# Patient Record
Sex: FEMALE | Race: OTHER | HISPANIC OR LATINO | URBAN - METROPOLITAN AREA
[De-identification: names, ages, dates, MRNs, and addresses within clinical notes are randomized per-mention and may not be internally consistent; named-entity substitution may affect disease eponyms.]

---

## 2023-09-26 ENCOUNTER — EMERGENCY (EMERGENCY)
Facility: HOSPITAL | Age: 53
LOS: 1 days | Discharge: HOME | End: 2023-09-26
Attending: EMERGENCY MEDICINE
Payer: MEDICAID

## 2023-09-26 VITALS
HEIGHT: 65 IN | OXYGEN SATURATION: 98 % | WEIGHT: 159.39 LBS | RESPIRATION RATE: 20 BRPM | DIASTOLIC BLOOD PRESSURE: 89 MMHG | HEART RATE: 83 BPM | TEMPERATURE: 98 F | SYSTOLIC BLOOD PRESSURE: 159 MMHG

## 2023-09-26 PROCEDURE — 99285 EMERGENCY DEPT VISIT HI MDM: CPT

## 2023-09-26 NOTE — ED ADULT TRIAGE NOTE - CHIEF COMPLAINT QUOTE
generalized abdominal pain with nausea x 2-3 days, bp 200/112 at 9pm, hx of htn, dialysis via Rt chest port

## 2023-09-27 VITALS
RESPIRATION RATE: 18 BRPM | OXYGEN SATURATION: 96 % | HEART RATE: 75 BPM | DIASTOLIC BLOOD PRESSURE: 88 MMHG | SYSTOLIC BLOOD PRESSURE: 165 MMHG | TEMPERATURE: 98 F

## 2023-09-27 LAB
ALBUMIN SERPL ELPH-MCNC: 3.5 G/DL — SIGNIFICANT CHANGE UP (ref 3.5–5)
ALP SERPL-CCNC: 90 U/L — SIGNIFICANT CHANGE UP (ref 40–120)
ALT FLD-CCNC: 25 U/L DA — SIGNIFICANT CHANGE UP (ref 10–60)
ANION GAP SERPL CALC-SCNC: 9 MMOL/L — SIGNIFICANT CHANGE UP (ref 5–17)
APPEARANCE UR: ABNORMAL
AST SERPL-CCNC: 16 U/L — SIGNIFICANT CHANGE UP (ref 10–40)
BACTERIA # UR AUTO: ABNORMAL /HPF
BASOPHILS # BLD AUTO: 0.01 K/UL — SIGNIFICANT CHANGE UP (ref 0–0.2)
BASOPHILS NFR BLD AUTO: 0.2 % — SIGNIFICANT CHANGE UP (ref 0–2)
BILIRUB SERPL-MCNC: 0.7 MG/DL — SIGNIFICANT CHANGE UP (ref 0.2–1.2)
BILIRUB UR-MCNC: NEGATIVE — SIGNIFICANT CHANGE UP
BUN SERPL-MCNC: 23 MG/DL — HIGH (ref 7–18)
CALCIUM SERPL-MCNC: 8.4 MG/DL — SIGNIFICANT CHANGE UP (ref 8.4–10.5)
CHLORIDE SERPL-SCNC: 101 MMOL/L — SIGNIFICANT CHANGE UP (ref 96–108)
CO2 SERPL-SCNC: 30 MMOL/L — SIGNIFICANT CHANGE UP (ref 22–31)
COLOR SPEC: YELLOW — SIGNIFICANT CHANGE UP
CREAT SERPL-MCNC: 6.56 MG/DL — HIGH (ref 0.5–1.3)
DIFF PNL FLD: ABNORMAL
EGFR: 7 ML/MIN/1.73M2 — LOW
EOSINOPHIL # BLD AUTO: 0.19 K/UL — SIGNIFICANT CHANGE UP (ref 0–0.5)
EOSINOPHIL NFR BLD AUTO: 4.1 % — SIGNIFICANT CHANGE UP (ref 0–6)
EPI CELLS # UR: PRESENT
GLUCOSE SERPL-MCNC: 109 MG/DL — HIGH (ref 70–99)
GLUCOSE UR QL: 100 MG/DL
HCT VFR BLD CALC: 29.4 % — LOW (ref 34.5–45)
HGB BLD-MCNC: 9.3 G/DL — LOW (ref 11.5–15.5)
HYALINE CASTS # UR AUTO: PRESENT
IMM GRANULOCYTES NFR BLD AUTO: 0.4 % — SIGNIFICANT CHANGE UP (ref 0–0.9)
KETONES UR-MCNC: NEGATIVE MG/DL — SIGNIFICANT CHANGE UP
LACTATE SERPL-SCNC: 0.7 MMOL/L — SIGNIFICANT CHANGE UP (ref 0.7–2)
LEUKOCYTE ESTERASE UR-ACNC: ABNORMAL
LIDOCAIN IGE QN: 42 U/L — SIGNIFICANT CHANGE UP (ref 13–75)
LYMPHOCYTES # BLD AUTO: 0.81 K/UL — LOW (ref 1–3.3)
LYMPHOCYTES # BLD AUTO: 17.5 % — SIGNIFICANT CHANGE UP (ref 13–44)
MCHC RBC-ENTMCNC: 30.2 PG — SIGNIFICANT CHANGE UP (ref 27–34)
MCHC RBC-ENTMCNC: 31.6 GM/DL — LOW (ref 32–36)
MCV RBC AUTO: 95.5 FL — SIGNIFICANT CHANGE UP (ref 80–100)
MONOCYTES # BLD AUTO: 0.45 K/UL — SIGNIFICANT CHANGE UP (ref 0–0.9)
MONOCYTES NFR BLD AUTO: 9.7 % — SIGNIFICANT CHANGE UP (ref 2–14)
NEUTROPHILS # BLD AUTO: 3.14 K/UL — SIGNIFICANT CHANGE UP (ref 1.8–7.4)
NEUTROPHILS NFR BLD AUTO: 68.1 % — SIGNIFICANT CHANGE UP (ref 43–77)
NITRITE UR-MCNC: NEGATIVE — SIGNIFICANT CHANGE UP
NRBC # BLD: 0 /100 WBCS — SIGNIFICANT CHANGE UP (ref 0–0)
PH UR: 8 — SIGNIFICANT CHANGE UP (ref 5–8)
PLATELET # BLD AUTO: 113 K/UL — LOW (ref 150–400)
POTASSIUM SERPL-MCNC: 3.6 MMOL/L — SIGNIFICANT CHANGE UP (ref 3.5–5.3)
POTASSIUM SERPL-SCNC: 3.6 MMOL/L — SIGNIFICANT CHANGE UP (ref 3.5–5.3)
PROT SERPL-MCNC: 7.1 G/DL — SIGNIFICANT CHANGE UP (ref 6–8.3)
PROT UR-MCNC: 300 MG/DL
RBC # BLD: 3.08 M/UL — LOW (ref 3.8–5.2)
RBC # FLD: 16.4 % — HIGH (ref 10.3–14.5)
RBC CASTS # UR COMP ASSIST: 4 /HPF — SIGNIFICANT CHANGE UP (ref 0–4)
SODIUM SERPL-SCNC: 140 MMOL/L — SIGNIFICANT CHANGE UP (ref 135–145)
SP GR SPEC: 1.01 — SIGNIFICANT CHANGE UP (ref 1–1.03)
TROPONIN I, HIGH SENSITIVITY RESULT: 11 NG/L — SIGNIFICANT CHANGE UP
TROPONIN I, HIGH SENSITIVITY RESULT: 14.3 NG/L — SIGNIFICANT CHANGE UP
UROBILINOGEN FLD QL: 0.2 MG/DL — SIGNIFICANT CHANGE UP (ref 0.2–1)
WBC # BLD: 4.62 K/UL — SIGNIFICANT CHANGE UP (ref 3.8–10.5)
WBC # FLD AUTO: 4.62 K/UL — SIGNIFICANT CHANGE UP (ref 3.8–10.5)
WBC UR QL: 3 /HPF — SIGNIFICANT CHANGE UP (ref 0–5)

## 2023-09-27 PROCEDURE — 83605 ASSAY OF LACTIC ACID: CPT

## 2023-09-27 PROCEDURE — 76705 ECHO EXAM OF ABDOMEN: CPT | Mod: 26

## 2023-09-27 PROCEDURE — 84484 ASSAY OF TROPONIN QUANT: CPT

## 2023-09-27 PROCEDURE — 74176 CT ABD & PELVIS W/O CONTRAST: CPT | Mod: 26,MA

## 2023-09-27 PROCEDURE — 99285 EMERGENCY DEPT VISIT HI MDM: CPT | Mod: 25

## 2023-09-27 PROCEDURE — 74176 CT ABD & PELVIS W/O CONTRAST: CPT | Mod: MA

## 2023-09-27 PROCEDURE — 85025 COMPLETE CBC W/AUTO DIFF WBC: CPT

## 2023-09-27 PROCEDURE — 36415 COLL VENOUS BLD VENIPUNCTURE: CPT

## 2023-09-27 PROCEDURE — 81001 URINALYSIS AUTO W/SCOPE: CPT

## 2023-09-27 PROCEDURE — 87086 URINE CULTURE/COLONY COUNT: CPT

## 2023-09-27 PROCEDURE — 71046 X-RAY EXAM CHEST 2 VIEWS: CPT

## 2023-09-27 PROCEDURE — 93005 ELECTROCARDIOGRAM TRACING: CPT

## 2023-09-27 PROCEDURE — 71046 X-RAY EXAM CHEST 2 VIEWS: CPT | Mod: 26

## 2023-09-27 PROCEDURE — 80053 COMPREHEN METABOLIC PANEL: CPT

## 2023-09-27 PROCEDURE — 83690 ASSAY OF LIPASE: CPT

## 2023-09-27 PROCEDURE — 76705 ECHO EXAM OF ABDOMEN: CPT

## 2023-09-27 RX ORDER — ONDANSETRON 8 MG/1
4 TABLET, FILM COATED ORAL ONCE
Refills: 0 | Status: DISCONTINUED | OUTPATIENT
Start: 2023-09-27 | End: 2023-09-30

## 2023-09-27 RX ORDER — ACETAMINOPHEN 500 MG
650 TABLET ORAL ONCE
Refills: 0 | Status: COMPLETED | OUTPATIENT
Start: 2023-09-27 | End: 2023-09-27

## 2023-09-27 RX ADMIN — Medication 650 MILLIGRAM(S): at 04:36

## 2023-09-27 RX ADMIN — Medication 650 MILLIGRAM(S): at 04:06

## 2023-09-27 NOTE — ED PROVIDER NOTE - OBJECTIVE STATEMENT
53-year-old female with a past medical history of HTN, ESRD on HD Tuesday Thursday Saturday last session of dialysis today. Completed 3-hour session removed 1.8 L presenting to ED for generalized abdominal pain and nausea.  First noticed abd pain, constipation, distention. Endorsed feeling chest discomfort and took her blood pressure noted to be hypertensive to 200s over 110s then noted difficulty breathing. Takes nifedipine 60mg x BID, and labetalol 200mg x 3 daily, compliant with these meds. Makes urine. +passing gas, last normal BM today. Denies vomiting, diarrhea, fever, chills, weakness, urinary symptoms.

## 2023-09-27 NOTE — ED PROVIDER NOTE - PHYSICAL EXAMINATION
Gen: NAD, AOx3, able to make needs known, non-toxic  Head: NCAT  HEENT: EOMI, oral mucosa moist, normal conjunctiva  Lung: CTAB, no respiratory distress, no wheezes/rhonchi/rales B/L, speaking in full sentences  CV: +rt chest wall port, RRR, no murmurs, nonpitting edema to b/l lower extremities   Abd: non distended, soft, nontender, no guarding, no CVA tenderness  MSK: no visible deformities  Neuro: Appears non focal  Skin: Warm, well perfused, no rash  Psych: normal affect

## 2023-09-27 NOTE — ED PROVIDER NOTE - PROGRESS NOTE DETAILS
NAD, well appearing. Signed off care to Dr. CARINE Sommer who will f/u repeat troponin and ultrasound and reassess, anticipate discharge if unremarkable. sono unremarkable. Patient asking for food. home with GI followup

## 2023-09-27 NOTE — ED PROVIDER NOTE - ATTENDING APP SHARED VISIT CONTRIBUTION OF CARE
[id number]. 53-year-old female with history of ESRD on HD Tuesday/Thursday/Saturday (last completed today Tuesday), HTN on nifedipine and labetalol, , presents primarily with epigastric abdominal pain up and out of her stomach x4 days. Described mostly as a gassy feeling and feeling of nausea and constipation, though had a normal bowel movement today. States the pain somewhat worsens when she takes deep breath, though contrary to NP history denies actual chest pain or shortness of breath. Denies fever, leg swelling, recent long distance travel, urinary symptoms, and all other symptoms. Reports normal stress test 1 month ago. Denies family history of CAD. On exam, afebrile, hemodynamically stable, saturating well on room air, NAD, well appearing, sitting comfortably in bed, no WOB/tachypnea, speaking full sentences, head NCAT, EOMI grossly, anicteric, MMM, no JVD, RRR, nml S1/S2, no m/r/g, lungs CTAB, no w/r/r, abd soft, mild right upper quadrant TTP, ND, nml BS, no rebound or guarding, negative [place]'s, AAO, CN's 3-12 grossly intact, BAHENA spontaneously, no leg cyanosis or edema, skin warm, well perfused, no rashes or hives. Noted nonspecific ECG abnormalities, though no prior with which to compare and patient with no actual chest pain or shortness of breath, and nml stress test 1 month ago, with low suspicion for ACS as cause of symptoms. Abdomen nonperitoneal and CT unremarkable. Some possibility of cholecystitis on CT noncontrast, and pending right upper quadrant abdominal ultrasound. Patient with no more pain and no vomiting at this time. NAD, well appearing. Signed off care to Dr. CARINE Sommer who will f/u repeat troponin and ultrasound and reassess, anticipate discharge if unremarkable.

## 2023-09-27 NOTE — ED ADULT NURSE NOTE - NSFALLUNIVINTERV_ED_ALL_ED
Bed/Stretcher in lowest position, wheels locked, appropriate side rails in place/Call bell, personal items and telephone in reach/Instruct patient to call for assistance before getting out of bed/chair/stretcher/Non-slip footwear applied when patient is off stretcher/[place] to call system/Physically safe environment - no spills, clutter or unnecessary equipment/Purposeful proactive rounding/Room/bathroom lighting operational, light cord in reach

## 2023-09-27 NOTE — ED PROVIDER NOTE - PATIENT PORTAL LINK FT
You can access the FollowMyHealth Patient Portal offered by [facility] by registering at the following website: [email or web address]. By joining [facility]’s FollowMyHealth portal, you will also be able to view your health information using other applications (apps) compatible with our system.

## 2023-09-27 NOTE — ED PROVIDER NOTE - NS ED ROS FT
GENERAL: No fever or chills  EYES: No change in vision  HEENT: No trouble swallowing or speaking  CARDIAC: +chest discomfort  PULMONARY: +SOB  GI: +ABD PAIN, +NAUSEA or no vomiting, no diarrhea or constipation  : No changes in urination  SKIN: No rashes  NEURO: No headache, no numbness  MSK: No joint pain  Otherwise as HPI or negative.

## 2023-09-27 NOTE — ED PROVIDER NOTE - NS ED ATTENDING STATEMENT MOD
This was a shared visit with the RAMONA. I reviewed and verified the documentation and independently performed the documented:

## 2023-09-27 NOTE — ED PROVIDER NOTE - CLINICAL SUMMARY MEDICAL DECISION MAKING FREE TEXT BOX
53-year-old female with a past medical history of HTN, ESRD on HD Tuesday Thursday Saturday last session of dialysis today. Completed 3-hour session removed 1.8 L presenting to ED for generalized abdominal pain and nausea.  First noticed abd pain, constipation, distention. Endorsed feeling chest discomfort and took her blood pressure noted to be hypertensive to 200s over 110s then noted difficulty breathing. Takes nifedipine 60mg x BID, and labetalol 200mg x 3 daily, compliant with these meds. Makes urine. +passing gas, last normal BM today. Denies vomiting, diarrhea, fever, chills, weakness, urinary symptoms. PE benign, no focal abd tenderness, will obtain labs r/o infection, electrolyte abnormality, UTI. Less likely ACS but will obtain trop/ekg. Do not suspect dissection pt well appearing with equal distal pulses. Wells low risk do not suspect PE. No cough, lungs CTAB do not suspect PNA. Will obtain labs, urine, CXR, EKG, analgesia, antiemetics, reassess, may require further imaging.

## 2023-09-27 NOTE — ED PROVIDER NOTE - NSFOLLOWUPINSTRUCTIONS_ED_ALL_ED_FT
Dolor abdominal en los adultos  Abdominal Pain, Adult  El dolor en el abdomen (dolor abdominal) puede tener muchas causas. A menudo, el dolor abdominal no es grave y mejora sin tratamiento o con tratamiento en la casa. Sin embargo, a veces el dolor abdominal es grave.    El médico le hará preguntas sobre jim antecedentes médicos y le hará un examen físico para tratar de determinar la causa del dolor abdominal.    Siga estas instrucciones en bardales casa:    Medicamentos    Use los medicamentos de venta jessie y los recetados solamente kim se lo haya indicado el médico.  No tome un laxante a menos que se lo haya indicado el médico.  Instrucciones generales    Controle bardales afección para detectar cualquier cambio.  Judy suficiente líquido kim para mantener la orina de color amarillo pálido.  Concurra a todas las visitas de seguimiento kim se lo haya indicado el médico. [place] es importante.  Comuníquese con un médico si:  El dolor abdominal cambia o empeora.  No tiene apetito o baja de peso sin proponérselo.  Está estreñido o tiene diarrea kathy más de 2 o 3 martha.  Tiene dolor cuando orina o defeca.  El dolor abdominal lo despierta de noche.  El dolor empeora con las comidas, después de comer o con determinados alimentos.  Tiene vómitos y no puede retener nada de lo que ingiere.  Tiene fiebre.  Observa cale en la orina.  Solicite ayuda inmediatamente si:  El dolor no desaparece tan pronto kim el médico le dijo que era esperable.  No puede dejar de vomitar.  El dolor se siente solo en zonas del abdomen, kim el lado derecho o la parte inferior izquierda del abdomen. Si se localiza en la alondra derecha, posiblemente podría tratarse de apendicitis.  Las heces son sanguinolentas o de color natalia, o de aspecto alquitranado.  Tiene dolor intenso, cólicos o distensión abdominal.  Tiene signos de deshidratación, kim los siguientes:  Orina de color oscuro, muy escasa o falta de orina.  Labios agrietados.  Sequedad de boca.  Ojos hundidos.  Somnolencia.  Debilidad.  Tiene dificultad para respirar o dolor en el pecho.  Resumen  A menudo, el dolor abdominal no es grave y mejora sin tratamiento o con tratamiento en la casa. Sin embargo, a veces el dolor abdominal es grave.  Controle bardales afección para detectar cualquier cambio.  Use los medicamentos de venta jessie y los recetados solamente kim se lo haya indicado el médico.  Comuníquese con un médico si el dolor abdominal cambia o empeora.  Busque ayuda de inmediato si tiene dolor intenso, cólicos o distensión abdominal.  Esta información no tiene kim fin reemplazar el consejo del médico. Asegúrese de hacerle al médico cualquier pregunta que tenga.

## 2023-09-28 LAB
CULTURE RESULTS: SIGNIFICANT CHANGE UP
SPECIMEN SOURCE: SIGNIFICANT CHANGE UP

## 2023-11-01 ENCOUNTER — INPATIENT (INPATIENT)
Facility: HOSPITAL | Age: 53
LOS: 6 days | Discharge: ANOTHER HOSPITAL | DRG: 305 | End: 2023-11-08
Attending: INTERNAL MEDICINE | Admitting: INTERNAL MEDICINE
Payer: MEDICAID

## 2023-11-01 VITALS
HEART RATE: 98 BPM | WEIGHT: 161.38 LBS | TEMPERATURE: 99 F | SYSTOLIC BLOOD PRESSURE: 168 MMHG | RESPIRATION RATE: 24 BRPM | HEIGHT: 65 IN | OXYGEN SATURATION: 92 % | DIASTOLIC BLOOD PRESSURE: 83 MMHG

## 2023-11-01 DIAGNOSIS — J81.0 ACUTE PULMONARY EDEMA: ICD-10-CM

## 2023-11-01 LAB
ALBUMIN SERPL ELPH-MCNC: 3.3 G/DL — LOW (ref 3.5–5)
ALBUMIN SERPL ELPH-MCNC: 3.3 G/DL — LOW (ref 3.5–5)
ALP SERPL-CCNC: 83 U/L — SIGNIFICANT CHANGE UP (ref 40–120)
ALP SERPL-CCNC: 83 U/L — SIGNIFICANT CHANGE UP (ref 40–120)
ALT FLD-CCNC: 30 U/L DA — SIGNIFICANT CHANGE UP (ref 10–60)
ALT FLD-CCNC: 30 U/L DA — SIGNIFICANT CHANGE UP (ref 10–60)
ANION GAP SERPL CALC-SCNC: 7 MMOL/L — SIGNIFICANT CHANGE UP (ref 5–17)
ANION GAP SERPL CALC-SCNC: 7 MMOL/L — SIGNIFICANT CHANGE UP (ref 5–17)
APPEARANCE UR: CLEAR — SIGNIFICANT CHANGE UP
APPEARANCE UR: CLEAR — SIGNIFICANT CHANGE UP
AST SERPL-CCNC: 18 U/L — SIGNIFICANT CHANGE UP (ref 10–40)
AST SERPL-CCNC: 18 U/L — SIGNIFICANT CHANGE UP (ref 10–40)
BACTERIA # UR AUTO: ABNORMAL /HPF
BACTERIA # UR AUTO: ABNORMAL /HPF
BASOPHILS # BLD AUTO: 0.02 K/UL — SIGNIFICANT CHANGE UP (ref 0–0.2)
BASOPHILS # BLD AUTO: 0.02 K/UL — SIGNIFICANT CHANGE UP (ref 0–0.2)
BASOPHILS NFR BLD AUTO: 0.4 % — SIGNIFICANT CHANGE UP (ref 0–2)
BASOPHILS NFR BLD AUTO: 0.4 % — SIGNIFICANT CHANGE UP (ref 0–2)
BILIRUB SERPL-MCNC: 0.9 MG/DL — SIGNIFICANT CHANGE UP (ref 0.2–1.2)
BILIRUB SERPL-MCNC: 0.9 MG/DL — SIGNIFICANT CHANGE UP (ref 0.2–1.2)
BILIRUB UR-MCNC: NEGATIVE — SIGNIFICANT CHANGE UP
BILIRUB UR-MCNC: NEGATIVE — SIGNIFICANT CHANGE UP
BUN SERPL-MCNC: 41 MG/DL — HIGH (ref 7–18)
BUN SERPL-MCNC: 41 MG/DL — HIGH (ref 7–18)
CALCIUM SERPL-MCNC: 8.8 MG/DL — SIGNIFICANT CHANGE UP (ref 8.4–10.5)
CALCIUM SERPL-MCNC: 8.8 MG/DL — SIGNIFICANT CHANGE UP (ref 8.4–10.5)
CHLORIDE SERPL-SCNC: 103 MMOL/L — SIGNIFICANT CHANGE UP (ref 96–108)
CHLORIDE SERPL-SCNC: 103 MMOL/L — SIGNIFICANT CHANGE UP (ref 96–108)
CO2 SERPL-SCNC: 28 MMOL/L — SIGNIFICANT CHANGE UP (ref 22–31)
CO2 SERPL-SCNC: 28 MMOL/L — SIGNIFICANT CHANGE UP (ref 22–31)
COLOR SPEC: YELLOW — SIGNIFICANT CHANGE UP
COLOR SPEC: YELLOW — SIGNIFICANT CHANGE UP
COMMENT - URINE: SIGNIFICANT CHANGE UP
COMMENT - URINE: SIGNIFICANT CHANGE UP
CREAT SERPL-MCNC: 8.21 MG/DL — HIGH (ref 0.5–1.3)
CREAT SERPL-MCNC: 8.21 MG/DL — HIGH (ref 0.5–1.3)
DIFF PNL FLD: ABNORMAL
DIFF PNL FLD: ABNORMAL
EGFR: 5 ML/MIN/1.73M2 — LOW
EGFR: 5 ML/MIN/1.73M2 — LOW
EOSINOPHIL # BLD AUTO: 0.25 K/UL — SIGNIFICANT CHANGE UP (ref 0–0.5)
EOSINOPHIL # BLD AUTO: 0.25 K/UL — SIGNIFICANT CHANGE UP (ref 0–0.5)
EOSINOPHIL NFR BLD AUTO: 5.4 % — SIGNIFICANT CHANGE UP (ref 0–6)
EOSINOPHIL NFR BLD AUTO: 5.4 % — SIGNIFICANT CHANGE UP (ref 0–6)
EPI CELLS # UR: PRESENT
EPI CELLS # UR: PRESENT
GLUCOSE SERPL-MCNC: 112 MG/DL — HIGH (ref 70–99)
GLUCOSE SERPL-MCNC: 112 MG/DL — HIGH (ref 70–99)
GLUCOSE UR QL: 100 MG/DL
GLUCOSE UR QL: 100 MG/DL
GRAN CASTS # UR COMP ASSIST: PRESENT
GRAN CASTS # UR COMP ASSIST: PRESENT
HCG SERPL-ACNC: 2 MIU/ML — SIGNIFICANT CHANGE UP
HCG SERPL-ACNC: 2 MIU/ML — SIGNIFICANT CHANGE UP
HCG UR QL: NEGATIVE — SIGNIFICANT CHANGE UP
HCG UR QL: NEGATIVE — SIGNIFICANT CHANGE UP
HCT VFR BLD CALC: 34.7 % — SIGNIFICANT CHANGE UP (ref 34.5–45)
HCT VFR BLD CALC: 34.7 % — SIGNIFICANT CHANGE UP (ref 34.5–45)
HGB BLD-MCNC: 10.7 G/DL — LOW (ref 11.5–15.5)
HGB BLD-MCNC: 10.7 G/DL — LOW (ref 11.5–15.5)
IMM GRANULOCYTES NFR BLD AUTO: 1.3 % — HIGH (ref 0–0.9)
IMM GRANULOCYTES NFR BLD AUTO: 1.3 % — HIGH (ref 0–0.9)
KETONES UR-MCNC: NEGATIVE MG/DL — SIGNIFICANT CHANGE UP
KETONES UR-MCNC: NEGATIVE MG/DL — SIGNIFICANT CHANGE UP
LEUKOCYTE ESTERASE UR-ACNC: ABNORMAL
LEUKOCYTE ESTERASE UR-ACNC: ABNORMAL
LIDOCAIN IGE QN: 35 U/L — SIGNIFICANT CHANGE UP (ref 13–75)
LIDOCAIN IGE QN: 35 U/L — SIGNIFICANT CHANGE UP (ref 13–75)
LYMPHOCYTES # BLD AUTO: 0.76 K/UL — LOW (ref 1–3.3)
LYMPHOCYTES # BLD AUTO: 0.76 K/UL — LOW (ref 1–3.3)
LYMPHOCYTES # BLD AUTO: 16.3 % — SIGNIFICANT CHANGE UP (ref 13–44)
LYMPHOCYTES # BLD AUTO: 16.3 % — SIGNIFICANT CHANGE UP (ref 13–44)
MAGNESIUM SERPL-MCNC: 2.2 MG/DL — SIGNIFICANT CHANGE UP (ref 1.6–2.6)
MAGNESIUM SERPL-MCNC: 2.2 MG/DL — SIGNIFICANT CHANGE UP (ref 1.6–2.6)
MCHC RBC-ENTMCNC: 28.8 PG — SIGNIFICANT CHANGE UP (ref 27–34)
MCHC RBC-ENTMCNC: 28.8 PG — SIGNIFICANT CHANGE UP (ref 27–34)
MCHC RBC-ENTMCNC: 30.8 GM/DL — LOW (ref 32–36)
MCHC RBC-ENTMCNC: 30.8 GM/DL — LOW (ref 32–36)
MCV RBC AUTO: 93.5 FL — SIGNIFICANT CHANGE UP (ref 80–100)
MCV RBC AUTO: 93.5 FL — SIGNIFICANT CHANGE UP (ref 80–100)
MONOCYTES # BLD AUTO: 0.42 K/UL — SIGNIFICANT CHANGE UP (ref 0–0.9)
MONOCYTES # BLD AUTO: 0.42 K/UL — SIGNIFICANT CHANGE UP (ref 0–0.9)
MONOCYTES NFR BLD AUTO: 9 % — SIGNIFICANT CHANGE UP (ref 2–14)
MONOCYTES NFR BLD AUTO: 9 % — SIGNIFICANT CHANGE UP (ref 2–14)
NEUTROPHILS # BLD AUTO: 3.15 K/UL — SIGNIFICANT CHANGE UP (ref 1.8–7.4)
NEUTROPHILS # BLD AUTO: 3.15 K/UL — SIGNIFICANT CHANGE UP (ref 1.8–7.4)
NEUTROPHILS NFR BLD AUTO: 67.6 % — SIGNIFICANT CHANGE UP (ref 43–77)
NEUTROPHILS NFR BLD AUTO: 67.6 % — SIGNIFICANT CHANGE UP (ref 43–77)
NITRITE UR-MCNC: NEGATIVE — SIGNIFICANT CHANGE UP
NITRITE UR-MCNC: NEGATIVE — SIGNIFICANT CHANGE UP
NRBC # BLD: 0 /100 WBCS — SIGNIFICANT CHANGE UP (ref 0–0)
NRBC # BLD: 0 /100 WBCS — SIGNIFICANT CHANGE UP (ref 0–0)
NT-PROBNP SERPL-SCNC: HIGH PG/ML (ref 0–125)
NT-PROBNP SERPL-SCNC: HIGH PG/ML (ref 0–125)
PH UR: 8 — SIGNIFICANT CHANGE UP (ref 5–8)
PH UR: 8 — SIGNIFICANT CHANGE UP (ref 5–8)
PLATELET # BLD AUTO: 123 K/UL — LOW (ref 150–400)
PLATELET # BLD AUTO: 123 K/UL — LOW (ref 150–400)
POTASSIUM SERPL-MCNC: 4.4 MMOL/L — SIGNIFICANT CHANGE UP (ref 3.5–5.3)
POTASSIUM SERPL-MCNC: 4.4 MMOL/L — SIGNIFICANT CHANGE UP (ref 3.5–5.3)
POTASSIUM SERPL-SCNC: 4.4 MMOL/L — SIGNIFICANT CHANGE UP (ref 3.5–5.3)
POTASSIUM SERPL-SCNC: 4.4 MMOL/L — SIGNIFICANT CHANGE UP (ref 3.5–5.3)
PROT SERPL-MCNC: 6.8 G/DL — SIGNIFICANT CHANGE UP (ref 6–8.3)
PROT SERPL-MCNC: 6.8 G/DL — SIGNIFICANT CHANGE UP (ref 6–8.3)
PROT UR-MCNC: 300 MG/DL
PROT UR-MCNC: 300 MG/DL
RBC # BLD: 3.71 M/UL — LOW (ref 3.8–5.2)
RBC # BLD: 3.71 M/UL — LOW (ref 3.8–5.2)
RBC # FLD: 15.9 % — HIGH (ref 10.3–14.5)
RBC # FLD: 15.9 % — HIGH (ref 10.3–14.5)
RBC CASTS # UR COMP ASSIST: 12 /HPF — HIGH (ref 0–4)
RBC CASTS # UR COMP ASSIST: 12 /HPF — HIGH (ref 0–4)
SODIUM SERPL-SCNC: 138 MMOL/L — SIGNIFICANT CHANGE UP (ref 135–145)
SODIUM SERPL-SCNC: 138 MMOL/L — SIGNIFICANT CHANGE UP (ref 135–145)
SP GR SPEC: 1.01 — SIGNIFICANT CHANGE UP (ref 1–1.03)
SP GR SPEC: 1.01 — SIGNIFICANT CHANGE UP (ref 1–1.03)
TROPONIN I, HIGH SENSITIVITY RESULT: 24.2 NG/L — SIGNIFICANT CHANGE UP
TROPONIN I, HIGH SENSITIVITY RESULT: 24.2 NG/L — SIGNIFICANT CHANGE UP
UROBILINOGEN FLD QL: 0.2 MG/DL — SIGNIFICANT CHANGE UP (ref 0.2–1)
UROBILINOGEN FLD QL: 0.2 MG/DL — SIGNIFICANT CHANGE UP (ref 0.2–1)
WBC # BLD: 4.66 K/UL — SIGNIFICANT CHANGE UP (ref 3.8–10.5)
WBC # BLD: 4.66 K/UL — SIGNIFICANT CHANGE UP (ref 3.8–10.5)
WBC # FLD AUTO: 4.66 K/UL — SIGNIFICANT CHANGE UP (ref 3.8–10.5)
WBC # FLD AUTO: 4.66 K/UL — SIGNIFICANT CHANGE UP (ref 3.8–10.5)
WBC UR QL: 8 /HPF — HIGH (ref 0–5)
WBC UR QL: 8 /HPF — HIGH (ref 0–5)

## 2023-11-01 PROCEDURE — 71045 X-RAY EXAM CHEST 1 VIEW: CPT | Mod: 26

## 2023-11-01 PROCEDURE — 99285 EMERGENCY DEPT VISIT HI MDM: CPT

## 2023-11-01 PROCEDURE — 71250 CT THORAX DX C-: CPT | Mod: 26,MA

## 2023-11-01 RX ORDER — FUROSEMIDE 40 MG
80 TABLET ORAL
Refills: 0 | Status: DISCONTINUED | OUTPATIENT
Start: 2023-11-02 | End: 2023-11-02

## 2023-11-01 RX ORDER — FUROSEMIDE 40 MG
80 TABLET ORAL ONCE
Refills: 0 | Status: COMPLETED | OUTPATIENT
Start: 2023-11-01 | End: 2023-11-01

## 2023-11-01 RX ORDER — MORPHINE SULFATE 50 MG/1
4 CAPSULE, EXTENDED RELEASE ORAL ONCE
Refills: 0 | Status: DISCONTINUED | OUTPATIENT
Start: 2023-11-01 | End: 2023-11-01

## 2023-11-01 RX ADMIN — Medication 80 MILLIGRAM(S): at 21:51

## 2023-11-01 NOTE — ED ADULT NURSE NOTE - OBJECTIVE STATEMENT
pt is here for difficulty breathing.  pt stated that difficulty breathing x 5 days, dry cough x10 days, dialysis port to right chest wall T/TH/SAT, mild chest pain with cough, denied fever or chills,

## 2023-11-01 NOTE — ED ADULT NURSE NOTE - NSFALLUNIVINTERV_ED_ALL_ED
Bed/Stretcher in lowest position, wheels locked, appropriate side rails in place/Call bell, personal items and telephone in reach/Instruct patient to call for assistance before getting out of bed/chair/stretcher/Non-slip footwear applied when patient is off stretcher/[place] to call system/Physically safe environment - no spills, clutter or unnecessary equipment/Purposeful proactive rounding/Room/bathroom lighting operational, light cord in reach

## 2023-11-01 NOTE — ED PROVIDER NOTE - ALLERGIC/IMMUNOLOGIC NEGATIVE STATEMENT, MLM
No no dermatitis, no environmental allergies, no food allergies, no immunosuppressive disorder, and no pruritus.

## 2023-11-01 NOTE — ED ADULT NURSE NOTE - TEMPLATE LIST FOR HEAD TO TOE ASSESSMENT
Called number on chart to contact Ysabel Tiwari, pt son. Lady that answered phone said that he was not there. Asked this person to please have Caleb Fierro call me about his mother. She states, \"I will if I see him. \"  Explained that it was important that I speak with him regarding us hearing back from [facility] about transfer. Respiratory

## 2023-11-01 NOTE — ED PROVIDER NOTE - OBJECTIVE STATEMENT
Patient is a 54 y/o female with a pertinent PMHx of HTN, ESRD, dialysis presents to the ED c/o SOB and nonproductive cough for the past two weeks. Patient also reports that her blood pressure is out of control despite taking metoprolol and metformin. Patient had intermittent left sided chest pain. Patient denies fever, headache, vomiting, diarrhea, and all other acute complaints.

## 2023-11-01 NOTE — ED PROVIDER NOTE - CLINICAL SUMMARY MEDICAL DECISION MAKING FREE TEXT BOX
Patient is a 52 y/o female diarrhea patient here for SOB. Concern for fluid overload, pneumonia, or ACS. Will perform chest x-ray, labs, EKG, and reassess.

## 2023-11-02 DIAGNOSIS — N18.6 END STAGE RENAL DISEASE: ICD-10-CM

## 2023-11-02 DIAGNOSIS — R06.00 DYSPNEA, UNSPECIFIED: ICD-10-CM

## 2023-11-02 DIAGNOSIS — Z29.9 ENCOUNTER FOR PROPHYLACTIC MEASURES, UNSPECIFIED: ICD-10-CM

## 2023-11-02 DIAGNOSIS — J81.0 ACUTE PULMONARY EDEMA: ICD-10-CM

## 2023-11-02 DIAGNOSIS — N39.0 URINARY TRACT INFECTION, SITE NOT SPECIFIED: ICD-10-CM

## 2023-11-02 DIAGNOSIS — I30.9 ACUTE PERICARDITIS, UNSPECIFIED: ICD-10-CM

## 2023-11-02 DIAGNOSIS — I10 ESSENTIAL (PRIMARY) HYPERTENSION: ICD-10-CM

## 2023-11-02 LAB
ANION GAP SERPL CALC-SCNC: 9 MMOL/L — SIGNIFICANT CHANGE UP (ref 5–17)
ANION GAP SERPL CALC-SCNC: 9 MMOL/L — SIGNIFICANT CHANGE UP (ref 5–17)
BASOPHILS # BLD AUTO: 0.02 K/UL — SIGNIFICANT CHANGE UP (ref 0–0.2)
BASOPHILS # BLD AUTO: 0.02 K/UL — SIGNIFICANT CHANGE UP (ref 0–0.2)
BASOPHILS NFR BLD AUTO: 0.5 % — SIGNIFICANT CHANGE UP (ref 0–2)
BASOPHILS NFR BLD AUTO: 0.5 % — SIGNIFICANT CHANGE UP (ref 0–2)
BUN SERPL-MCNC: 48 MG/DL — HIGH (ref 7–18)
BUN SERPL-MCNC: 48 MG/DL — HIGH (ref 7–18)
CALCIUM SERPL-MCNC: 8.3 MG/DL — LOW (ref 8.4–10.5)
CALCIUM SERPL-MCNC: 8.3 MG/DL — LOW (ref 8.4–10.5)
CHLORIDE SERPL-SCNC: 104 MMOL/L — SIGNIFICANT CHANGE UP (ref 96–108)
CHLORIDE SERPL-SCNC: 104 MMOL/L — SIGNIFICANT CHANGE UP (ref 96–108)
CO2 SERPL-SCNC: 28 MMOL/L — SIGNIFICANT CHANGE UP (ref 22–31)
CO2 SERPL-SCNC: 28 MMOL/L — SIGNIFICANT CHANGE UP (ref 22–31)
CREAT SERPL-MCNC: 9.62 MG/DL — HIGH (ref 0.5–1.3)
CREAT SERPL-MCNC: 9.62 MG/DL — HIGH (ref 0.5–1.3)
EGFR: 4 ML/MIN/1.73M2 — LOW
EGFR: 4 ML/MIN/1.73M2 — LOW
EOSINOPHIL # BLD AUTO: 0.18 K/UL — SIGNIFICANT CHANGE UP (ref 0–0.5)
EOSINOPHIL # BLD AUTO: 0.18 K/UL — SIGNIFICANT CHANGE UP (ref 0–0.5)
EOSINOPHIL NFR BLD AUTO: 4.1 % — SIGNIFICANT CHANGE UP (ref 0–6)
EOSINOPHIL NFR BLD AUTO: 4.1 % — SIGNIFICANT CHANGE UP (ref 0–6)
GLUCOSE SERPL-MCNC: 93 MG/DL — SIGNIFICANT CHANGE UP (ref 70–99)
GLUCOSE SERPL-MCNC: 93 MG/DL — SIGNIFICANT CHANGE UP (ref 70–99)
HBV SURFACE AG SER-ACNC: SIGNIFICANT CHANGE UP
HBV SURFACE AG SER-ACNC: SIGNIFICANT CHANGE UP
HCT VFR BLD CALC: 31 % — LOW (ref 34.5–45)
HCT VFR BLD CALC: 31 % — LOW (ref 34.5–45)
HGB BLD-MCNC: 9.7 G/DL — LOW (ref 11.5–15.5)
HGB BLD-MCNC: 9.7 G/DL — LOW (ref 11.5–15.5)
IMM GRANULOCYTES NFR BLD AUTO: 1.1 % — HIGH (ref 0–0.9)
IMM GRANULOCYTES NFR BLD AUTO: 1.1 % — HIGH (ref 0–0.9)
LYMPHOCYTES # BLD AUTO: 1.02 K/UL — SIGNIFICANT CHANGE UP (ref 1–3.3)
LYMPHOCYTES # BLD AUTO: 1.02 K/UL — SIGNIFICANT CHANGE UP (ref 1–3.3)
LYMPHOCYTES # BLD AUTO: 23.2 % — SIGNIFICANT CHANGE UP (ref 13–44)
LYMPHOCYTES # BLD AUTO: 23.2 % — SIGNIFICANT CHANGE UP (ref 13–44)
MAGNESIUM SERPL-MCNC: 2.4 MG/DL — SIGNIFICANT CHANGE UP (ref 1.6–2.6)
MAGNESIUM SERPL-MCNC: 2.4 MG/DL — SIGNIFICANT CHANGE UP (ref 1.6–2.6)
MCHC RBC-ENTMCNC: 28.9 PG — SIGNIFICANT CHANGE UP (ref 27–34)
MCHC RBC-ENTMCNC: 28.9 PG — SIGNIFICANT CHANGE UP (ref 27–34)
MCHC RBC-ENTMCNC: 31.3 GM/DL — LOW (ref 32–36)
MCHC RBC-ENTMCNC: 31.3 GM/DL — LOW (ref 32–36)
MCV RBC AUTO: 92.3 FL — SIGNIFICANT CHANGE UP (ref 80–100)
MCV RBC AUTO: 92.3 FL — SIGNIFICANT CHANGE UP (ref 80–100)
MONOCYTES # BLD AUTO: 0.45 K/UL — SIGNIFICANT CHANGE UP (ref 0–0.9)
MONOCYTES # BLD AUTO: 0.45 K/UL — SIGNIFICANT CHANGE UP (ref 0–0.9)
MONOCYTES NFR BLD AUTO: 10.3 % — SIGNIFICANT CHANGE UP (ref 2–14)
MONOCYTES NFR BLD AUTO: 10.3 % — SIGNIFICANT CHANGE UP (ref 2–14)
NEUTROPHILS # BLD AUTO: 2.67 K/UL — SIGNIFICANT CHANGE UP (ref 1.8–7.4)
NEUTROPHILS # BLD AUTO: 2.67 K/UL — SIGNIFICANT CHANGE UP (ref 1.8–7.4)
NEUTROPHILS NFR BLD AUTO: 60.8 % — SIGNIFICANT CHANGE UP (ref 43–77)
NEUTROPHILS NFR BLD AUTO: 60.8 % — SIGNIFICANT CHANGE UP (ref 43–77)
NRBC # BLD: 0 /100 WBCS — SIGNIFICANT CHANGE UP (ref 0–0)
NRBC # BLD: 0 /100 WBCS — SIGNIFICANT CHANGE UP (ref 0–0)
PHOSPHATE SERPL-MCNC: 4.8 MG/DL — HIGH (ref 2.5–4.5)
PHOSPHATE SERPL-MCNC: 4.8 MG/DL — HIGH (ref 2.5–4.5)
PLATELET # BLD AUTO: 115 K/UL — LOW (ref 150–400)
PLATELET # BLD AUTO: 115 K/UL — LOW (ref 150–400)
POTASSIUM SERPL-MCNC: 3.8 MMOL/L — SIGNIFICANT CHANGE UP (ref 3.5–5.3)
POTASSIUM SERPL-MCNC: 3.8 MMOL/L — SIGNIFICANT CHANGE UP (ref 3.5–5.3)
POTASSIUM SERPL-SCNC: 3.8 MMOL/L — SIGNIFICANT CHANGE UP (ref 3.5–5.3)
POTASSIUM SERPL-SCNC: 3.8 MMOL/L — SIGNIFICANT CHANGE UP (ref 3.5–5.3)
RBC # BLD: 3.36 M/UL — LOW (ref 3.8–5.2)
RBC # BLD: 3.36 M/UL — LOW (ref 3.8–5.2)
RBC # FLD: 15.9 % — HIGH (ref 10.3–14.5)
RBC # FLD: 15.9 % — HIGH (ref 10.3–14.5)
SODIUM SERPL-SCNC: 141 MMOL/L — SIGNIFICANT CHANGE UP (ref 135–145)
SODIUM SERPL-SCNC: 141 MMOL/L — SIGNIFICANT CHANGE UP (ref 135–145)
TROPONIN I, HIGH SENSITIVITY RESULT: 21.2 NG/L — SIGNIFICANT CHANGE UP
TROPONIN I, HIGH SENSITIVITY RESULT: 21.2 NG/L — SIGNIFICANT CHANGE UP
WBC # BLD: 4.39 K/UL — SIGNIFICANT CHANGE UP (ref 3.8–10.5)
WBC # BLD: 4.39 K/UL — SIGNIFICANT CHANGE UP (ref 3.8–10.5)
WBC # FLD AUTO: 4.39 K/UL — SIGNIFICANT CHANGE UP (ref 3.8–10.5)
WBC # FLD AUTO: 4.39 K/UL — SIGNIFICANT CHANGE UP (ref 3.8–10.5)

## 2023-11-02 PROCEDURE — 93010 ELECTROCARDIOGRAM REPORT: CPT

## 2023-11-02 RX ORDER — ONDANSETRON 8 MG/1
4 TABLET, FILM COATED ORAL EVERY 8 HOURS
Refills: 0 | Status: DISCONTINUED | OUTPATIENT
Start: 2023-11-02 | End: 2023-11-08

## 2023-11-02 RX ORDER — LABETALOL HCL 100 MG
300 TABLET ORAL EVERY 8 HOURS
Refills: 0 | Status: DISCONTINUED | OUTPATIENT
Start: 2023-11-02 | End: 2023-11-08

## 2023-11-02 RX ORDER — NIFEDIPINE 30 MG
60 TABLET, EXTENDED RELEASE 24 HR ORAL
Refills: 0 | Status: DISCONTINUED | OUTPATIENT
Start: 2023-11-02 | End: 2023-11-08

## 2023-11-02 RX ORDER — LABETALOL HCL 100 MG
100 TABLET ORAL THREE TIMES A DAY
Refills: 0 | Status: DISCONTINUED | OUTPATIENT
Start: 2023-11-02 | End: 2023-11-02

## 2023-11-02 RX ORDER — INFLUENZA VIRUS VACCINE 15; 15; 15; 15 UG/.5ML; UG/.5ML; UG/.5ML; UG/.5ML
0.5 SUSPENSION INTRAMUSCULAR ONCE
Refills: 0 | Status: DISCONTINUED | OUTPATIENT
Start: 2023-11-02 | End: 2023-11-08

## 2023-11-02 RX ORDER — FUROSEMIDE 40 MG
80 TABLET ORAL
Refills: 0 | Status: DISCONTINUED | OUTPATIENT
Start: 2023-11-02 | End: 2023-11-08

## 2023-11-02 RX ORDER — HEPARIN SODIUM 5000 [USP'U]/ML
5000 INJECTION INTRAVENOUS; SUBCUTANEOUS EVERY 8 HOURS
Refills: 0 | Status: DISCONTINUED | OUTPATIENT
Start: 2023-11-02 | End: 2023-11-08

## 2023-11-02 RX ORDER — ACETAMINOPHEN 500 MG
650 TABLET ORAL EVERY 6 HOURS
Refills: 0 | Status: DISCONTINUED | OUTPATIENT
Start: 2023-11-02 | End: 2023-11-08

## 2023-11-02 RX ORDER — HYDRALAZINE HCL 50 MG
25 TABLET ORAL EVERY 8 HOURS
Refills: 0 | Status: DISCONTINUED | OUTPATIENT
Start: 2023-11-02 | End: 2023-11-08

## 2023-11-02 RX ADMIN — Medication 100 MILLIGRAM(S): at 03:50

## 2023-11-02 RX ADMIN — Medication 300 MILLIGRAM(S): at 15:01

## 2023-11-02 RX ADMIN — HEPARIN SODIUM 5000 UNIT(S): 5000 INJECTION INTRAVENOUS; SUBCUTANEOUS at 22:44

## 2023-11-02 RX ADMIN — Medication 25 MILLIGRAM(S): at 15:01

## 2023-11-02 RX ADMIN — Medication 60 MILLIGRAM(S): at 05:32

## 2023-11-02 RX ADMIN — HEPARIN SODIUM 5000 UNIT(S): 5000 INJECTION INTRAVENOUS; SUBCUTANEOUS at 05:32

## 2023-11-02 RX ADMIN — Medication 100 MILLIGRAM(S): at 15:01

## 2023-11-02 RX ADMIN — Medication 80 MILLIGRAM(S): at 05:32

## 2023-11-02 RX ADMIN — Medication 80 MILLIGRAM(S): at 15:01

## 2023-11-02 RX ADMIN — Medication 100 MILLIGRAM(S): at 22:45

## 2023-11-02 RX ADMIN — HEPARIN SODIUM 5000 UNIT(S): 5000 INJECTION INTRAVENOUS; SUBCUTANEOUS at 15:01

## 2023-11-02 RX ADMIN — Medication 25 MILLIGRAM(S): at 12:45

## 2023-11-02 RX ADMIN — Medication 300 MILLIGRAM(S): at 22:45

## 2023-11-02 RX ADMIN — Medication 60 MILLIGRAM(S): at 19:14

## 2023-11-02 RX ADMIN — Medication 25 MILLIGRAM(S): at 22:44

## 2023-11-02 NOTE — PROGRESS NOTE ADULT - ASSESSMENT
This is a 52 y/o female with pmhx of HTN, ESRD on HD presenting to the ED for 2 week shortness of breath and dry cough. Patient states she developed shortness of breath at rest associated with dry cough that is severe, comes in bouts causing chest tightness. She took some over the counter cough medication that did not help. She endorses dysuria for the past 3 days. She denies any fevers, sore throat, chest pian, palpitations, abdominal pain, N/V/D. No sick contacts or recent travel. She notes increase in her lower extremity. Admitted for dyspnea 2/2 pulmonary edema   In ED  vitals: BP: 184/93, HR: 97 on RA  s/p Lasix 80mg, morphine 4mg,   Chest CT: b/l ground glass and pulm edema   Cr: 8.21, BNP: 30k  Ua(+)

## 2023-11-02 NOTE — PROGRESS NOTE ADULT - PROBLEM SELECTOR PLAN 1
p/w dyspnea for 2 weeks, LE edema  dyspnea 2/2 fluid overload  Chest CT: b/l ground glass 2/2  pulm edema   BNP: 30 k. trop nl   s/p 80 mg IV lasix  c/w lasix 80 mg IV BID as per Dr. Calle  f/u echo   strict intake and out put, daily weights

## 2023-11-02 NOTE — H&P ADULT - PROBLEM SELECTOR PLAN 2
ESRD on HD at [place] follows up with DR hernandez   planned for HD [facility]   c/w lasix 80 mg IV BID same as above

## 2023-11-02 NOTE — PROGRESS NOTE ADULT - PROBLEM SELECTOR PLAN 4
ESRD on HD at [place] follows up with DR hernandez   planned for HD [facility]   c/w lasix 80 mg IV BID

## 2023-11-02 NOTE — H&P ADULT - ASSESSMENT
This is a 54 y/o female with pmhx of HTN, ESRD on HD presenting to the ED for 2 week shortness of breath and dry cough. Patient states she developed shortness of breath at rest associated with dry cough that is severe, comes in bouts causing chest tightness. She took some over the counter cough medication that did not help. She endorses dysuria for the past 3 days. She denies any fevers, sore throat, chest pian, palpitations, abdominal pain, N/V/D. No sick contacts or recent travel. She notes increase in her lower extremity. Admitted for dyspnea   In ED  vitals: BP: 184/93, HR: 97 on RA  s/p Lasix 80mg, morphine 4mg,   Chest CT: b/l ground glass and pulm edema   Cr: 8.21, BNP: 30k  Ua(+) This is a 52 y/o female with pmhx of HTN, ESRD on HD presenting to the ED for 2 week shortness of breath and dry cough. Patient states she developed shortness of breath at rest associated with dry cough that is severe, comes in bouts causing chest tightness. She took some over the counter cough medication that did not help. She endorses dysuria for the past 3 days. She denies any fevers, sore throat, chest pian, palpitations, abdominal pain, N/V/D. No sick contacts or recent travel. She notes increase in her lower extremity. Admitted for dyspnea 2/2 pulmonary edema   In ED  vitals: BP: 184/93, HR: 97 on RA  s/p Lasix 80mg, morphine 4mg,   Chest CT: b/l ground glass and pulm edema   Cr: 8.21, BNP: 30k  Ua(+)

## 2023-11-02 NOTE — H&P ADULT - NSCORESITESY/N_GEN_A_CORE_RD
Yes pt BIBA prenotification of a stroke code. [place] [employer] [place] lot and developed RUE/RLE weakness and full dysarthria. No trauma noted. Pt lethargic at this time

## 2023-11-02 NOTE — CONSULT NOTE ADULT - SUBJECTIVE AND OBJECTIVE BOX
Date of Service  11-02-23 @ 11:41    CHIEF COMPLAINT:Patient is a 53y old  Female who presents with a chief complaint of Dyspnea .      HPI:  This is a 54 y/o female with pmhx of HTN, ESRD on HD ID  [id number] presenting to the ED for 2 week shortness of breath and dry cough. Patient states she developed shortness of breath at rest associated with dry cough that is severe, comes in bouts causing chest tightness. She took some over the counter cough medication that did not help. She endorses dysuria for the past 3 days. She denies any fevers, sore throat, chest pian, palpitations, abdominal pain, N/V/D. No sick contacts or recent travel. She notes increase in her lower extremity.     In ED  vitals: BP: 184/93, HR: 97 on RA  s/p Lasix 80mg, morphine 4mg,   Chest CT: b/l ground glass and pulm edema   Cr: 8.21, BNP: 30k  Ua(+) (02 Nov 2023 00:10)      PAST MEDICAL & SURGICAL HISTORY:  ESRD    HTN    S/P PC        MEDICATIONS  (STANDING):  furosemide   Injectable 80 milliGRAM(s) IV Push two times a day  heparin   Injectable 5000 Unit(s) SubCutaneous every 8 hours  influenza   Vaccine 0.5 milliLiter(s) IntraMuscular once  labetalol 300 milliGRAM(s) Oral every 8 hours  NIFEdipine XL 60 milliGRAM(s) Oral two times a day    MEDICATIONS  (PRN):  acetaminophen     Tablet .. 650 milliGRAM(s) Oral every 6 hours PRN Temp greater or equal to 38C (100.4F), Mild Pain (1 - 3)  ondansetron Injectable 4 milliGRAM(s) IV Push every 8 hours PRN Nausea and/or Vomiting      FAMILY HISTORY:No hx of CAD      SOCIAL HISTORY:    [x ] Non-smoker    [x ] Alcohol-denies    Allergies    No Known Allergies    Intolerances    	    REVIEW OF SYSTEMS:  CONSTITUTIONAL: No fever, weight loss, or fatigue  EYES: No eye pain, visual disturbances, or discharge  ENT:  No difficulty hearing, tinnitus, vertigo; No sinus or throat pain  NECK: No pain or stiffness  RESPIRATORY: No cough, wheezing, chills or hemoptysis; + Shortness of Breath  CARDIOVASCULAR: No chest pain, palpitations, passing out, dizziness, or leg swelling  GASTROINTESTINAL: No abdominal or epigastric pain. No nausea, vomiting, or hematemesis; No diarrhea or constipation. No melena or hematochezia.  GENITOURINARY: No dysuria, frequency, hematuria, or incontinence  NEUROLOGICAL: No headaches, memory loss, loss of strength, numbness, or tremors  SKIN: No itching, burning, rashes, or lesions   LYMPH Nodes: No enlarged glands  ENDOCRINE: No heat or cold intolerance; No hair loss  MUSCULOSKELETAL: No joint pain or swelling; No muscle, back, or extremity pain  PSYCHIATRIC: No depression, anxiety, mood swings, or difficulty sleeping  HEME/LYMPH: No easy bruising, or bleeding gums  ALLERGY AND IMMUNOLOGIC: No hives or eczema	        PHYSICAL EXAM:  T(C): 36.7 (11-02-23 @ 05:15), Max: 37 (11-01-23 @ 16:56)  HR: 86 (11-02-23 @ 05:15) (85 - 98)  BP: 171/89 (11-02-23 @ 05:15) (156/83 - 184/93)  RR: 17 (11-02-23 @ 05:15) (17 - 24)  SpO2: 96% (11-02-23 @ 05:15) (92% - 99%)  Wt(kg): --  I&O's Summary      Appearance: Normal	  HEENT:   Normal oral mucosa, PERRL, EOMI	  Lymphatic: No lymphadenopathy  Cardiovascular: Normal S1 S2, No JVD, No murmurs, No edema  Respiratory: B/L ronchi	  Psychiatry: A & O x 3, Mood & affect appropriate  Gastrointestinal:  Soft, Non-tender, + BS	  Skin: No rashes, No ecchymoses, No cyanosis	  Neurologic: Non-focal  Extremities: Normal range of motion, No clubbing, cyanosis or edema  Vascular: Peripheral pulses palpable 2+ bilaterally    	    ECG:    Normal sinus rhythm  T wave abnormality, consider lateral ischemia      	  	  LABS:	 	    Troponin I, High Sensitivity Result: 21.2 ng/L (11-02-23 @ 03:48)  Troponin I, High Sensitivity Result: 24.2 ng/L (11-01-23 @ 18:20)                          9.7    4.39  )-----------( 115      ( 02 Nov 2023 05:09 )             31.0     11-02    141  |  104  |  48<H>  ----------------------------<  93  3.8   |  28  |  9.62<H>    Ca    8.3<L>      02 Nov 2023 05:09  Phos  4.8     11-02  Mg     2.4     11-02    TPro  6.8  /  Alb  3.3<L>  /  TBili  0.9  /  DBili  x   /  AST  18  /  ALT  30  /  AlkPhos  83  11-01      ACC: [id number] EXAM:  CT CHEST   ORDERED BY: AUDREY GARCIA     PROCEDURE DATE:  11/01/2023          INTERPRETATION:  CT CHEST WITHOUT CONTRAST    INDICATION: Shortness of breath..    TECHNIQUE: Unenhanced helical images were obtained of the chest.Coronal   and sagittal images were reconstructed. Maximum intensity projection   images were generated.        COMPARISON: Graft chest 11/1/2023.    FINDINGS:    Tubes/Lines: Catheter via right-sided approach in the right atrium    Lungs, airways andpleura: Bilateral pleural effusions, passive   atelectasis, septal thickening and groundglass opacities.    The airways are unremarkable. No pneumothorax.    Mediastinum: The heart is enlarged. In particular, the atria are   enlarged. Small pericardial effusion. Coronary artery calcifications. The   aorta is normal in caliber. Aortic calcifications.    Upper Abdomen: The upper abdomen is normal.    Bones And Soft Tissues: The bones are unremarkable.  The soft tissues are   unremarkable.      IMPRESSION:    1.  Bilateral septal thickening and groundglass opacities can be   secondary to pulmonary edema.  2.  Bilateral pleural effusions and lower lobe passive atelectasis.  3.  Small pericardial effusion.    --- End of Report ---            JEWEL FINK MD; Attending Radiologist  This document has been electronically signed. Nov 1 2023  7:23PM    < end of copied text >

## 2023-11-02 NOTE — PATIENT PROFILE ADULT - FALL HARM RISK - HARM RISK INTERVENTIONS

## 2023-11-02 NOTE — CONSULT NOTE ADULT - ASSESSMENT
Paged Device RN at [phone]    Patient: Rohit Garvey  MRN:[id number]  Procedure: EP Ablation VT  Device: ICD    Patient is in 3c, bay 19, for pre-procedure of the above surgery. Patient has ICD. Please advise.    Marshal Paz [phone]    Response from Device RN at [phone] from Chelita RN:    Will see him in EP lab for pre and post   1 ESRD on HD (T/TH/SAT)  -HD today and pt can't tolerate more than 2L per HD and HD orders were written and discussed with dialysis nurse.   -Hemodialysis Renal Diet and Fluid restriction to 1L/day  -Adjust meds to eGFR and avoid IV Gadolinium contrast,NSAIDs, and phosphate enema.  -Monitor Electrolytes daily.  2. HTN:   -bp is elevated.   -continue bp meds  -titrate bp meds to keep sbp >110 and < 130  3. Anemia of ESRD:  -hold Epogen until bp is controlled.   -F/u CBC daily  -transfuse if HB < 7.0.  4. Mineral Bone Disease:  -continue phoslo tid  -monitor phos  5. Sob due to fluid overload:  -add lasix 80mg iv bid   -ECHO pending  -UF during HD.     Discussed with patient via  in detail regarding the renal plan and care.   1 ESRD on HD (T/TH/SAT)  -HD today and pt can't tolerate more than 2L per HD and HD orders were written and discussed with dialysis nurse.   -Hemodialysis Renal Diet and Fluid restriction to 1L/day  -Adjust meds to eGFR and avoid IV Gadolinium contrast,NSAIDs, and phosphate enema.  -Monitor Electrolytes daily.  2. HTN:   -bp is elevated.   -increase labetolol 300mg tid; not sure if nifedipine is causing swelling of the legs.   -titrate bp meds to keep sbp >110 and < 130  3. Anemia of ESRD:  -hold Epogen until bp is controlled.   -F/u CBC daily  -transfuse if HB < 7.0.  4. Mineral Bone Disease:  -continue phoslo tid  -monitor phos  5. Sob due to fluid overload:  -add lasix 80mg iv bid   -ECHO pending  -UF during HD.   -Consult Pulmon (Dr. Goodwin) and Cardio (Dr. Hernandez)    Discussed with patient via  in detail regarding the renal plan and care.

## 2023-11-02 NOTE — CONSULT NOTE ADULT - SUBJECTIVE AND OBJECTIVE BOX
Time of visit:    CHIEF COMPLAINT: Patient is a 53y old  Female who presents with a chief complaint of Dyspnea (02 Nov 2023 09:37)      HPI:  This is a 52 y/o female with pmhx of HTN, ESRD on HD ID  [id number] presenting to the ED for 2 week shortness of breath and dry cough. Patient states she developed shortness of breath at rest associated with dry cough that is severe, comes in bouts causing chest tightness. She took some over the counter cough medication that did not help. She endorses dysuria for the past 3 days. She denies any fevers, sore throat, chest pian, palpitations, abdominal pain, N/V/D. No sick contacts or recent travel. She notes increase in her lower extremity.     In ED  vitals: BP: 184/93, HR: 97 on RA  s/p Lasix 80mg, morphine 4mg,   Chest CT: b/l ground glass and pulm edema   Cr: 8.21, BNP: 30k  Ua(+) (02 Nov 2023 00:10)   Patient seen and examined.     PAST MEDICAL & SURGICAL HISTORY:  No pertinent past medical history      No significant past surgical history          Allergies    No Known Allergies    Intolerances        MEDICATIONS  (STANDING):  furosemide   Injectable 80 milliGRAM(s) IV Push two times a day  heparin   Injectable 5000 Unit(s) SubCutaneous every 8 hours  influenza   Vaccine 0.5 milliLiter(s) IntraMuscular once  labetalol 300 milliGRAM(s) Oral every 8 hours  NIFEdipine XL 60 milliGRAM(s) Oral two times a day      MEDICATIONS  (PRN):  acetaminophen     Tablet .. 650 milliGRAM(s) Oral every 6 hours PRN Temp greater or equal to 38C (100.4F), Mild Pain (1 - 3)  ondansetron Injectable 4 milliGRAM(s) IV Push every 8 hours PRN Nausea and/or Vomiting   Medications up to date at time of exam.    Medications up to date at time of exam.    FAMILY HISTORY:      SOCIAL HISTORY  Smoking History: [   ] smoking/smoke exposure, [   ] former smoker  Living Condition: [   ] apartment, [   ] private house  Work History:   Travel History: denies recent travel  Illicit Substance Use: denies  Alcohol Use: denies    REVIEW OF SYSTEMS:    CONSTITUTIONAL:  denies fevers, chills, sweats, weight loss    HEENT:  denies diplopia or blurred vision, sore throat or runny nose.    CARDIOVASCULAR:  denies pressure, squeezing, tightness, or heaviness about the chest; no palpitations.    RESPIRATORY:  denies SOB, cough, MUIR, wheezing.    GASTROINTESTINAL:  denies abdominal pain, nausea, vomiting or diarrhea.    GENITOURINARY: denies dysuria, frequency or urgency.    NEUROLOGIC:  denies numbness, tingling, seizures or weakness.    PSYCHIATRIC:  denies disorder of thought or mood.    MSK: denies swelling, redness      PHYSICAL EXAMINATION:    GENERAL: The patient is a well-developed, well-nourished, in no apparent distress.     Vital Signs Last 24 Hrs  T(C): 36.7 (02 Nov 2023 05:15), Max: 37 (01 Nov 2023 16:56)  T(F): 98 (02 Nov 2023 05:15), Max: 98.6 (01 Nov 2023 16:56)  HR: 86 (02 Nov 2023 05:15) (85 - 98)  BP: 171/89 (02 Nov 2023 05:15) (156/83 - 184/93)  BP(mean): 107 (01 Nov 2023 23:42) (107 - 107)  RR: 17 (02 Nov 2023 05:15) (17 - 24)  SpO2: 96% (02 Nov 2023 05:15) (92% - 99%)    Parameters below as of 02 Nov 2023 05:15  Patient On (Oxygen Delivery Method): nasal cannula  O2 Flow (L/min): 2     (if applicable)    Chest Tube (if applicable)    HEENT: Head is normocephalic and atraumatic. Extraocular muscles are intact. Mucous membranes are moist.     NECK: Supple, no palpable adenopathy.    LUNGS: Clear to auscultation, no wheezing, rales, or rhonchi.    HEART: Regular rate and rhythm without murmur.    ABDOMEN: Soft, nontender, and nondistended.  No hepatosplenomegaly is noted.    RENAL: No difficulty voiding, no pelvic pain    EXTREMITIES: Without any cyanosis, clubbing, rash, lesions or edema.    NEUROLOGIC: Awake, alert, oriented, grossly intact    SKIN: Warm, dry, good turgor.      LABS:                        9.7    4.39  )-----------( 115      ( 02 Nov 2023 05:09 )             31.0     11-02    141  |  104  |  48<H>  ----------------------------<  93  3.8   |  28  |  9.62<H>    Ca    8.3<L>      02 Nov 2023 05:09  Phos  4.8     11-02  Mg     2.4     11-02    TPro  6.8  /  Alb  3.3<L>  /  TBili  0.9  /  DBili  x   /  AST  18  /  ALT  30  /  AlkPhos  83  11-01      Urinalysis Basic - ( 02 Nov 2023 05:09 )    Color: x / Appearance: x / SG: x / pH: x  Gluc: 93 mg/dL / Ketone: x  / Bili: x / Urobili: x   Blood: x / Protein: x / Nitrite: x   Leuk Esterase: x / RBC: x / WBC x   Sq Epi: x / Non Sq Epi: x / Bacteria: x    MICROBIOLOGY: (if applicable)    RADIOLOGY & ADDITIONAL STUDIES:  EKG:   CXR:  < from: CT Chest No Cont (11.01.23 @ 18:59) >    ACC: [id number] EXAM:  CT CHEST   ORDERED BY: AUDREY GARCIA     PROCEDURE DATE:  11/01/2023          INTERPRETATION:  CT CHEST WITHOUT CONTRAST    INDICATION: Shortness of breath..    TECHNIQUE: Unenhanced helical images were obtained of the chest.Coronal   and sagittal images were reconstructed. Maximum intensity projection   images were generated.        COMPARISON: Graft chest 11/1/2023.    FINDINGS:    Tubes/Lines: Catheter via right-sided approach in the right atrium    Lungs, airways andpleura: Bilateral pleural effusions, passive   atelectasis, septal thickening and groundglass opacities.    The airways are unremarkable. No pneumothorax.    Mediastinum: The heart is enlarged. In particular, the atria are   enlarged. Small pericardial effusion. Coronary artery calcifications. The   aorta is normal in caliber. Aortic calcifications.    Upper Abdomen: The upper abdomen is normal.    Bones And Soft Tissues: The bones are unremarkable.  The soft tissues are   unremarkable.      IMPRESSION:    1.  Bilateral septal thickening and groundglass opacities can be   secondary to pulmonary edema.  2.  Bilateral pleural effusions and lower lobe passive atelectasis.  3.  Small pericardial effusion.    --- End of Report ---            JEWEL FINK MD; Attending Radiologist  This document has been electronically signed. Nov 1 2023  7:23PM    < end of copied text >  ECHO:    IMPRESSION: 53y Female PAST MEDICAL & SURGICAL HISTORY:  No pertinent past medical history      No significant past surgical history       p/w                      Time of visit:    CHIEF COMPLAINT: Patient is a 53y old  Female who presents with a chief complaint of Dyspnea (02 Nov 2023 09:37)      HPI:  This is a 52 y/o female with pmhx of HTN, ESRD on HD ID  [id number] presenting to the ED for 2 week shortness of breath and dry cough. Patient states she developed shortness of breath at rest associated with dry cough that is severe, comes in bouts causing chest tightness. She took some over the counter cough medication that did not help. She endorses dysuria for the past 3 days. She denies any fevers, sore throat, chest pian, palpitations, abdominal pain, N/V/D. No sick contacts or recent travel. She notes increase in her lower extremity.     In ED  vitals: BP: 184/93, HR: 97 on RA  s/p Lasix 80mg, morphine 4mg,   Chest CT: b/l ground glass and pulm edema   Cr: 8.21, BNP: 30k  Ua(+) (02 Nov 2023 00:10)   Patient seen and examined.     PAST MEDICAL & SURGICAL HISTORY:  No pertinent past medical history      No significant past surgical history    Allergies    No Known Allergies    Intolerances    MEDICATIONS  (STANDING):  furosemide   Injectable 80 milliGRAM(s) IV Push two times a day  heparin   Injectable 5000 Unit(s) SubCutaneous every 8 hours  influenza   Vaccine 0.5 milliLiter(s) IntraMuscular once  labetalol 300 milliGRAM(s) Oral every 8 hours  NIFEdipine XL 60 milliGRAM(s) Oral two times a day      MEDICATIONS  (PRN):  acetaminophen     Tablet .. 650 milliGRAM(s) Oral every 6 hours PRN Temp greater or equal to 38C (100.4F), Mild Pain (1 - 3)  ondansetron Injectable 4 milliGRAM(s) IV Push every 8 hours PRN Nausea and/or Vomiting   Medications up to date at time of exam.    Medications up to date at time of exam.    FAMILY HISTORY:      SOCIAL HISTORY  Smoking History: [ x  ] none  smoking/smoke exposure, [   ] former smoker  Living Condition: [   ] apartment, [   ] private house  Work History:    Travel History: denies recent travel  Illicit Substance Use: denies  Alcohol Use: denies    REVIEW OF SYSTEMS:    CONSTITUTIONAL:  denies fevers, chills, sweats, weight loss    HEENT:  denies diplopia or blurred vision, sore throat or runny nose.    CARDIOVASCULAR:  denies pressure, squeezing, tightness, or heaviness about the chest; no palpitations.    RESPIRATORY:  denies SOB, cough, MUIR, wheezing.    GASTROINTESTINAL:  denies abdominal pain, nausea, vomiting or diarrhea.    GENITOURINARY: denies dysuria, frequency or urgency.    NEUROLOGIC:  denies numbness, tingling, seizures or weakness.    PSYCHIATRIC:  denies disorder of thought or mood.    MSK: denies swelling, redness      PHYSICAL EXAMINATION:    GENERAL: The patient is a well-developed, well-nourished, in no apparent distress.     Vital Signs Last 24 Hrs  T(C): 36.7 (02 Nov 2023 05:15), Max: 37 (01 Nov 2023 16:56)  T(F): 98 (02 Nov 2023 05:15), Max: 98.6 (01 Nov 2023 16:56)  HR: 86 (02 Nov 2023 05:15) (85 - 98)  BP: 171/89 (02 Nov 2023 05:15) (156/83 - 184/93)  BP(mean): 107 (01 Nov 2023 23:42) (107 - 107)  RR: 17 (02 Nov 2023 05:15) (17 - 24)  SpO2: 96% (02 Nov 2023 05:15) (92% - 99%)    Parameters below as of 02 Nov 2023 05:15  Patient On (Oxygen Delivery Method): nasal cannula  O2 Flow (L/min): 2     (if applicable)    Chest Tube (if applicable)    HEENT: Head is normocephalic and atraumatic. Extraocular muscles are intact. Mucous membranes are moist.     NECK: Supple, no palpable adenopathy.    LUNGS: Clear to auscultation, no wheezing, rales, or rhonchi. Right SC dialysis cath     HEART: Regular rate and rhythm without murmur.    ABDOMEN: Soft, nontender, and nondistended.  No hepatosplenomegaly is noted.    RENAL: No difficulty voiding, no pelvic pain    EXTREMITIES: Without any cyanosis, clubbing, rash, lesions or edema.    NEUROLOGIC: Awake, alert, oriented, grossly intact    SKIN: Warm, dry, good turgor.      LABS:                        9.7    4.39  )-----------( 115      ( 02 Nov 2023 05:09 )             31.0     11-02    141  |  104  |  48<H>  ----------------------------<  93  3.8   |  28  |  9.62<H>    Ca    8.3<L>      02 Nov 2023 05:09  Phos  4.8     11-02  Mg     2.4     11-02    TPro  6.8  /  Alb  3.3<L>  /  TBili  0.9  /  DBili  x   /  AST  18  /  ALT  30  /  AlkPhos  83  11-01      Urinalysis Basic - ( 02 Nov 2023 05:09 )    Color: x / Appearance: x / SG: x / pH: x  Gluc: 93 mg/dL / Ketone: x  / Bili: x / Urobili: x   Blood: x / Protein: x / Nitrite: x   Leuk Esterase: x / RBC: x / WBC x   Sq Epi: x / Non Sq Epi: x / Bacteria: x    MICROBIOLOGY: (if applicable)    RADIOLOGY & ADDITIONAL STUDIES:  EKG:   CXR:  < from: CT Chest No Cont (11.01.23 @ 18:59) >    ACC: [id number] EXAM:  CT CHEST   ORDERED BY: AUDREY GARCIA     PROCEDURE DATE:  11/01/2023          INTERPRETATION:  CT CHEST WITHOUT CONTRAST    INDICATION: Shortness of breath..    TECHNIQUE: Unenhanced helical images were obtained of the chest.Coronal   and sagittal images were reconstructed. Maximum intensity projection   images were generated.        COMPARISON: Graft chest 11/1/2023.    FINDINGS:    Tubes/Lines: Catheter via right-sided approach in the right atrium    Lungs, airways andpleura: Bilateral pleural effusions, passive   atelectasis, septal thickening and groundglass opacities.    The airways are unremarkable. No pneumothorax.    Mediastinum: The heart is enlarged. In particular, the atria are   enlarged. Small pericardial effusion. Coronary artery calcifications. The   aorta is normal in caliber. Aortic calcifications.    Upper Abdomen: The upper abdomen is normal.    Bones And Soft Tissues: The bones are unremarkable.  The soft tissues are   unremarkable.      IMPRESSION:    1.  Bilateral septal thickening and groundglass opacities can be   secondary to pulmonary edema.  2.  Bilateral pleural effusions and lower lobe passive atelectasis.  3.  Small pericardial effusion.    --- End of Report ---            JEWEL FINK MD; Attending Radiologist  This document has been electronically signed. Nov 1 2023  7:23PM    < end of copied text >  ECHO:    IMPRESSION: 53y Female PAST MEDICAL & SURGICAL HISTORY:  No pertinent past medical history      No significant past surgical history       p/w         IMP: This is a 53 yr old  woman  with  HTN, ESRD on HD presenting to the ED for 2 week shortness of breath and dry cough. Patient states she developed shortness of breath at rest associated with dry cough that is severe, comes in bouts causing chest tightness.  She missed HD .  Admitted for acute hypoxic resp failure due to pul edema from missed HD . CT show small pericardial effusion probable due to uremia .  Serial cardiac enzymes neg .       ASSESSMENT     - Acute Hypoxic resp Failure   - Pulmonary edema   - Total body fluid over load   - Pericardia edema   - HTN   - ESRD on HD       Plan     - Continue O2 supp as needed to maintain sat >90%  - Tele monitoring   - 2 DECHO eval for tamponade   - Neph for dialysis   - Repeat CXR after 2-3 session of Dialysis   - Check TSH   - RVP   - DVT GI prophy   - Vascular for AVF creation     spoke with  Juan  at bedside   D/C Dr Singh

## 2023-11-02 NOTE — CONSULT NOTE ADULT - ASSESSMENT
52 y/o female with pmhx of HTN, ESRD on HD ID  [id number] presenting to the ED for 2 week shortness of breath and dry cough,pulmonary edema,uncontrolled HTN.  1.Echocardiogram.  2.ESRD-HD as per renal.  3.HTN-add hydralazine 25 mg q8,cont rest of bp medication.  4.GI and DVT prophylaxis.  5.Check TSH,A1c.

## 2023-11-02 NOTE — H&P ADULT - NSHPPHYSICALEXAM_GEN_ALL_CORE
GENERAL: NAD   HEAD:  Atraumatic, Normocephalic  EYES:  conjunctiva and sclera clear  NECK: Supple, No JVD, Normal thyroid  CHEST/LUNG: decreased breath sounds in b/l LL; No rales, rhonchi, wheezing, or rubs  HEART: Regular rate and rhythm; No murmurs, rubs, or gallops  ABDOMEN: Soft, Nontender, Nondistended; Bowel sounds present  NERVOUS SYSTEM:  Alert & Oriented X3,    EXTREMITIES:  2+ Peripheral Pulses, No clubbing, cyanosis, or +1(+) edema  SKIN: warm dry

## 2023-11-02 NOTE — H&P ADULT - PROBLEM SELECTOR PLAN 1
p/w dyspnea for 2 weeks, LE edema  dyspnea 2/2 fluid overload  Chest CT: b/l ground glass and pulm edema   BNP: 30 k. trop nl   s/p 80 mg IV lasix  c/w lasix 80 mg IV BID as per Dr. Calle  f/u echo   strict intake and out put, daily weights p/w dyspnea for 2 weeks, LE edema  dyspnea 2/2 fluid overload  Chest CT: b/l ground glass 2/2  pulm edema   BNP: 30 k. trop nl   s/p 80 mg IV lasix  c/w lasix 80 mg IV BID as per Dr. Calle  f/u echo   strict intake and out put, daily weights

## 2023-11-02 NOTE — H&P ADULT - HISTORY OF PRESENT ILLNESS
This is a 52 y/o female with pmhx of HTN, ESRD on HD presenting to the ED for 2 week shortness of breath and dry cough. Patient states she developed shortness of breath at rest associated with dry cough that is severe, comes in bouts causing chest tightness. She took some over the counter cough medication that did not help. She endorses dysuria for the past 3 days. She denies any fevers, sore throat, chest pian, palpitations, abdominal pain, N/V/D. No sick contacts or recent travel. She notes increase in her lower extremity.     In ED  vitals: BP: 184/93, HR: 97 on RA  s/p Lasix 80mg, morphine 4mg,   Chest CT: b/l ground glass and pulm edema   Cr: 8.21, BNP: 30k  Ua(+) This is a 52 y/o female with pmhx of HTN, ESRD on HD ID  [id number] presenting to the ED for 2 week shortness of breath and dry cough. Patient states she developed shortness of breath at rest associated with dry cough that is severe, comes in bouts causing chest tightness. She took some over the counter cough medication that did not help. She endorses dysuria for the past 3 days. She denies any fevers, sore throat, chest pian, palpitations, abdominal pain, N/V/D. No sick contacts or recent travel. She notes increase in her lower extremity.     In ED  vitals: BP: 184/93, HR: 97 on RA  s/p Lasix 80mg, morphine 4mg,   Chest CT: b/l ground glass and pulm edema   Cr: 8.21, BNP: 30k  Ua(+)

## 2023-11-02 NOTE — CONSULT NOTE ADULT - SUBJECTIVE AND OBJECTIVE BOX
NEPHROLOGY MEDICAL CARE, [facility] - Dr. Marco Calle/ Dr. Nicholas Mckeon/ Dr. Ry Zuniga/ Dr. Roxi Reese    Date of Service: 11-02-23    Patient was seen and examined at bedside.    Consultation requested by:  Phan Singh    Reason for Consult: End Stage Renal Disease management    HPI:  This is a 52 y/o female with pmhx of HTN, ESRD on HD (T/Th/Sat at [facility], Nephro: Dr. Calle) presenting to the ED for 2 week shortness of breath and dry cough. Patient states she developed shortness of breath at rest associated with dry cough that is severe, comes in bouts causing chest tightness. She took some over the counter cough medication that did not help. She endorses dysuria for the past 3 days. She denies any fevers, sore throat, chest pian, palpitations, abdominal pain, N/V/D. No sick contacts or recent travel. She notes increase in her lower extremity. Last HD was on Tuesday and patient is compliant with meds. Bp is not well controlled even with different medications. patient had stress test done few months back which was normal.     In ED  vitals: BP: 184/93, HR: 97 on RA  s/p Lasix 80mg, morphine 4mg,   Chest CT: b/l ground glass and pulm edema   Cr: 8.21, BNP: 30k  Ua(+) (02 Nov 2023 00:10)      PMH:   ESRD on HD  HTN        PSH:   s/p right permacath         FAMILY HISTORY:  n/c    Social History:  non-smoker/ non-alcoholic     Home Meds:  Home Medications:  labetalol 100 mg oral tablet: 1 tab(s) orally 3 times a day (02 Nov 2023 00:15)  NIFEdipine 60 mg oral tablet, extended release: 1 tab(s) orally 2 times a day (02 Nov 2023 00:15)      Allergies:  Allergies    No Known Allergies    Intolerances        REVIEW OF SYSTEMS:  CONSTITUTIONAL: No fever; No weight loss; No fatigue  EYES: No eye pain; No visual disturbances; No discharge  ENMT:  No difficulty hearing; No tinnitus;  No vertigo; No sinus; No throat pain  NECK: No pain; No stiffness  BREASTS: No pain; No masses; No nipple discharge  RESPIRATORY: No cough; No wheezing; No chills; No hemoptysis; shortness of breath  CARDIOVASCULAR: No chest pain; No palpitations; No dizziness; No leg swelling  GASTROINTESTINAL: No abdominal pain; No epigastric pain; No nausea; No vomiting; No hematemesis; No diarrhea; No constipation. No melena   GENITOURINARY: No dysuria No frequency; No hematuria; No incontinence  NEUROLOGICAL: No headaches; No memory loss; No loss of strength; No numbness; No tremors  SKIN: No itching; No burning; No rashes  ENDOCRINE: No heat or cold intolerance; No hair loss  MUSCULOSKELETAL: No joint pain or swelling; No muscle, back, or extremity pain  PSYCHIATRIC: No depression; No anxiety; No mood swings; No difficulty sleeping  HEME/LYMPH: No easy bruising; No bleeding gums  ALLERY AND IMMUNOLOGIC: No hives or eczema    Vital Signs Last 24 Hrs  T(C): 36.7 (02 Nov 2023 05:15), Max: 37 (01 Nov 2023 16:56)  T(F): 98 (02 Nov 2023 05:15), Max: 98.6 (01 Nov 2023 16:56)  HR: 86 (02 Nov 2023 05:15) (85 - 98)  BP: 171/89 (02 Nov 2023 05:15) (156/83 - 184/93)  BP(mean): 107 (01 Nov 2023 23:42) (107 - 107)  RR: 17 (02 Nov 2023 05:15) (17 - 24)  SpO2: 96% (02 Nov 2023 05:15) (92% - 99%)    Parameters below as of 02 Nov 2023 05:15  Patient On (Oxygen Delivery Method): nasal cannula  O2 Flow (L/min): 2        PHYSICAL EXAM:  General: No acute respiratory distress.  Eyes: conjunctiva and sclera clear  ENMT: Atraumatic, Normocephalic, supple, No JVD present. Moist mucous membranes  Respiratory:   Bilaterally rales and no rhonchi, wheezing  Cardiovascular: S1S2+; no m/r/g  Gastrointestinal: Soft, Non-tender, Nondistended; Bowel sounds present, no hepatosplenomegaly.   Neuro:  Awake, Alert & Oriented X3, No focal deficits present.   Ext:  2+ Peripheral Pulses and 2+ pedal edema, No Cyanosis  Skin: No rashes  Back: No CVA tenderness.  Dialysis Access::  Rt chest  PERMACATH    LABS:                        9.7    4.39  )-----------( 115      ( 02 Nov 2023 05:09 )             31.0     11-02    141  |  104  |  48<H>  ----------------------------<  93  3.8   |  28  |  9.62<H>    Ca    8.3<L>      02 Nov 2023 05:09  Phos  4.8     11-02  Mg     2.4     11-02    TPro  6.8  /  Alb  3.3<L>  /  TBili  0.9  /  DBili  x   /  AST  18  /  ALT  30  /  AlkPhos  83  11-01      Urinalysis Basic - ( 02 Nov 2023 05:09 )    Color: x / Appearance: x / SG: x / pH: x  Gluc: 93 mg/dL / Ketone: x  / Bili: x / Urobili: x   Blood: x / Protein: x / Nitrite: x   Leuk Esterase: x / RBC: x / WBC x   Sq Epi: x / Non Sq Epi: x / Bacteria: x      Magnesium: 2.4 mg/dL (11-02 @ 05:09)  Phosphorus: 4.8 mg/dL *H* (11-02 @ 05:09)  Magnesium: 2.2 mg/dL (11-01 @ 18:20)    Urine studies      Medications:  acetaminophen     Tablet .. 650 milliGRAM(s) Oral every 6 hours PRN  furosemide   Injectable 80 milliGRAM(s) IV Push two times a day  heparin   Injectable 5000 Unit(s) SubCutaneous every 8 hours  influenza   Vaccine 0.5 milliLiter(s) IntraMuscular once  labetalol 100 milliGRAM(s) Oral three times a day  NIFEdipine XL 60 milliGRAM(s) Oral two times a day  ondansetron Injectable 4 milliGRAM(s) IV Push every 8 hours PRN

## 2023-11-02 NOTE — H&P ADULT - ATTENDING COMMENTS
Pt appearing with sob and radiographic support for pulmonary edema, possibly consistent with fluid overload in HD dependent pt with ESRD. Possibility of uremic pericarditis is also present. Scheduled for HD in a.m. Pulm and cardio consult to follow. Pt appearing with sob and radiographic support for pulmonary edema, possibly consistent with fluid overload in HD dependent pt with ESRD vs CHF (secondary to cardiac tamponade post pericardial effusion). Possibility of uremic pericarditis is also present. Scheduled for HD in a.m. Pulm and cardio consult to follow.

## 2023-11-02 NOTE — PROGRESS NOTE ADULT - SUBJECTIVE AND OBJECTIVE BOX
COLLIN WHITMAN  MR# [id number]  53yFemale        Patient is a 53y old  Female who presents with a chief complaint of Dyspnea (02 Nov 2023 09:37)      INTERVAL HPI/OVERNIGHT EVENTS:  Patient seen and examined at bedside. No notations of chest pain, palpitation, SOB, orthopnea, nausea, vomiting or abdominal pain.    ALLERGIES  No Known Allergies      MEDICATIONS  acetaminophen     Tablet .. 650 milliGRAM(s) Oral every 6 hours PRN Temp greater or equal to 38C (100.4F), Mild Pain (1 - 3)  furosemide   Injectable 80 milliGRAM(s) IV Push two times a day  heparin   Injectable 5000 Unit(s) SubCutaneous every 8 hours  influenza   Vaccine 0.5 milliLiter(s) IntraMuscular once  labetalol 300 milliGRAM(s) Oral every 8 hours  NIFEdipine XL 60 milliGRAM(s) Oral two times a day  ondansetron Injectable 4 milliGRAM(s) IV Push every 8 hours PRN Nausea and/or Vomiting              REVIEW OF SYSTEMS:  CONSTITUTIONAL: No fever, weight loss, or fatigue  EYES: No eye pain, visual disturbances, or discharge  ENT:  No difficulty hearing, tinnitus, vertigo; No sinus or throat pain  NECK: No pain or stiffness  RESPIRATORY: No cough, wheezing, chills or hemoptysis; No Shortness of Breath  CARDIOVASCULAR: No chest pain, palpitations, passing out, dizziness, or leg swelling  GASTROINTESTINAL: No abdominal or epigastric pain. No nausea, vomiting, or hematemesis; No diarrhea or constipation. No melena or hematochezia.  GENITOURINARY: No dysuria, frequency, hematuria, or incontinence  NEUROLOGICAL: No headaches, memory loss, loss of strength, numbness, or tremors  SKIN: No itching, burning, rashes, or lesions   LYMPH Nodes: No enlarged glands  ENDOCRINE: No heat or cold intolerance; No hair loss  MUSCULOSKELETAL: No joint pain or swelling; No muscle, back, or extremity pain  PSYCHIATRIC: No depression, anxiety, mood swings, or difficulty sleeping  HEME/LYMPH: No easy bruising, or bleeding gums  ALLERGY AND IMMUNOLOGIC: No hives or eczema	    [ ] All others negative	  [ ] Unable to obtain      T(C): 36.7 (11-02-23 @ 05:15), Max: 37 (11-01-23 @ 16:56)  T(F): 98 (11-02-23 @ 05:15), Max: 98.6 (11-01-23 @ 16:56)  HR: 86 (11-02-23 @ 05:15) (85 - 98)  BP: 171/89 (11-02-23 @ 05:15) (156/83 - 184/93)  RR: 17 (11-02-23 @ 05:15) (17 - 24)  SpO2: 96% (11-02-23 @ 05:15) (92% - 99%)  Wt(kg): --  Height (cm): 165.1 (11-01 @ 16:56)  Weight (kg): 73.2 (11-01 @ 16:56)  BMI (kg/m2): 26.9 (11-01 @ 16:56)  BSA (m2): 1.81 (11-01 @ 16:56)  I&O's Summary        PHYSICAL EXAM:  A X O x  HEAD:  Atraumatic, Normocephalic  EYES: EOMI, PERRLA, conjunctiva and sclera clear  NECK: Supple, No JVD, Normal thyroid  Resp: CTAB, No crackles, wheezing,   CVS: Regular rate and rhythm; No discernable murmurs, rubs, or gallops  ABD: Soft, Nontender, Nondistended; Bowel sounds present  EXTREMITIES:  2+ Peripheral Pulses, No edema  LYMPH: No dicernable lymphadenopathy noted  GENERAL: NAD, well-groomed, well-developed      LABS:                        9.7    4.39  )-----------( 115      ( 02 Nov 2023 05:09 )             31.0     11-02    141  |  104  |  48<H>  ----------------------------<  93  3.8   |  28  |  9.62<H>    Ca    8.3<L>      02 Nov 2023 05:09  Phos  4.8     11-02  Mg     2.4     11-02    TPro  6.8  /  Alb  3.3<L>  /  TBili  0.9  /  DBili  x   /  AST  18  /  ALT  30  /  AlkPhos  83  11-01      Urinalysis Basic - ( 02 Nov 2023 05:09 )    Color: x / Appearance: x / SG: x / pH: x  Gluc: 93 mg/dL / Ketone: x  / Bili: x / Urobili: x   Blood: x / Protein: x / Nitrite: x   Leuk Esterase: x / RBC: x / WBC x   Sq Epi: x / Non Sq Epi: x / Bacteria: x      CAPILLARY BLOOD GLUCOSE          Troponins:  ProBNP:  Lipid Profile:   HgA1c:  TSH:           RADIOLOGY & ADDITIONAL TESTS:    Imaging Personally Reviewed:  [ ] YES  [ ] NO      Consultant(s) Notes Reviewed:  [x ] YES  [ ] NO    Care Discussed with Consultants/Other Providers [ x] YES  [ ] NO          PAST MEDICAL & SURGICAL HISTORY:  No pertinent past medical history      No significant past surgical history            Acute pulmonary edema    Handoff    MEWS Score    No pertinent past medical history    No pertinent past medical history    Acute pulmonary edema    Dyspnea    ESRD on hemodialysis    Acute UTI    HTN (hypertension)    Prophylactic measure    Acute pericardial effusion    Acute pulmonary edema    No significant past surgical history    No significant past surgical history    W/DIFF BREATHING    34    ESRD on dialysis    SysAdmin_VisitLink

## 2023-11-02 NOTE — H&P ADULT - PROBLEM SELECTOR PLAN 5
on labetalol 100mg TID and nifedipien 60mg BID   c/w home med p/w dysuria  Ua(+)   start rocephin x 3 days

## 2023-11-02 NOTE — H&P ADULT - PROBLEM SELECTOR PLAN 4
p/w dysuria  Ua(+)   start rocephin x 3 days ESRD on HD at [place] follows up with DR hernandez   planned for HD [facility]   c/w lasix 80 mg IV BID

## 2023-11-02 NOTE — PATIENT PROFILE ADULT - FUNCTIONAL ASSESSMENT - DAILY ACTIVITY SECTION LABEL
[FreeTextEntry1] : Ms. 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 She has been following with Dr. Florentino with MRA and the most recent MRA shows the aneurysm seems to have increased in size from 3.0 mm to 4.6 mm. We discussed the risks of having an aneurysm and the likelihood of one causing a SAH. Plan for cerebral angiogram to better evaluate the aneurysm, and based on size we will possibly treat. The procedure, risks and benefits discussed with patient. All of her questions and concerns were addressed.  .

## 2023-11-03 LAB
ALBUMIN SERPL ELPH-MCNC: 2.9 G/DL — LOW (ref 3.5–5)
ALBUMIN SERPL ELPH-MCNC: 2.9 G/DL — LOW (ref 3.5–5)
ALP SERPL-CCNC: 79 U/L — SIGNIFICANT CHANGE UP (ref 40–120)
ALP SERPL-CCNC: 79 U/L — SIGNIFICANT CHANGE UP (ref 40–120)
ALT FLD-CCNC: 23 U/L DA — SIGNIFICANT CHANGE UP (ref 10–60)
ALT FLD-CCNC: 23 U/L DA — SIGNIFICANT CHANGE UP (ref 10–60)
ANION GAP SERPL CALC-SCNC: 7 MMOL/L — SIGNIFICANT CHANGE UP (ref 5–17)
ANION GAP SERPL CALC-SCNC: 7 MMOL/L — SIGNIFICANT CHANGE UP (ref 5–17)
AST SERPL-CCNC: 11 U/L — SIGNIFICANT CHANGE UP (ref 10–40)
AST SERPL-CCNC: 11 U/L — SIGNIFICANT CHANGE UP (ref 10–40)
BILIRUB SERPL-MCNC: 0.8 MG/DL — SIGNIFICANT CHANGE UP (ref 0.2–1.2)
BILIRUB SERPL-MCNC: 0.8 MG/DL — SIGNIFICANT CHANGE UP (ref 0.2–1.2)
BUN SERPL-MCNC: 31 MG/DL — HIGH (ref 7–18)
BUN SERPL-MCNC: 31 MG/DL — HIGH (ref 7–18)
CALCIUM SERPL-MCNC: 8.4 MG/DL — SIGNIFICANT CHANGE UP (ref 8.4–10.5)
CALCIUM SERPL-MCNC: 8.4 MG/DL — SIGNIFICANT CHANGE UP (ref 8.4–10.5)
CHLORIDE SERPL-SCNC: 100 MMOL/L — SIGNIFICANT CHANGE UP (ref 96–108)
CHLORIDE SERPL-SCNC: 100 MMOL/L — SIGNIFICANT CHANGE UP (ref 96–108)
CO2 SERPL-SCNC: 32 MMOL/L — HIGH (ref 22–31)
CO2 SERPL-SCNC: 32 MMOL/L — HIGH (ref 22–31)
CREAT SERPL-MCNC: 7.3 MG/DL — HIGH (ref 0.5–1.3)
CREAT SERPL-MCNC: 7.3 MG/DL — HIGH (ref 0.5–1.3)
EGFR: 6 ML/MIN/1.73M2 — LOW
EGFR: 6 ML/MIN/1.73M2 — LOW
GLUCOSE SERPL-MCNC: 105 MG/DL — HIGH (ref 70–99)
GLUCOSE SERPL-MCNC: 105 MG/DL — HIGH (ref 70–99)
HCT VFR BLD CALC: 33.5 % — LOW (ref 34.5–45)
HCT VFR BLD CALC: 33.5 % — LOW (ref 34.5–45)
HGB BLD-MCNC: 10.3 G/DL — LOW (ref 11.5–15.5)
HGB BLD-MCNC: 10.3 G/DL — LOW (ref 11.5–15.5)
MAGNESIUM SERPL-MCNC: 2.3 MG/DL — SIGNIFICANT CHANGE UP (ref 1.6–2.6)
MAGNESIUM SERPL-MCNC: 2.3 MG/DL — SIGNIFICANT CHANGE UP (ref 1.6–2.6)
MCHC RBC-ENTMCNC: 29.1 PG — SIGNIFICANT CHANGE UP (ref 27–34)
MCHC RBC-ENTMCNC: 29.1 PG — SIGNIFICANT CHANGE UP (ref 27–34)
MCHC RBC-ENTMCNC: 30.7 GM/DL — LOW (ref 32–36)
MCHC RBC-ENTMCNC: 30.7 GM/DL — LOW (ref 32–36)
MCV RBC AUTO: 94.6 FL — SIGNIFICANT CHANGE UP (ref 80–100)
MCV RBC AUTO: 94.6 FL — SIGNIFICANT CHANGE UP (ref 80–100)
NRBC # BLD: 0 /100 WBCS — SIGNIFICANT CHANGE UP (ref 0–0)
NRBC # BLD: 0 /100 WBCS — SIGNIFICANT CHANGE UP (ref 0–0)
PHOSPHATE SERPL-MCNC: 3.5 MG/DL — SIGNIFICANT CHANGE UP (ref 2.5–4.5)
PHOSPHATE SERPL-MCNC: 3.5 MG/DL — SIGNIFICANT CHANGE UP (ref 2.5–4.5)
PLATELET # BLD AUTO: 126 K/UL — LOW (ref 150–400)
PLATELET # BLD AUTO: 126 K/UL — LOW (ref 150–400)
POTASSIUM SERPL-MCNC: 3.9 MMOL/L — SIGNIFICANT CHANGE UP (ref 3.5–5.3)
POTASSIUM SERPL-MCNC: 3.9 MMOL/L — SIGNIFICANT CHANGE UP (ref 3.5–5.3)
POTASSIUM SERPL-SCNC: 3.9 MMOL/L — SIGNIFICANT CHANGE UP (ref 3.5–5.3)
POTASSIUM SERPL-SCNC: 3.9 MMOL/L — SIGNIFICANT CHANGE UP (ref 3.5–5.3)
PROT SERPL-MCNC: 6.3 G/DL — SIGNIFICANT CHANGE UP (ref 6–8.3)
PROT SERPL-MCNC: 6.3 G/DL — SIGNIFICANT CHANGE UP (ref 6–8.3)
RAPID RVP RESULT: SIGNIFICANT CHANGE UP
RAPID RVP RESULT: SIGNIFICANT CHANGE UP
RBC # BLD: 3.54 M/UL — LOW (ref 3.8–5.2)
RBC # BLD: 3.54 M/UL — LOW (ref 3.8–5.2)
RBC # FLD: 15.8 % — HIGH (ref 10.3–14.5)
RBC # FLD: 15.8 % — HIGH (ref 10.3–14.5)
SARS-COV-2 RNA SPEC QL NAA+PROBE: SIGNIFICANT CHANGE UP
SARS-COV-2 RNA SPEC QL NAA+PROBE: SIGNIFICANT CHANGE UP
SODIUM SERPL-SCNC: 139 MMOL/L — SIGNIFICANT CHANGE UP (ref 135–145)
SODIUM SERPL-SCNC: 139 MMOL/L — SIGNIFICANT CHANGE UP (ref 135–145)
TSH SERPL-MCNC: 0.42 UU/ML — SIGNIFICANT CHANGE UP (ref 0.34–4.82)
TSH SERPL-MCNC: 0.42 UU/ML — SIGNIFICANT CHANGE UP (ref 0.34–4.82)
WBC # BLD: 4.63 K/UL — SIGNIFICANT CHANGE UP (ref 3.8–10.5)
WBC # BLD: 4.63 K/UL — SIGNIFICANT CHANGE UP (ref 3.8–10.5)
WBC # FLD AUTO: 4.63 K/UL — SIGNIFICANT CHANGE UP (ref 3.8–10.5)
WBC # FLD AUTO: 4.63 K/UL — SIGNIFICANT CHANGE UP (ref 3.8–10.5)

## 2023-11-03 RX ADMIN — Medication 100 MILLIGRAM(S): at 06:09

## 2023-11-03 RX ADMIN — Medication 60 MILLIGRAM(S): at 17:12

## 2023-11-03 RX ADMIN — Medication 300 MILLIGRAM(S): at 17:12

## 2023-11-03 RX ADMIN — HEPARIN SODIUM 5000 UNIT(S): 5000 INJECTION INTRAVENOUS; SUBCUTANEOUS at 06:08

## 2023-11-03 RX ADMIN — Medication 25 MILLIGRAM(S): at 22:21

## 2023-11-03 RX ADMIN — Medication 25 MILLIGRAM(S): at 06:08

## 2023-11-03 RX ADMIN — Medication 300 MILLIGRAM(S): at 22:21

## 2023-11-03 RX ADMIN — Medication 100 MILLIGRAM(S): at 14:25

## 2023-11-03 RX ADMIN — Medication 25 MILLIGRAM(S): at 14:25

## 2023-11-03 RX ADMIN — Medication 300 MILLIGRAM(S): at 06:08

## 2023-11-03 RX ADMIN — Medication 60 MILLIGRAM(S): at 06:08

## 2023-11-03 RX ADMIN — Medication 100 MILLIGRAM(S): at 22:20

## 2023-11-03 RX ADMIN — HEPARIN SODIUM 5000 UNIT(S): 5000 INJECTION INTRAVENOUS; SUBCUTANEOUS at 22:21

## 2023-11-03 RX ADMIN — Medication 80 MILLIGRAM(S): at 06:07

## 2023-11-03 RX ADMIN — Medication 80 MILLIGRAM(S): at 14:25

## 2023-11-03 RX ADMIN — HEPARIN SODIUM 5000 UNIT(S): 5000 INJECTION INTRAVENOUS; SUBCUTANEOUS at 14:25

## 2023-11-03 NOTE — PROGRESS NOTE ADULT - ASSESSMENT
52 y/o female with pmhx of HTN, ESRD on HD ID  [id number] presenting to the ED for 2 week shortness of breath and dry cough,pulmonary edema,uncontrolled HTN.  1.Echocardiogram.  2.ESRD-HD as per renal.  3.HTN-cont bp medication.  4.GI and DVT prophylaxis.

## 2023-11-03 NOTE — PROGRESS NOTE ADULT - PROBLEM SELECTOR PLAN 4
ESRD on HD at [place] follows up with DR hernandez   planned for HD [facility]   c/w lasix 80 mg IV BID

## 2023-11-03 NOTE — PROGRESS NOTE ADULT - SUBJECTIVE AND OBJECTIVE BOX
NEPHROLOGY MEDICAL CARE, [facility] - Dr. Marco Calle/ Dr. Nicholas Mckeon/ Dr. Ry Zuniga/ Dr. Roxi Reese    Date of Service: 11-03-23    Patient was seen and examined at bedside.    CC: patient is okay and breathing is better.     Vital Signs Last 24 Hrs  T(C): 36.7 (03 Nov 2023 12:49), Max: 36.8 (02 Nov 2023 18:50)  T(F): 98 (03 Nov 2023 12:49), Max: 98.3 (02 Nov 2023 20:09)  HR: 86 (03 Nov 2023 12:49) (72 - 86)  BP: 133/78 (03 Nov 2023 12:49) (133/78 - 164/98)  BP(mean): --  RR: 16 (03 Nov 2023 12:49) (16 - 18)  SpO2: 95% (03 Nov 2023 12:49) (95% - 100%)    Parameters below as of 03 Nov 2023 12:49  Patient On (Oxygen Delivery Method): room air        11-02 @ 07:01 - 11-03 @ 07:00  --------------------------------------------------------  IN: 600 mL / OUT: 2600 mL / NET: -2000 mL    11-03 @ 07:01  -  11-03 @ 13:54  --------------------------------------------------------  IN: 500 mL / OUT: 2500 mL / NET: -2000 mL        PHYSICAL EXAM:  General: No acute respiratory distress.  Eyes: conjunctiva and sclera clear  ENMT: Atraumatic, Normocephalic,  Respiratory:   Bilaterally poor air entry and no rhonchi, wheezing  Cardiovascular: S1S2+; no m/r/g  Gastrointestinal: Soft, Non-tender, Nondistended; Bowel sounds present   Neuro:  Awake, Alert & Oriented X3  Ext:  2+ pedal edema, No Cyanosis  Skin: No rashes  Dialysis Access::  Rt chest  PERMACATH      MEDICATIONS:  MEDICATIONS  (STANDING):  benzonatate 100 milliGRAM(s) Oral every 8 hours  furosemide   Injectable 80 milliGRAM(s) IV Push two times a day  heparin   Injectable 5000 Unit(s) SubCutaneous every 8 hours  hydrALAZINE 25 milliGRAM(s) Oral every 8 hours  influenza   Vaccine 0.5 milliLiter(s) IntraMuscular once  labetalol 300 milliGRAM(s) Oral every 8 hours  NIFEdipine XL 60 milliGRAM(s) Oral two times a day    MEDICATIONS  (PRN):  acetaminophen     Tablet .. 650 milliGRAM(s) Oral every 6 hours PRN Temp greater or equal to 38C (100.4F), Mild Pain (1 - 3)  ondansetron Injectable 4 milliGRAM(s) IV Push every 8 hours PRN Nausea and/or Vomiting          LABS:                        10.3   4.63  )-----------( 126      ( 03 Nov 2023 08:12 )             33.5     11-03    139  |  100  |  31<H>  ----------------------------<  105<H>  3.9   |  32<H>  |  7.30<H>    Ca    8.4      03 Nov 2023 08:12  Phos  3.5     11-03  Mg     2.3     11-03    TPro  6.3  /  Alb  2.9<L>  /  TBili  0.8  /  DBili  x   /  AST  11  /  ALT  23  /  AlkPhos  79  11-03      Urinalysis Basic - ( 03 Nov 2023 08:12 )    Color: x / Appearance: x / SG: x / pH: x  Gluc: 105 mg/dL / Ketone: x  / Bili: x / Urobili: x   Blood: x / Protein: x / Nitrite: x   Leuk Esterase: x / RBC: x / WBC x   Sq Epi: x / Non Sq Epi: x / Bacteria: x      Phosphorus: 3.5 mg/dL (11-03 @ 08:12)  Magnesium: 2.3 mg/dL (11-03 @ 08:12)    Urine studies    PTH and Vit D:

## 2023-11-03 NOTE — PROGRESS NOTE ADULT - SUBJECTIVE AND OBJECTIVE BOX
Time of Visit:  Patient seen and examined. pat is lying in bed , relative at bedside     MEDICATIONS  (STANDING):  benzonatate 100 milliGRAM(s) Oral every 8 hours  furosemide   Injectable 80 milliGRAM(s) IV Push two times a day  heparin   Injectable 5000 Unit(s) SubCutaneous every 8 hours  hydrALAZINE 25 milliGRAM(s) Oral every 8 hours  influenza   Vaccine 0.5 milliLiter(s) IntraMuscular once  labetalol 300 milliGRAM(s) Oral every 8 hours  NIFEdipine XL 60 milliGRAM(s) Oral two times a day      MEDICATIONS  (PRN):  acetaminophen     Tablet .. 650 milliGRAM(s) Oral every 6 hours PRN Temp greater or equal to 38C (100.4F), Mild Pain (1 - 3)  ondansetron Injectable 4 milliGRAM(s) IV Push every 8 hours PRN Nausea and/or Vomiting       Medications up to date at time of exam.      PHYSICAL EXAMINATION:  Patient has no new complaints.  GENERAL: The patient is a well-developed, well-nourished, in no apparent distress.     Vital Signs Last 24 Hrs  T(C): 36.7 (03 Nov 2023 12:49), Max: 36.8 (02 Nov 2023 18:50)  T(F): 98 (03 Nov 2023 12:49), Max: 98.3 (02 Nov 2023 20:09)  HR: 95 (03 Nov 2023 17:03) (72 - 95)  BP: 152/84 (03 Nov 2023 17:03) (129/77 - 164/98)  BP(mean): 106 (03 Nov 2023 17:03) (94 - 106)  RR: 16 (03 Nov 2023 17:03) (16 - 18)  SpO2: 94% (03 Nov 2023 17:03) (94% - 100%)    Parameters below as of 03 Nov 2023 17:03  Patient On (Oxygen Delivery Method): nasal cannula, 2  O2 Flow (L/min): 2     (if applicable)    Chest Tube (if applicable)    HEENT: Head is normocephalic and atraumatic. Extraocular muscles are intact. Mucous membranes are moist.     NECK: Supple, no palpable adenopathy.    LUNGS: Clear to auscultation, no wheezing, rales, or rhonchi.    HEART: Regular rate and rhythm without murmur.    ABDOMEN: Soft, nontender, and nondistended.  No hepatosplenomegaly is noted.    : No painful voiding, no pelvic pain    EXTREMITIES: Without any cyanosis, clubbing, rash, lesions or edema.    NEUROLOGIC: Awake, alert, oriented, grossly intact    SKIN: Warm, dry, good turgor.      LABS:                        10.3   4.63  )-----------( 126      ( 03 Nov 2023 08:12 )             33.5     11-03    139  |  100  |  31<H>  ----------------------------<  105<H>  3.9   |  32<H>  |  7.30<H>    Ca    8.4      03 Nov 2023 08:12  Phos  3.5     11-03  Mg     2.3     11-03    TPro  6.3  /  Alb  2.9<L>  /  TBili  0.8  /  DBili  x   /  AST  11  /  ALT  23  /  AlkPhos  79  11-03      Urinalysis Basic - ( 03 Nov 2023 08:12 )    Color: x / Appearance: x / SG: x / pH: x  Gluc: 105 mg/dL / Ketone: x  / Bili: x / Urobili: x   Blood: x / Protein: x / Nitrite: x   Leuk Esterase: x / RBC: x / WBC x   Sq Epi: x / Non Sq Epi: x / Bacteria: x      MICROBIOLOGY: (if applicable)    RADIOLOGY & ADDITIONAL STUDIES:  EKG:   CXR:  ECHO:< from: Transthoracic Echocardiogram (11.02.23 @ 07:08) >    Patient name: COLLIN WHITMAN  YOB: 1970   Age: 53 (F)   MR#: [id number]  Study Date: 11/2/2023  Location: [address] [place], [place] [address]Sonographer: Abdirashid Tucker RUST  Study quality: Technically good  Referring Physician:  POLLY PIÑA MD  Blood Pressure: 171/89 mmHg  Height: 165 cm  Weight: 73 kg  BSA: 1.8 m2  ------------------------------------------------------------------------    PROCEDURE: Transthoracic echocardiogram with 2-D, M-Mode  and complete spectral and color flow Doppler.  INDICATION: Chest pain, unspecified (R07.9)  HISTORY:  ------------------------------------------------------------------------  DIMENSIONS:  Dimensions:     Normal Values:  LA:     4.7 cm    2.0 - 4.0 cm  Ao:     3.3 cm    2.0 - 3.8 cm  SEPTUM: 1.0 cm    0.6 - 1.2 cm  PWT:    1.0 cm    0.6 - 1.1 cm  LVIDd:  5.6 cm    3.0 - 5.6 cm  LVIDs:  4.0 cm    1.8 - 4.0 cm      Derived Variables:  LVMI: 122 g/m2  RWT: 0.35  Ejection Fraction Visual Estimate: 50-55 %  Ejection Fraction [place]: 52 %    ------------------------------------------------------------------------  OBSERVATIONS:  Mitral Valve: Normal mitral valve. Moderate to severe  mitral regurgitation.  Aortic Root: Aortic Root: 3.3 cm.    Aortic Valve: Normal trileaflet aortic valve. Mild aortic  regurgitation.  Left Atrium: Moderately dilated left atrium.  LA volume  index = 46 cc/m2.  Left Ventricle: Normal Left Ventricular Systolic Function,  (EF = 55 to 60%) Normal left ventricular internal  dimensions and wall thicknesses. Grade IVdiastolic  dysfunction (severe with fixed restrictive pattern).  Right Heart: Normal right atrium. Linear echodensity  consistent with a catheter is visualized in the right  atrium. Normal right ventricular size and systolic function  (TAPSE 2.6 cm).There is mild tricuspid regurgitation.  There is mild pulmonic regurgitation.  Pericardium/PleuraModerate pericardial effusion,greatest  diameter 1.5cm. Bilateral pleural effusions.  Hemodynamic: RA Pressure is 8 mm Hg. RV systolic pressure  is moderately increased at  46 mm Hg.  ------------------------------------------------------------------------  CONCLUSIONS:  1. Normal mitral valve. Moderate to severe mitral  regurgitation.  2. Normal trileaflet aortic valve. Mild aortic  regurgitation.  3. Aortic Root: 3.3 cm.  4. Moderately dilated left atrium.  LA volume index = 46  cc/m2.  5. Normal left ventricular internal dimensions and wall  thicknesses.  6. Normal Left Ventricular Systolic Function,  (EF = 55 to  60%)  7. Grade IV diastolic dysfunction (severe with fixed  restrictive pattern).  8. Normal right atrium. Linear echodensity consistent with  a catheter is visualized in the right atrium.  9. Normal right ventricular size and systolic function  (TAPSE 2.6 cm).  10. RA Pressure is8 mm Hg.  11. RV systolic pressure is moderately increased at  46 mm  Hg.  12. There is mild tricuspid regurgitation.  13. There is mild pulmonic regurgitation.  14. Moderate pericardial effusion,greatest diameter 1.5cm.  15. Bilateral pleural effusions.    ------------------------------------------------------------------------  Confirmed on  11/3/2023 - 15:05:56 by Emilie Hernandez MD  ------------------------------------------------------------------------    < end of copied text >      IMPRESSION: 53y Female PAST MEDICAL & SURGICAL HISTORY:  No pertinent past medical history      No significant past surgical history       p/w         IMP: This is a 53 yr old  woman  with  HTN, ESRD on HD presenting to the ED for 2 week shortness of breath and dry cough. Patient states she developed shortness of breath at rest associated with dry cough that is severe, comes in bouts causing chest tightness.  She missed HD .  Admitted for acute hypoxic resp failure due to pul edema from missed HD . CT show small pericardial effusion probable due to uremia .  Serial cardiac enzymes neg .       ASSESSMENT     - Acute Hypoxic resp Failure   - Pulmonary edema   - Total body fluid over load   - Pericardia edema   - HTN   - ESRD on HD       Plan     - Continue O2 supp as needed to maintain sat >90%  - Tele monitoring   - 2 DECHO reported noted   - Serial  2 DECHO in 2-3 months to eval pericardial effusion   - Thoracic eval   - Neph for dialysis   - Repeat CXR after 2-3 session of Dialysis   - TSH ok   - RVP  neg  - DVT GI prophy   - Vascular for AVF creation     D/C Dr Singh

## 2023-11-03 NOTE — PROGRESS NOTE ADULT - ASSESSMENT
1 ESRD on HD (T/TH/SAT)  -s/p HD yesterday with UF 2.0kg; UF today with UF 2.0kg plan for HD tomorrow.    -Hemodialysis Renal Diet and Fluid restriction to 1L/day  -Adjust meds to eGFR and avoid IV Gadolinium contrast,NSAIDs, and phosphate enema.  -Monitor Electrolytes daily.  2. HTN:   -bp is improved.  -continue labetolol 300mg tid; on nifedipine 60mg daily; hydralazine 25mg tid.    -titrate bp meds to keep sbp >110 and < 130  3. Anemia of ESRD:  -hold Epogen until bp is controlled.   -F/u CBC daily  -transfuse if HB < 7.0.  4. Mineral Bone Disease:  -continue phoslo tid  -monitor phos  5. Sob due to fluid overload:  -continue lasix 80mg iv bid   -ECHO pending  -UF during HD.   -Pulmon (Dr. Goodwin) and Cardio (Dr. Hernandez) noted.    Discussed with patient via  in detail regarding the renal plan and care.

## 2023-11-03 NOTE — PROGRESS NOTE ADULT - SUBJECTIVE AND OBJECTIVE BOX
COLLIN WHITMAN  MR# [id number]  53yFemale        Patient is a 53y old  Female who presents with a chief complaint of Dyspnea (03 Nov 2023 13:23)      INTERVAL HPI/OVERNIGHT EVENTS:  Patient seen and examined at bedside. No notations of chest pain, palpitation, SOB, orthopnea, nausea, vomiting or abdominal pain.    ALLERGIES  No Known Allergies      MEDICATIONS  acetaminophen     Tablet .. 650 milliGRAM(s) Oral every 6 hours PRN Temp greater or equal to 38C (100.4F), Mild Pain (1 - 3)  benzonatate 100 milliGRAM(s) Oral every 8 hours  furosemide   Injectable 80 milliGRAM(s) IV Push two times a day  heparin   Injectable 5000 Unit(s) SubCutaneous every 8 hours  hydrALAZINE 25 milliGRAM(s) Oral every 8 hours  influenza   Vaccine 0.5 milliLiter(s) IntraMuscular once  labetalol 300 milliGRAM(s) Oral every 8 hours  NIFEdipine XL 60 milliGRAM(s) Oral two times a day  ondansetron Injectable 4 milliGRAM(s) IV Push every 8 hours PRN Nausea and/or Vomiting              REVIEW OF SYSTEMS:  CONSTITUTIONAL: No fever, weight loss, or fatigue  EYES: No eye pain, visual disturbances, or discharge  ENT:  No difficulty hearing, tinnitus, vertigo; No sinus or throat pain  NECK: No pain or stiffness  RESPIRATORY: No cough, wheezing, chills or hemoptysis; No Shortness of Breath  CARDIOVASCULAR: No chest pain, palpitations, passing out, dizziness, or leg swelling  GASTROINTESTINAL: No abdominal or epigastric pain. No nausea, vomiting, or hematemesis; No diarrhea or constipation. No melena or hematochezia.  GENITOURINARY: No dysuria, frequency, hematuria, or incontinence  NEUROLOGICAL: No headaches, memory loss, loss of strength, numbness, or tremors  SKIN: No itching, burning, rashes, or lesions   LYMPH Nodes: No enlarged glands  ENDOCRINE: No heat or cold intolerance; No hair loss  MUSCULOSKELETAL: No joint pain or swelling; No muscle, back, or extremity pain  PSYCHIATRIC: No depression, anxiety, mood swings, or difficulty sleeping  HEME/LYMPH: No easy bruising, or bleeding gums  ALLERGY AND IMMUNOLOGIC: No hives or eczema	    [ ] All others negative	  [ ] Unable to obtain      T(C): 36.7 (11-03-23 @ 12:49), Max: 36.8 (11-02-23 @ 18:50)  T(F): 98 (11-03-23 @ 12:49), Max: 98.3 (11-02-23 @ 20:09)  HR: 86 (11-03-23 @ 12:49) (72 - 86)  BP: 133/78 (11-03-23 @ 12:49) (133/78 - 164/98)  RR: 16 (11-03-23 @ 12:49) (16 - 18)  SpO2: 95% (11-03-23 @ 12:49) (95% - 100%)  Wt(kg): --    I&O's Summary    02 Nov 2023 07:01  -  03 Nov 2023 07:00  --------------------------------------------------------  IN: 600 mL / OUT: 2600 mL / NET: -2000 mL    03 Nov 2023 07:01  -  03 Nov 2023 13:41  --------------------------------------------------------  IN: 500 mL / OUT: 2500 mL / NET: -2000 mL          PHYSICAL EXAM:  A X O x  HEAD:  Atraumatic, Normocephalic  EYES: EOMI, PERRLA, conjunctiva and sclera clear  NECK: Supple, No JVD, Normal thyroid  Resp: CTAB, No crackles, wheezing,   CVS: Regular rate and rhythm; No discernable murmurs, rubs, or gallops  ABD: Soft, Nontender, Nondistended; Bowel sounds present  EXTREMITIES:  2+ Peripheral Pulses, No edema  LYMPH: No dicernable lymphadenopathy noted  GENERAL: NAD, well-groomed, well-developed      LABS:                        10.3   4.63  )-----------( 126      ( 03 Nov 2023 08:12 )             33.5     11-03    139  |  100  |  31<H>  ----------------------------<  105<H>  3.9   |  32<H>  |  7.30<H>    Ca    8.4      03 Nov 2023 08:12  Phos  3.5     11-03  Mg     2.3     11-03    TPro  6.3  /  Alb  2.9<L>  /  TBili  0.8  /  DBili  x   /  AST  11  /  ALT  23  /  AlkPhos  79  11-03      Urinalysis Basic - ( 03 Nov 2023 08:12 )    Color: x / Appearance: x / SG: x / pH: x  Gluc: 105 mg/dL / Ketone: x  / Bili: x / Urobili: x   Blood: x / Protein: x / Nitrite: x   Leuk Esterase: x / RBC: x / WBC x   Sq Epi: x / Non Sq Epi: x / Bacteria: x      CAPILLARY BLOOD GLUCOSE          Troponins:  ProBNP:  Lipid Profile:   HgA1c:  TSH:           RADIOLOGY & ADDITIONAL TESTS:    Imaging Personally Reviewed:  [ ] YES  [ ] NO      Consultant(s) Notes Reviewed:  [x ] YES  [ ] NO    Care Discussed with Consultants/Other Providers [ x] YES  [ ] NO          PAST MEDICAL & SURGICAL HISTORY:  No pertinent past medical history      No significant past surgical history            Acute pulmonary edema    Handoff    MEWS Score    No pertinent past medical history    No pertinent past medical history    Acute pulmonary edema    Dyspnea    ESRD on hemodialysis    Acute UTI    HTN (hypertension)    Prophylactic measure    Acute pericardial effusion    Acute pulmonary edema    No significant past surgical history    No significant past surgical history    W/DIFF BREATHING    34    ESRD on dialysis    SysAdmin_VisitLink

## 2023-11-03 NOTE — PROGRESS NOTE ADULT - SUBJECTIVE AND OBJECTIVE BOX
Date of Service 11-03-23 @ 13:23    CHIEF COMPLAINT:Patient is a 53y old  Female who presents with a chief complaint of Dyspnea .Pt appears comfortable.    	  REVIEW OF SYSTEMS:  CONSTITUTIONAL: No fever, weight loss, or fatigue  EYES: No eye pain, visual disturbances, or discharge  ENT:  No difficulty hearing, tinnitus, vertigo; No sinus or throat pain  NECK: No pain or stiffness  RESPIRATORY: No cough, wheezing, chills or hemoptysis; No Shortness of Breath  CARDIOVASCULAR: No chest pain, palpitations, passing out, dizziness, or leg swelling  GASTROINTESTINAL: No abdominal or epigastric pain. No nausea, vomiting, or hematemesis; No diarrhea or constipation. No melena or hematochezia.  GENITOURINARY: No dysuria, frequency, hematuria, or incontinence  NEUROLOGICAL: No headaches, memory loss, loss of strength, numbness, or tremors  SKIN: No itching, burning, rashes, or lesions   LYMPH Nodes: No enlarged glands  ENDOCRINE: No heat or cold intolerance; No hair loss  MUSCULOSKELETAL: No joint pain or swelling; No muscle, back, or extremity pain  PSYCHIATRIC: No depression, anxiety, mood swings, or difficulty sleeping  HEME/LYMPH: No easy bruising, or bleeding gums  ALLERGY AND IMMUNOLOGIC: No hives or eczema	      PHYSICAL EXAM:  T(C): 36.7 (11-03-23 @ 12:49), Max: 36.8 (11-02-23 @ 18:50)  HR: 86 (11-03-23 @ 12:49) (72 - 86)  BP: 133/78 (11-03-23 @ 12:49) (133/78 - 164/98)  RR: 16 (11-03-23 @ 12:49) (16 - 18)  SpO2: 95% (11-03-23 @ 12:49) (95% - 100%)  Wt(kg): --  I&O's Summary    02 Nov 2023 07:01  -  03 Nov 2023 07:00  --------------------------------------------------------  IN: 600 mL / OUT: 2600 mL / NET: -2000 mL    03 Nov 2023 07:01  -  03 Nov 2023 13:23  --------------------------------------------------------  IN: 500 mL / OUT: 2500 mL / NET: -2000 mL        Appearance: Normal	  HEENT:   Normal oral mucosa, PERRL, EOMI	  Lymphatic: No lymphadenopathy  Cardiovascular: Normal S1 S2, No JVD, No murmurs, No edema  Respiratory: Lungs clear to auscultation	  Psychiatry: A & O x 3, Mood & affect appropriate  Gastrointestinal:  Soft, Non-tender, + BS	  Skin: No rashes, No ecchymoses, No cyanosis	  Neurologic: Non-focal  Extremities: Normal range of motion, No clubbing, cyanosis or edema  Vascular: Peripheral pulses palpable 2+ bilaterally    MEDICATIONS  (STANDING):  benzonatate 100 milliGRAM(s) Oral every 8 hours  furosemide   Injectable 80 milliGRAM(s) IV Push two times a day  heparin   Injectable 5000 Unit(s) SubCutaneous every 8 hours  hydrALAZINE 25 milliGRAM(s) Oral every 8 hours  influenza   Vaccine 0.5 milliLiter(s) IntraMuscular once  labetalol 300 milliGRAM(s) Oral every 8 hours  NIFEdipine XL 60 milliGRAM(s) Oral two times a day       	  	  LABS:	 	      Troponin I, High Sensitivity Result: 21.2 ng/L (11-02 @ 03:48)  Troponin I, High Sensitivity Result: 24.2 ng/L (11-01 @ 18:20)                            10.3   4.63  )-----------( 126      ( 03 Nov 2023 08:12 )             33.5     11-03    139  |  100  |  31<H>  ----------------------------<  105<H>  3.9   |  32<H>  |  7.30<H>    Ca    8.4      03 Nov 2023 08:12  Phos  3.5     11-03  Mg     2.3     11-03    TPro  6.3  /  Alb  2.9<L>  /  TBili  0.8  /  DBili  x   /  AST  11  /  ALT  23  /  AlkPhos  79  11-03    proBNP:   Lipid Profile:   HgA1c:   TSH: Thyroid Stimulating Hormone, Serum: 0.42 uU/mL (11-03 @ 08:12)

## 2023-11-04 LAB
A1C WITH ESTIMATED AVERAGE GLUCOSE RESULT: 4.4 % — SIGNIFICANT CHANGE UP (ref 4–5.6)
A1C WITH ESTIMATED AVERAGE GLUCOSE RESULT: 4.4 % — SIGNIFICANT CHANGE UP (ref 4–5.6)
ANION GAP SERPL CALC-SCNC: 9 MMOL/L — SIGNIFICANT CHANGE UP (ref 5–17)
ANION GAP SERPL CALC-SCNC: 9 MMOL/L — SIGNIFICANT CHANGE UP (ref 5–17)
BUN SERPL-MCNC: 50 MG/DL — HIGH (ref 7–18)
BUN SERPL-MCNC: 50 MG/DL — HIGH (ref 7–18)
CALCIUM SERPL-MCNC: 8.7 MG/DL — SIGNIFICANT CHANGE UP (ref 8.4–10.5)
CALCIUM SERPL-MCNC: 8.7 MG/DL — SIGNIFICANT CHANGE UP (ref 8.4–10.5)
CHLORIDE SERPL-SCNC: 103 MMOL/L — SIGNIFICANT CHANGE UP (ref 96–108)
CHLORIDE SERPL-SCNC: 103 MMOL/L — SIGNIFICANT CHANGE UP (ref 96–108)
CO2 SERPL-SCNC: 28 MMOL/L — SIGNIFICANT CHANGE UP (ref 22–31)
CO2 SERPL-SCNC: 28 MMOL/L — SIGNIFICANT CHANGE UP (ref 22–31)
CREAT SERPL-MCNC: 9.71 MG/DL — HIGH (ref 0.5–1.3)
CREAT SERPL-MCNC: 9.71 MG/DL — HIGH (ref 0.5–1.3)
EGFR: 4 ML/MIN/1.73M2 — LOW
EGFR: 4 ML/MIN/1.73M2 — LOW
ESTIMATED AVERAGE GLUCOSE: 80 MG/DL — SIGNIFICANT CHANGE UP (ref 68–114)
ESTIMATED AVERAGE GLUCOSE: 80 MG/DL — SIGNIFICANT CHANGE UP (ref 68–114)
GLUCOSE SERPL-MCNC: 90 MG/DL — SIGNIFICANT CHANGE UP (ref 70–99)
GLUCOSE SERPL-MCNC: 90 MG/DL — SIGNIFICANT CHANGE UP (ref 70–99)
HCT VFR BLD CALC: 35 % — SIGNIFICANT CHANGE UP (ref 34.5–45)
HCT VFR BLD CALC: 35 % — SIGNIFICANT CHANGE UP (ref 34.5–45)
HGB BLD-MCNC: 10.5 G/DL — LOW (ref 11.5–15.5)
HGB BLD-MCNC: 10.5 G/DL — LOW (ref 11.5–15.5)
MAGNESIUM SERPL-MCNC: 2.4 MG/DL — SIGNIFICANT CHANGE UP (ref 1.6–2.6)
MAGNESIUM SERPL-MCNC: 2.4 MG/DL — SIGNIFICANT CHANGE UP (ref 1.6–2.6)
MCHC RBC-ENTMCNC: 28.4 PG — SIGNIFICANT CHANGE UP (ref 27–34)
MCHC RBC-ENTMCNC: 28.4 PG — SIGNIFICANT CHANGE UP (ref 27–34)
MCHC RBC-ENTMCNC: 30 GM/DL — LOW (ref 32–36)
MCHC RBC-ENTMCNC: 30 GM/DL — LOW (ref 32–36)
MCV RBC AUTO: 94.6 FL — SIGNIFICANT CHANGE UP (ref 80–100)
MCV RBC AUTO: 94.6 FL — SIGNIFICANT CHANGE UP (ref 80–100)
NRBC # BLD: 0 /100 WBCS — SIGNIFICANT CHANGE UP (ref 0–0)
NRBC # BLD: 0 /100 WBCS — SIGNIFICANT CHANGE UP (ref 0–0)
PLATELET # BLD AUTO: 146 K/UL — LOW (ref 150–400)
PLATELET # BLD AUTO: 146 K/UL — LOW (ref 150–400)
POTASSIUM SERPL-MCNC: 4.4 MMOL/L — SIGNIFICANT CHANGE UP (ref 3.5–5.3)
POTASSIUM SERPL-MCNC: 4.4 MMOL/L — SIGNIFICANT CHANGE UP (ref 3.5–5.3)
POTASSIUM SERPL-SCNC: 4.4 MMOL/L — SIGNIFICANT CHANGE UP (ref 3.5–5.3)
POTASSIUM SERPL-SCNC: 4.4 MMOL/L — SIGNIFICANT CHANGE UP (ref 3.5–5.3)
RBC # BLD: 3.7 M/UL — LOW (ref 3.8–5.2)
RBC # BLD: 3.7 M/UL — LOW (ref 3.8–5.2)
RBC # FLD: 16.2 % — HIGH (ref 10.3–14.5)
RBC # FLD: 16.2 % — HIGH (ref 10.3–14.5)
SODIUM SERPL-SCNC: 140 MMOL/L — SIGNIFICANT CHANGE UP (ref 135–145)
SODIUM SERPL-SCNC: 140 MMOL/L — SIGNIFICANT CHANGE UP (ref 135–145)
WBC # BLD: 4.75 K/UL — SIGNIFICANT CHANGE UP (ref 3.8–10.5)
WBC # BLD: 4.75 K/UL — SIGNIFICANT CHANGE UP (ref 3.8–10.5)
WBC # FLD AUTO: 4.75 K/UL — SIGNIFICANT CHANGE UP (ref 3.8–10.5)
WBC # FLD AUTO: 4.75 K/UL — SIGNIFICANT CHANGE UP (ref 3.8–10.5)

## 2023-11-04 PROCEDURE — 99222 1ST HOSP IP/OBS MODERATE 55: CPT

## 2023-11-04 RX ORDER — ALTEPLASE 100 MG
2 KIT INTRAVENOUS ONCE
Refills: 0 | Status: COMPLETED | OUTPATIENT
Start: 2023-11-04 | End: 2023-11-04

## 2023-11-04 RX ADMIN — Medication 60 MILLIGRAM(S): at 19:59

## 2023-11-04 RX ADMIN — Medication 80 MILLIGRAM(S): at 06:53

## 2023-11-04 RX ADMIN — Medication 300 MILLIGRAM(S): at 21:17

## 2023-11-04 RX ADMIN — Medication 25 MILLIGRAM(S): at 06:54

## 2023-11-04 RX ADMIN — Medication 60 MILLIGRAM(S): at 06:55

## 2023-11-04 RX ADMIN — Medication 25 MILLIGRAM(S): at 14:23

## 2023-11-04 RX ADMIN — Medication 300 MILLIGRAM(S): at 06:55

## 2023-11-04 RX ADMIN — HEPARIN SODIUM 5000 UNIT(S): 5000 INJECTION INTRAVENOUS; SUBCUTANEOUS at 21:17

## 2023-11-04 RX ADMIN — HEPARIN SODIUM 5000 UNIT(S): 5000 INJECTION INTRAVENOUS; SUBCUTANEOUS at 06:54

## 2023-11-04 RX ADMIN — ALTEPLASE 2 MILLIGRAM(S): KIT at 16:26

## 2023-11-04 RX ADMIN — Medication 100 MILLIGRAM(S): at 06:53

## 2023-11-04 RX ADMIN — Medication 80 MILLIGRAM(S): at 14:26

## 2023-11-04 RX ADMIN — Medication 100 MILLIGRAM(S): at 21:16

## 2023-11-04 RX ADMIN — Medication 25 MILLIGRAM(S): at 21:16

## 2023-11-04 RX ADMIN — Medication 100 MILLIGRAM(S): at 14:23

## 2023-11-04 RX ADMIN — Medication 300 MILLIGRAM(S): at 14:24

## 2023-11-04 NOTE — PROGRESS NOTE ADULT - SUBJECTIVE AND OBJECTIVE BOX
STEPHANPEDRITOS COLLIN  53y  Patient is a 53y old  Female who presents with a chief complaint of Dyspnea (2023 18:16)    HPI:  This is a 52 y/o female with pmhx of HTN, ESRD on HD ID  [id number] presenting to the ED for 2 week shortness of breath and dry cough. Patient states she developed shortness of breath at rest associated with dry cough that is severe, comes in bouts causing chest tightness. She took some over the counter cough medication that did not help. She endorses dysuria for the past 3 days. She denies any fevers, sore throat, chest pian, palpitations, abdominal pain, N/V/D. No sick contacts or recent travel. She notes increase in her lower extremity.     In ED  vitals: BP: 184/93, HR: 97 on RA  s/p Lasix 80mg, morphine 4mg,   Chest CT: b/l ground glass and pulm edema   Cr: 8.21, BNP: 30k  Ua(+) (2023 00:10)    HEALTH ISSUES - PROBLEM Dx:  Dyspnea    ESRD on hemodialysis    Acute UTI    HTN (hypertension)    Prophylactic measure    Acute pericardial effusion    Acute pulmonary edema          MEDICATIONS  (STANDING):  benzonatate 100 milliGRAM(s) Oral every 8 hours  furosemide   Injectable 80 milliGRAM(s) IV Push two times a day  heparin   Injectable 5000 Unit(s) SubCutaneous every 8 hours  hydrALAZINE 25 milliGRAM(s) Oral every 8 hours  influenza   Vaccine 0.5 milliLiter(s) IntraMuscular once  labetalol 300 milliGRAM(s) Oral every 8 hours  NIFEdipine XL 60 milliGRAM(s) Oral two times a day    MEDICATIONS  (PRN):  acetaminophen     Tablet .. 650 milliGRAM(s) Oral every 6 hours PRN Temp greater or equal to 38C (100.4F), Mild Pain (1 - 3)  ondansetron Injectable 4 milliGRAM(s) IV Push every 8 hours PRN Nausea and/or Vomiting    Vital Signs Last 24 Hrs  T(C): 36.9 (2023 20:44), Max: 36.9 (2023 20:44)  T(F): 98.5 (2023 20:44), Max: 98.5 (2023 20:44)  HR: 83 (2023 20:44) (78 - 88)  BP: 133/57 (2023 20:44) (133/57 - 161/90)  BP(mean): 82 (2023 20:44) (82 - 82)  RR: 19 (2023 20:44) (16 - 19)  SpO2: 93% (2023 21:01) (93% - 100%)    Parameters below as of 2023 21:01  Patient On (Oxygen Delivery Method): room air      Daily     Daily Weight in k.1 (2023 19:15)    PHYSICAL EXAM:  Constitutional:   appears comfortable and not distressed. Not diaphoretic.    Neck:  The thyroid is normal. Trachea is midline.     Breasts: Normal examination.    Respiratory: The lungs are clear to auscultation. No dullness and expansion is normal.    Cardiovascular: S1 and S2 are normal. No mummurs, rubs or gallops are present.    Gastrointestinal: The abdomen is soft. No tenderness is present. No masses are present. Bowel sounds are normal.    Genitourinary: The bladder is not distended. No CVA tenderness is present.    Extremities: No edema is noted. No deformities are present.    Neurological: Cognition is normal. Tone, power and sensation are normal. Gait is steady.    Skin: No leasions are seen  or palpated.    Lymph Nodes: No lymphadenopathy is present.    Psychiatric: Mood is appropriate. No hallucinations or flight of ideas are noted.                              10.5   4.75  )-----------( 146      ( 2023 06:10 )             35.0     11-04    140  |  103  |  50<H>  ----------------------------<  90  4.4   |  28  |  9.71<H>    Ca    8.7      2023 06:10  Phos  3.5     11-03  Mg     2.4     11-04    TPro  6.3  /  Alb  2.9<L>  /  TBili  0.8  /  DBili  x   /  AST  11  /  ALT  23  /  AlkPhos  79  11-03    Urinalysis Basic - ( 2023 06:10 )    Color: x / Appearance: x / SG: x / pH: x  Gluc: 90 mg/dL / Ketone: x  / Bili: x / Urobili: x   Blood: x / Protein: x / Nitrite: x   Leuk Esterase: x / RBC: x / WBC x   Sq Epi: x / Non Sq Epi: x / Bacteria: x

## 2023-11-04 NOTE — CONSULT NOTE ADULT - SUBJECTIVE AND OBJECTIVE BOX
HPI:  This is a 52 y/o female with pmhx of HTN, ESRD on HD ID  [id number] presenting to the ED for 2 week shortness of breath and dry cough. Patient states she developed shortness of breath at rest associated with dry cough that is severe, comes in bouts causing chest tightness. She took some over the counter cough medication that did not help. She endorses dysuria for the past 3 days. She denies any fevers, sore throat, chest pian, palpitations, abdominal pain, N/V/D. No sick contacts or recent travel. She notes increase in her lower extremity.     THORACIC SURGERY CONSULT  52 y/o female with past medical history of HTN, ESRD (on HD through permacath) presented to the ED with SOB and dry cough x 2 weeks. Pt admitted to medicine for concern of fluid overload / CHF. Thoracic surgery consulted for surgical evaluation of pericardial effusion / pleural effusions. Pt seen and examined at bedside. Family member at bedside provided translation. Pt admits to coming into hospital for shortness of breath and cough. Pt experienced difficulty breathing with chest pain upon admission. Pt admits to improvement in breathing since admission. Pt feels comfortable on 2L. Able to ambulate without difficulty. Denies chest pain, back pain, pleuritic pain. Pt is most concerned about cough/mild pain in throat. Denies significant medical/pulmonary history including COPD, asthma, lung malignancy, pneumonia, etc. Denies history of thoracic surgical interventions. Pt is nonsmoker.     In ED  vitals: BP: 184/93, HR: 97 on RA  s/p Lasix 80mg, morphine 4mg,   Chest CT: b/l ground glass and pulm edema   Cr: 8.21, BNP: 30k  Ua(+) (02 Nov 2023 00:10)      PAST MEDICAL & SURGICAL HISTORY:  No pertinent past medical history      No significant past surgical history          MEDICATIONS  (STANDING):  benzonatate 100 milliGRAM(s) Oral every 8 hours  furosemide   Injectable 80 milliGRAM(s) IV Push two times a day  heparin   Injectable 5000 Unit(s) SubCutaneous every 8 hours  hydrALAZINE 25 milliGRAM(s) Oral every 8 hours  influenza   Vaccine 0.5 milliLiter(s) IntraMuscular once  labetalol 300 milliGRAM(s) Oral every 8 hours  NIFEdipine XL 60 milliGRAM(s) Oral two times a day    MEDICATIONS  (PRN):  acetaminophen     Tablet .. 650 milliGRAM(s) Oral every 6 hours PRN Temp greater or equal to 38C (100.4F), Mild Pain (1 - 3)  ondansetron Injectable 4 milliGRAM(s) IV Push every 8 hours PRN Nausea and/or Vomiting      Allergies    No Known Allergies    Intolerances        Vital Signs Last 24 Hrs  T(C): 36.6 (04 Nov 2023 05:17), Max: 37 (03 Nov 2023 20:34)  T(F): 97.9 (04 Nov 2023 05:17), Max: 98.6 (03 Nov 2023 20:34)  HR: 84 (04 Nov 2023 05:17) (81 - 95)  BP: 146/77 (04 Nov 2023 05:17) (129/77 - 152/84)  BP(mean): 106 (03 Nov 2023 17:03) (94 - 106)  RR: 18 (04 Nov 2023 05:17) (16 - 18)  SpO2: 94% (04 Nov 2023 05:17) (94% - 99%)    Parameters below as of 04 Nov 2023 05:17  Patient On (Oxygen Delivery Method): room air        Physical:  Gen: A&Ox3. NAD.  Resp: Unlabored breathing. Equal chest rise bilaterally. Satting well on 2L NC.   Chest: Nontender to palpation. No crepitus palpated.         I&O's Detail    03 Nov 2023 07:01  -  04 Nov 2023 07:00  --------------------------------------------------------  IN:    Other (mL): 500 mL  Total IN: 500 mL    OUT:    Other (mL): 2500 mL  Total OUT: 2500 mL    Total NET: -2000 mL          LABS:                        10.5   4.75  )-----------( 146      ( 04 Nov 2023 06:10 )             35.0              11-04    140  |  103  |  50<H>  ----------------------------<  90  4.4   |  28  |  9.71<H>    Ca    8.7      04 Nov 2023 06:10  Phos  3.5     11-03  Mg     2.4     11-04    TPro  6.3  /  Alb  2.9<L>  /  TBili  0.8  /  DBili  x   /  AST  11  /  ALT  23  /  AlkPhos  79  11-03              Urinalysis Basic - ( 04 Nov 2023 06:10 )    Color: x / Appearance: x / SG: x / pH: x  Gluc: 90 mg/dL / Ketone: x  / Bili: x / Urobili: x   Blood: x / Protein: x / Nitrite: x   Leuk Esterase: x / RBC: x / WBC x   Sq Epi: x / Non Sq Epi: x / Bacteria: x        RADIOLOGY & ADDITIONAL STUDIES:    53y.o. Female with THORACIC SURGERY CONSULT  52 y/o female with past medical history of HTN, ESRD (on HD through permacath) presented to the ED with SOB and dry cough x 2 weeks. Pt admitted to medicine for concern of fluid overload / CHF. Thoracic surgery consulted for surgical evaluation of pericardial effusion / pleural effusions. Pt seen and examined at bedside. Family member at bedside provided translation. Pt admits to coming into hospital for shortness of breath and cough. Pt experienced difficulty breathing with chest pain upon admission. Pt admits to improvement in breathing since admission. Pt feels comfortable on 2L. Able to ambulate without difficulty. Denies chest pain, back pain, pleuritic pain. Pt is most concerned about cough/mild pain in throat. Denies significant medical/pulmonary history including COPD, asthma, lung malignancy, pneumonia, etc. Denies history of thoracic surgical interventions. Pt is nonsmoker.     In ED  vitals: BP: 184/93, HR: 97 on RA  s/p Lasix 80mg, morphine 4mg,   Chest CT: b/l ground glass and pulm edema   Cr: 8.21, BNP: 30k  Ua(+) (02 Nov 2023 00:10)      PAST MEDICAL & SURGICAL HISTORY:  No pertinent past medical history      No significant past surgical history          MEDICATIONS  (STANDING):  benzonatate 100 milliGRAM(s) Oral every 8 hours  furosemide   Injectable 80 milliGRAM(s) IV Push two times a day  heparin   Injectable 5000 Unit(s) SubCutaneous every 8 hours  hydrALAZINE 25 milliGRAM(s) Oral every 8 hours  influenza   Vaccine 0.5 milliLiter(s) IntraMuscular once  labetalol 300 milliGRAM(s) Oral every 8 hours  NIFEdipine XL 60 milliGRAM(s) Oral two times a day    MEDICATIONS  (PRN):  acetaminophen     Tablet .. 650 milliGRAM(s) Oral every 6 hours PRN Temp greater or equal to 38C (100.4F), Mild Pain (1 - 3)  ondansetron Injectable 4 milliGRAM(s) IV Push every 8 hours PRN Nausea and/or Vomiting      Allergies    No Known Allergies    Intolerances        Vital Signs Last 24 Hrs  T(C): 36.6 (04 Nov 2023 05:17), Max: 37 (03 Nov 2023 20:34)  T(F): 97.9 (04 Nov 2023 05:17), Max: 98.6 (03 Nov 2023 20:34)  HR: 84 (04 Nov 2023 05:17) (81 - 95)  BP: 146/77 (04 Nov 2023 05:17) (129/77 - 152/84)  BP(mean): 106 (03 Nov 2023 17:03) (94 - 106)  RR: 18 (04 Nov 2023 05:17) (16 - 18)  SpO2: 94% (04 Nov 2023 05:17) (94% - 99%)    Parameters below as of 04 Nov 2023 05:17  Patient On (Oxygen Delivery Method): room air        Physical:  Gen: A&Ox3. NAD.  Resp: Unlabored breathing. Equal chest rise bilaterally. Satting well on 2L NC.   Cardio: Normal heart sounds. Normal RR.  Chest: R permacath in place, dressing clean, dry, intact. Nontender to palpation. No crepitus palpated. Upper lung fields clear to auscultation. Lower lung fields muffled.  Extremities: LE 1+ pitting edema b/l. Distal pulses intact.       I&O's Detail    03 Nov 2023 07:01  -  04 Nov 2023 07:00  --------------------------------------------------------  IN:    Other (mL): 500 mL  Total IN: 500 mL    OUT:    Other (mL): 2500 mL  Total OUT: 2500 mL    Total NET: -2000 mL          LABS:                        10.5   4.75  )-----------( 146      ( 04 Nov 2023 06:10 )             35.0              11-04    140  |  103  |  50<H>  ----------------------------<  90  4.4   |  28  |  9.71<H>    Ca    8.7      04 Nov 2023 06:10  Phos  3.5     11-03  Mg     2.4     11-04    TPro  6.3  /  Alb  2.9<L>  /  TBili  0.8  /  DBili  x   /  AST  11  /  ALT  23  /  AlkPhos  79  11-03              Urinalysis Basic - ( 04 Nov 2023 06:10 )    Color: x / Appearance: x / SG: x / pH: x  Gluc: 90 mg/dL / Ketone: x  / Bili: x / Urobili: x   Blood: x / Protein: x / Nitrite: x   Leuk Esterase: x / RBC: x / WBC x   Sq Epi: x / Non Sq Epi: x / Bacteria: x        RADIOLOGY & ADDITIONAL STUDIES:    < from: CT Chest No Cont (11.01.23 @ 18:59) >  FINDINGS:    Tubes/Lines: Catheter via right-sided approach in the right atrium    Lungs, airways andpleura: Bilateral pleural effusions, passive   atelectasis, septal thickening and groundglass opacities.    The airways are unremarkable. No pneumothorax.    Mediastinum: The heart is enlarged. In particular, the atria are   enlarged. Small pericardial effusion. Coronary artery calcifications. The   aorta is normal in caliber. Aortic calcifications.    Upper Abdomen: The upper abdomen is normal.    Bones And Soft Tissues: The bones are unremarkable.  The soft tissues are   unremarkable.      IMPRESSION:    1.  Bilateral septal thickening and groundglass opacities can be   secondary to pulmonary edema.  2.  Bilateral pleural effusions and lower lobe passive atelectasis.  3.  Small pericardial effusion.    --- End of Report ---        < from: Transthoracic Echocardiogram (11.02.23 @ 07:08) >  CONCLUSIONS:  1. Normal mitral valve. Moderate to severe mitral  regurgitation.  2. Normal trileaflet aortic valve. Mild aortic  regurgitation.  3. Aortic Root: 3.3 cm.  4. Moderately dilated left atrium.  LA volume index = 46  cc/m2.  5. Normal left ventricular internal dimensions and wall  thicknesses.  6. Normal Left Ventricular Systolic Function,  (EF = 55 to  60%)  7. Grade IV diastolic dysfunction (severe with fixed  restrictive pattern).  8. Normal right atrium. Linear echodensity consistent with  a catheter is visualized in the right atrium.  9. Normal right ventricular size and systolic function  (TAPSE 2.6 cm).  10. RA Pressure is8 mm Hg.  11. RV systolic pressure is moderately increased at  46 mm  Hg.  12. There is mild tricuspid regurgitation.  13. There is mild pulmonic regurgitation.  14. Moderate pericardial effusion,greatest diameter 1.5cm.  15. Bilateral pleural effusions.    < end of copied text >

## 2023-11-04 NOTE — CONSULT NOTE ADULT - NS ATTEND AMEND GEN_ALL_CORE FT
patient HD dependent with bilateral effusion and pericardial effusion - clinically in no tamponade physiology. sitting comfortably in bed in no acute distress.   would  continue to keep fluid negative on HD if BP allows. no acute indication for surgical drainage of fluid. medical optimization.

## 2023-11-04 NOTE — DISCHARGE NOTE PROVIDER - NSDCCPCAREPLAN_GEN_ALL_CORE_FT
PRINCIPAL DISCHARGE DIAGNOSIS  Diagnosis: Acute pulmonary edema  Assessment and Plan of Treatment: You presented with worsening  shortness of breath for 2 weeks, bilateral leg  edema  due to  fluid overload  Chest CT findings suggestive of   pulmonary  edema   You were treated with lasix (diuretic) , and dialysis.   Your Echo shows bilateral  pleural effusions and moderate pericardial effusion (fluid around the heart).   Continue medication as prescribed by your doctor.   Continue hemodialysis as per your nephrologist.   Follow-up for  ischemia eval and repeat echo in 1 month to follow-up  pericardial effusion-due to renal failure..  Follow-up with your primary care physician within 1 week. Call for appointment.  Follow-up with Thoracic Surgeon Dr. Knight within 1 week. Call for appointment.        SECONDARY DISCHARGE DIAGNOSES  Diagnosis: ESRD on dialysis  Assessment and Plan of Treatment:

## 2023-11-04 NOTE — DISCHARGE NOTE PROVIDER - NSDCMRMEDTOKEN_GEN_ALL_CORE_FT
labetalol 100 mg oral tablet: 1 tab(s) orally 3 times a day  NIFEdipine 60 mg oral tablet, extended release: 1 tab(s) orally 2 times a day

## 2023-11-04 NOTE — PROGRESS NOTE ADULT - PROBLEM SELECTOR PLAN 4
ESRD on HD at [place] follows up with DR hernandez   planned for HD [facility]   c/w lasix 80 mg IV BID

## 2023-11-04 NOTE — DISCHARGE NOTE PROVIDER - HOSPITAL COURSE
This is a 52 y/o female with pmhx of HTN, ESRD on HD presenting to the ED for 2 week shortness of breath and dry cough. Patient states she developed shortness of breath at rest associated with dry cough that is severe, comes in bouts causing chest tightness. She took some over the counter cough medication that did not help. She endorses dysuria for the past 3 days. She denies any fevers, sore throat, chest pian, palpitations, abdominal pain, N/V/D. No sick contacts or recent travel. She notes increase in her lower extremity. Admitted for dyspnea 2/2 pulmonary edema . Chest CT: b/l ground glass and pulm edema . Cr: 8.21, BNP: 30k.  Troponin WNL. . s/p 80 mg IV lasix. Continue with lasix 80 mg IV BID as per Nephrology Dr. Calle. HD per Nephro.  CT chest finding acute pericardial effusion. Card Dr Kamila Hernandez consulted. Echo finding Pericardium/PleuraModerate pericardial effusion,greatest  diameter 1.5cm. Bilateral pleural effusions.  Will need ischemia eval and repeat echo 1 mo f/u pericardial effusion-due to renal failure..    Thoracic Surgery consulted. No acute thoracic intervention indicated. Continue aggressive hemodialysis per Nephrologist. Follow up with Dr. Knight as an outpatient once discharged.     ----------------incomplete    Pt is medically optimized for discharge. Continue medications as instructed. Follow-up with PCP.   This is brief summary of patient's hospitalization. Refer to medical record for complete details of hospital course. This is a 54 y/o female with pmhx of HTN, ESRD on HD presenting to the ED for 2 week shortness of breath and dry cough. Patient states she developed shortness of breath at rest associated with dry cough that is severe, comes in bouts causing chest tightness. She took some over the counter cough medication that did not help. She endorses dysuria for the past 3 days. She denies any fevers, sore throat, chest pian, palpitations, abdominal pain, N/V/D. No sick contacts or recent travel. She notes increase in her lower extremity. Admitted for dyspnea 2/2 pulmonary edema . Chest CT: b/l ground glass and pulm edema . Cr: 8.21, BNP: 30k.  Troponin WNL.  S/p 80 mg IV lasix. Continue with lasix 80 mg IV BID as per Nephrology Dr. Calle. HD per Nephro.  CT chest finding noted for acute pericardial effusion. Cardiologist Dr. Hernandez consulted. Echo done with finding Pericardium/PleuraModerate pericardial effusion, greatest diameter 1.5cm. Bilateral pleural effusions.  Will need ischemia eval and repeat echo in 1 mo f/u. Pericardial effusion is due to renal failure.    Thoracic Surgery consulted. No acute thoracic intervention indicated. Continue aggressive hemodialysis as per Nephrologist. Follow up with Dr. Knight as an outpatient once discharged.     ----------------incomplete oa of 11/06    Pt is medically optimized for discharge. Continue medications as instructed. Follow-up with PCP.   This is brief summary of patient's hospitalization. Refer to medical record for complete details of hospital course. This is a 54 y/o female with pmhx of HTN, ESRD on HD presenting to the ED for 2 week shortness of breath and dry cough. Patient states she developed shortness of breath at rest associated with dry cough that is severe, which comes in bouts causing chest tightness. She took some over the counter cough medication that did not help. She endorses dysuria for the past 3 days. She denies any fevers, sore throat, chest pain, palpitations, abdominal pain, N/V/D. No sick contacts or recent travel. She notes increase swelling in her lower extremities.  Admitted for dyspnea 2/2 pulmonary edema. Chest CT with b/l ground glass and pulm edema . Cr: 8.21, BNP: 30k.  Troponin WNL.  S/p 80 mg IV lasix in ED & was continued with Lasix 80 mg IV BID as per Nephrology Dr. Calle. HD per Nephro.  CT chest finding noted for acute pericardial effusion. Cardiologist Dr. Hernandez consulted. Echo done with finding Pericardium/PleuraModerate pericardial effusion, greatest diameter 1.5cm. Bilateral pleural effusions.  Will need ischemia eval and repeat echo in 1 mo f/u. Pericardial effusion is due to renal failure.  Thoracic Surgery consulted. No acute thoracic intervention indicated. Continue aggressive hemodialysis as per Nephrologist. Follow up with Dr. Knight as an outpatient once discharged.       ----------------incomplete as of 11/06 ..................    Pt is medically optimized for discharge. Continue medications as instructed. Follow-up with PCP.   This is brief summary of patient's hospitalization. Refer to medical record for complete details of hospital course.

## 2023-11-04 NOTE — CONSULT NOTE ADULT - ASSESSMENT
52 y/o female c/o SOB with b/l pleural effusions and moderate pericardial effusion   VSS, afebrile, satting well on 2L NC    Pt is hemodynamically stable. Appears comfortable. Pt admits to improvement in respiratory effort.     Plan   - no acute thoracic intervention indicated  - aggressive hemodialysis as her nephrology / primary team to mitigate fluid overload  - follow cardiology recommendations  - follow pulm recommendations   - follow up with Dr. Knight as an outpatient once d/c   - pt to return if respiratory status declines, difficulty breathing, severe chest pain, syncope, etc.  - discussed and agreed with Dr. Knight

## 2023-11-04 NOTE — PROGRESS NOTE ADULT - SUBJECTIVE AND OBJECTIVE BOX
COLLIN WHITMAN  MR# [id number]  53yFemale        Patient is a 53y old  Female who presents with a chief complaint of Dyspnea (04 Nov 2023 17:50)      INTERVAL HPI/OVERNIGHT EVENTS:  Patient seen and examined at bedside. No notations of chest pain, palpitation, SOB, orthopnea, nausea, vomiting or abdominal pain.    ALLERGIES  No Known Allergies      MEDICATIONS  acetaminophen     Tablet .. 650 milliGRAM(s) Oral every 6 hours PRN Temp greater or equal to 38C (100.4F), Mild Pain (1 - 3)  benzonatate 100 milliGRAM(s) Oral every 8 hours  furosemide   Injectable 80 milliGRAM(s) IV Push two times a day  heparin   Injectable 5000 Unit(s) SubCutaneous every 8 hours  hydrALAZINE 25 milliGRAM(s) Oral every 8 hours  influenza   Vaccine 0.5 milliLiter(s) IntraMuscular once  labetalol 300 milliGRAM(s) Oral every 8 hours  NIFEdipine XL 60 milliGRAM(s) Oral two times a day  ondansetron Injectable 4 milliGRAM(s) IV Push every 8 hours PRN Nausea and/or Vomiting              REVIEW OF SYSTEMS:  CONSTITUTIONAL: No fever, weight loss, or fatigue  EYES: No eye pain, visual disturbances, or discharge  ENT:  No difficulty hearing, tinnitus, vertigo; No sinus or throat pain  NECK: No pain or stiffness  RESPIRATORY: No cough, wheezing, chills or hemoptysis; No Shortness of Breath  CARDIOVASCULAR: No chest pain, palpitations, passing out, dizziness, or leg swelling  GASTROINTESTINAL: No abdominal or epigastric pain. No nausea, vomiting, or hematemesis; No diarrhea or constipation. No melena or hematochezia.  GENITOURINARY: No dysuria, frequency, hematuria, or incontinence  NEUROLOGICAL: No headaches, memory loss, loss of strength, numbness, or tremors  SKIN: No itching, burning, rashes, or lesions   LYMPH Nodes: No enlarged glands  ENDOCRINE: No heat or cold intolerance; No hair loss  MUSCULOSKELETAL: No joint pain or swelling; No muscle, back, or extremity pain  PSYCHIATRIC: No depression, anxiety, mood swings, or difficulty sleeping  HEME/LYMPH: No easy bruising, or bleeding gums  ALLERGY AND IMMUNOLOGIC: No hives or eczema	    [ ] All others negative	  [ ] Unable to obtain      T(C): 36.9 (11-04-23 @ 20:44), Max: 36.9 (11-04-23 @ 20:44)  T(F): 98.5 (11-04-23 @ 20:44), Max: 98.5 (11-04-23 @ 20:44)  HR: 83 (11-04-23 @ 20:44) (78 - 88)  BP: 133/57 (11-04-23 @ 20:44) (133/57 - 161/90)  RR: 19 (11-04-23 @ 20:44) (16 - 19)  SpO2: 95% (11-04-23 @ 20:44) (94% - 100%)  Wt(kg): --    I&O's Summary    03 Nov 2023 07:01  -  04 Nov 2023 07:00  --------------------------------------------------------  IN: 500 mL / OUT: 2500 mL / NET: -2000 mL    04 Nov 2023 08:01  -  04 Nov 2023 21:16  --------------------------------------------------------  IN: 800 mL / OUT: 2800 mL / NET: -2000 mL          PHYSICAL EXAM:  A X O x  HEAD:  Atraumatic, Normocephalic  EYES: EOMI, PERRLA, conjunctiva and sclera clear  NECK: Supple, No JVD, Normal thyroid  Resp: CTAB, No crackles, wheezing,   CVS: Regular rate and rhythm; No discernable murmurs, rubs, or gallops  ABD: Soft, Nontender, Nondistended; Bowel sounds present  EXTREMITIES:  2+ Peripheral Pulses, No edema  LYMPH: No dicernable lymphadenopathy noted  GENERAL: NAD, well-groomed, well-developed      LABS:                        10.5   4.75  )-----------( 146      ( 04 Nov 2023 06:10 )             35.0     11-04    140  |  103  |  50<H>  ----------------------------<  90  4.4   |  28  |  9.71<H>    Ca    8.7      04 Nov 2023 06:10  Phos  3.5     11-03  Mg     2.4     11-04    TPro  6.3  /  Alb  2.9<L>  /  TBili  0.8  /  DBili  x   /  AST  11  /  ALT  23  /  AlkPhos  79  11-03      Urinalysis Basic - ( 04 Nov 2023 06:10 )    Color: x / Appearance: x / SG: x / pH: x  Gluc: 90 mg/dL / Ketone: x  / Bili: x / Urobili: x   Blood: x / Protein: x / Nitrite: x   Leuk Esterase: x / RBC: x / WBC x   Sq Epi: x / Non Sq Epi: x / Bacteria: x      CAPILLARY BLOOD GLUCOSE          Troponins:  ProBNP:  Lipid Profile:   HgA1c:  TSH:           RADIOLOGY & ADDITIONAL TESTS:    Imaging Personally Reviewed:  [ ] YES  [ ] NO      Consultant(s) Notes Reviewed:  [x ] YES  [ ] NO    Care Discussed with Consultants/Other Providers [ x] YES  [ ] NO          PAST MEDICAL & SURGICAL HISTORY:  No pertinent past medical history      No significant past surgical history            Acute pulmonary edema    Handoff    MEWS Score    No pertinent past medical history    No pertinent past medical history    Acute pulmonary edema    Dyspnea    ESRD on hemodialysis    Acute UTI    HTN (hypertension)    Prophylactic measure    Acute pericardial effusion    Acute pulmonary edema    No significant past surgical history    No significant past surgical history    W/DIFF BREATHING    34    ESRD on dialysis    SysAdmin_VisitLink

## 2023-11-04 NOTE — PROGRESS NOTE ADULT - SUBJECTIVE AND OBJECTIVE BOX
Time of Visit:  Patient seen and examined.     MEDICATIONS  (STANDING):  benzonatate 100 milliGRAM(s) Oral every 8 hours  furosemide   Injectable 80 milliGRAM(s) IV Push two times a day  heparin   Injectable 5000 Unit(s) SubCutaneous every 8 hours  hydrALAZINE 25 milliGRAM(s) Oral every 8 hours  influenza   Vaccine 0.5 milliLiter(s) IntraMuscular once  labetalol 300 milliGRAM(s) Oral every 8 hours  NIFEdipine XL 60 milliGRAM(s) Oral two times a day      MEDICATIONS  (PRN):  acetaminophen     Tablet .. 650 milliGRAM(s) Oral every 6 hours PRN Temp greater or equal to 38C (100.4F), Mild Pain (1 - 3)  ondansetron Injectable 4 milliGRAM(s) IV Push every 8 hours PRN Nausea and/or Vomiting       Medications up to date at time of exam.      PHYSICAL EXAMINATION:  Patient has no new complaints.  GENERAL: The patient is a well-developed, well-nourished, in no apparent distress.     Vital Signs Last 24 Hrs  T(C): 36.7 (04 Nov 2023 15:11), Max: 37 (03 Nov 2023 20:34)  T(F): 98.1 (04 Nov 2023 15:11), Max: 98.6 (03 Nov 2023 20:34)  HR: 78 (04 Nov 2023 15:11) (78 - 88)  BP: 142/82 (04 Nov 2023 15:11) (139/78 - 161/90)  BP(mean): --  RR: 17 (04 Nov 2023 15:11) (16 - 18)  SpO2: 100% (04 Nov 2023 15:11) (94% - 100%)    Parameters below as of 04 Nov 2023 15:11  Patient On (Oxygen Delivery Method): nasal cannula  O2 Flow (L/min): 2     (if applicable)    Chest Tube (if applicable)    HEENT: Head is normocephalic and atraumatic. Extraocular muscles are intact. Mucous membranes are moist.     NECK: Supple, no palpable adenopathy.    LUNGS: Clear to auscultation, no wheezing, rales, or rhonchi.    HEART: Regular rate and rhythm without murmur.    ABDOMEN: Soft, nontender, and nondistended.  No hepatosplenomegaly is noted.    : No painful voiding, no pelvic pain    EXTREMITIES: Without any cyanosis, clubbing, rash, lesions or edema.    NEUROLOGIC: Awake, alert, oriented, grossly intact    SKIN: Warm, dry, good turgor.      LABS:                        10.5   4.75  )-----------( 146      ( 04 Nov 2023 06:10 )             35.0     11-04    140  |  103  |  50<H>  ----------------------------<  90  4.4   |  28  |  9.71<H>    Ca    8.7      04 Nov 2023 06:10  Phos  3.5     11-03  Mg     2.4     11-04    TPro  6.3  /  Alb  2.9<L>  /  TBili  0.8  /  DBili  x   /  AST  11  /  ALT  23  /  AlkPhos  79  11-03      Urinalysis Basic - ( 04 Nov 2023 06:10 )    Color: x / Appearance: x / SG: x / pH: x  Gluc: 90 mg/dL / Ketone: x  / Bili: x / Urobili: x   Blood: x / Protein: x / Nitrite: x   Leuk Esterase: x / RBC: x / WBC x   Sq Epi: x / Non Sq Epi: x / Bacteria: x    MICROBIOLOGY: (if applicable)    RADIOLOGY & ADDITIONAL STUDIES:  EKG:   CXR:  ECHO:    IMPRESSION: 53y Female PAST MEDICAL & SURGICAL HISTORY:  No pertinent past medical history      No significant past surgical history       p/w           IMP: This is a 53 yr old  woman  with  HTN, ESRD on HD presenting to the ED for 2 week shortness of breath and dry cough. Patient states she developed shortness of breath at rest associated with dry cough that is severe, comes in bouts causing chest tightness.  She missed HD .  Admitted for acute hypoxic resp failure due to pul edema from missed HD . CT show small pericardial effusion probable due to uremia .  Serial cardiac enzymes neg .       ASSESSMENT     - Acute Hypoxic resp Failure   - Pulmonary edema   - Total body fluid over load   - Pericardia edema   - HTN   - ESRD on HD       Plan     - D/C supp o2   - Tele monitoring   - 2 DECHO reported noted   - Serial  2 DECHO in 2-3 months to eval pericardial effusion   - Thoracic eval noted   - Neph for dialysis   - Repeat CXR after 2-3 session of Dialysis   - TSH ok   - RVP  neg  - DVT GI prophy   - CXR in AM     spoke with daughter Germaine at bedside

## 2023-11-04 NOTE — PROGRESS NOTE ADULT - ASSESSMENT
54 y/o female with pmhx of HTN, ESRD on HD ID  [id number] presenting to the ED for 2 week shortness of breath and dry cough,pulmonary edema,uncontrolled HTN.  1.Echocardiogram as above.Will need ischemia eval and repeat echo 1 mo f/u pericardial effusion-due cookie renal failure..  2.ESRD-HD as per renal.  3.HTN-cont  bp medication.  4.GI and DVT prophylaxis.

## 2023-11-04 NOTE — PROGRESS NOTE ADULT - ASSESSMENT
This is a 54 y/o female with pmhx of HTN, ESRD on HD presenting to the ED for 2 week shortness of breath and dry cough. Patient states she developed shortness of breath at rest associated with dry cough that is severe, comes in bouts causing chest tightness. She took some over the counter cough medication that did not help. She endorses dysuria for the past 3 days. She denies any fevers, sore throat, chest pian, palpitations, abdominal pain, N/V/D. No sick contacts or recent travel. She notes increase in her lower extremity. Admitted for dyspnea 2/2 pulmonary edema   In ED  vitals: BP: 184/93, HR: 97 on RA  s/p Lasix 80mg, morphine 4mg,   Chest CT: b/l ground glass and pulm edema   Cr: 8.21, BNP: 30k  Ua(+)

## 2023-11-04 NOTE — DISCHARGE NOTE PROVIDER - CARE PROVIDERS DIRECT ADDRESSES
,DirectAddress_Unknown,[email or web address]

## 2023-11-04 NOTE — PROGRESS NOTE ADULT - SUBJECTIVE AND OBJECTIVE BOX
Date of Service 11-04-23 @ 12:48    CHIEF COMPLAINT:Patient is a 53y old  Female who presents with a chief complaint of Dyspnea.Pt appears comfortable.    	  REVIEW OF SYSTEMS:  CONSTITUTIONAL: No fever, weight loss, or fatigue  EYES: No eye pain, visual disturbances, or discharge  ENT:  No difficulty hearing, tinnitus, vertigo; No sinus or throat pain  NECK: No pain or stiffness  RESPIRATORY: No cough, wheezing, chills or hemoptysis; No Shortness of Breath  CARDIOVASCULAR: No chest pain, palpitations, passing out, dizziness, or leg swelling  GASTROINTESTINAL: No abdominal or epigastric pain. No nausea, vomiting, or hematemesis; No diarrhea or constipation. No melena or hematochezia.  GENITOURINARY: No dysuria, frequency, hematuria, or incontinence  NEUROLOGICAL: No headaches, memory loss, loss of strength, numbness, or tremors  SKIN: No itching, burning, rashes, or lesions   LYMPH Nodes: No enlarged glands  ENDOCRINE: No heat or cold intolerance; No hair loss  MUSCULOSKELETAL: No joint pain or swelling; No muscle, back, or extremity pain  PSYCHIATRIC: No depression, anxiety, mood swings, or difficulty sleeping  HEME/LYMPH: No easy bruising, or bleeding gums  ALLERGY AND IMMUNOLOGIC: No hives or eczema	    PHYSICAL EXAM:  T(C): 36.6 (11-04-23 @ 05:17), Max: 37 (11-03-23 @ 20:34)  HR: 84 (11-04-23 @ 05:17) (81 - 95)  BP: 146/77 (11-04-23 @ 05:17) (129/77 - 152/84)  RR: 18 (11-04-23 @ 05:17) (16 - 18)  SpO2: 94% (11-04-23 @ 05:17) (94% - 99%)  Wt(kg): --  I&O's Summary    03 Nov 2023 07:01  -  04 Nov 2023 07:00  --------------------------------------------------------  IN: 500 mL / OUT: 2500 mL / NET: -2000 mL        Appearance: Normal	  HEENT:   Normal oral mucosa, PERRL, EOMI	  Lymphatic: No lymphadenopathy  Cardiovascular: Normal S1 S2, No JVD, No murmurs, No edema  Respiratory: Lungs clear to auscultation	  Psychiatry: A & O x 3, Mood & affect appropriate  Gastrointestinal:  Soft, Non-tender, + BS	  Skin: No rashes, No ecchymoses, No cyanosis	  Neurologic: Non-focal  Extremities: Normal range of motion, No clubbing, cyanosis or edema  Vascular: Peripheral pulses palpable 2+ bilaterally    MEDICATIONS  (STANDING):  benzonatate 100 milliGRAM(s) Oral every 8 hours  furosemide   Injectable 80 milliGRAM(s) IV Push two times a day  heparin   Injectable 5000 Unit(s) SubCutaneous every 8 hours  hydrALAZINE 25 milliGRAM(s) Oral every 8 hours  influenza   Vaccine 0.5 milliLiter(s) IntraMuscular once  labetalol 300 milliGRAM(s) Oral every 8 hours  NIFEdipine XL 60 milliGRAM(s) Oral two times a day    	  	  LABS:	 	    Troponin I, High Sensitivity Result: 21.2 ng/L (11-02 @ 03:48)  Troponin I, High Sensitivity Result: 24.2 ng/L (11-01 @ 18:20)                            10.5   4.75  )-----------( 146      ( 04 Nov 2023 06:10 )             35.0     11-04    140  |  103  |  50<H>  ----------------------------<  90  4.4   |  28  |  9.71<H>    Ca    8.7      04 Nov 2023 06:10  Phos  3.5     11-03  Mg     2.4     11-04    TPro  6.3  /  Alb  2.9<L>  /  TBili  0.8  /  DBili  x   /  AST  11  /  ALT  23  /  AlkPhos  79  11-03      TSH: Thyroid Stimulating Hormone, Serum: 0.42 uU/mL (11-03 @ 08:12)      	  < from: Transthoracic Echocardiogram (11.02.23 @ 07:08) >  OBSERVATIONS:  Mitral Valve: Normal mitral valve. Moderate to severe  mitral regurgitation.  Aortic Root: Aortic Root: 3.3 cm.    Aortic Valve: Normal trileaflet aortic valve. Mild aortic  regurgitation.  Left Atrium: Moderately dilated left atrium.  LA volume  index = 46 cc/m2.  Left Ventricle: Normal Left Ventricular Systolic Function,  (EF = 55 to 60%) Normal left ventricular internal  dimensions and wall thicknesses. Grade IVdiastolic  dysfunction (severe with fixed restrictive pattern).  Right Heart: Normal right atrium. Linear echodensity  consistent with a catheter is visualized in the right  atrium. Normal right ventricular size and systolic function  (TAPSE 2.6 cm).There is mild tricuspid regurgitation.  There is mild pulmonic regurgitation.  Pericardium/PleuraModerate pericardial effusion,greatest  diameter 1.5cm. Bilateral pleural effusions.  Hemodynamic: RA Pressure is 8 mm Hg. RV systolic pressure  is moderately increased at  46 mm Hg.    < end of copied text >

## 2023-11-04 NOTE — DISCHARGE NOTE PROVIDER - CARE PROVIDER_API CALL
Dr. Dong Hamilton, PCP  Phone: [phone]  Fax: (   )    -  Established Patient  Follow Up Time: 1 week    Sarahy Knight  Thoracic Surgery  [address] [place] [address], [address] [place] [address]  Phone: [phone]  Fax: [phone]  Follow Up Time: 1 week

## 2023-11-04 NOTE — DISCHARGE NOTE PROVIDER - PROVIDER TOKENS
FREE:[LAST:[Dr. Dong Hamilton],FIRST:[PCP],PHONE:[phone]],FAX:[(   )    -],FOLLOWUP:[1 week],ESTABLISHEDPATIENT:[T]],PROVIDER:[TOKEN:[[id number]],FOLLOWUP:[1 week]]

## 2023-11-05 RX ORDER — FLUTICASONE PROPIONATE 50 MCG
1 SPRAY, SUSPENSION NASAL
Refills: 0 | Status: DISCONTINUED | OUTPATIENT
Start: 2023-11-05 | End: 2023-11-08

## 2023-11-05 RX ADMIN — Medication 60 MILLIGRAM(S): at 05:53

## 2023-11-05 RX ADMIN — Medication 100 MILLIGRAM(S): at 13:16

## 2023-11-05 RX ADMIN — Medication 25 MILLIGRAM(S): at 05:53

## 2023-11-05 RX ADMIN — Medication 100 MILLIGRAM(S): at 21:05

## 2023-11-05 RX ADMIN — Medication 100 MILLIGRAM(S): at 05:53

## 2023-11-05 RX ADMIN — Medication 100 MILLIGRAM(S): at 21:10

## 2023-11-05 RX ADMIN — Medication 60 MILLIGRAM(S): at 17:14

## 2023-11-05 RX ADMIN — HEPARIN SODIUM 5000 UNIT(S): 5000 INJECTION INTRAVENOUS; SUBCUTANEOUS at 13:17

## 2023-11-05 RX ADMIN — HEPARIN SODIUM 5000 UNIT(S): 5000 INJECTION INTRAVENOUS; SUBCUTANEOUS at 05:55

## 2023-11-05 RX ADMIN — Medication 80 MILLIGRAM(S): at 05:55

## 2023-11-05 RX ADMIN — Medication 25 MILLIGRAM(S): at 21:06

## 2023-11-05 RX ADMIN — Medication 300 MILLIGRAM(S): at 13:16

## 2023-11-05 RX ADMIN — Medication 300 MILLIGRAM(S): at 05:54

## 2023-11-05 RX ADMIN — Medication 300 MILLIGRAM(S): at 21:06

## 2023-11-05 RX ADMIN — HEPARIN SODIUM 5000 UNIT(S): 5000 INJECTION INTRAVENOUS; SUBCUTANEOUS at 21:07

## 2023-11-05 RX ADMIN — Medication 100 MILLIGRAM(S): at 15:39

## 2023-11-05 RX ADMIN — Medication 1 SPRAY(S): at 17:13

## 2023-11-05 RX ADMIN — Medication 80 MILLIGRAM(S): at 13:16

## 2023-11-05 RX ADMIN — Medication 25 MILLIGRAM(S): at 13:16

## 2023-11-05 NOTE — PROGRESS NOTE ADULT - SUBJECTIVE AND OBJECTIVE BOX
COLLIN WHITMAN  MR# [id number]  53yFemale        Patient is a 53y old  Female who presents with a chief complaint of Dyspnea (05 Nov 2023 16:00)      INTERVAL HPI/OVERNIGHT EVENTS:  Patient seen and examined at bedside. No notations of chest pain, palpitation, SOB, orthopnea, nausea, vomiting or abdominal pain.    ALLERGIES  No Known Allergies      MEDICATIONS  acetaminophen     Tablet .. 650 milliGRAM(s) Oral every 6 hours PRN Temp greater or equal to 38C (100.4F), Mild Pain (1 - 3)  benzonatate 100 milliGRAM(s) Oral every 8 hours  fluticasone propionate 50 MICROgram(s)/spray Nasal Spray 1 Spray(s) Both Nostrils two times a day  furosemide   Injectable 80 milliGRAM(s) IV Push two times a day  guaiFENesin Oral Liquid (Sugar-Free) 100 milliGRAM(s) Oral every 6 hours PRN Cough  heparin   Injectable 5000 Unit(s) SubCutaneous every 8 hours  hydrALAZINE 25 milliGRAM(s) Oral every 8 hours  influenza   Vaccine 0.5 milliLiter(s) IntraMuscular once  labetalol 300 milliGRAM(s) Oral every 8 hours  NIFEdipine XL 60 milliGRAM(s) Oral two times a day  ondansetron Injectable 4 milliGRAM(s) IV Push every 8 hours PRN Nausea and/or Vomiting              REVIEW OF SYSTEMS:  CONSTITUTIONAL: No fever, weight loss, or fatigue  EYES: No eye pain, visual disturbances, or discharge  ENT:  No difficulty hearing, tinnitus, vertigo; No sinus or throat pain  NECK: No pain or stiffness  RESPIRATORY: No cough, wheezing, chills or hemoptysis; No Shortness of Breath  CARDIOVASCULAR: No chest pain, palpitations, passing out, dizziness, or leg swelling  GASTROINTESTINAL: No abdominal or epigastric pain. No nausea, vomiting, or hematemesis; No diarrhea or constipation. No melena or hematochezia.  GENITOURINARY: No dysuria, frequency, hematuria, or incontinence  NEUROLOGICAL: No headaches, memory loss, loss of strength, numbness, or tremors  SKIN: No itching, burning, rashes, or lesions   LYMPH Nodes: No enlarged glands  ENDOCRINE: No heat or cold intolerance; No hair loss  MUSCULOSKELETAL: No joint pain or swelling; No muscle, back, or extremity pain  PSYCHIATRIC: No depression, anxiety, mood swings, or difficulty sleeping  HEME/LYMPH: No easy bruising, or bleeding gums  ALLERGY AND IMMUNOLOGIC: No hives or eczema	    [ ] All others negative	  [ ] Unable to obtain      T(C): 36.8 (11-05-23 @ 14:03), Max: 36.9 (11-04-23 @ 20:44)  T(F): 98.3 (11-05-23 @ 14:03), Max: 98.5 (11-04-23 @ 20:44)  HR: 86 (11-05-23 @ 14:03) (79 - 86)  BP: 141/77 (11-05-23 @ 14:03) (133/57 - 161/88)  RR: 16 (11-05-23 @ 14:03) (16 - 19)  SpO2: 97% (11-05-23 @ 14:03) (93% - 100%)  Wt(kg): --    I&O's Summary    04 Nov 2023 08:01  -  05 Nov 2023 07:00  --------------------------------------------------------  IN: 800 mL / OUT: 2800 mL / NET: -2000 mL          PHYSICAL EXAM:  A X O x  HEAD:  Atraumatic, Normocephalic  EYES: EOMI, PERRLA, conjunctiva and sclera clear  NECK: Supple, No JVD, Normal thyroid  Resp: CTAB, No crackles, wheezing,   CVS: Regular rate and rhythm; No discernable murmurs, rubs, or gallops  ABD: Soft, Nontender, Nondistended; Bowel sounds present  EXTREMITIES:  2+ Peripheral Pulses, No edema  LYMPH: No dicernable lymphadenopathy noted  GENERAL: NAD, well-groomed, well-developed      LABS:                        10.5   4.75  )-----------( 146      ( 04 Nov 2023 06:10 )             35.0     11-04    140  |  103  |  50<H>  ----------------------------<  90  4.4   |  28  |  9.71<H>    Ca    8.7      04 Nov 2023 06:10  Mg     2.4     11-04        Urinalysis Basic - ( 04 Nov 2023 06:10 )    Color: x / Appearance: x / SG: x / pH: x  Gluc: 90 mg/dL / Ketone: x  / Bili: x / Urobili: x   Blood: x / Protein: x / Nitrite: x   Leuk Esterase: x / RBC: x / WBC x   Sq Epi: x / Non Sq Epi: x / Bacteria: x      CAPILLARY BLOOD GLUCOSE          Troponins:  ProBNP:  Lipid Profile:   HgA1c:  TSH:           RADIOLOGY & ADDITIONAL TESTS:    Imaging Personally Reviewed:  [ ] YES  [ ] NO      Consultant(s) Notes Reviewed:  [x ] YES  [ ] NO    Care Discussed with Consultants/Other Providers [ x] YES  [ ] NO          PAST MEDICAL & SURGICAL HISTORY:  No pertinent past medical history      No significant past surgical history            Acute pulmonary edema    Handoff    MEWS Score    No pertinent past medical history    No pertinent past medical history    Acute pulmonary edema    Dyspnea    ESRD on hemodialysis    Acute UTI    HTN (hypertension)    Prophylactic measure    Acute pericardial effusion    Acute pulmonary edema    No significant past surgical history    No significant past surgical history    W/DIFF BREATHING    35    ESRD on dialysis    SysAdmin_VisitLink

## 2023-11-05 NOTE — PROGRESS NOTE ADULT - SUBJECTIVE AND OBJECTIVE BOX
Elmo Cantu MD  Nephrology      Covering for COLLIN Esposito  53y  Patient is a 53y old  Female who presents with a chief complaint of Dyspnea (2023 11:40)    HPI:  ESRD on HD admitted for volume overload.  HD yesterday and Friday.  She feels much improved.    HEALTH ISSUES - PROBLEM Dx:  Dyspnea    ESRD on hemodialysis    Acute UTI    HTN (hypertension)    Prophylactic measure    Acute pericardial effusion    Acute pulmonary edema          MEDICATIONS  (STANDING):  benzonatate 100 milliGRAM(s) Oral every 8 hours  fluticasone propionate 50 MICROgram(s)/spray Nasal Spray 1 Spray(s) Both Nostrils two times a day  furosemide   Injectable 80 milliGRAM(s) IV Push two times a day  heparin   Injectable 5000 Unit(s) SubCutaneous every 8 hours  hydrALAZINE 25 milliGRAM(s) Oral every 8 hours  influenza   Vaccine 0.5 milliLiter(s) IntraMuscular once  labetalol 300 milliGRAM(s) Oral every 8 hours  NIFEdipine XL 60 milliGRAM(s) Oral two times a day    MEDICATIONS  (PRN):  acetaminophen     Tablet .. 650 milliGRAM(s) Oral every 6 hours PRN Temp greater or equal to 38C (100.4F), Mild Pain (1 - 3)  guaiFENesin Oral Liquid (Sugar-Free) 100 milliGRAM(s) Oral every 6 hours PRN Cough  ondansetron Injectable 4 milliGRAM(s) IV Push every 8 hours PRN Nausea and/or Vomiting    Vital Signs Last 24 Hrs  T(C): 36.8 (2023 14:03), Max: 36.9 (2023 20:44)  T(F): 98.3 (2023 14:03), Max: 98.5 (2023 20:44)  HR: 86 (2023 14:03) (79 - 86)  BP: 141/77 (2023 14:03) (133/57 - 161/88)  BP(mean): 82 (2023 20:44) (82 - 82)  RR: 16 (2023 14:03) (16 - 19)  SpO2: 97% (2023 14:03) (93% - 100%)    Parameters below as of 2023 14:03  Patient On (Oxygen Delivery Method): room air      Daily     Daily Weight in k.9 (2023 05:38)    PHYSICAL EXAM:  Constitutional:  She appears comfortable and not distressed. Not diaphoretic.    Neck:  The thyroid is normal. Trachea is midline.     Breasts: Normal examination.    Respiratory: The lungs are clear to auscultation. No dullness and expansion is normal.    Cardiovascular: S1 and S2 are normal. No mummurs, rubs or gallops are present.    Gastrointestinal: The abdomen is soft. No tenderness is present. No masses are present. Bowel sounds are normal.    Genitourinary: The bladder is not distended. No CVA tenderness is present.    Extremities: No edema is noted. No deformities are present.    Neurological: Cognition is normal. Tone, power and sensation are normal. Gait is steady.    Skin: No leasions are seen  or palpated.    Lymph Nodes: No lymphadenopathy is present.    Psychiatric: Mood is appropriate. No hallucinations or flight of ideas are noted.                              10.5   4.75  )-----------( 146      ( 2023 06:10 )             35.0     11-04    140  |  103  |  50<H>  ----------------------------<  90  4.4   |  28  |  9.71<H>    Ca    8.7      2023 06:10  Mg     2.4     11-04        < from: Transthoracic Echocardiogram (23 @ 07:08) >  1. Normal mitral valve. Moderate to severe mitral  regurgitation.  2. Normal trileaflet aortic valve. Mild aortic  regurgitation.  3. Aortic Root: 3.3 cm.  4. Moderately dilated left atrium.  LA volume index = 46  cc/m2.  5. Normal left ventricular internal dimensions and wall  thicknesses.  6. Normal Left Ventricular Systolic Function,  (EF = 55 to  60%)  7. Grade IV diastolic dysfunction (severe with fixed  restrictive pattern).  8. Normal right atrium. Linear echodensity consistent with  a catheter is visualized in the right atrium.  9. Normal right ventricular size and systolic function  (TAPSE 2.6 cm).  10. RA Pressure is8 mm Hg.  11. RV systolic pressure is moderately increased at  46 mm  Hg.  12. There is mild tricuspid regurgitation.  13. There is mild pulmonic regurgitation.  14. Moderate pericardial effusion,greatest diameter 1.5cm.  15. Bilateral pleural effusions.    < end of copied text >

## 2023-11-05 NOTE — PROGRESS NOTE ADULT - SUBJECTIVE AND OBJECTIVE BOX
S: No difficulty breathing. C/o cough with sputum that is difficult to excrete.     O:  Vital Signs Last 24 Hrs  T(C): 36.4 (05 Nov 2023 05:38), Max: 36.9 (04 Nov 2023 20:44)  T(F): 97.6 (05 Nov 2023 05:38), Max: 98.5 (04 Nov 2023 20:44)  HR: 83 (05 Nov 2023 05:38) (78 - 88)  BP: 158/83 (05 Nov 2023 05:38) (133/57 - 161/90)  BP(mean): 82 (04 Nov 2023 20:44) (82 - 82)  RR: 17 (05 Nov 2023 05:38) (16 - 19)  SpO2: 97% (05 Nov 2023 05:38) (93% - 100%)    Parameters below as of 05 Nov 2023 05:38  Patient On (Oxygen Delivery Method): nasal cannula  O2 Flow (L/min): 2    Exam:  General: sitting upright, NAD  Resp: nonlabored, no accessory  muscle use    < from: Transthoracic Echocardiogram (11.02.23 @ 07:08) >  CONCLUSIONS:  1. Normal mitral valve. Moderate to severe mitral  regurgitation.  2. Normal trileaflet aortic valve. Mild aortic  regurgitation.  3. Aortic Root: 3.3 cm.  4. Moderately dilated left atrium.  LA volume index = 46  cc/m2.  5. Normal left ventricular internal dimensions and wall  thicknesses.  6. Normal Left Ventricular Systolic Function,  (EF = 55 to  60%)  7. Grade IV diastolic dysfunction (severe with fixed  restrictive pattern).  8. Normal right atrium. Linear echodensity consistent with  a catheter is visualized in the right atrium.  9. Normal right ventricular size and systolic function  (TAPSE 2.6 cm).  10. RA Pressure is8 mm Hg.  11. RV systolic pressure is moderately increased at  46 mm  Hg.  12. There is mild tricuspid regurgitation.  13. There is mild pulmonic regurgitation.  14. Moderate pericardial effusion, greatest diameter 1.5cm.  15. Bilateral pleural effusions.    < end of copied text >

## 2023-11-05 NOTE — PROGRESS NOTE ADULT - ASSESSMENT
1 ESRD on HD (T/TH/SAT)  -s/p HD on 10/3 and 10/4.  -Hemodialysis Renal Diet and Fluid restriction to 1L/day  -Adjust meds to eGFR and avoid IV Gadolinium contrast,NSAIDs, and phosphate enema.  -Monitor Electrolytes daily.    2. HTN:   -bp is improved.  -continue labetolol 300mg tid; on nifedipine 60mg daily; hydralazine 25mg tid.    -titrate bp meds to keep sbp >110 and < 130.    3. Anemia of ESRD:  -hold Epogen until bp is controlled.   -F/u CBC daily  -transfuse if HB < 7.0.    4. Mineral Bone Disease:  -continue phoslo tid  -monitor phos.    5. Sob due to fluid overload:  -continue lasix 80mg iv bid   -ECHO pending  -UF during HD.   -Pulmon (Dr. Goodwin) and Cardio (Dr. Hernandez) noted.    Discussed with patient via her son as  in detail regarding the renal plan and care.

## 2023-11-05 NOTE — PROGRESS NOTE ADULT - ASSESSMENT
54 y/o female with pmhx of HTN, ESRD on HD ID  [id number] presenting to the ED for 2 week shortness of breath and dry cough,pulmonary edema,uncontrolled HTN.  1.Echocardiogram as above.Will need ischemia eval and repeat echo 1 mo f/u pericardial effusion-due cookie renal failure..  2.ESRD-HD as per renal.  3.HTN-cont  bp medication.  4.GI and DVT prophylaxis.

## 2023-11-05 NOTE — PROGRESS NOTE ADULT - ASSESSMENT
54 y/o female c/o SOB with b/l pleural effusions and moderate pericardial effusion   VSS, afebrile, saturation wnl on 2L NC  Pt is hemodynamically stable. No difficulty breathing    - expectorant for productive cough  - no acute thoracic intervention indicated at this time  - aggressive hemodialysis as her nephrology  - follow cardiology recommendations  - follow pulm recommendations   - follow up with Dr. Knight as an outpatient upon discharge  - pt to return to hospital if respiratory status declines, difficulty breathing, severe chest pain, syncope, etc.  - discussed with Dr. Knight    52 y/o female c/o SOB with b/l pleural effusions and moderate pericardial effusion   VSS, afebrile, saturation wnl on 2L NC  Pt is hemodynamically stable. No difficulty breathing    - expectorant for productive cough  - no acute thoracic intervention indicated at this time  - aggressive hemodialysis as her nephrology  - follow cardiology recommendations  - follow pulm recommendations   - follow up with Dr. Knight as an outpatient upon discharge  - pt to return to hospital if respiratory status declines, difficulty breathing, severe chest pain, syncope, etc.  - discussed with Dr. Knight

## 2023-11-05 NOTE — PROGRESS NOTE ADULT - PROBLEM SELECTOR PLAN 4
ESRD on HD at [place] follows up with DR hernandez   planned for HD [facility]   c/w lasix 80 mg IV BID

## 2023-11-05 NOTE — PROGRESS NOTE ADULT - SUBJECTIVE AND OBJECTIVE BOX
Date of Service 11-05-23 @ 11:40    CHIEF COMPLAINT:Patient is a 53y old  Female who presents with a chief complaint of Dyspnea .Pt appears comfortable.    	  REVIEW OF SYSTEMS:  CONSTITUTIONAL: No fever, weight loss, or fatigue  EYES: No eye pain, visual disturbances, or discharge  ENT:  No difficulty hearing, tinnitus, vertigo; No sinus or throat pain  NECK: No pain or stiffness  RESPIRATORY: No cough, wheezing, chills or hemoptysis; No Shortness of Breath  CARDIOVASCULAR: No chest pain, palpitations, passing out, dizziness, or leg swelling  GASTROINTESTINAL: No abdominal or epigastric pain. No nausea, vomiting, or hematemesis; No diarrhea or constipation. No melena or hematochezia.  GENITOURINARY: No dysuria, frequency, hematuria, or incontinence  NEUROLOGICAL: No headaches, memory loss, loss of strength, numbness, or tremors  SKIN: No itching, burning, rashes, or lesions   LYMPH Nodes: No enlarged glands  ENDOCRINE: No heat or cold intolerance; No hair loss  MUSCULOSKELETAL: No joint pain or swelling; No muscle, back, or extremity pain  PSYCHIATRIC: No depression, anxiety, mood swings, or difficulty sleeping  HEME/LYMPH: No easy bruising, or bleeding gums  ALLERGY AND IMMUNOLOGIC: No hives or eczema	      PHYSICAL EXAM:  T(C): 36.4 (11-05-23 @ 05:38), Max: 36.9 (11-04-23 @ 20:44)  HR: 83 (11-05-23 @ 05:38) (78 - 88)  BP: 158/83 (11-05-23 @ 05:38) (133/57 - 161/90)  RR: 17 (11-05-23 @ 05:38) (16 - 19)  SpO2: 97% (11-05-23 @ 05:38) (93% - 100%)  Wt(kg): --  I&O's Summary    04 Nov 2023 08:01  -  05 Nov 2023 07:00  --------------------------------------------------------  IN: 800 mL / OUT: 2800 mL / NET: -2000 mL        Appearance: Normal	  HEENT:   Normal oral mucosa, PERRL, EOMI	  Lymphatic: No lymphadenopathy  Cardiovascular: Normal S1 S2, No JVD, No murmurs, No edema  Respiratory: Lungs clear to auscultation	  Psychiatry: A & O x 3, Mood & affect appropriate  Gastrointestinal:  Soft, Non-tender, + BS	  Skin: No rashes, No ecchymoses, No cyanosis	  Neurologic: Non-focal  Extremities: Normal range of motion, No clubbing, cyanosis or edema  Vascular: Peripheral pulses palpable 2+ bilaterally    MEDICATIONS  (STANDING):  benzonatate 100 milliGRAM(s) Oral every 8 hours  furosemide   Injectable 80 milliGRAM(s) IV Push two times a day  heparin   Injectable 5000 Unit(s) SubCutaneous every 8 hours  hydrALAZINE 25 milliGRAM(s) Oral every 8 hours  influenza   Vaccine 0.5 milliLiter(s) IntraMuscular once  labetalol 300 milliGRAM(s) Oral every 8 hours  NIFEdipine XL 60 milliGRAM(s) Oral two times a day      	  	  LABS:	 	                         10.5   4.75  )-----------( 146      ( 04 Nov 2023 06:10 )             35.0     11-04    140  |  103  |  50<H>  ----------------------------<  90  4.4   |  28  |  9.71<H>    Ca    8.7      04 Nov 2023 06:10  Mg     2.4     11-04      proBNP:   Lipid Profile:   HgA1c:   TSH: Thyroid Stimulating Hormone, Serum: 0.42 uU/mL (11-03 @ 08:12)

## 2023-11-05 NOTE — PROGRESS NOTE ADULT - SUBJECTIVE AND OBJECTIVE BOX
Time of Visit:  Patient seen and examined.     MEDICATIONS  (STANDING):  benzonatate 100 milliGRAM(s) Oral every 8 hours  fluticasone propionate 50 MICROgram(s)/spray Nasal Spray 1 Spray(s) Both Nostrils two times a day  furosemide   Injectable 80 milliGRAM(s) IV Push two times a day  heparin   Injectable 5000 Unit(s) SubCutaneous every 8 hours  hydrALAZINE 25 milliGRAM(s) Oral every 8 hours  influenza   Vaccine 0.5 milliLiter(s) IntraMuscular once  labetalol 300 milliGRAM(s) Oral every 8 hours  NIFEdipine XL 60 milliGRAM(s) Oral two times a day      MEDICATIONS  (PRN):  acetaminophen     Tablet .. 650 milliGRAM(s) Oral every 6 hours PRN Temp greater or equal to 38C (100.4F), Mild Pain (1 - 3)  guaiFENesin Oral Liquid (Sugar-Free) 100 milliGRAM(s) Oral every 6 hours PRN Cough  ondansetron Injectable 4 milliGRAM(s) IV Push every 8 hours PRN Nausea and/or Vomiting       Medications up to date at time of exam.      PHYSICAL EXAMINATION:  Patient has no new complaints.  GENERAL: The patient is a well-developed, well-nourished, in no apparent distress.     Vital Signs Last 24 Hrs  T(C): 36.8 (05 Nov 2023 14:03), Max: 36.9 (04 Nov 2023 20:44)  T(F): 98.3 (05 Nov 2023 14:03), Max: 98.5 (04 Nov 2023 20:44)  HR: 85 (05 Nov 2023 17:11) (79 - 86)  BP: 138/70 (05 Nov 2023 17:11) (133/57 - 161/88)  BP(mean): 82 (04 Nov 2023 20:44) (82 - 82)  RR: 16 (05 Nov 2023 14:03) (16 - 19)  SpO2: 97% (05 Nov 2023 14:03) (93% - 100%)    Parameters below as of 05 Nov 2023 14:03  Patient On (Oxygen Delivery Method): room air       (if applicable)    Chest Tube (if applicable)    HEENT: Head is normocephalic and atraumatic. Extraocular muscles are intact. Mucous membranes are moist.     NECK: Supple, no palpable adenopathy.    LUNGS: Clear to auscultation, no wheezing, rales, or rhonchi.    HEART: Regular rate and rhythm without murmur.    ABDOMEN: Soft, nontender, and nondistended.  No hepatosplenomegaly is noted.    : No painful voiding, no pelvic pain    EXTREMITIES: Without any cyanosis, clubbing, rash, lesions or edema.    NEUROLOGIC: Awake, alert, oriented, grossly intact    SKIN: Warm, dry, good turgor.      LABS:                        10.5   4.75  )-----------( 146      ( 04 Nov 2023 06:10 )             35.0     11-04    140  |  103  |  50<H>  ----------------------------<  90  4.4   |  28  |  9.71<H>    Ca    8.7      04 Nov 2023 06:10  Mg     2.4     11-04        Urinalysis Basic - ( 04 Nov 2023 06:10 )    Color: x / Appearance: x / SG: x / pH: x  Gluc: 90 mg/dL / Ketone: x  / Bili: x / Urobili: x   Blood: x / Protein: x / Nitrite: x   Leuk Esterase: x / RBC: x / WBC x   Sq Epi: x / Non Sq Epi: x / Bacteria: x      MICROBIOLOGY: (if applicable)    RADIOLOGY & ADDITIONAL STUDIES:  EKG:   CXR:  ECHO:    IMPRESSION: 53y Female PAST MEDICAL & SURGICAL HISTORY:  No pertinent past medical history      No significant past surgical history       p/w             IMP: This is a 53 yr old  woman  with  HTN, ESRD on HD presenting to the ED for 2 week shortness of breath and dry cough. Patient states she developed shortness of breath at rest associated with dry cough that is severe, comes in bouts causing chest tightness.  She missed HD .  Admitted for acute hypoxic resp failure due to pul edema from missed HD . CT show small pericardial effusion probable due to uremia .  Serial cardiac enzymes neg .       ASSESSMENT     - Acute Hypoxic resp Failure   - Pulmonary edema   - Total body fluid over load   - Pericardia edema   - HTN   - ESRD on HD       Plan     - D/C supp o2   - Tele monitoring   - 2 DECHO reported noted   - Serial  2 DECHO in 2-3 months to eval pericardial effusion   - Thoracic eval noted   - Neph for dialysis   - Repeat CXR after 2-3 session of Dialysis   - TSH ok   - RVP  neg  - DVT GI prophy   - CXR in AM     spoke with kevin Gill at  bedside

## 2023-11-06 DIAGNOSIS — R82.71 BACTERIURIA: ICD-10-CM

## 2023-11-06 DIAGNOSIS — I50.33 ACUTE ON CHRONIC DIASTOLIC (CONGESTIVE) HEART FAILURE: ICD-10-CM

## 2023-11-06 LAB
ANION GAP SERPL CALC-SCNC: 11 MMOL/L — SIGNIFICANT CHANGE UP (ref 5–17)
ANION GAP SERPL CALC-SCNC: 11 MMOL/L — SIGNIFICANT CHANGE UP (ref 5–17)
BUN SERPL-MCNC: 50 MG/DL — HIGH (ref 7–18)
BUN SERPL-MCNC: 50 MG/DL — HIGH (ref 7–18)
CALCIUM SERPL-MCNC: 9.5 MG/DL — SIGNIFICANT CHANGE UP (ref 8.4–10.5)
CALCIUM SERPL-MCNC: 9.5 MG/DL — SIGNIFICANT CHANGE UP (ref 8.4–10.5)
CHLORIDE SERPL-SCNC: 100 MMOL/L — SIGNIFICANT CHANGE UP (ref 96–108)
CHLORIDE SERPL-SCNC: 100 MMOL/L — SIGNIFICANT CHANGE UP (ref 96–108)
CO2 SERPL-SCNC: 27 MMOL/L — SIGNIFICANT CHANGE UP (ref 22–31)
CO2 SERPL-SCNC: 27 MMOL/L — SIGNIFICANT CHANGE UP (ref 22–31)
CREAT SERPL-MCNC: 9.9 MG/DL — HIGH (ref 0.5–1.3)
CREAT SERPL-MCNC: 9.9 MG/DL — HIGH (ref 0.5–1.3)
EGFR: 4 ML/MIN/1.73M2 — LOW
EGFR: 4 ML/MIN/1.73M2 — LOW
GLUCOSE SERPL-MCNC: 103 MG/DL — HIGH (ref 70–99)
GLUCOSE SERPL-MCNC: 103 MG/DL — HIGH (ref 70–99)
HCT VFR BLD CALC: 36.9 % — SIGNIFICANT CHANGE UP (ref 34.5–45)
HCT VFR BLD CALC: 36.9 % — SIGNIFICANT CHANGE UP (ref 34.5–45)
HGB BLD-MCNC: 11.2 G/DL — LOW (ref 11.5–15.5)
HGB BLD-MCNC: 11.2 G/DL — LOW (ref 11.5–15.5)
MAGNESIUM SERPL-MCNC: 2.3 MG/DL — SIGNIFICANT CHANGE UP (ref 1.6–2.6)
MAGNESIUM SERPL-MCNC: 2.3 MG/DL — SIGNIFICANT CHANGE UP (ref 1.6–2.6)
MCHC RBC-ENTMCNC: 29.2 PG — SIGNIFICANT CHANGE UP (ref 27–34)
MCHC RBC-ENTMCNC: 29.2 PG — SIGNIFICANT CHANGE UP (ref 27–34)
MCHC RBC-ENTMCNC: 30.4 GM/DL — LOW (ref 32–36)
MCHC RBC-ENTMCNC: 30.4 GM/DL — LOW (ref 32–36)
MCV RBC AUTO: 96.3 FL — SIGNIFICANT CHANGE UP (ref 80–100)
MCV RBC AUTO: 96.3 FL — SIGNIFICANT CHANGE UP (ref 80–100)
MRSA PCR RESULT.: SIGNIFICANT CHANGE UP
MRSA PCR RESULT.: SIGNIFICANT CHANGE UP
NRBC # BLD: 0 /100 WBCS — SIGNIFICANT CHANGE UP (ref 0–0)
NRBC # BLD: 0 /100 WBCS — SIGNIFICANT CHANGE UP (ref 0–0)
PHOSPHATE SERPL-MCNC: 4.9 MG/DL — HIGH (ref 2.5–4.5)
PHOSPHATE SERPL-MCNC: 4.9 MG/DL — HIGH (ref 2.5–4.5)
PLATELET # BLD AUTO: 160 K/UL — SIGNIFICANT CHANGE UP (ref 150–400)
PLATELET # BLD AUTO: 160 K/UL — SIGNIFICANT CHANGE UP (ref 150–400)
POTASSIUM SERPL-MCNC: 3.6 MMOL/L — SIGNIFICANT CHANGE UP (ref 3.5–5.3)
POTASSIUM SERPL-MCNC: 3.6 MMOL/L — SIGNIFICANT CHANGE UP (ref 3.5–5.3)
POTASSIUM SERPL-SCNC: 3.6 MMOL/L — SIGNIFICANT CHANGE UP (ref 3.5–5.3)
POTASSIUM SERPL-SCNC: 3.6 MMOL/L — SIGNIFICANT CHANGE UP (ref 3.5–5.3)
RBC # BLD: 3.83 M/UL — SIGNIFICANT CHANGE UP (ref 3.8–5.2)
RBC # BLD: 3.83 M/UL — SIGNIFICANT CHANGE UP (ref 3.8–5.2)
RBC # FLD: 17.2 % — HIGH (ref 10.3–14.5)
RBC # FLD: 17.2 % — HIGH (ref 10.3–14.5)
S AUREUS DNA NOSE QL NAA+PROBE: SIGNIFICANT CHANGE UP
S AUREUS DNA NOSE QL NAA+PROBE: SIGNIFICANT CHANGE UP
SODIUM SERPL-SCNC: 138 MMOL/L — SIGNIFICANT CHANGE UP (ref 135–145)
SODIUM SERPL-SCNC: 138 MMOL/L — SIGNIFICANT CHANGE UP (ref 135–145)
WBC # BLD: 5.68 K/UL — SIGNIFICANT CHANGE UP (ref 3.8–10.5)
WBC # BLD: 5.68 K/UL — SIGNIFICANT CHANGE UP (ref 3.8–10.5)
WBC # FLD AUTO: 5.68 K/UL — SIGNIFICANT CHANGE UP (ref 3.8–10.5)
WBC # FLD AUTO: 5.68 K/UL — SIGNIFICANT CHANGE UP (ref 3.8–10.5)

## 2023-11-06 PROCEDURE — 71045 X-RAY EXAM CHEST 1 VIEW: CPT | Mod: 26

## 2023-11-06 RX ORDER — CHLORHEXIDINE GLUCONATE 213 G/1000ML
1 SOLUTION TOPICAL
Refills: 0 | Status: DISCONTINUED | OUTPATIENT
Start: 2023-11-06 | End: 2023-11-08

## 2023-11-06 RX ADMIN — Medication 1 SPRAY(S): at 17:42

## 2023-11-06 RX ADMIN — HEPARIN SODIUM 5000 UNIT(S): 5000 INJECTION INTRAVENOUS; SUBCUTANEOUS at 05:38

## 2023-11-06 RX ADMIN — Medication 60 MILLIGRAM(S): at 05:38

## 2023-11-06 RX ADMIN — HEPARIN SODIUM 5000 UNIT(S): 5000 INJECTION INTRAVENOUS; SUBCUTANEOUS at 13:07

## 2023-11-06 RX ADMIN — Medication 300 MILLIGRAM(S): at 05:39

## 2023-11-06 RX ADMIN — Medication 100 MILLIGRAM(S): at 13:14

## 2023-11-06 RX ADMIN — Medication 80 MILLIGRAM(S): at 05:38

## 2023-11-06 RX ADMIN — Medication 25 MILLIGRAM(S): at 21:53

## 2023-11-06 RX ADMIN — Medication 100 MILLIGRAM(S): at 05:38

## 2023-11-06 RX ADMIN — Medication 25 MILLIGRAM(S): at 13:14

## 2023-11-06 RX ADMIN — HEPARIN SODIUM 5000 UNIT(S): 5000 INJECTION INTRAVENOUS; SUBCUTANEOUS at 21:52

## 2023-11-06 RX ADMIN — Medication 60 MILLIGRAM(S): at 17:42

## 2023-11-06 RX ADMIN — Medication 300 MILLIGRAM(S): at 21:53

## 2023-11-06 RX ADMIN — Medication 300 MILLIGRAM(S): at 13:14

## 2023-11-06 RX ADMIN — Medication 80 MILLIGRAM(S): at 13:06

## 2023-11-06 RX ADMIN — Medication 25 MILLIGRAM(S): at 05:38

## 2023-11-06 RX ADMIN — Medication 100 MILLIGRAM(S): at 21:52

## 2023-11-06 RX ADMIN — Medication 1 SPRAY(S): at 05:39

## 2023-11-06 NOTE — PROGRESS NOTE ADULT - ASSESSMENT
52 y/o female with pmhx of HTN, ESRD on HD presenting to the ED for 2 week shortness of breath and dry cough. Patient states she developed shortness of breath at rest associated with dry cough that is severe, comes in bouts causing chest tightness. She took some over the counter cough medication that did not help. She endorses dysuria for the past 3 days. She denies any fevers, sore throat, chest pian, palpitations, abdominal pain, N/V/D. No sick contacts or recent travel. She notes increase in her lower extremity. Admitted for dyspnea 2/2 pulmonary edema.     Chest CT: b/l ground glass opacity, and bilateral pleural effusions, also small pericardial effusion.   Cardiology Dr. Hernandez following.   Cr: 8.21, BNP: 30k  Ua(+)    Awaiting repeat CXR to re-eval pleural effusions. Pulm. Dr. Goodwin following.   Pt is also pending Cardiac Cath on 11/8/23, will [facility].    52 y/o female with pmhx of HTN, ESRD on HD T/Th/Sat, presenting to the ED for 2 week shortness of breath and dry cough. Admitted for dyspnea 2/2 pulmonary edema. Chest CT: b/l ground glass opacity, and bilateral pleural effusions, also small pericardial effusion. Echo noted for normal EF, grade 4 diastolic dysfunction, and now moderate pericardial effusion.   Cardiology Dr. Hernandez following. Cardiothoracic Surgery Dr. Knight following, no surgical intervention.     Awaiting repeat CXR to re-eval pleural effusions. Pulm. Dr. Goodwin following.   Pt is also pending Cardiac Cath on 11/8/23, will need transfer to [facility].

## 2023-11-06 NOTE — ACUTE INTERFACILITY TRANSFER NOTE - HOSPITAL COURSE
54 y/o female with PMH of HTN, ESRD on HD presenting to the ED for 2 weeks with the c/o dyspnea and dry cough. Patient stated she developed shortness of breath at rest associated with dry cough that was severe, and causing chest tightness. she took some over the counter cough medication that did not help. She endorsed dysuria for the past 3 days. Patient denied any fevers, sore throat, chest pain, palpitation, abdominal pain. N/V/D. No sick contact or recent travel. She noted increases in her lower extremities. Admitted for dyspnea 2/2 pulmonary edema. Chest CT: zenaida. glass opacity, and bilateral pleural effusions, and small pericardial effusion. Echo noted for normal EF, grade 4 diastolic dysfunction, and now moderate pericardiac effusion. ESRD on HD -T/Th/Sat, . Cardiology Dr. Hernandez following. Cardiothoracic surgery Dr. Knight following, No surgical intervention. Pulmonary Dr. Goodwin following. Awaiting repeat CXR to re-eval. pleural effusions. Patient scheduled for a coronary angiogram on Wednesday (11/8/23) at [facility], as deemed by cardio. Will have HD session prior to that 11/7/23.

## 2023-11-06 NOTE — PROGRESS NOTE ADULT - PROBLEM SELECTOR PLAN 1
p/w dyspnea for 2 weeks, LE edema  dyspnea 2/2 fluid overload  Chest CT: b/l ground glass 2/2  pulm edema   BNP: 30 k. trop nl   s/p 80 mg IV lasix  c/w lasix 80 mg IV BID as per Dr. Calle  f/u echo   strict intake and out put, daily weights p/w dyspnea for 2 weeks, LE edema  dyspnea 2/2 fluid overload  Chest CT: b/l ground glass 2/2  pulm edema   BNP: 30 k. trop nl   echo noted for normal EF and grade 4 DD  s/p 80 mg IV lasix  c/w lasix 80 mg IV BID as per Dr. Calle  strict intake and out put, daily weights  pending cardiac cath  Cardiology Dr. Hernandez following

## 2023-11-06 NOTE — PROGRESS NOTE ADULT - ASSESSMENT
53F c/o SOB with b/l pleural effusions and moderate pericardial effusion   VSS, afebrile, saturation wnl on RA  Pt is hemodynamically stable. No difficulty breathing    - no acute thoracic intervention indicated at this time  - aggressive hemodialysis as her nephrology  - follow cardiology recommendations  - follow pulm recommendations   - follow up with Dr. Knight as an outpatient upon discharge  - pt to return to hospital if respiratory status declines, difficulty breathing, severe chest pain, syncope, etc.  - contents of this note signed out to PA covering #[phone]   53F c/o SOB with b/l pleural effusions and moderate pericardial effusion   VSS, afebrile, saturation wnl on RA  Pt is hemodynamically stable. No difficulty breathing    - no acute thoracic intervention indicated at this time  - aggressive hemodialysis as her nephrology  - follow cardiology recommendations  - follow pulm recommendations   - follow up with Dr. Knight as an outpatient upon discharge  - pt to return to hospital if respiratory status declines, difficulty breathing, severe chest pain, syncope, etc.  - contents of this note signed out to PA covering #[phone]   53F c/o SOB with b/l pleural effusions and moderate pericardial effusion   VSS, afebrile, saturation wnl on RA  Pt is hemodynamically stable. No difficulty breathing    - no acute thoracic intervention indicated at this time  - aggressive hemodialysis as her nephrology  - follow cardiology recommendations  - follow pulm recommendations   - follow up with Dr. Knight as an outpatient upon discharge  - pt to return to hospital if respiratory status declines, difficulty breathing, severe chest pain, syncope, etc.  - contents of this note signed out to PA covering #[phone]

## 2023-11-06 NOTE — PROGRESS NOTE ADULT - ASSESSMENT
1 ESRD on HD (T/TH/SAT)  -Plan for HD tomorrow with UF 2.0kg    -Hemodialysis Renal Diet and Fluid restriction to 1L/day  -Adjust meds to eGFR and avoid IV Gadolinium contrast,NSAIDs, and phosphate enema.  -Monitor Electrolytes daily.  2. HTN:   -bp is elevated.  -continue labetolol 300mg tid; was increased nifedipine 60mg bid; hydralazine 25mg tid.    -titrate bp meds to keep sbp >110 and < 130  3. Anemia of ESRD:  -start epoen if hb <10    -F/u CBC daily  -transfuse if HB < 7.0.  4. Mineral Bone Disease:  -continue phoslo tid  -monitor phos  5. Sob due to fluid overload:  -continue lasix 80mg iv bid   -ECHO shows percardial effussion with mod MR.   -UF during HD.   -Pulmon (Dr. Goodwin) and Cardio (Dr. Hernandez) noted.  -Cardio will plan for cardiac cath on Wednesday at [facility].    Discussed with patient via  in detail regarding the renal plan and care.

## 2023-11-06 NOTE — PROGRESS NOTE ADULT - SUBJECTIVE AND OBJECTIVE BOX
COLLIN WHITMAN  MR# [id number]  53yFemale        Patient is a 53y old  Female who presents with a chief complaint of Dyspnea (06 Nov 2023 11:28)      INTERVAL HPI/OVERNIGHT EVENTS:  Patient seen and examined at bedside. No notations of chest pain, palpitation, SOB, orthopnea, nausea, vomiting or abdominal pain.    ALLERGIES  No Known Allergies      MEDICATIONS  acetaminophen     Tablet .. 650 milliGRAM(s) Oral every 6 hours PRN Temp greater or equal to 38C (100.4F), Mild Pain (1 - 3)  benzonatate 100 milliGRAM(s) Oral every 8 hours  chlorhexidine 2% Cloths 1 Application(s) Topical <User Schedule>  fluticasone propionate 50 MICROgram(s)/spray Nasal Spray 1 Spray(s) Both Nostrils two times a day  furosemide   Injectable 80 milliGRAM(s) IV Push two times a day  guaiFENesin Oral Liquid (Sugar-Free) 100 milliGRAM(s) Oral every 6 hours PRN Cough  heparin   Injectable 5000 Unit(s) SubCutaneous every 8 hours  hydrALAZINE 25 milliGRAM(s) Oral every 8 hours  influenza   Vaccine 0.5 milliLiter(s) IntraMuscular once  labetalol 300 milliGRAM(s) Oral every 8 hours  NIFEdipine XL 60 milliGRAM(s) Oral two times a day  ondansetron Injectable 4 milliGRAM(s) IV Push every 8 hours PRN Nausea and/or Vomiting              REVIEW OF SYSTEMS:  CONSTITUTIONAL: No fever, weight loss, or fatigue  EYES: No eye pain, visual disturbances, or discharge  ENT:  No difficulty hearing, tinnitus, vertigo; No sinus or throat pain  NECK: No pain or stiffness  RESPIRATORY: No cough, wheezing, chills or hemoptysis; No Shortness of Breath  CARDIOVASCULAR: No chest pain, palpitations, passing out, dizziness, or leg swelling  GASTROINTESTINAL: No abdominal or epigastric pain. No nausea, vomiting, or hematemesis; No diarrhea or constipation. No melena or hematochezia.  GENITOURINARY: No dysuria, frequency, hematuria, or incontinence  NEUROLOGICAL: No headaches, memory loss, loss of strength, numbness, or tremors  SKIN: No itching, burning, rashes, or lesions   LYMPH Nodes: No enlarged glands  ENDOCRINE: No heat or cold intolerance; No hair loss  MUSCULOSKELETAL: No joint pain or swelling; No muscle, back, or extremity pain  PSYCHIATRIC: No depression, anxiety, mood swings, or difficulty sleeping  HEME/LYMPH: No easy bruising, or bleeding gums  ALLERGY AND IMMUNOLOGIC: No hives or eczema	    [ ] All others negative	  [ ] Unable to obtain      T(C): 36.7 (11-06-23 @ 05:15), Max: 36.9 (11-05-23 @ 20:19)  T(F): 98.1 (11-06-23 @ 05:15), Max: 98.4 (11-05-23 @ 20:19)  HR: 79 (11-06-23 @ 05:15) (79 - 86)  BP: 144/77 (11-06-23 @ 05:15) (138/70 - 154/83)  RR: 17 (11-06-23 @ 05:15) (16 - 18)  SpO2: 94% (11-06-23 @ 05:15) (94% - 97%)  Wt(kg): --    I&O's Summary        PHYSICAL EXAM:  A X O x  HEAD:  Atraumatic, Normocephalic  EYES: EOMI, PERRLA, conjunctiva and sclera clear  NECK: Supple, No JVD, Normal thyroid  Resp: CTAB, No crackles, wheezing,   CVS: Regular rate and rhythm; No discernable murmurs, rubs, or gallops  ABD: Soft, Nontender, Nondistended; Bowel sounds present  EXTREMITIES:  2+ Peripheral Pulses, No edema  LYMPH: No dicernable lymphadenopathy noted  GENERAL: NAD, well-groomed, well-developed      LABS:                        11.2   5.68  )-----------( 160      ( 06 Nov 2023 06:15 )             36.9     11-06    138  |  100  |  50<H>  ----------------------------<  103<H>  3.6   |  27  |  9.90<H>    Ca    9.5      06 Nov 2023 06:15  Phos  4.9     11-06  Mg     2.3     11-06        Urinalysis Basic - ( 06 Nov 2023 06:15 )    Color: x / Appearance: x / SG: x / pH: x  Gluc: 103 mg/dL / Ketone: x  / Bili: x / Urobili: x   Blood: x / Protein: x / Nitrite: x   Leuk Esterase: x / RBC: x / WBC x   Sq Epi: x / Non Sq Epi: x / Bacteria: x      CAPILLARY BLOOD GLUCOSE          Troponins:  ProBNP:  Lipid Profile:   HgA1c:  TSH:           RADIOLOGY & ADDITIONAL TESTS:    Imaging Personally Reviewed:  [ ] YES  [ ] NO      Consultant(s) Notes Reviewed:  [x ] YES  [ ] NO    Care Discussed with Consultants/Other Providers [ x] YES  [ ] NO          PAST MEDICAL & SURGICAL HISTORY:  No pertinent past medical history      No significant past surgical history            Acute pulmonary edema    Handoff    MEWS Score    No pertinent past medical history    No pertinent past medical history    Acute pulmonary edema    Dyspnea    ESRD on hemodialysis    Acute UTI    HTN (hypertension)    Prophylactic measure    Acute pericardial effusion    Acute pulmonary edema    No significant past surgical history    No significant past surgical history    W/DIFF BREATHING    35    ESRD on dialysis    [email or web address]

## 2023-11-06 NOTE — PROGRESS NOTE ADULT - SUBJECTIVE AND OBJECTIVE BOX
Patient is a 53y old  Female who presents with a chief complaint of Dyspnea (06 Nov 2023 12:47)    OVERNIGHT EVENTS: no acute changes.    Pt is aox3, sitting up in bed, comfortable appearing, eating well, ambulating independently.     REVIEW OF SYSTEMS:  # [id number]  CONSTITUTIONAL: No fever, chills  ENMT:  No difficulty hearing, no change in vision  NECK: No pain or stiffness  RESPIRATORY: No cough, SOB  CARDIOVASCULAR: No chest pain, palpitations  GASTROINTESTINAL: No abdominal pain. No nausea, vomiting, or diarrhea  GENITOURINARY: No dysuria  NEUROLOGICAL: No HA  SKIN: No itching, burning, rashes, or lesions   LYMPH NODES: No enlarged glands  ENDOCRINE: No heat or cold intolerance; No hair loss  MUSCULOSKELETAL: No joint pain or swelling; No muscle, back, or extremity pain  PSYCHIATRIC: No depression, anxiety  HEME/LYMPH: No easy bruising, or bleeding gums    T(C): 36.9 (11-06-23 @ 14:15), Max: 36.9 (11-05-23 @ 20:19)  HR: 81 (11-06-23 @ 14:15) (79 - 85)  BP: 158/82 (11-06-23 @ 14:15) (138/70 - 158/82)  RR: 17 (11-06-23 @ 14:15) (17 - 18)  SpO2: 98% (11-06-23 @ 14:15) (94% - 98%)  Wt(kg): --Vital Signs Last 24 Hrs  T(C): 36.9 (06 Nov 2023 14:15), Max: 36.9 (05 Nov 2023 20:19)  T(F): 98.5 (06 Nov 2023 14:15), Max: 98.5 (06 Nov 2023 14:15)  HR: 81 (06 Nov 2023 14:15) (79 - 85)  BP: 158/82 (06 Nov 2023 14:15) (138/70 - 158/82)  BP(mean): --  RR: 17 (06 Nov 2023 14:15) (17 - 18)  SpO2: 98% (06 Nov 2023 14:15) (94% - 98%)    Parameters below as of 06 Nov 2023 14:15  Patient On (Oxygen Delivery Method): room air        MEDICATIONS  (STANDING):  benzonatate 100 milliGRAM(s) Oral every 8 hours  chlorhexidine 2% Cloths 1 Application(s) Topical <User Schedule>  fluticasone propionate 50 MICROgram(s)/spray Nasal Spray 1 Spray(s) Both Nostrils two times a day  furosemide   Injectable 80 milliGRAM(s) IV Push two times a day  heparin   Injectable 5000 Unit(s) SubCutaneous every 8 hours  hydrALAZINE 25 milliGRAM(s) Oral every 8 hours  influenza   Vaccine 0.5 milliLiter(s) IntraMuscular once  labetalol 300 milliGRAM(s) Oral every 8 hours  NIFEdipine XL 60 milliGRAM(s) Oral two times a day    MEDICATIONS  (PRN):  acetaminophen     Tablet .. 650 milliGRAM(s) Oral every 6 hours PRN Temp greater or equal to 38C (100.4F), Mild Pain (1 - 3)  guaiFENesin Oral Liquid (Sugar-Free) 100 milliGRAM(s) Oral every 6 hours PRN Cough  ondansetron Injectable 4 milliGRAM(s) IV Push every 8 hours PRN Nausea and/or Vomiting      PHYSICAL EXAM:  GENERAL: NAD  EYES: clear conjunctiva  ENMT: Moist mucous membranes  NECK: Supple, No JVD, Normal thyroid  CHEST/LUNG: Clear to auscultation bilaterally; No rales, rhonchi, wheezing, or rubs  HEART: S1, S2, Regular rate and rhythm  ABDOMEN: Soft, Nontender, Nondistended; Bowel sounds present  NEURO: Alert & Oriented X3  EXTREMITIES: No LE edema, no calf tenderness  LYMPH: No lymphadenopathy noted  SKIN: No rashes or lesions    Consultant(s) Notes Reviewed:  [x ] YES  [ ] NO  Care Discussed with Consultants/Other Providers [ x] YES  [ ] NO    LABS:                        11.2   5.68  )-----------( 160      ( 06 Nov 2023 06:15 )             36.9     11-06    138  |  100  |  50<H>  ----------------------------<  103<H>  3.6   |  27  |  9.90<H>    Ca    9.5      06 Nov 2023 06:15  Phos  4.9     11-06  Mg     2.3     11-06        CAPILLARY BLOOD GLUCOSE            Urinalysis Basic - ( 06 Nov 2023 06:15 )    Color: x / Appearance: x / SG: x / pH: x  Gluc: 103 mg/dL / Ketone: x  / Bili: x / Urobili: x   Blood: x / Protein: x / Nitrite: x   Leuk Esterase: x / RBC: x / WBC x   Sq Epi: x / Non Sq Epi: x / Bacteria: x        RADIOLOGY & ADDITIONAL TESTS:  < from: CT Chest No Cont (11.01.23 @ 18:59) >    ACC: [id number] EXAM:  CT CHEST   ORDERED BY: AUDREY GARCIA     PROCEDURE DATE:  11/01/2023          INTERPRETATION:  CT CHEST WITHOUT CONTRAST    INDICATION: Shortness of breath..    TECHNIQUE: Unenhanced helical images were obtained of the chest.Coronal   and sagittal images were reconstructed. Maximum intensity projection   images were generated.        COMPARISON: Graft chest 11/1/2023.    FINDINGS:    Tubes/Lines: Catheter via right-sided approach in the right atrium    Lungs, airways andpleura: Bilateral pleural effusions, passive   atelectasis, septal thickening and groundglass opacities.    The airways are unremarkable. No pneumothorax.    Mediastinum: The heart is enlarged. In particular, the atria are   enlarged. Small pericardial effusion. Coronary artery calcifications. The   aorta is normal in caliber. Aortic calcifications.    Upper Abdomen: The upper abdomen is normal.    Bones And Soft Tissues: The bones are unremarkable.  The soft tissues are   unremarkable.      IMPRESSION:    1.  Bilateral septal thickening and groundglass opacities can be   secondary to pulmonary edema.  2.  Bilateral pleural effusions and lower lobe passive atelectasis.  3.  Small pericardial effusion.    --- End of Report ---            JEWEL FINK MD; Attending Radiologist  This document has been electronically signed. Nov 1 2023  7:23PM    < end of copied text >    Imaging Personally Reviewed:  [ ] YES  [ ] NO   Patient is a 53y old  Female who presents with a chief complaint of Dyspnea (06 Nov 2023 12:47)    OVERNIGHT EVENTS: no acute changes.    Pt is aox3, sitting up in bed, comfortable appearing, eating well, ambulating independently.     REVIEW OF SYSTEMS:  # [id number]  CONSTITUTIONAL: No fever, chills  ENMT:  No difficulty hearing, no change in vision  NECK: No pain or stiffness  RESPIRATORY: No cough, SOB  CARDIOVASCULAR: No chest pain, palpitations  GASTROINTESTINAL: No abdominal pain. No nausea, vomiting, or diarrhea  GENITOURINARY: No dysuria  NEUROLOGICAL: No HA  SKIN: No itching, burning, rashes, or lesions   LYMPH NODES: No enlarged glands  ENDOCRINE: No heat or cold intolerance; No hair loss  MUSCULOSKELETAL: No joint pain or swelling; No muscle, back, or extremity pain  PSYCHIATRIC: No depression, anxiety  HEME/LYMPH: No easy bruising, or bleeding gums    T(C): 36.9 (11-06-23 @ 14:15), Max: 36.9 (11-05-23 @ 20:19)  HR: 81 (11-06-23 @ 14:15) (79 - 85)  BP: 158/82 (11-06-23 @ 14:15) (138/70 - 158/82)  RR: 17 (11-06-23 @ 14:15) (17 - 18)  SpO2: 98% (11-06-23 @ 14:15) (94% - 98%)  Wt(kg): --Vital Signs Last 24 Hrs  T(C): 36.9 (06 Nov 2023 14:15), Max: 36.9 (05 Nov 2023 20:19)  T(F): 98.5 (06 Nov 2023 14:15), Max: 98.5 (06 Nov 2023 14:15)  HR: 81 (06 Nov 2023 14:15) (79 - 85)  BP: 158/82 (06 Nov 2023 14:15) (138/70 - 158/82)  BP(mean): --  RR: 17 (06 Nov 2023 14:15) (17 - 18)  SpO2: 98% (06 Nov 2023 14:15) (94% - 98%)    Parameters below as of 06 Nov 2023 14:15  Patient On (Oxygen Delivery Method): room air        MEDICATIONS  (STANDING):  benzonatate 100 milliGRAM(s) Oral every 8 hours  chlorhexidine 2% Cloths 1 Application(s) Topical <User Schedule>  fluticasone propionate 50 MICROgram(s)/spray Nasal Spray 1 Spray(s) Both Nostrils two times a day  furosemide   Injectable 80 milliGRAM(s) IV Push two times a day  heparin   Injectable 5000 Unit(s) SubCutaneous every 8 hours  hydrALAZINE 25 milliGRAM(s) Oral every 8 hours  influenza   Vaccine 0.5 milliLiter(s) IntraMuscular once  labetalol 300 milliGRAM(s) Oral every 8 hours  NIFEdipine XL 60 milliGRAM(s) Oral two times a day    MEDICATIONS  (PRN):  acetaminophen     Tablet .. 650 milliGRAM(s) Oral every 6 hours PRN Temp greater or equal to 38C (100.4F), Mild Pain (1 - 3)  guaiFENesin Oral Liquid (Sugar-Free) 100 milliGRAM(s) Oral every 6 hours PRN Cough  ondansetron Injectable 4 milliGRAM(s) IV Push every 8 hours PRN Nausea and/or Vomiting      PHYSICAL EXAM:  GENERAL: NAD  EYES: clear conjunctiva  ENMT: Moist mucous membranes  NECK: Supple, No JVD, Normal thyroid  CHEST/LUNG: +right chest permacath. Clear to auscultation bilaterally; No rales, rhonchi, wheezing, or rubs  HEART: S1, S2, Regular rate and rhythm  ABDOMEN: Soft, Nontender, Nondistended; Bowel sounds present  NEURO: Alert & Oriented X3  EXTREMITIES: No LE edema, no calf tenderness  LYMPH: No lymphadenopathy noted  SKIN: No rashes or lesions    Consultant(s) Notes Reviewed:  [x ] YES  [ ] NO  Care Discussed with Consultants/Other Providers [ x] YES  [ ] NO    LABS:                        11.2   5.68  )-----------( 160      ( 06 Nov 2023 06:15 )             36.9     11-06    138  |  100  |  50<H>  ----------------------------<  103<H>  3.6   |  27  |  9.90<H>    Ca    9.5      06 Nov 2023 06:15  Phos  4.9     11-06  Mg     2.3     11-06        CAPILLARY BLOOD GLUCOSE            Urinalysis Basic - ( 06 Nov 2023 06:15 )    Color: x / Appearance: x / SG: x / pH: x  Gluc: 103 mg/dL / Ketone: x  / Bili: x / Urobili: x   Blood: x / Protein: x / Nitrite: x   Leuk Esterase: x / RBC: x / WBC x   Sq Epi: x / Non Sq Epi: x / Bacteria: x        RADIOLOGY & ADDITIONAL TESTS:  < from: CT Chest No Cont (11.01.23 @ 18:59) >    ACC: [id number] EXAM:  CT CHEST   ORDERED BY: AUDREY GARCIA     PROCEDURE DATE:  11/01/2023          INTERPRETATION:  CT CHEST WITHOUT CONTRAST    INDICATION: Shortness of breath..    TECHNIQUE: Unenhanced helical images were obtained of the chest.Coronal   and sagittal images were reconstructed. Maximum intensity projection   images were generated.        COMPARISON: Graft chest 11/1/2023.    FINDINGS:    Tubes/Lines: Catheter via right-sided approach in the right atrium    Lungs, airways andpleura: Bilateral pleural effusions, passive   atelectasis, septal thickening and groundglass opacities.    The airways are unremarkable. No pneumothorax.    Mediastinum: The heart is enlarged. In particular, the atria are   enlarged. Small pericardial effusion. Coronary artery calcifications. The   aorta is normal in caliber. Aortic calcifications.    Upper Abdomen: The upper abdomen is normal.    Bones And Soft Tissues: The bones are unremarkable.  The soft tissues are   unremarkable.      IMPRESSION:    1.  Bilateral septal thickening and groundglass opacities can be   secondary to pulmonary edema.  2.  Bilateral pleural effusions and lower lobe passive atelectasis.  3.  Small pericardial effusion.    --- End of Report ---            JEWEL FINK MD; Attending Radiologist  This document has been electronically signed. Nov 1 2023  7:23PM    < end of copied text >    Imaging Personally Reviewed:  [ ] YES  [ ] NO

## 2023-11-06 NOTE — PROGRESS NOTE ADULT - SUBJECTIVE AND OBJECTIVE BOX
Date of Service 11-06-23 @ 11:31    CHIEF COMPLAINT:Patient is a 53y old  Female who presents with a chief complaint of Dyspnea.Pt appears comfortable.    	  REVIEW OF SYSTEMS:  CONSTITUTIONAL: No fever, weight loss, or fatigue  EYES: No eye pain, visual disturbances, or discharge  ENT:  No difficulty hearing, tinnitus, vertigo; No sinus or throat pain  NECK: No pain or stiffness  RESPIRATORY: No cough, wheezing, chills or hemoptysis; No Shortness of Breath  CARDIOVASCULAR: No chest pain, palpitations, passing out, dizziness, or leg swelling  GASTROINTESTINAL: No abdominal or epigastric pain. No nausea, vomiting, or hematemesis; No diarrhea or constipation. No melena or hematochezia.  GENITOURINARY: No dysuria, frequency, hematuria, or incontinence  NEUROLOGICAL: No headaches, memory loss, loss of strength, numbness, or tremors  SKIN: No itching, burning, rashes, or lesions   LYMPH Nodes: No enlarged glands  ENDOCRINE: No heat or cold intolerance; No hair loss  MUSCULOSKELETAL: No joint pain or swelling; No muscle, back, or extremity pain  PSYCHIATRIC: No depression, anxiety, mood swings, or difficulty sleeping  HEME/LYMPH: No easy bruising, or bleeding gums  ALLERGY AND IMMUNOLOGIC: No hives or eczema	        PHYSICAL EXAM:  T(C): 36.7 (11-06-23 @ 05:15), Max: 36.9 (11-05-23 @ 20:19)  HR: 79 (11-06-23 @ 05:15) (79 - 86)  BP: 144/77 (11-06-23 @ 05:15) (138/70 - 154/83)  RR: 17 (11-06-23 @ 05:15) (16 - 18)  SpO2: 94% (11-06-23 @ 05:15) (94% - 97%)  Wt(kg): --  I&O's Summary      Appearance: Normal	  HEENT:   Normal oral mucosa, PERRL, EOMI	  Lymphatic: No lymphadenopathy  Cardiovascular: Normal S1 S2, No JVD, No murmurs, No edema  Respiratory: Lungs clear to auscultation	  Psychiatry: A & O x 3, Mood & affect appropriate  Gastrointestinal:  Soft, Non-tender, + BS	  Skin: No rashes, No ecchymoses, No cyanosis	  Neurologic: Non-focal  Extremities: Normal range of motion, No clubbing, cyanosis or edema  Vascular: Peripheral pulses palpable 2+ bilaterally    MEDICATIONS  (STANDING):  benzonatate 100 milliGRAM(s) Oral every 8 hours  chlorhexidine 2% Cloths 1 Application(s) Topical <User Schedule>  fluticasone propionate 50 MICROgram(s)/spray Nasal Spray 1 Spray(s) Both Nostrils two times a day  furosemide   Injectable 80 milliGRAM(s) IV Push two times a day  heparin   Injectable 5000 Unit(s) SubCutaneous every 8 hours  hydrALAZINE 25 milliGRAM(s) Oral every 8 hours  influenza   Vaccine 0.5 milliLiter(s) IntraMuscular once  labetalol 300 milliGRAM(s) Oral every 8 hours  NIFEdipine XL 60 milliGRAM(s) Oral two times a day        LABS:	 	                        11.2   5.68  )-----------( 160      ( 06 Nov 2023 06:15 )             36.9     11-06    138  |  100  |  50<H>  ----------------------------<  103<H>  3.6   |  27  |  9.90<H>    Ca    9.5      06 Nov 2023 06:15  Phos  4.9     11-06  Mg     2.3     11-06        TSH: Thyroid Stimulating Hormone, Serum: 0.42 uU/mL (11-03 @ 08:12)      	    < from: Transthoracic Echocardiogram (11.02.23 @ 07:08) >  OBSERVATIONS:  Mitral Valve: Normal mitral valve. Moderate to severe  mitral regurgitation.  Aortic Root: Aortic Root: 3.3 cm.    Aortic Valve: Normal trileaflet aortic valve. Mild aortic  regurgitation.  Left Atrium: Moderately dilated left atrium.  LA volume  index = 46 cc/m2.  Left Ventricle: Normal Left Ventricular Systolic Function,  (EF = 55 to 60%) Normal left ventricular internal  dimensions and wall thicknesses. Grade IVdiastolic  dysfunction (severe with fixed restrictive pattern).  Right Heart: Normal right atrium. Linear echodensity  consistent with a catheter is visualized in the right  atrium. Normal right ventricular size and systolic function  (TAPSE 2.6 cm).There is mild tricuspid regurgitation.  There is mild pulmonic regurgitation.  Pericardium/PleuraModerate pericardial effusion,greatest  diameter 1.5cm. Bilateral pleural effusions.  Hemodynamic: RA Pressure is 8 mm Hg. RV systolic pressure  is moderately increased at  46 mm Hg.

## 2023-11-06 NOTE — PROGRESS NOTE ADULT - PROBLEM SELECTOR PLAN 5
on labetalol 100mg TID and nifedipine 60mg BID   c/w home med c/w home med labetalol 100mg TID and nifedipine 60mg BID   c/w hydralazine 25 mg tid, lasix 80 mg IV BID  BP goal <140/90

## 2023-11-06 NOTE — PROGRESS NOTE ADULT - PROBLEM SELECTOR PLAN 4
ESRD on HD at [facility] follows up with DR hernandez   planned for HD [facility]   c/w lasix 80 mg IV BID ESRD on HD T/Th/Sat at [place] follows up with Dr Alva HASSAN bathing daily  f/u MRSA/MSSA pcr  monitor BMP and phos serum  Hemodialysis Renal Diet and Fluid restriction to 1L/day  Adjust meds to eGFR and avoid IV Gadolinium contrast, NSAIDs, and phosphate enema

## 2023-11-06 NOTE — PROGRESS NOTE ADULT - PROBLEM SELECTOR PLAN 2
same as above Ct chest on 11/1 noted for Bilateral pleural effusions and lower lobe passive atelectasis  on lasix 80 mg bid IV  f/u repeat CXR  Pulm. Dr. Goodwin following

## 2023-11-06 NOTE — PROGRESS NOTE ADULT - SUBJECTIVE AND OBJECTIVE BOX
S: No complaints. Ambulating without breathing issues. No chest pain.     O:  Vital Signs Last 24 Hrs  T(C): 36.7 (06 Nov 2023 05:15), Max: 36.9 (05 Nov 2023 20:19)  T(F): 98.1 (06 Nov 2023 05:15), Max: 98.4 (05 Nov 2023 20:19)  HR: 79 (06 Nov 2023 05:15) (79 - 86)  BP: 144/77 (06 Nov 2023 05:15) (138/70 - 154/83)  BP(mean): --  RR: 17 (06 Nov 2023 05:15) (16 - 18)  SpO2: 94% (06 Nov 2023 05:15) (94% - 97%)    Parameters below as of 06 Nov 2023 05:15  Patient On (Oxygen Delivery Method): room air    Exam:  General: awake, alert, sitting upright in bed  Respiratory: nonlabored; saturating well on RA      LABS                      11.2   5.68  )-----------( 160      ( 06 Nov 2023 06:15 )             36.9     11-06    138  |  100  |  50<H>  ----------------------------<  103<H>  3.6   |  27  |  9.90<H>    Ca    9.5      06 Nov 2023 06:15  Phos  4.9     11-06  Mg     2.3     11-06

## 2023-11-06 NOTE — PROGRESS NOTE ADULT - SUBJECTIVE AND OBJECTIVE BOX
NEPHROLOGY MEDICAL CARE, [facility] - Dr. Marco Calle/ Dr. Nicholas Mckeon/ Dr. Ry Zuniga/ Dr. Roxi Reese    Date of Service: 11-06-23    Patient was seen and examined at bedside.    CC: patient feeling better   at bedside  used     Vital Signs Last 24 Hrs  T(C): 36.9 (06 Nov 2023 14:15), Max: 36.9 (05 Nov 2023 20:19)  T(F): 98.5 (06 Nov 2023 14:15), Max: 98.5 (06 Nov 2023 14:15)  HR: 81 (06 Nov 2023 14:15) (79 - 85)  BP: 158/82 (06 Nov 2023 14:15) (138/70 - 158/82)  BP(mean): --  RR: 17 (06 Nov 2023 14:15) (17 - 18)  SpO2: 98% (06 Nov 2023 14:15) (94% - 98%)    Parameters below as of 06 Nov 2023 14:15  Patient On (Oxygen Delivery Method): room air          PHYSICAL EXAM:  General: No acute respiratory distress.  Eyes: conjunctiva and sclera clear  ENMT: Atraumatic, Normocephalic,  Respiratory:   Bilaterally poor air entry and no rhonchi, wheezing  Cardiovascular: S1S2+; no m/r/g  Gastrointestinal: Soft, Non-tender, Nondistended; Bowel sounds present   Neuro:  Awake, Alert & Oriented X3  Ext:  2+ pedal edema, No Cyanosis  Skin: No rashes  Dialysis Access::  Rt chest  PERMACATH    MEDICATIONS:  MEDICATIONS  (STANDING):  benzonatate 100 milliGRAM(s) Oral every 8 hours  chlorhexidine 2% Cloths 1 Application(s) Topical <User Schedule>  fluticasone propionate 50 MICROgram(s)/spray Nasal Spray 1 Spray(s) Both Nostrils two times a day  furosemide   Injectable 80 milliGRAM(s) IV Push two times a day  heparin   Injectable 5000 Unit(s) SubCutaneous every 8 hours  hydrALAZINE 25 milliGRAM(s) Oral every 8 hours  influenza   Vaccine 0.5 milliLiter(s) IntraMuscular once  labetalol 300 milliGRAM(s) Oral every 8 hours  NIFEdipine XL 60 milliGRAM(s) Oral two times a day    MEDICATIONS  (PRN):  acetaminophen     Tablet .. 650 milliGRAM(s) Oral every 6 hours PRN Temp greater or equal to 38C (100.4F), Mild Pain (1 - 3)  guaiFENesin Oral Liquid (Sugar-Free) 100 milliGRAM(s) Oral every 6 hours PRN Cough  ondansetron Injectable 4 milliGRAM(s) IV Push every 8 hours PRN Nausea and/or Vomiting          LABS:                        11.2   5.68  )-----------( 160      ( 06 Nov 2023 06:15 )             36.9     11-06    138  |  100  |  50<H>  ----------------------------<  103<H>  3.6   |  27  |  9.90<H>    Ca    9.5      06 Nov 2023 06:15  Phos  4.9     11-06  Mg     2.3     11-06        Urinalysis Basic - ( 06 Nov 2023 06:15 )    Color: x / Appearance: x / SG: x / pH: x  Gluc: 103 mg/dL / Ketone: x  / Bili: x / Urobili: x   Blood: x / Protein: x / Nitrite: x   Leuk Esterase: x / RBC: x / WBC x   Sq Epi: x / Non Sq Epi: x / Bacteria: x      Magnesium: 2.3 mg/dL (11-06 @ 06:15)  Phosphorus: 4.9 mg/dL (11-06 @ 06:15)    Urine studies    PTH and Vit D:

## 2023-11-06 NOTE — PROGRESS NOTE ADULT - SUBJECTIVE AND OBJECTIVE BOX
Time of Visit:  Patient seen and examined.     MEDICATIONS  (STANDING):  benzonatate 100 milliGRAM(s) Oral every 8 hours  chlorhexidine 2% Cloths 1 Application(s) Topical <User Schedule>  fluticasone propionate 50 MICROgram(s)/spray Nasal Spray 1 Spray(s) Both Nostrils two times a day  furosemide   Injectable 80 milliGRAM(s) IV Push two times a day  heparin   Injectable 5000 Unit(s) SubCutaneous every 8 hours  hydrALAZINE 25 milliGRAM(s) Oral every 8 hours  influenza   Vaccine 0.5 milliLiter(s) IntraMuscular once  labetalol 300 milliGRAM(s) Oral every 8 hours  NIFEdipine XL 60 milliGRAM(s) Oral two times a day      MEDICATIONS  (PRN):  acetaminophen     Tablet .. 650 milliGRAM(s) Oral every 6 hours PRN Temp greater or equal to 38C (100.4F), Mild Pain (1 - 3)  guaiFENesin Oral Liquid (Sugar-Free) 100 milliGRAM(s) Oral every 6 hours PRN Cough  ondansetron Injectable 4 milliGRAM(s) IV Push every 8 hours PRN Nausea and/or Vomiting       Medications up to date at time of exam.    ROS; No fever, chills, cough, nasal congestion on exam.   PHYSICAL EXAMINATION:    Vital Signs Last 24 Hrs  T(C): 36.9 (06 Nov 2023 14:15), Max: 36.9 (05 Nov 2023 20:19)  T(F): 98.5 (06 Nov 2023 14:15), Max: 98.5 (06 Nov 2023 14:15)  HR: 81 (06 Nov 2023 14:15) (79 - 85)  BP: 158/82 (06 Nov 2023 14:15) (138/70 - 158/82)  BP(mean): --  RR: 17 (06 Nov 2023 14:15) (17 - 18)  SpO2: 98% (06 Nov 2023 14:15) (94% - 98%)    Parameters below as of 06 Nov 2023 14:15  Patient On (Oxygen Delivery Method): room air       (if applicable)    General : Alert and oriented. No acute distress .     HEENT: Head is normocephalic and atraumatic. No nasal tenderness. Extraocular muscles are intact. Mucous membranes are moist.     NECK: Supple, no palpable adenopathy.    LUNGS: Clear to auscultation bilaterally with no wheezing, rales, or rhonchi. No use of accessory muscle .     HEART: S1 S2  Regular rate and no click / rub.     ABDOMEN: Soft, nontender, and nondistended.  Active bowel sounds.     EXTREMITIES: Without any cyanosis, clubbing, rash, lesions .     NEUROLOGIC: Awake, alert, oriented.     SKIN: Warm and moist . Non diaphoretic.       LABS:                        11.2   5.68  )-----------( 160      ( 06 Nov 2023 06:15 )             36.9     11-06    138  |  100  |  50<H>  ----------------------------<  103<H>  3.6   |  27  |  9.90<H>    Ca    9.5      06 Nov 2023 06:15  Phos  4.9     11-06  Mg     2.3     11-06        Urinalysis Basic - ( 06 Nov 2023 06:15 )    Color: x / Appearance: x / SG: x / pH: x  Gluc: 103 mg/dL / Ketone: x  / Bili: x / Urobili: x   Blood: x / Protein: x / Nitrite: x   Leuk Esterase: x / RBC: x / WBC x   Sq Epi: x / Non Sq Epi: x / Bacteria: x    MICROBIOLOGY: (if applicable)    RADIOLOGY & ADDITIONAL STUDIES:  EKG:   CT Chest < from: CT Chest No Cont (11.01.23 @ 18:59) >    ACC: [id number] EXAM:  CT CHEST   ORDERED BY: AUDREY GARCIA     PROCEDURE DATE:  11/01/2023          INTERPRETATION:  CT CHEST WITHOUT CONTRAST    INDICATION: Shortness of breath..    TECHNIQUE: Unenhanced helical images were obtained of the chest.Coronal   and sagittal images were reconstructed. Maximum intensity projection   images were generated.        COMPARISON: Graft chest 11/1/2023.    FINDINGS:    Tubes/Lines: Catheter via right-sided approach in the right atrium    Lungs, airways andpleura: Bilateral pleural effusions, passive   atelectasis, septal thickening and groundglass opacities.    The airways are unremarkable. No pneumothorax.    Mediastinum: The heart is enlarged. In particular, the atria are   enlarged. Small pericardial effusion. Coronary artery calcifications. The   aorta is normal in caliber. Aortic calcifications.    Upper Abdomen: The upper abdomen is normal.    Bones And Soft Tissues: The bones are unremarkable.  The soft tissues are   unremarkable.      IMPRESSION:    1.  Bilateral septal thickening and groundglass opacities can be   secondary to pulmonary edema.  2.  Bilateral pleural effusions and lower lobe passive atelectasis.  3.  Small pericardial effusion.    --- End of Report ---      CXR : < from: Xray Chest 1 View- PORTABLE-Urgent (Xray Chest 1 View- PORTABLE-Urgent .) (11.01.23 @ 17:39) >    ACC: [id number] EXAM:  XR CHEST PORTABLE URGENT 1V   ORDERED BY: AUDREY GARCIA     PROCEDURE DATE:  11/01/2023          INTERPRETATION:  CLINICAL HISTORY: sob.    COMPARISON: Prior chest radiograph dated 9/27/2023. Same day CT chest   11/1/2023    TECHNIQUE: Portable frontal chest radiograph. Adequate positioning.    FINDINGS:    Support devices: Right-sided catheter with tip terminating in right   atrium.    Cardiac/mediastinum/hilum: Mild enlargement of the cardiac silhouette, CT   demonstrated a pericardial effusion.    Lung parenchyma/Pleura: Bilateral pleural effusion with atelectasis,   right greater than left. Increased bronchovascular markings consistent   with pulmonary vascular congestion.    Skeleton/soft tissues: No acute findings.      IMPRESSION:    Bilateral pleural effusion with atelectasis, right greater than left.    Mild pulmonary vascular congestion.    Mild enlargement of the cardiac silhouette, CT demonstrated a pericardial   effusion    --- End of Report ---          CAITLYN ACEVEDO DO; Resident Radiologist  This document has been electronically signed.  DAR ALBA DO; Attending Radiologist  This document has been electronically signed. Nov 2 2023  9:48AM    < end of copied text >          IMPRESSION: 53y Female PAST MEDICAL & SURGICAL HISTORY:  No pertinent past medical history      No significant past surgical history       Impression; This is a 52 Y/O Female with ESRD on HD presented to ED for 2 weeks of shortness of breath and dry cough. Admitted for Acute hypoxic respiratory failure due to Pulmonary Edema from missed HD .  CT Chest with B/L Pleural Effusions with Small Pericardial Effusion. Her hypoxia improved and saturating good room air.    Suggestion:  O2 saturation 96% room air. So far saturating good room air.   Reinforced the importance of compliance to HD and 1 L Fluid restriction .   Thoracic eval noted. No need to drain effusion at this time.  Nephrology follow up for ESRD on HD.   Fluticasone nasal spray Twice daily.   DVT / GI prophylactic. On Heparin 5,000 Units SQ Q 8 Hours.

## 2023-11-06 NOTE — ACUTE INTERFACILITY TRANSFER NOTE - PLAN OF CARE
p/w dyspnea for 2 weeks, LE edema, dyspnea 2/2 fluid overload. CT chest results on 11/1 noted for Bilateral pleural effusions and lower lobe passive atelectasis. Echo shows bilat. pleural effusions and moderate pericardial effusion. On CT scan, f/u resolution post HD. Follow-up ischemia eval., s/p Lasix 80 mg bid IV. c/w Lasix 80 mg IV bid as per Dr. Calle. f/u echo, repeat CXR. Strict intake and output. Pulmonary - Dr. Goodwin following. Cardiology consult- Dr. Hernandez following. ESRD on HD (T/Th/Sat) at [place] follows up with Dr. Calle. Schedule for HD 11/7/23 in am. ESRD vs CHF (secondary to cardiac tamponage post pericardial effusion). 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 c/w Lasix 80 mg Iv bid. Daily weight. BNP: 30K., trop. nl., CHG bathing daily. Monitor BMP and phos. serum, Renal diet and fluid restriction to 1L/day. Adjust meds to eGFR and avoid IV gadolinium contrast, NSAIDs, and phosphate enema.

## 2023-11-06 NOTE — PROGRESS NOTE ADULT - PROBLEM SELECTOR PLAN 3
on CT scan   f/u resolution post HD   cardiology consult Dr. Salguero echo noted for morderate pericardial effusion 2/2 ESRD  Cardiothoracic surgery following, no surgical intervention   f/u Cardiology Dr. Hernandez for repeat echo in 1 month

## 2023-11-06 NOTE — PROGRESS NOTE ADULT - PROBLEM SELECTOR PLAN 4
ESRD on HD at [place] follows up with DR hernandez   planned for HD [facility]   c/w lasix 80 mg IV BID

## 2023-11-06 NOTE — PROGRESS NOTE ADULT - ASSESSMENT
52 y/o female with pmhx of HTN, ESRD on HD ID  [id number] presenting to the ED for 2 week shortness of breath and dry cough,pulmonary edema,uncontrolled HTN.  1.Echocardiogram as above.Will need ischemia eval and repeat echo 1 mo f/u pericardial effusion-due to renal failure..  2.ESRD-HD as per renal.  3.HTN-cont  bp medication.  4.R and Lt heart cath wednesday.  5.GI and DVT prophylaxis.

## 2023-11-07 LAB
ANION GAP SERPL CALC-SCNC: 9 MMOL/L — SIGNIFICANT CHANGE UP (ref 5–17)
ANION GAP SERPL CALC-SCNC: 9 MMOL/L — SIGNIFICANT CHANGE UP (ref 5–17)
BUN SERPL-MCNC: 75 MG/DL — HIGH (ref 7–18)
BUN SERPL-MCNC: 75 MG/DL — HIGH (ref 7–18)
CALCIUM SERPL-MCNC: 8.3 MG/DL — LOW (ref 8.4–10.5)
CALCIUM SERPL-MCNC: 8.3 MG/DL — LOW (ref 8.4–10.5)
CHLORIDE SERPL-SCNC: 103 MMOL/L — SIGNIFICANT CHANGE UP (ref 96–108)
CHLORIDE SERPL-SCNC: 103 MMOL/L — SIGNIFICANT CHANGE UP (ref 96–108)
CO2 SERPL-SCNC: 27 MMOL/L — SIGNIFICANT CHANGE UP (ref 22–31)
CO2 SERPL-SCNC: 27 MMOL/L — SIGNIFICANT CHANGE UP (ref 22–31)
CREAT SERPL-MCNC: 12.2 MG/DL — HIGH (ref 0.5–1.3)
CREAT SERPL-MCNC: 12.2 MG/DL — HIGH (ref 0.5–1.3)
EGFR: 3 ML/MIN/1.73M2 — LOW
EGFR: 3 ML/MIN/1.73M2 — LOW
GLUCOSE SERPL-MCNC: 105 MG/DL — HIGH (ref 70–99)
GLUCOSE SERPL-MCNC: 105 MG/DL — HIGH (ref 70–99)
HCT VFR BLD CALC: 32.2 % — LOW (ref 34.5–45)
HCT VFR BLD CALC: 32.2 % — LOW (ref 34.5–45)
HGB BLD-MCNC: 9.7 G/DL — LOW (ref 11.5–15.5)
HGB BLD-MCNC: 9.7 G/DL — LOW (ref 11.5–15.5)
MAGNESIUM SERPL-MCNC: 2.3 MG/DL — SIGNIFICANT CHANGE UP (ref 1.6–2.6)
MAGNESIUM SERPL-MCNC: 2.3 MG/DL — SIGNIFICANT CHANGE UP (ref 1.6–2.6)
MCHC RBC-ENTMCNC: 28.9 PG — SIGNIFICANT CHANGE UP (ref 27–34)
MCHC RBC-ENTMCNC: 28.9 PG — SIGNIFICANT CHANGE UP (ref 27–34)
MCHC RBC-ENTMCNC: 30.1 GM/DL — LOW (ref 32–36)
MCHC RBC-ENTMCNC: 30.1 GM/DL — LOW (ref 32–36)
MCV RBC AUTO: 95.8 FL — SIGNIFICANT CHANGE UP (ref 80–100)
MCV RBC AUTO: 95.8 FL — SIGNIFICANT CHANGE UP (ref 80–100)
NRBC # BLD: 0 /100 WBCS — SIGNIFICANT CHANGE UP (ref 0–0)
NRBC # BLD: 0 /100 WBCS — SIGNIFICANT CHANGE UP (ref 0–0)
PHOSPHATE SERPL-MCNC: 6.6 MG/DL — HIGH (ref 2.5–4.5)
PHOSPHATE SERPL-MCNC: 6.6 MG/DL — HIGH (ref 2.5–4.5)
PLATELET # BLD AUTO: 141 K/UL — LOW (ref 150–400)
PLATELET # BLD AUTO: 141 K/UL — LOW (ref 150–400)
POTASSIUM SERPL-MCNC: 5.1 MMOL/L — SIGNIFICANT CHANGE UP (ref 3.5–5.3)
POTASSIUM SERPL-MCNC: 5.1 MMOL/L — SIGNIFICANT CHANGE UP (ref 3.5–5.3)
POTASSIUM SERPL-SCNC: 5.1 MMOL/L — SIGNIFICANT CHANGE UP (ref 3.5–5.3)
POTASSIUM SERPL-SCNC: 5.1 MMOL/L — SIGNIFICANT CHANGE UP (ref 3.5–5.3)
RBC # BLD: 3.36 M/UL — LOW (ref 3.8–5.2)
RBC # BLD: 3.36 M/UL — LOW (ref 3.8–5.2)
RBC # FLD: 17.7 % — HIGH (ref 10.3–14.5)
RBC # FLD: 17.7 % — HIGH (ref 10.3–14.5)
SODIUM SERPL-SCNC: 139 MMOL/L — SIGNIFICANT CHANGE UP (ref 135–145)
SODIUM SERPL-SCNC: 139 MMOL/L — SIGNIFICANT CHANGE UP (ref 135–145)
WBC # BLD: 4.89 K/UL — SIGNIFICANT CHANGE UP (ref 3.8–10.5)
WBC # BLD: 4.89 K/UL — SIGNIFICANT CHANGE UP (ref 3.8–10.5)
WBC # FLD AUTO: 4.89 K/UL — SIGNIFICANT CHANGE UP (ref 3.8–10.5)
WBC # FLD AUTO: 4.89 K/UL — SIGNIFICANT CHANGE UP (ref 3.8–10.5)

## 2023-11-07 PROCEDURE — 99231 SBSQ HOSP IP/OBS SF/LOW 25: CPT

## 2023-11-07 RX ADMIN — Medication 60 MILLIGRAM(S): at 05:36

## 2023-11-07 RX ADMIN — Medication 100 MILLIGRAM(S): at 05:35

## 2023-11-07 RX ADMIN — Medication 300 MILLIGRAM(S): at 21:19

## 2023-11-07 RX ADMIN — Medication 1 SPRAY(S): at 05:35

## 2023-11-07 RX ADMIN — Medication 60 MILLIGRAM(S): at 17:17

## 2023-11-07 RX ADMIN — Medication 650 MILLIGRAM(S): at 19:40

## 2023-11-07 RX ADMIN — Medication 300 MILLIGRAM(S): at 05:36

## 2023-11-07 RX ADMIN — HEPARIN SODIUM 5000 UNIT(S): 5000 INJECTION INTRAVENOUS; SUBCUTANEOUS at 05:37

## 2023-11-07 RX ADMIN — Medication 100 MILLIGRAM(S): at 14:04

## 2023-11-07 RX ADMIN — Medication 300 MILLIGRAM(S): at 14:05

## 2023-11-07 RX ADMIN — HEPARIN SODIUM 5000 UNIT(S): 5000 INJECTION INTRAVENOUS; SUBCUTANEOUS at 14:04

## 2023-11-07 RX ADMIN — Medication 25 MILLIGRAM(S): at 21:19

## 2023-11-07 RX ADMIN — Medication 80 MILLIGRAM(S): at 14:04

## 2023-11-07 RX ADMIN — Medication 1 SPRAY(S): at 17:17

## 2023-11-07 RX ADMIN — Medication 100 MILLIGRAM(S): at 21:19

## 2023-11-07 RX ADMIN — Medication 650 MILLIGRAM(S): at 20:30

## 2023-11-07 RX ADMIN — Medication 25 MILLIGRAM(S): at 05:36

## 2023-11-07 RX ADMIN — HEPARIN SODIUM 5000 UNIT(S): 5000 INJECTION INTRAVENOUS; SUBCUTANEOUS at 21:21

## 2023-11-07 RX ADMIN — CHLORHEXIDINE GLUCONATE 1 APPLICATION(S): 213 SOLUTION TOPICAL at 05:37

## 2023-11-07 RX ADMIN — Medication 80 MILLIGRAM(S): at 05:35

## 2023-11-07 RX ADMIN — Medication 25 MILLIGRAM(S): at 14:05

## 2023-11-07 NOTE — PROGRESS NOTE ADULT - PROBLEM SELECTOR PLAN 2
Ct chest on 11/1 noted for Bilateral pleural effusions and lower lobe passive atelectasis  on lasix 80 mg bid IV  f/u repeat CXR  Pulm. Dr. Goodwin following

## 2023-11-07 NOTE — PROGRESS NOTE ADULT - ASSESSMENT
54 y/o female with pmhx of HTN, ESRD on HD T/Th/Sat, presenting to the ED for 2 week shortness of breath and dry cough. Admitted for dyspnea 2/2 pulmonary edema. Chest CT: b/l ground glass opacity, and bilateral pleural effusions, also small pericardial effusion. Echo noted for normal EF, grade 4 diastolic dysfunction, and now moderate pericardial effusion.   Cardiology Dr. Hernandez following. Cardiothoracic Surgery Dr. Knight following, no surgical intervention.     Awaiting repeat CXR to re-eval pleural effusions. Pulm. Dr. Goodwin following.   Pt is also pending Cardiac Cath on 11/8/23, will need transfer to [facility].

## 2023-11-07 NOTE — PROGRESS NOTE ADULT - SUBJECTIVE AND OBJECTIVE BOX
INTERVAL HPI/OVERNIGHT EVENTS:  seen and examined   Pt resting comfortably. Reporting cough  Denies SOB/chest pain   planned for dialysis today and transfer tomorrow     MEDICATIONS  (STANDING):  benzonatate 100 milliGRAM(s) Oral every 8 hours  chlorhexidine 2% Cloths 1 Application(s) Topical <User Schedule>  fluticasone propionate 50 MICROgram(s)/spray Nasal Spray 1 Spray(s) Both Nostrils two times a day  furosemide   Injectable 80 milliGRAM(s) IV Push two times a day  heparin   Injectable 5000 Unit(s) SubCutaneous every 8 hours  hydrALAZINE 25 milliGRAM(s) Oral every 8 hours  influenza   Vaccine 0.5 milliLiter(s) IntraMuscular once  labetalol 300 milliGRAM(s) Oral every 8 hours  NIFEdipine XL 60 milliGRAM(s) Oral two times a day    MEDICATIONS  (PRN):  acetaminophen     Tablet .. 650 milliGRAM(s) Oral every 6 hours PRN Temp greater or equal to 38C (100.4F), Mild Pain (1 - 3)  guaiFENesin Oral Liquid (Sugar-Free) 100 milliGRAM(s) Oral every 6 hours PRN Cough  ondansetron Injectable 4 milliGRAM(s) IV Push every 8 hours PRN Nausea and/or Vomiting      Vital Signs Last 24 Hrs  T(C): 36.7 (07 Nov 2023 05:16), Max: 36.9 (06 Nov 2023 14:15)  T(F): 98.1 (07 Nov 2023 05:16), Max: 98.5 (06 Nov 2023 14:15)  HR: 80 (07 Nov 2023 05:16) (80 - 92)  BP: 143/78 (07 Nov 2023 05:16) (143/78 - 158/83)  BP(mean): --  RR: 18 (07 Nov 2023 05:16) (17 - 18)  SpO2: 95% (07 Nov 2023 05:16) (95% - 99%)    Parameters below as of 07 Nov 2023 05:16  Patient On (Oxygen Delivery Method): room air        Physical:  General: A&Ox3. NAD.  Respirations: Unlabored on room air  chest: equal chest rise b/l     I&O's Detail      LABS:                        11.2   5.68  )-----------( 160      ( 06 Nov 2023 06:15 )             36.9             11-06    138  |  100  |  50<H>  ----------------------------<  103<H>  3.6   |  27  |  9.90<H>    Ca    9.5      06 Nov 2023 06:15  Phos  4.9     11-06  Mg     2.3     11-06

## 2023-11-07 NOTE — PROGRESS NOTE ADULT - PROBLEM SELECTOR PLAN 4
ESRD on HD T/Th/Sat at [place] follows up with Dr Alva HASSAN bathing daily  f/u MRSA/MSSA pcr  monitor BMP and phos serum  Hemodialysis Renal Diet and Fluid restriction to 1L/day  Adjust meds to eGFR and avoid IV Gadolinium contrast, NSAIDs, and phosphate enema

## 2023-11-07 NOTE — PROGRESS NOTE ADULT - SUBJECTIVE AND OBJECTIVE BOX
COLLIN WHITMAN  MR# [id number]  53yFemale        Patient is a 53y old  Female who presents with a chief complaint of Dyspnea (07 Nov 2023 11:52)      INTERVAL HPI/OVERNIGHT EVENTS:  Patient seen and examined at bedside. No notations of chest pain, palpitation, SOB, orthopnea, nausea, vomiting or abdominal pain.    ALLERGIES  No Known Allergies      MEDICATIONS  acetaminophen     Tablet .. 650 milliGRAM(s) Oral every 6 hours PRN Temp greater or equal to 38C (100.4F), Mild Pain (1 - 3)  benzonatate 100 milliGRAM(s) Oral every 8 hours  chlorhexidine 2% Cloths 1 Application(s) Topical <User Schedule>  fluticasone propionate 50 MICROgram(s)/spray Nasal Spray 1 Spray(s) Both Nostrils two times a day  furosemide   Injectable 80 milliGRAM(s) IV Push two times a day  guaiFENesin Oral Liquid (Sugar-Free) 100 milliGRAM(s) Oral every 6 hours PRN Cough  heparin   Injectable 5000 Unit(s) SubCutaneous every 8 hours  hydrALAZINE 25 milliGRAM(s) Oral every 8 hours  influenza   Vaccine 0.5 milliLiter(s) IntraMuscular once  labetalol 300 milliGRAM(s) Oral every 8 hours  NIFEdipine XL 60 milliGRAM(s) Oral two times a day  ondansetron Injectable 4 milliGRAM(s) IV Push every 8 hours PRN Nausea and/or Vomiting              REVIEW OF SYSTEMS:  CONSTITUTIONAL: No fever, weight loss, or fatigue  EYES: No eye pain, visual disturbances, or discharge  ENT:  No difficulty hearing, tinnitus, vertigo; No sinus or throat pain  NECK: No pain or stiffness  RESPIRATORY: No cough, wheezing, chills or hemoptysis; No Shortness of Breath  CARDIOVASCULAR: No chest pain, palpitations, passing out, dizziness, or leg swelling  GASTROINTESTINAL: No abdominal or epigastric pain. No nausea, vomiting, or hematemesis; No diarrhea or constipation. No melena or hematochezia.  GENITOURINARY: No dysuria, frequency, hematuria, or incontinence  NEUROLOGICAL: No headaches, memory loss, loss of strength, numbness, or tremors  SKIN: No itching, burning, rashes, or lesions   LYMPH Nodes: No enlarged glands  ENDOCRINE: No heat or cold intolerance; No hair loss  MUSCULOSKELETAL: No joint pain or swelling; No muscle, back, or extremity pain  PSYCHIATRIC: No depression, anxiety, mood swings, or difficulty sleeping  HEME/LYMPH: No easy bruising, or bleeding gums  ALLERGY AND IMMUNOLOGIC: No hives or eczema	    [ ] All others negative	  [ ] Unable to obtain      T(C): 36.7 (11-07-23 @ 05:16), Max: 36.9 (11-06-23 @ 14:15)  T(F): 98.1 (11-07-23 @ 05:16), Max: 98.5 (11-06-23 @ 14:15)  HR: 80 (11-07-23 @ 05:16) (80 - 92)  BP: 143/78 (11-07-23 @ 05:16) (143/78 - 158/83)  RR: 18 (11-07-23 @ 05:16) (17 - 18)  SpO2: 95% (11-07-23 @ 05:16) (95% - 99%)  Wt(kg): --    I&O's Summary        PHYSICAL EXAM:  A X O x  HEAD:  Atraumatic, Normocephalic  EYES: EOMI, PERRLA, conjunctiva and sclera clear  NECK: Supple, No JVD, Normal thyroid  Resp: CTAB, No crackles, wheezing,   CVS: Regular rate and rhythm; No discernable murmurs, rubs, or gallops  ABD: Soft, Nontender, Nondistended; Bowel sounds present  EXTREMITIES:  2+ Peripheral Pulses, No edema  LYMPH: No dicernable lymphadenopathy noted  GENERAL: NAD, well-groomed, well-developed      LABS:                        11.2   5.68  )-----------( 160      ( 06 Nov 2023 06:15 )             36.9     11-06    138  |  100  |  50<H>  ----------------------------<  103<H>  3.6   |  27  |  9.90<H>    Ca    9.5      06 Nov 2023 06:15  Phos  4.9     11-06  Mg     2.3     11-06        Urinalysis Basic - ( 06 Nov 2023 06:15 )    Color: x / Appearance: x / SG: x / pH: x  Gluc: 103 mg/dL / Ketone: x  / Bili: x / Urobili: x   Blood: x / Protein: x / Nitrite: x   Leuk Esterase: x / RBC: x / WBC x   Sq Epi: x / Non Sq Epi: x / Bacteria: x      CAPILLARY BLOOD GLUCOSE          Troponins:  ProBNP:  Lipid Profile:   HgA1c:  TSH:           RADIOLOGY & ADDITIONAL TESTS:    Imaging Personally Reviewed:  [ ] YES  [ ] NO      Consultant(s) Notes Reviewed:  [x ] YES  [ ] NO    Care Discussed with Consultants/Other Providers [ x] YES  [ ] NO          PAST MEDICAL & SURGICAL HISTORY:  No pertinent past medical history      No significant past surgical history            Acute pulmonary edema    Handoff    MEWS Score    No pertinent past medical history    No pertinent past medical history    Acute pulmonary edema    Dyspnea    ESRD on hemodialysis    Acute UTI    HTN (hypertension)    Prophylactic measure    Acute pericardial effusion    Acute pulmonary edema    Asymptomatic bacteriuria    Acute on chronic diastolic congestive heart failure    No significant past surgical history    No significant past surgical history    W/DIFF BREATHING    35    ESRD on dialysis    [email or web address]

## 2023-11-07 NOTE — PROGRESS NOTE ADULT - ASSESSMENT
54 y/o female with pmhx of HTN, ESRD on HD ID  [id number] presenting to the ED for 2 week shortness of breath and dry cough,pulmonary edema,uncontrolled HTN.  1.Echocardiogram as above.Will need ischemia eval and repeat echo 1 mo f/u pericardial effusion-due to renal failure..  2.ESRD-HD as per renal.  3.HTN-cont  bp medication.  4.R and Lt heart cath in am.  5.GI and DVT prophylaxis.

## 2023-11-07 NOTE — PROGRESS NOTE ADULT - ASSESSMENT
52 y/o female with pmhx of HTN, ESRD on HD T/Th/Sat, presenting to the ED for 2 week shortness of breath and dry cough. Admitted for dyspnea 2/2 pulmonary edema. Chest CT: b/l ground glass opacity, and bilateral pleural effusions, also small pericardial effusion. Echo noted for normal EF, grade 4 diastolic dysfunction, and now moderate pericardial effusion.   Cardiology Dr. Hernandez following. Cardiothoracic Surgery Dr. Knight following, no surgical intervention.     Awaiting repeat CXR to re-eval pleural effusions. Pulm. Dr. Goodwin following.   Pt is also pending Cardiac Cath on 11/8/23, will need transfer to [facility].

## 2023-11-07 NOTE — PROGRESS NOTE ADULT - PROBLEM SELECTOR PLAN 3
echo noted for morderate pericardial effusion 2/2 ESRD  Cardiothoracic surgery following, no surgical intervention   f/u Cardiology Dr. Hernandez for repeat echo in 1 month

## 2023-11-07 NOTE — PROGRESS NOTE ADULT - SUBJECTIVE AND OBJECTIVE BOX
NEPHROLOGY MEDICAL CARE, [facility] - Dr. Marco Calle/ Dr. Nicholas Mckeon/ Dr. Ry Zuniga/ Dr. Roxi Reese    Date of Service: 11-07-23    Patient was seen and examined at bedside.    CC: patient is okay and NAD    Vital Signs Last 24 Hrs  T(C): 36.7 (07 Nov 2023 05:16), Max: 36.9 (06 Nov 2023 14:15)  T(F): 98.1 (07 Nov 2023 05:16), Max: 98.5 (06 Nov 2023 14:15)  HR: 80 (07 Nov 2023 05:16) (80 - 92)  BP: 143/78 (07 Nov 2023 05:16) (143/78 - 158/83)  BP(mean): --  RR: 18 (07 Nov 2023 05:16) (17 - 18)  SpO2: 95% (07 Nov 2023 05:16) (95% - 99%)    Parameters below as of 07 Nov 2023 05:16  Patient On (Oxygen Delivery Method): room air          PHYSICAL EXAM:  General: No acute respiratory distress.  Eyes: conjunctiva and sclera clear  ENMT: Atraumatic, Normocephalic,  Respiratory:   Bilaterally poor air entry and no rhonchi, wheezing  Cardiovascular: S1S2+; no m/r/g  Gastrointestinal: Soft, Non-tender, Nondistended; Bowel sounds present   Neuro:  Awake, Alert & Oriented X3  Ext:  1+ pedal edema, No Cyanosis  Skin: No rashes  Dialysis Access::  Rt chest  PERMACATH    MEDICATIONS:  MEDICATIONS  (STANDING):  benzonatate 100 milliGRAM(s) Oral every 8 hours  chlorhexidine 2% Cloths 1 Application(s) Topical <User Schedule>  fluticasone propionate 50 MICROgram(s)/spray Nasal Spray 1 Spray(s) Both Nostrils two times a day  furosemide   Injectable 80 milliGRAM(s) IV Push two times a day  heparin   Injectable 5000 Unit(s) SubCutaneous every 8 hours  hydrALAZINE 25 milliGRAM(s) Oral every 8 hours  influenza   Vaccine 0.5 milliLiter(s) IntraMuscular once  labetalol 300 milliGRAM(s) Oral every 8 hours  NIFEdipine XL 60 milliGRAM(s) Oral two times a day    MEDICATIONS  (PRN):  acetaminophen     Tablet .. 650 milliGRAM(s) Oral every 6 hours PRN Temp greater or equal to 38C (100.4F), Mild Pain (1 - 3)  guaiFENesin Oral Liquid (Sugar-Free) 100 milliGRAM(s) Oral every 6 hours PRN Cough  ondansetron Injectable 4 milliGRAM(s) IV Push every 8 hours PRN Nausea and/or Vomiting          LABS:                        11.2   5.68  )-----------( 160      ( 06 Nov 2023 06:15 )             36.9     11-06    138  |  100  |  50<H>  ----------------------------<  103<H>  3.6   |  27  |  9.90<H>    Ca    9.5      06 Nov 2023 06:15  Phos  4.9     11-06  Mg     2.3     11-06        Urinalysis Basic - ( 06 Nov 2023 06:15 )    Color: x / Appearance: x / SG: x / pH: x  Gluc: 103 mg/dL / Ketone: x  / Bili: x / Urobili: x   Blood: x / Protein: x / Nitrite: x   Leuk Esterase: x / RBC: x / WBC x   Sq Epi: x / Non Sq Epi: x / Bacteria: x        Urine studies    PTH and Vit D:

## 2023-11-07 NOTE — PROGRESS NOTE ADULT - PROBLEM SELECTOR PLAN 5
c/w home med labetalol 100mg TID and nifedipine 60mg BID   c/w hydralazine 25 mg tid, lasix 80 mg IV BID  BP goal <140/90

## 2023-11-07 NOTE — PROGRESS NOTE ADULT - ASSESSMENT
53F c/o SOB with b/l pleural effusions and moderate pericardial effusion   VSS, afebrile, saturation wnl on RA  Pt is hemodynamically stable. No difficulty breathing    -aggressive hemodialysis per nephrology  -planned for transfer tomorrow 11/8 for coronary angiogram at [facility]   -cardio, pulm f/u   -contents of this note signed out to PA covering #[phone]  -no acute thoracic surgical intervention at this time    53F c/o SOB with b/l pleural effusions and moderate pericardial effusion   VSS, afebrile, saturation wnl on RA  Pt is hemodynamically stable. No difficulty breathing    -aggressive hemodialysis per nephrology  -planned for transfer tomorrow 11/8 for coronary angiogram at [facility]   -cardio, pulm f/u   -contents of this note signed out to PA covering #[phone]  -no acute thoracic surgical intervention at this time    53F c/o SOB with b/l pleural effusions and moderate pericardial effusion   VSS, afebrile, saturation wnl on RA  Pt is hemodynamically stable. No difficulty breathing    -aggressive hemodialysis per nephrology  -planned for transfer tomorrow 11/8 for coronary angiogram at [facility]   -cardio, pulm f/u   -contents of this note signed out to PA covering #[phone]  -no acute thoracic surgical intervention at this time

## 2023-11-07 NOTE — PROGRESS NOTE ADULT - PROBLEM SELECTOR PLAN 4
ESRD on HD T/Th/Sat at [place] follows up with Dr Alva HASSAN bathing daily  f/u MRSA/MSSA pcr  monitor BMP and phos serum  Hemodialysis Renal Diet and Fluid restriction to 1L/day  Adjust meds to eGFR and avoid IV Gadolinium contrast, NSAIDs, and phosphate enema

## 2023-11-07 NOTE — PROGRESS NOTE ADULT - SUBJECTIVE AND OBJECTIVE BOX
Date of Service 11-07-23 @ 11:40    CHIEF COMPLAINT:Patient is a 53y old  Female who presents with a chief complaint of Dyspnea.Pt appears comfortable.    	  REVIEW OF SYSTEMS:  CONSTITUTIONAL: No fever, weight loss, or fatigue  EYES: No eye pain, visual disturbances, or discharge  ENT:  No difficulty hearing, tinnitus, vertigo; No sinus or throat pain  NECK: No pain or stiffness  RESPIRATORY: No cough, wheezing, chills or hemoptysis; No Shortness of Breath  CARDIOVASCULAR: No chest pain, palpitations, passing out, dizziness, or leg swelling  GASTROINTESTINAL: No abdominal or epigastric pain. No nausea, vomiting, or hematemesis; No diarrhea or constipation. No melena or hematochezia.  GENITOURINARY: No dysuria, frequency, hematuria, or incontinence  NEUROLOGICAL: No headaches, memory loss, loss of strength, numbness, or tremors  SKIN: No itching, burning, rashes, or lesions   LYMPH Nodes: No enlarged glands  ENDOCRINE: No heat or cold intolerance; No hair loss  MUSCULOSKELETAL: No joint pain or swelling; No muscle, back, or extremity pain  PSYCHIATRIC: No depression, anxiety, mood swings, or difficulty sleeping  HEME/LYMPH: No easy bruising, or bleeding gums  ALLERGY AND IMMUNOLOGIC: No hives or eczema	      PHYSICAL EXAM:  T(C): 36.7 (11-07-23 @ 05:16), Max: 36.9 (11-06-23 @ 14:15)  HR: 80 (11-07-23 @ 05:16) (80 - 92)  BP: 143/78 (11-07-23 @ 05:16) (143/78 - 158/83)  RR: 18 (11-07-23 @ 05:16) (17 - 18)  SpO2: 95% (11-07-23 @ 05:16) (95% - 99%)  Wt(kg): --  I&O's Summary      Appearance: Normal	  HEENT:   Normal oral mucosa, PERRL, EOMI	  Lymphatic: No lymphadenopathy  Cardiovascular: Normal S1 S2, No JVD, No murmurs, No edema  Respiratory: Lungs clear to auscultation	  Psychiatry: A & O x 3, Mood & affect appropriate  Gastrointestinal:  Soft, Non-tender, + BS	  Skin: No rashes, No ecchymoses, No cyanosis	  Neurologic: Non-focal  Extremities: Normal range of motion, No clubbing, cyanosis or edema  Vascular: Peripheral pulses palpable 2+ bilaterally    MEDICATIONS  (STANDING):  benzonatate 100 milliGRAM(s) Oral every 8 hours  chlorhexidine 2% Cloths 1 Application(s) Topical <User Schedule>  fluticasone propionate 50 MICROgram(s)/spray Nasal Spray 1 Spray(s) Both Nostrils two times a day  furosemide   Injectable 80 milliGRAM(s) IV Push two times a day  heparin   Injectable 5000 Unit(s) SubCutaneous every 8 hours  hydrALAZINE 25 milliGRAM(s) Oral every 8 hours  influenza   Vaccine 0.5 milliLiter(s) IntraMuscular once  labetalol 300 milliGRAM(s) Oral every 8 hours  NIFEdipine XL 60 milliGRAM(s) Oral two times a day        	  LABS:	 	                            11.2   5.68  )-----------( 160      ( 06 Nov 2023 06:15 )             36.9     11-06    138  |  100  |  50<H>  ----------------------------<  103<H>  3.6   |  27  |  9.90<H>    Ca    9.5      06 Nov 2023 06:15  Phos  4.9     11-06  Mg     2.3     11-06        TSH: Thyroid Stimulating Hormone, Serum: 0.42 uU/mL (11-03 @ 08:12)      	         Date of Service 11-07-23 @ 11:40    CHIEF COMPLAINT:Patient is a 53y old  Female who presents with a chief complaint of Dyspnea.Pt appears comfortable.    	  REVIEW OF SYSTEMS:  CONSTITUTIONAL: No fever, weight loss, or fatigue  EYES: No eye pain, visual disturbances, or discharge  ENT:  No difficulty hearing, tinnitus, vertigo; No sinus or throat pain  NECK: No pain or stiffness  RESPIRATORY: No cough, wheezing, chills or hemoptysis; No Shortness of Breath  CARDIOVASCULAR: No chest pain, palpitations, passing out, dizziness, or leg swelling  GASTROINTESTINAL: No abdominal or epigastric pain. No nausea, vomiting, or hematemesis; No diarrhea or constipation. No melena or hematochezia.  GENITOURINARY: No dysuria, frequency, hematuria, or incontinence  NEUROLOGICAL: No headaches, memory loss, loss of strength, numbness, or tremors  SKIN: No itching, burning, rashes, or lesions   LYMPH Nodes: No enlarged glands  ENDOCRINE: No heat or cold intolerance; No hair loss  MUSCULOSKELETAL: No joint pain or swelling; No muscle, back, or extremity pain  PSYCHIATRIC: No depression, anxiety, mood swings, or difficulty sleeping  HEME/LYMPH: No easy bruising, or bleeding gums  ALLERGY AND IMMUNOLOGIC: No hives or eczema	      PHYSICAL EXAM:  T(C): 36.7 (11-07-23 @ 05:16), Max: 36.9 (11-06-23 @ 14:15)  HR: 80 (11-07-23 @ 05:16) (80 - 92)  BP: 143/78 (11-07-23 @ 05:16) (143/78 - 158/83)  RR: 18 (11-07-23 @ 05:16) (17 - 18)  SpO2: 95% (11-07-23 @ 05:16) (95% - 99%)  Wt(kg): --  I&O's Summary      Appearance: Normal	  HEENT:   Normal oral mucosa, PERRL, EOMI	  Lymphatic: No lymphadenopathy  Cardiovascular: Normal S1 S2, No JVD, No murmurs, No edema  Respiratory: Lungs clear to auscultation	  Psychiatry: A & O x 3, Mood & affect appropriate  Gastrointestinal:  Soft, Non-tender, + BS	  Skin: No rashes, No ecchymoses, No cyanosis	  Neurologic: Non-focal  Extremities: Normal range of motion, No clubbing, cyanosis or edema  Vascular: Peripheral pulses palpable 2+ bilaterally    MEDICATIONS  (STANDING):  benzonatate 100 milliGRAM(s) Oral every 8 hours  chlorhexidine 2% Cloths 1 Application(s) Topical <User Schedule>  fluticasone propionate 50 MICROgram(s)/spray Nasal Spray 1 Spray(s) Both Nostrils two times a day  furosemide   Injectable 80 milliGRAM(s) IV Push two times a day  heparin   Injectable 5000 Unit(s) SubCutaneous every 8 hours  hydrALAZINE 25 milliGRAM(s) Oral every 8 hours  influenza   Vaccine 0.5 milliLiter(s) IntraMuscular once  labetalol 300 milliGRAM(s) Oral every 8 hours  NIFEdipine XL 60 milliGRAM(s) Oral two times a day        	  LABS:	 	                            11.2   5.68  )-----------( 160      ( 06 Nov 2023 06:15 )             36.9     11-06    138  |  100  |  50<H>  ----------------------------<  103<H>  3.6   |  27  |  9.90<H>    Ca    9.5      06 Nov 2023 06:15  Phos  4.9     11-06  Mg     2.3     11-06        TSH: Thyroid Stimulating Hormone, Serum: 0.42 uU/mL (11-03 @ 08:12)      	      Transthoracic Echocardiogram (11.02.23 @ 07:08)   OBSERVATIONS:  Mitral Valve: Normal mitral valve. Moderate to severe  mitral regurgitation.  Aortic Root: Aortic Root: 3.3 cm.  Aortic Valve: Normal trileaflet aortic valve. Mild aortic  regurgitation.  Left Atrium: Moderately dilated left atrium.  LA volume  index = 46 cc/m2.  Left Ventricle: Normal Left Ventricular Systolic Function,  (EF = 55 to 60%) Normal left ventricular internal  dimensions and wall thicknesses. Grade IVdiastolic  dysfunction (severe with fixed restrictive pattern).  Right Heart: Normal right atrium. Linear echodensity  consistent with a catheter is visualized in the right  atrium. Normal right ventricular size and systolic function  (TAPSE 2.6 cm).There is mild tricuspid regurgitation.  There is mild pulmonic regurgitation.  Pericardium/PleuraModerate pericardial effusion,greatest  diameter 1.5cm. Bilateral pleural effusions.  Hemodynamic: RA Pressure is 8 mm Hg. RV systolic pressure  is moderately increased at  46 mm Hg.

## 2023-11-07 NOTE — PROGRESS NOTE ADULT - SUBJECTIVE AND OBJECTIVE BOX
NP Note discussed with  primary attending    Patient is a 53y old  Female who presents with a chief complaint of Dyspnea (07 Nov 2023 12:48)      INTERVAL HPI/OVERNIGHT EVENTS: no new complaints    MEDICATIONS  (STANDING):  benzonatate 100 milliGRAM(s) Oral every 8 hours  chlorhexidine 2% Cloths 1 Application(s) Topical <User Schedule>  fluticasone propionate 50 MICROgram(s)/spray Nasal Spray 1 Spray(s) Both Nostrils two times a day  furosemide   Injectable 80 milliGRAM(s) IV Push two times a day  heparin   Injectable 5000 Unit(s) SubCutaneous every 8 hours  hydrALAZINE 25 milliGRAM(s) Oral every 8 hours  influenza   Vaccine 0.5 milliLiter(s) IntraMuscular once  labetalol 300 milliGRAM(s) Oral every 8 hours  NIFEdipine XL 60 milliGRAM(s) Oral two times a day    MEDICATIONS  (PRN):  acetaminophen     Tablet .. 650 milliGRAM(s) Oral every 6 hours PRN Temp greater or equal to 38C (100.4F), Mild Pain (1 - 3)  guaiFENesin Oral Liquid (Sugar-Free) 100 milliGRAM(s) Oral every 6 hours PRN Cough  ondansetron Injectable 4 milliGRAM(s) IV Push every 8 hours PRN Nausea and/or Vomiting      __________________________________________________  REVIEW OF SYSTEMS:    CONSTITUTIONAL: No fever,   EYES: no acute visual disturbances  NECK: No pain or stiffness  RESPIRATORY: No cough; No shortness of breath  CARDIOVASCULAR: No chest pain, no palpitations  GASTROINTESTINAL: No pain. No nausea or vomiting; No diarrhea   NEUROLOGICAL: No headache or numbness, no tremors  MUSCULOSKELETAL: No joint pain, no muscle pain  GENITOURINARY: no dysuria, no frequency, no hesitancy        Vital Signs Last 24 Hrs  T(C): 36.5 (07 Nov 2023 13:54), Max: 36.9 (06 Nov 2023 20:15)  T(F): 97.7 (07 Nov 2023 13:54), Max: 98.5 (06 Nov 2023 20:15)  HR: 83 (07 Nov 2023 14:01) (75 - 92)  BP: 147/75 (07 Nov 2023 14:01) (141/74 - 158/83)  BP(mean): 99 (07 Nov 2023 14:01) (99 - 99)  RR: 17 (07 Nov 2023 14:01) (16 - 18)  SpO2: 96% (07 Nov 2023 14:01) (95% - 99%)    Parameters below as of 07 Nov 2023 14:01  Patient On (Oxygen Delivery Method): room air        ________________________________________________  PHYSICAL EXAM:  GENERAL: NAD  HEENT: Normocephalic;  conjunctivae and sclerae clear; moist mucous membranes;   NECK : supple  CHEST/LUNG: Clear to ausculitation bilaterally with good air entry   HEART: S1 S2  regular; no murmurs, gallops or rubs  ABDOMEN: Soft, Nontender, Nondistended; Bowel sounds present  EXTREMITIES: no cyanosis; no edema; no calf tenderness  SKIN: warm and dry; no rash  NERVOUS SYSTEM:  Awake and alert; Oriented  to place, person and time ; no new deficits    _________________________________________________  LABS:                        11.2   5.68  )-----------( 160      ( 06 Nov 2023 06:15 )             36.9     11-06    138  |  100  |  50<H>  ----------------------------<  103<H>  3.6   |  27  |  9.90<H>    Ca    9.5      06 Nov 2023 06:15  Phos  4.9     11-06  Mg     2.3     11-06        Urinalysis Basic - ( 06 Nov 2023 06:15 )    Color: x / Appearance: x / SG: x / pH: x  Gluc: 103 mg/dL / Ketone: x  / Bili: x / Urobili: x   Blood: x / Protein: x / Nitrite: x   Leuk Esterase: x / RBC: x / WBC x   Sq Epi: x / Non Sq Epi: x / Bacteria: x      CAPILLARY BLOOD GLUCOSE            RADIOLOGY & ADDITIONAL TESTS:    Imaging Personally Reviewed:  YES/NO    Consultant(s) Notes Reviewed:   YES/ No    Care Discussed with Consultants :     Plan of care was discussed with patient and /or primary care giver; all questions and concerns were addressed and care was aligned with patient's wishes.

## 2023-11-07 NOTE — PROGRESS NOTE ADULT - ASSESSMENT
1 ESRD on HD (T/TH/SAT)  -Plan for HD today with UF 2.0kg    -Hemodialysis Renal Diet and Fluid restriction to 1L/day  -Adjust meds to eGFR and avoid IV Gadolinium contrast,NSAIDs, and phosphate enema.  -Monitor Electrolytes daily.  2. HTN:   -bp is impoving  -continue labetolol 300mg tid; continue nifedipine 60mg bid; hydralazine 25mg tid.    -titrate bp meds to keep sbp >110 and < 130  3. Anemia of ESRD:  -start epoen if hb <10    -F/u CBC daily  -transfuse if HB < 7.0.  4. Mineral Bone Disease:  -continue phoslo tid  -monitor phos  5. Sob due to fluid overload:  -continue lasix 80mg iv bid   -ECHO shows percardial effussion with mod MR.   -UF during HD.   -Pulmon (Dr. Goodwin) and Cardio (Dr. Hernandez) noted.  -Cardio will plan for cardiac cath on Wednesday at [facility].

## 2023-11-07 NOTE — PROGRESS NOTE ADULT - PROBLEM SELECTOR PLAN 1
p/w dyspnea for 2 weeks, LE edema  dyspnea 2/2 fluid overload  Chest CT: b/l ground glass 2/2  pulm edema   BNP: 30 k. trop nl   echo noted for normal EF and grade 4 DD  s/p 80 mg IV lasix  c/w lasix 80 mg IV BID as per Dr. Calle  strict intake and out put, daily weights  pending cardiac cath  Cardiology Dr. Hernandez following

## 2023-11-08 ENCOUNTER — INPATIENT (INPATIENT)
Facility: HOSPITAL | Age: 53
LOS: 2 days | Discharge: HOME | End: 2023-11-11
Attending: HOSPITALIST | Admitting: HOSPITALIST
Payer: MEDICAID

## 2023-11-08 VITALS
HEART RATE: 72 BPM | RESPIRATION RATE: 18 BRPM | DIASTOLIC BLOOD PRESSURE: 100 MMHG | TEMPERATURE: 98 F | SYSTOLIC BLOOD PRESSURE: 172 MMHG

## 2023-11-08 VITALS
TEMPERATURE: 98 F | HEART RATE: 77 BPM | DIASTOLIC BLOOD PRESSURE: 87 MMHG | OXYGEN SATURATION: 97 % | SYSTOLIC BLOOD PRESSURE: 167 MMHG | RESPIRATION RATE: 16 BRPM

## 2023-11-08 DIAGNOSIS — Z98.51 TUBAL LIGATION STATUS: Chronic | ICD-10-CM

## 2023-11-08 DIAGNOSIS — I50.9 HEART FAILURE, UNSPECIFIED: ICD-10-CM

## 2023-11-08 DIAGNOSIS — Z98.891 HISTORY OF UTERINE SCAR FROM PREVIOUS SURGERY: Chronic | ICD-10-CM

## 2023-11-08 LAB
ANION GAP SERPL CALC-SCNC: 9 MMOL/L — SIGNIFICANT CHANGE UP (ref 5–17)
ANION GAP SERPL CALC-SCNC: 9 MMOL/L — SIGNIFICANT CHANGE UP (ref 5–17)
BUN SERPL-MCNC: 34 MG/DL — HIGH (ref 7–18)
BUN SERPL-MCNC: 34 MG/DL — HIGH (ref 7–18)
CALCIUM SERPL-MCNC: 9 MG/DL — SIGNIFICANT CHANGE UP (ref 8.4–10.5)
CALCIUM SERPL-MCNC: 9 MG/DL — SIGNIFICANT CHANGE UP (ref 8.4–10.5)
CHLORIDE SERPL-SCNC: 100 MMOL/L — SIGNIFICANT CHANGE UP (ref 96–108)
CHLORIDE SERPL-SCNC: 100 MMOL/L — SIGNIFICANT CHANGE UP (ref 96–108)
CO2 SERPL-SCNC: 29 MMOL/L — SIGNIFICANT CHANGE UP (ref 22–31)
CO2 SERPL-SCNC: 29 MMOL/L — SIGNIFICANT CHANGE UP (ref 22–31)
CREAT SERPL-MCNC: 7.44 MG/DL — HIGH (ref 0.5–1.3)
CREAT SERPL-MCNC: 7.44 MG/DL — HIGH (ref 0.5–1.3)
EGFR: 6 ML/MIN/1.73M2 — LOW
EGFR: 6 ML/MIN/1.73M2 — LOW
GLUCOSE SERPL-MCNC: 98 MG/DL — SIGNIFICANT CHANGE UP (ref 70–99)
GLUCOSE SERPL-MCNC: 98 MG/DL — SIGNIFICANT CHANGE UP (ref 70–99)
HCT VFR BLD CALC: 36.8 % — SIGNIFICANT CHANGE UP (ref 34.5–45)
HCT VFR BLD CALC: 36.8 % — SIGNIFICANT CHANGE UP (ref 34.5–45)
HGB BLD-MCNC: 11.3 G/DL — LOW (ref 11.5–15.5)
HGB BLD-MCNC: 11.3 G/DL — LOW (ref 11.5–15.5)
MCHC RBC-ENTMCNC: 29.1 PG — SIGNIFICANT CHANGE UP (ref 27–34)
MCHC RBC-ENTMCNC: 29.1 PG — SIGNIFICANT CHANGE UP (ref 27–34)
MCHC RBC-ENTMCNC: 30.7 GM/DL — LOW (ref 32–36)
MCHC RBC-ENTMCNC: 30.7 GM/DL — LOW (ref 32–36)
MCV RBC AUTO: 94.8 FL — SIGNIFICANT CHANGE UP (ref 80–100)
MCV RBC AUTO: 94.8 FL — SIGNIFICANT CHANGE UP (ref 80–100)
NRBC # BLD: 0 /100 WBCS — SIGNIFICANT CHANGE UP (ref 0–0)
NRBC # BLD: 0 /100 WBCS — SIGNIFICANT CHANGE UP (ref 0–0)
PLATELET # BLD AUTO: 144 K/UL — LOW (ref 150–400)
PLATELET # BLD AUTO: 144 K/UL — LOW (ref 150–400)
POTASSIUM SERPL-MCNC: 4.3 MMOL/L — SIGNIFICANT CHANGE UP (ref 3.5–5.3)
POTASSIUM SERPL-MCNC: 4.3 MMOL/L — SIGNIFICANT CHANGE UP (ref 3.5–5.3)
POTASSIUM SERPL-SCNC: 4.3 MMOL/L — SIGNIFICANT CHANGE UP (ref 3.5–5.3)
POTASSIUM SERPL-SCNC: 4.3 MMOL/L — SIGNIFICANT CHANGE UP (ref 3.5–5.3)
RBC # BLD: 3.88 M/UL — SIGNIFICANT CHANGE UP (ref 3.8–5.2)
RBC # BLD: 3.88 M/UL — SIGNIFICANT CHANGE UP (ref 3.8–5.2)
RBC # FLD: 17.6 % — HIGH (ref 10.3–14.5)
RBC # FLD: 17.6 % — HIGH (ref 10.3–14.5)
SODIUM SERPL-SCNC: 138 MMOL/L — SIGNIFICANT CHANGE UP (ref 135–145)
SODIUM SERPL-SCNC: 138 MMOL/L — SIGNIFICANT CHANGE UP (ref 135–145)
WBC # BLD: 4.88 K/UL — SIGNIFICANT CHANGE UP (ref 3.8–10.5)
WBC # BLD: 4.88 K/UL — SIGNIFICANT CHANGE UP (ref 3.8–10.5)
WBC # FLD AUTO: 4.88 K/UL — SIGNIFICANT CHANGE UP (ref 3.8–10.5)
WBC # FLD AUTO: 4.88 K/UL — SIGNIFICANT CHANGE UP (ref 3.8–10.5)

## 2023-11-08 PROCEDURE — 87641 MR-STAPH DNA AMP PROBE: CPT

## 2023-11-08 PROCEDURE — 93010 ELECTROCARDIOGRAM REPORT: CPT

## 2023-11-08 PROCEDURE — 87340 HEPATITIS B SURFACE AG IA: CPT

## 2023-11-08 PROCEDURE — 71250 CT THORAX DX C-: CPT | Mod: MA

## 2023-11-08 PROCEDURE — 99231 SBSQ HOSP IP/OBS SF/LOW 25: CPT

## 2023-11-08 PROCEDURE — 83690 ASSAY OF LIPASE: CPT

## 2023-11-08 PROCEDURE — 0225U NFCT DS DNA&RNA 21 SARSCOV2: CPT

## 2023-11-08 PROCEDURE — 99285 EMERGENCY DEPT VISIT HI MDM: CPT | Mod: 25

## 2023-11-08 PROCEDURE — 96374 THER/PROPH/DIAG INJ IV PUSH: CPT

## 2023-11-08 PROCEDURE — 71045 X-RAY EXAM CHEST 1 VIEW: CPT

## 2023-11-08 PROCEDURE — 93005 ELECTROCARDIOGRAM TRACING: CPT

## 2023-11-08 PROCEDURE — 80053 COMPREHEN METABOLIC PANEL: CPT

## 2023-11-08 PROCEDURE — 83735 ASSAY OF MAGNESIUM: CPT

## 2023-11-08 PROCEDURE — 36415 COLL VENOUS BLD VENIPUNCTURE: CPT

## 2023-11-08 PROCEDURE — 81025 URINE PREGNANCY TEST: CPT

## 2023-11-08 PROCEDURE — 94640 AIRWAY INHALATION TREATMENT: CPT

## 2023-11-08 PROCEDURE — 84100 ASSAY OF PHOSPHORUS: CPT

## 2023-11-08 PROCEDURE — 93306 TTE W/DOPPLER COMPLETE: CPT

## 2023-11-08 PROCEDURE — 99261: CPT

## 2023-11-08 PROCEDURE — 84443 ASSAY THYROID STIM HORMONE: CPT

## 2023-11-08 PROCEDURE — 81001 URINALYSIS AUTO W/SCOPE: CPT

## 2023-11-08 PROCEDURE — 85025 COMPLETE CBC W/AUTO DIFF WBC: CPT

## 2023-11-08 PROCEDURE — 84484 ASSAY OF TROPONIN QUANT: CPT

## 2023-11-08 PROCEDURE — 85027 COMPLETE CBC AUTOMATED: CPT

## 2023-11-08 PROCEDURE — 84702 CHORIONIC GONADOTROPIN TEST: CPT

## 2023-11-08 PROCEDURE — 80048 BASIC METABOLIC PNL TOTAL CA: CPT

## 2023-11-08 PROCEDURE — 87640 STAPH A DNA AMP PROBE: CPT

## 2023-11-08 PROCEDURE — 83036 HEMOGLOBIN GLYCOSYLATED A1C: CPT

## 2023-11-08 PROCEDURE — 93460 R&L HRT ART/VENTRICLE ANGIO: CPT | Mod: 26

## 2023-11-08 PROCEDURE — 83880 ASSAY OF NATRIURETIC PEPTIDE: CPT

## 2023-11-08 RX ORDER — FUROSEMIDE 40 MG
80 TABLET ORAL
Refills: 0 | Status: DISCONTINUED | OUTPATIENT
Start: 2023-11-08 | End: 2023-11-09

## 2023-11-08 RX ORDER — LABETALOL HCL 100 MG
300 TABLET ORAL THREE TIMES A DAY
Refills: 0 | Status: DISCONTINUED | OUTPATIENT
Start: 2023-11-08 | End: 2023-11-11

## 2023-11-08 RX ORDER — HEPARIN SODIUM 5000 [USP'U]/ML
5000 INJECTION INTRAVENOUS; SUBCUTANEOUS EVERY 8 HOURS
Refills: 0 | Status: DISCONTINUED | OUTPATIENT
Start: 2023-11-09 | End: 2023-11-11

## 2023-11-08 RX ORDER — ACETAMINOPHEN 500 MG
650 TABLET ORAL ONCE
Refills: 0 | Status: COMPLETED | OUTPATIENT
Start: 2023-11-08 | End: 2023-11-08

## 2023-11-08 RX ORDER — HYDRALAZINE HCL 50 MG
50 TABLET ORAL THREE TIMES A DAY
Refills: 0 | Status: DISCONTINUED | OUTPATIENT
Start: 2023-11-08 | End: 2023-11-09

## 2023-11-08 RX ORDER — NIFEDIPINE 30 MG
90 TABLET, EXTENDED RELEASE 24 HR ORAL DAILY
Refills: 0 | Status: DISCONTINUED | OUTPATIENT
Start: 2023-11-08 | End: 2023-11-11

## 2023-11-08 RX ORDER — ASPIRIN/CALCIUM CARB/MAGNESIUM 324 MG
81 TABLET ORAL DAILY
Refills: 0 | Status: DISCONTINUED | OUTPATIENT
Start: 2023-11-08 | End: 2023-11-11

## 2023-11-08 RX ORDER — PETROLATUM,WHITE
1 JELLY (GRAM) TOPICAL EVERY 6 HOURS
Refills: 0 | Status: DISCONTINUED | OUTPATIENT
Start: 2023-11-08 | End: 2023-11-11

## 2023-11-08 RX ORDER — SODIUM CHLORIDE 9 MG/ML
100 INJECTION INTRAMUSCULAR; INTRAVENOUS; SUBCUTANEOUS
Refills: 0 | Status: DISCONTINUED | OUTPATIENT
Start: 2023-11-08 | End: 2023-11-11

## 2023-11-08 RX ORDER — CHLORHEXIDINE GLUCONATE 213 G/1000ML
1 SOLUTION TOPICAL DAILY
Refills: 0 | Status: DISCONTINUED | OUTPATIENT
Start: 2023-11-08 | End: 2023-11-11

## 2023-11-08 RX ORDER — DIPHENHYDRAMINE HCL 50 MG
25 CAPSULE ORAL ONCE
Refills: 0 | Status: COMPLETED | OUTPATIENT
Start: 2023-11-08 | End: 2023-11-08

## 2023-11-08 RX ORDER — ATORVASTATIN CALCIUM 80 MG/1
40 TABLET, FILM COATED ORAL AT BEDTIME
Refills: 0 | Status: DISCONTINUED | OUTPATIENT
Start: 2023-11-08 | End: 2023-11-11

## 2023-11-08 RX ADMIN — ATORVASTATIN CALCIUM 40 MILLIGRAM(S): 80 TABLET, FILM COATED ORAL at 23:43

## 2023-11-08 RX ADMIN — Medication 100 MILLIGRAM(S): at 05:18

## 2023-11-08 RX ADMIN — Medication 80 MILLIGRAM(S): at 05:17

## 2023-11-08 RX ADMIN — Medication 1 SPRAY(S): at 05:18

## 2023-11-08 RX ADMIN — HEPARIN SODIUM 5000 UNIT(S): 5000 INJECTION INTRAVENOUS; SUBCUTANEOUS at 05:17

## 2023-11-08 RX ADMIN — Medication 50 MILLIGRAM(S): at 17:16

## 2023-11-08 RX ADMIN — Medication 60 MILLIGRAM(S): at 05:18

## 2023-11-08 RX ADMIN — Medication 25 MILLIGRAM(S): at 05:18

## 2023-11-08 RX ADMIN — CHLORHEXIDINE GLUCONATE 1 APPLICATION(S): 213 SOLUTION TOPICAL at 05:17

## 2023-11-08 RX ADMIN — Medication 300 MILLIGRAM(S): at 17:17

## 2023-11-08 RX ADMIN — Medication 300 MILLIGRAM(S): at 05:19

## 2023-11-08 RX ADMIN — Medication 650 MILLIGRAM(S): at 18:54

## 2023-11-08 RX ADMIN — Medication 25 MILLIGRAM(S): at 23:43

## 2023-11-08 NOTE — TRANSFER ACCEPTANCE NOTE - NSICDXPASTMEDICALHX_GEN_ALL_CORE_FT
PAST MEDICAL HISTORY:  Anemia     Enlarged thyroid     ESRD on dialysis     H/O thrombocytopenia     HLD (hyperlipidemia)     HTN (hypertension), benign

## 2023-11-08 NOTE — CHART NOTE - NSCHARTNOTEFT_GEN_A_CORE
Pt is s/p LHC w RRA access and RHC: w R brachial vein access    Patient denies pain, numbness, tingling, CP, SOB.     Vital Signs Last 24 Hrs  T(C): 36.6 (08 Nov 2023 11:24), Max: 36.7 (08 Nov 2023 05:13)  T(F): 97.9 (08 Nov 2023 11:24), Max: 98 (08 Nov 2023 05:13)  HR: 77 (08 Nov 2023 11:24) (74 - 77)  BP: 167/87 (08 Nov 2023 11:24) (151/79 - 167/87)  BP(mean): 114 (08 Nov 2023 11:24) (114 - 114)  RR: 16 (08 Nov 2023 11:24) (16 - 18)  SpO2: 97% (08 Nov 2023 11:24) (97% - 97%)    VSS.     Dressing is clear/dry/intact. x2  RRA site is without hematoma or bleeding. Somedry blood at Rt brachial vein site, but no hematoma.   Pulses palpable, cap refill <3 sec.   Strength, sensation intact in UE b/l     Will continue to monitor.

## 2023-11-08 NOTE — PROGRESS NOTE ADULT - ASSESSMENT
54 y/o female with pmhx of HTN, ESRD on HD ID  [id number] presenting to the ED for 2 week shortness of breath and dry cough,pulmonary edema,uncontrolled HTN.  1.Repeat echo 1 mo f/u pericardial effusion-due to renal failure.  2.ESRD-HD as per renal.  3.HTN-cont  bp medication.  4.R and Lt heart cath today.  5.GI and DVT prophylaxis.

## 2023-11-08 NOTE — PROGRESS NOTE ADULT - REASON FOR ADMISSION
Dyspnea
bilateral pleural effusion and pericardial effusion
Dyspnea

## 2023-11-08 NOTE — PROGRESS NOTE ADULT - PROBLEM SELECTOR PROBLEM 5
HTN (hypertension)
HTN (hypertension)
Acute UTI
HTN (hypertension)
Acute UTI
HTN (hypertension)

## 2023-11-08 NOTE — PROGRESS NOTE ADULT - PROBLEM SELECTOR PROBLEM 6
HTN (hypertension)
HTN (hypertension)
Asymptomatic bacteriuria
HTN (hypertension)
Asymptomatic bacteriuria
HTN (hypertension)
HTN (hypertension)

## 2023-11-08 NOTE — PROGRESS NOTE ADULT - SUBJECTIVE AND OBJECTIVE BOX
COLLIN WHITMAN  MR# [id number]  53yFemale        Patient is a 53y old  Female who presents with a chief complaint of Dyspnea (08 Nov 2023 09:23)      INTERVAL HPI/OVERNIGHT EVENTS:  Patient seen and examined at bedside. No notations of chest pain, palpitation, SOB, orthopnea, nausea, vomiting or abdominal pain.    ALLERGIES  No Known Allergies      MEDICATIONS  acetaminophen     Tablet .. 650 milliGRAM(s) Oral every 6 hours PRN Temp greater or equal to 38C (100.4F), Mild Pain (1 - 3)  benzonatate 100 milliGRAM(s) Oral every 8 hours  chlorhexidine 2% Cloths 1 Application(s) Topical <User Schedule>  fluticasone propionate 50 MICROgram(s)/spray Nasal Spray 1 Spray(s) Both Nostrils two times a day  furosemide   Injectable 80 milliGRAM(s) IV Push two times a day  guaiFENesin Oral Liquid (Sugar-Free) 100 milliGRAM(s) Oral every 6 hours PRN Cough  heparin   Injectable 5000 Unit(s) SubCutaneous every 8 hours  hydrALAZINE 25 milliGRAM(s) Oral every 8 hours  influenza   Vaccine 0.5 milliLiter(s) IntraMuscular once  labetalol 300 milliGRAM(s) Oral every 8 hours  NIFEdipine XL 60 milliGRAM(s) Oral two times a day  ondansetron Injectable 4 milliGRAM(s) IV Push every 8 hours PRN Nausea and/or Vomiting              REVIEW OF SYSTEMS:  CONSTITUTIONAL: No fever, weight loss, or fatigue  EYES: No eye pain, visual disturbances, or discharge  ENT:  No difficulty hearing, tinnitus, vertigo; No sinus or throat pain  NECK: No pain or stiffness  RESPIRATORY: No cough, wheezing, chills or hemoptysis; No Shortness of Breath  CARDIOVASCULAR: No chest pain, palpitations, passing out, dizziness, or leg swelling  GASTROINTESTINAL: No abdominal or epigastric pain. No nausea, vomiting, or hematemesis; No diarrhea or constipation. No melena or hematochezia.  GENITOURINARY: No dysuria, frequency, hematuria, or incontinence  NEUROLOGICAL: No headaches, memory loss, loss of strength, numbness, or tremors  SKIN: No itching, burning, rashes, or lesions   LYMPH Nodes: No enlarged glands  ENDOCRINE: No heat or cold intolerance; No hair loss  MUSCULOSKELETAL: No joint pain or swelling; No muscle, back, or extremity pain  PSYCHIATRIC: No depression, anxiety, mood swings, or difficulty sleeping  HEME/LYMPH: No easy bruising, or bleeding gums  ALLERGY AND IMMUNOLOGIC: No hives or eczema	    [ ] All others negative	  [ ] Unable to obtain      T(C): 36.6 (11-08-23 @ 11:24), Max: 36.9 (11-07-23 @ 17:14)  T(F): 97.9 (11-08-23 @ 11:24), Max: 98.4 (11-07-23 @ 17:14)  HR: 77 (11-08-23 @ 11:24) (74 - 83)  BP: 167/87 (11-08-23 @ 11:24) (141/74 - 182/96)  RR: 16 (11-08-23 @ 11:24) (16 - 18)  SpO2: 97% (11-08-23 @ 11:24) (95% - 98%)  Wt(kg): --    I&O's Summary    07 Nov 2023 07:01  -  08 Nov 2023 07:00  --------------------------------------------------------  IN: 0 mL / OUT: 300 mL / NET: -300 mL          PHYSICAL EXAM:  A X O x  HEAD:  Atraumatic, Normocephalic  EYES: EOMI, PERRLA, conjunctiva and sclera clear  NECK: Supple, No JVD, Normal thyroid  Resp: CTAB, No crackles, wheezing,   CVS: Regular rate and rhythm; No discernable murmurs, rubs, or gallops  ABD: Soft, Nontender, Nondistended; Bowel sounds present  EXTREMITIES:  2+ Peripheral Pulses, No edema  LYMPH: No dicernable lymphadenopathy noted  GENERAL: NAD, well-groomed, well-developed      LABS:                        11.3   4.88  )-----------( 144      ( 08 Nov 2023 05:40 )             36.8     11-08    138  |  100  |  34<H>  ----------------------------<  98  4.3   |  29  |  7.44<H>    Ca    9.0      08 Nov 2023 05:40  Phos  6.6     11-07  Mg     2.3     11-07        Urinalysis Basic - ( 08 Nov 2023 05:40 )    Color: x / Appearance: x / SG: x / pH: x  Gluc: 98 mg/dL / Ketone: x  / Bili: x / Urobili: x   Blood: x / Protein: x / Nitrite: x   Leuk Esterase: x / RBC: x / WBC x   Sq Epi: x / Non Sq Epi: x / Bacteria: x      CAPILLARY BLOOD GLUCOSE          Troponins:  ProBNP:  Lipid Profile:   HgA1c:  TSH:           RADIOLOGY & ADDITIONAL TESTS:    Imaging Personally Reviewed:  [ ] YES  [ ] NO      Consultant(s) Notes Reviewed:  [x ] YES  [ ] NO    Care Discussed with Consultants/Other Providers [ x] YES  [ ] NO          PAST MEDICAL & SURGICAL HISTORY:  No pertinent past medical history      No significant past surgical history            Acute pulmonary edema    Handoff    MEWS Score    No pertinent past medical history    No pertinent past medical history    Acute pulmonary edema    Dyspnea    ESRD on hemodialysis    Acute UTI    HTN (hypertension)    Prophylactic measure    Acute pericardial effusion    Acute pulmonary edema    Asymptomatic bacteriuria    Acute on chronic diastolic congestive heart failure    No significant past surgical history    No significant past surgical history    W/DIFF BREATHING    35    ESRD on dialysis    [email or web address]

## 2023-11-08 NOTE — TRANSFER ACCEPTANCE NOTE - ASSESSMENT
53 y.o. female with PMH of HTN, HLD, ESRD (on dialysis for 1.7 years, T/Th/Sat), Anemia, Enlarged Thyroid - was admitted to [facility] on 11/1/23 c/o SOB, b/l lower extremities edema, dry cough started 2 weeks ago. The patient denies missing dialysis and claims that SOB was gradually getting worse. She admits to chest pain, aggravate when she is hypertensive, midsternal, 8/10, doesn't radiate, resolve when Blood pressure is normal. The patient was found to have negative Troponin, proBNP 35049.  The patient was admitted for new onset CHF, pulmonary edema, started on Lasix 80 mg IV BID with improvement in symptoms. Echo showed moderate pericardial effusion. Cardiothoracic surgeon, from [facility], Dr. Knight, recommended no surgical intervention.     The patient was transferred to [facility] today for cardiac catheterization. She denies any complaints and claims that feels better since the admission.

## 2023-11-08 NOTE — PROGRESS NOTE ADULT - ASSESSMENT
53F c/o SOB with b/l pleural effusions and moderate pericardial effusion   VSS, afebrile, saturation wnl on RA  Pt is hemodynamically stable. No difficulty breathing    -aggressive hemodialysis per nephrology  -planned for transfer today 11/8 for coronary angiogram at [facility]   -cardio, pulm f/u   -contents of this note signed out to PA covering #[phone]  -no acute thoracic surgical intervention at this time  53F c/o SOB with b/l pleural effusions and moderate pericardial effusion   VSS, afebrile, saturation wnl on RA  Pt is hemodynamically stable. No difficulty breathing    -aggressive hemodialysis per nephrology  -planned for transfer today 11/8 for coronary angiogram at [facility]   -cardio, pulm f/u   -contents of this note signed out to PA covering #[phone]  -no acute thoracic surgical intervention at this time  53F c/o SOB with b/l pleural effusions and moderate pericardial effusion   VSS, afebrile, saturation wnl on RA  Pt is hemodynamically stable. No difficulty breathing    -aggressive hemodialysis per nephrology  -planned for transfer today 11/8 for coronary angiogram at [facility]   -cardio, pulm f/u   -contents of this note signed out to PA covering #[phone]  -no acute thoracic surgical intervention at this time

## 2023-11-08 NOTE — PROGRESS NOTE ADULT - SUBJECTIVE AND OBJECTIVE BOX
Patient seen and examined at bedside with no complaints.     Vital Signs Last 24 Hrs  T(F): 98 (11-08-23 @ 05:13), Max: 98.4 (11-07-23 @ 17:14)  HR: 74 (11-08-23 @ 05:13)  BP: 155/83 (11-08-23 @ 05:13)  RR: 18 (11-08-23 @ 05:13)  SpO2: 97% (11-08-23 @ 05:13)    GENERAL: Alert, NAD  CHEST/LUNG: respirations nonlabored on room air     I&O's Detail    07 Nov 2023 07:01  -  08 Nov 2023 07:00  --------------------------------------------------------  IN:  Total IN: 0 mL    OUT:    Voided (mL): 300 mL  Total OUT: 300 mL    Total NET: -300 mL    LABS:                        11.3   4.88  )-----------( 144      ( 08 Nov 2023 05:40 )             36.8     11-08    138  |  100  |  34<H>  ----------------------------<  98  4.3   |  29  |  7.44<H>    Ca    9.0      08 Nov 2023 05:40  Phos  6.6     11-07  Mg     2.3     11-07    < from: Xray Chest 1 View- PORTABLE-Urgent (Xray Chest 1 View- PORTABLE-Urgent .) (11.06.23 @ 10:46) >    ACC: [id number] EXAM:  XR CHEST PORTABLE URGENT 1V   ORDERED BY: RAY RAMOS     PROCEDURE DATE:  11/06/2023          INTERPRETATION:  Follow-up.    AP chest. Prior 11/1/2023.    IMPRESSION: Right dialysis catheter reidentified in situ. Heart size not  accurately evaluated on this projection. Improvement in congestive   changes. There may be mild residual vascular congestion. Decrease in   pleural effusions. Trace bibasilar effusions persist. No focal   consolidation or pneumothorax.    --- End of Report ---            CHAU STARK MD; Attending Radiologist  This document has been electronically signed. Nov 6 2023  4:39PM    < end of copied text >   Patient seen and examined at bedside with no complaints.     Vital Signs Last 24 Hrs  T(F): 98 (11-08-23 @ 05:13), Max: 98.4 (11-07-23 @ 17:14)  HR: 74 (11-08-23 @ 05:13)  BP: 155/83 (11-08-23 @ 05:13)  RR: 18 (11-08-23 @ 05:13)  SpO2: 97% (11-08-23 @ 05:13)    GENERAL: Alert, NAD  CHEST/LUNG: respirations nonlabored on room air     I&O's Detail    07 Nov 2023 07:01  -  08 Nov 2023 07:00  --------------------------------------------------------  IN:  Total IN: 0 mL    OUT:    Voided (mL): 300 mL  Total OUT: 300 mL    Total NET: -300 mL    LABS:                        11.3   4.88  )-----------( 144      ( 08 Nov 2023 05:40 )             36.8     11-08    138  |  100  |  34<H>  ----------------------------<  98  4.3   |  29  |  7.44<H>    Ca    9.0      08 Nov 2023 05:40  Phos  6.6     11-07  Mg     2.3     11-07    < from: Xray Chest 1 View- PORTABLE-Urgent (Xray Chest 1 View- PORTABLE-Urgent .) (11.06.23 @ 10:46) >    ACC: [id number] EXAM:  XR CHEST PORTABLE URGENT 1V   ORDERED BY: RAY RAMOS     PROCEDURE DATE:  11/06/2023          INTERPRETATION:  Follow-up.    AP chest. Prior 11/1/2023.    IMPRESSION: Right dialysis catheter reidentified in situ. Heart size not  accurately evaluated on this projection. Improvement in congestive   changes. There may be mild residual vascular congestion. Decrease in   pleural effusions. Trace bibasilar effusions persist. No focal   consolidation or pneumothorax.    --- End of Report ---            CHAU STARK MD; Attending Radiologist  This document has been electronically signed. Nov 6 2023  4:39PM    < end of copied text >   Patient seen and examined at bedside with no complaints.     Vital Signs Last 24 Hrs  T(F): 98 (11-08-23 @ 05:13), Max: 98.4 (11-07-23 @ 17:14)  HR: 74 (11-08-23 @ 05:13)  BP: 155/83 (11-08-23 @ 05:13)  RR: 18 (11-08-23 @ 05:13)  SpO2: 97% (11-08-23 @ 05:13)    GENERAL: Alert, NAD  CHEST/LUNG: respirations nonlabored on room air     I&O's Detail    07 Nov 2023 07:01  -  08 Nov 2023 07:00  --------------------------------------------------------  IN:  Total IN: 0 mL    OUT:    Voided (mL): 300 mL  Total OUT: 300 mL    Total NET: -300 mL    LABS:                        11.3   4.88  )-----------( 144      ( 08 Nov 2023 05:40 )             36.8     11-08    138  |  100  |  34<H>  ----------------------------<  98  4.3   |  29  |  7.44<H>    Ca    9.0      08 Nov 2023 05:40  Phos  6.6     11-07  Mg     2.3     11-07    < from: Xray Chest 1 View- PORTABLE-Urgent (Xray Chest 1 View- PORTABLE-Urgent .) (11.06.23 @ 10:46) >    ACC: [id number] EXAM:  XR CHEST PORTABLE URGENT 1V   ORDERED BY: RAY RAMOS     PROCEDURE DATE:  11/06/2023          INTERPRETATION:  Follow-up.    AP chest. Prior 11/1/2023.    IMPRESSION: Right dialysis catheter reidentified in situ. Heart size not  accurately evaluated on this projection. Improvement in congestive   changes. There may be mild residual vascular congestion. Decrease in   pleural effusions. Trace bibasilar effusions persist. No focal   consolidation or pneumothorax.    --- End of Report ---            CHUA STARK MD; Attending Radiologist  This document has been electronically signed. Nov 6 2023  4:39PM    < end of copied text >

## 2023-11-08 NOTE — CONSULT NOTE ADULT - ASSESSMENT
53 y.o. female with PMH of HTN, HLD, ESRD (on dialysis for 1.7 years, T/Th/Sat), Anemia, Enlarged Thyroid - was admitted to [facility] on 11/1/23 c/o SOB, b/l lower extremities edema, dry cough started 2 weeks ago. echo with new chf transfer to [facility] for cath and cardio work up    ESRD on HD (T/TH/SAT)  Nephrologist Dr. Calle   last hd 11/7   s/p cath today  will plan for short session of hd today post cath  consent in chart  renal diet  Adjust meds to eGFR and avoid IV Gadolinium contrast,NSAIDs, and phosphate enema.    HTN:   -bp uncontrolled  resume home meds, increase hydralazine if needed    3. Anemia of ESRD:  at goal  -start epoen if hb <10    -F/u CBC daily  monitor    4. Mineral Bone Disease:  -continue phoslo tid  -monitor phos  check pth    5. Sob due to fluid overload:  -ECHO shows percardial effussion with mod MR.   -UF with HD.   f/u cardio  s/p cath   53 y.o. female with PMH of HTN, HLD, ESRD (on dialysis for 1.7 years, T/Th/Sat), Anemia, Enlarged Thyroid - was admitted to [facility] on 11/1/23 c/o SOB, b/l lower extremities edema, dry cough started 2 weeks ago. echo with new chf transfer to [facility] for cath and cardio work up    ESRD on HD (T/TH/SAT)  [facility]  Nephrologist Dr. Calle   last hd 11/7   s/p cath today  will plan for short session of hd today post cath  consent in chart  renal diet  Adjust meds to eGFR and avoid IV Gadolinium contrast,NSAIDs, and phosphate enema.    HTN:   -bp uncontrolled  resume home meds, increase hydralazine if needed    3. Anemia of ESRD:  at goal  -start epoen if hb <10    -F/u CBC daily  monitor    4. Mineral Bone Disease:  -continue phoslo tid  -monitor phos  check pth    5. Sob due to fluid overload:  -ECHO shows percardial effussion with mod MR.   -UF with HD.   f/u cardio  s/p cath

## 2023-11-08 NOTE — TRANSFER ACCEPTANCE NOTE - RESPIRATORY
no wheezes/no rales/no rhonchi/no respiratory distress/no use of accessory muscles/breath sounds equal/good air movement/respirations non-labored

## 2023-11-08 NOTE — PROGRESS NOTE ADULT - ATTENDING COMMENTS
Pt appearing with sob and radiographic support for pulmonary edema, possibly consistent with fluid overload in HD dependent pt with ESRD vs CHF (secondary to cardiac tamponade post pericardial effusion). Possibility of uremic pericarditis is also present. Scheduled for HD in a.m. Pulm and cardio consult to follow.    11/3/23 - S/P HD session yesterday, with fluid off load and pt reporting thus far significant subjective improvement. Will f/u echocardiogram.    11/4/23 - Thoracic Sx consulted noted and much appreciated in regard to pericardial effusion; no indication for intervention - recommending continued HD with goal of further fluid offload. Pt thus far noted with improvement in respiratory status.
Pt appearing with sob and radiographic support for pulmonary edema, possibly consistent with fluid overload in HD dependent pt with ESRD vs CHF (secondary to cardiac tamponade post pericardial effusion). Possibility of uremic pericarditis is also present. Scheduled for HD in a.m. Pulm and cardio consult to follow.    11/3/23 - S/P HD session yesterday, with fluid off load and pt reporting thus far significant subjective improvement. Will f/u echocardiogram.    11/4/23 - Thoracic Sx consulted noted and much appreciated in regard to pericardial effusion; no indication for intervention - recommending continued HD with goal of further fluid offload. Pt thus far noted with improvement in respiratory status.    11/5/23 - Continue with fluid offload via HD and Lasix. Will consider IV Levaquin given deep productive cough with difficult expectoration.    11/6/23 - Pt is scheduled for a coronary angiogram on Wednesday at [facility], as deemed by cardio. Will have HD session prior to that tomorrow.    11/7/23 - Scheduled for transfer in a.m to [facility] for coronary angiogram.    11/8/23 - Awaiting transfer today.
Pt appearing with sob and radiographic support for pulmonary edema, possibly consistent with fluid overload in HD dependent pt with ESRD vs CHF (secondary to cardiac tamponade post pericardial effusion). Possibility of uremic pericarditis is also present. Scheduled for HD in a.m. Pulm and cardio consult to follow.    11/3/23 - S/P HD session yesterday, with fluid off load and pt reporting thus far significant subjective improvement. Will f/u echocardiogram.    11/4/23 - Thoracic Sx consulted noted and much appreciated in regard to pericardial effusion; no indication for intervention - recommending continued HD with goal of further fluid offload. Pt thus far noted with improvement in respiratory status.    11/5/23 - Continue with fluid offload via HD and Lasix. Will consider IV Levaquin given deep productive cough with difficult expectoration.    11/6/23 - Pt is scheduled for a coronary angiogram on Wednesday at [facility], as deemed by cardio. Will have HD session prior to that tomorrow.
Pt appearing with sob and radiographic support for pulmonary edema, possibly consistent with fluid overload in HD dependent pt with ESRD vs CHF (secondary to cardiac tamponade post pericardial effusion). Possibility of uremic pericarditis is also present. Scheduled for HD in a.m. Pulm and cardio consult to follow.    11/3/23 - S/P HD session yesterday, with fluid off load and pt reporting thus far significant subjective improvement. Will f/u echocardiogram.
Pt appearing with sob and radiographic support for pulmonary edema, possibly consistent with fluid overload in HD dependent pt with ESRD vs CHF (secondary to cardiac tamponade post pericardial effusion). Possibility of uremic pericarditis is also present. Scheduled for HD in a.m. Pulm and cardio consult to follow.    11/3/23 - S/P HD session yesterday, with fluid off load and pt reporting thus far significant subjective improvement. Will f/u echocardiogram.    11/4/23 - Thoracic Sx consulted noted and much appreciated in regard to pericardial effusion; no indication for intervention - recommending continued HD with goal of further fluid offload. Pt thus far noted with improvement in respiratory status.    11/5/23 - Continue with fluid offload via HD and Lasix. Will consider IV Levaquin given deep productive cough with difficult expectoration.    11/6/23 - Pt is scheduled for a coronary angiogram on Wednesday at [facility], as deemed by cardio. Will have HD session prior to that tomorrow.    11/7/23 - Scheduled for transfer in a.m to [facility] for coronary angiogram.
Pt appearing with sob and radiographic support for pulmonary edema, possibly consistent with fluid overload in HD dependent pt with ESRD vs CHF (secondary to cardiac tamponade post pericardial effusion). Possibility of uremic pericarditis is also present. Scheduled for HD in a.m. Pulm and cardio consult to follow.
Pt appearing with sob and radiographic support for pulmonary edema, possibly consistent with fluid overload in HD dependent pt with ESRD vs CHF (secondary to cardiac tamponade post pericardial effusion). Possibility of uremic pericarditis is also present. Scheduled for HD in a.m. Pulm and cardio consult to follow.    11/3/23 - S/P HD session yesterday, with fluid off load and pt reporting thus far significant subjective improvement. Will f/u echocardiogram.    11/4/23 - Thoracic Sx consulted noted and much appreciated in regard to pericardial effusion; no indication for intervention - recommending continued HD with goal of further fluid offload. Pt thus far noted with improvement in respiratory status.    11/5/23 - Continue with fluid offload via HD and Lasix. Will consider IV Levaquin given deep productive cough with difficult expectoration.

## 2023-11-08 NOTE — TRANSFER ACCEPTANCE NOTE - HISTORY OF PRESENT ILLNESS
53 y.o.  female with PMH of HTN, HLD, ESRD (on dialysis T/Th/Sat), Anemia, Enlarged Thyroid was admitted to [facility] on presenting to the ED for 2 week shortness of breath and dry cough. Admitted for dyspnea 2/2 pulmonary edema. Chest CT: b/l ground glass opacity, and bilateral pleural effusions, also small pericardial effusion. Echo noted for normal EF, grade 4 diastolic dysfunction, and now moderate pericardial effusion.   Cardiology Dr. Hernandez following. Cardiothoracic Surgery Dr. Knight following, no surgical intervention.         Problem/Plan - 1:  ·  Problem: Acute on chronic diastolic congestive heart failure.   ·  Plan: p/w dyspnea for 2 weeks, LE edema  dyspnea 2/2 fluid overload  Chest CT: b/l ground glass 2/2  pulm edema   BNP: 30 k. trop nl   echo noted for normal EF and grade 4 DD  s/p 80 mg IV lasix  c/w lasix 80 mg IV BID as per Dr. Calle  strict intake and out put, daily weights  pending cardiac cath  Cardiology Dr. Hernandez following.     Problem/Plan - 2:  ·  Problem: Acute pulmonary edema.   ·  Plan: Ct chest on 11/1 noted for Bilateral pleural effusions and lower lobe passive atelectasis  on lasix 80 mg bid IV  f/u repeat CXR  Pulm. Dr. Goodwin following.     Problem/Plan - 3:  ·  Problem: Acute pericardial effusion.   ·  Plan: echo noted for morderate pericardial effusion 2/2 ESRD  Cardiothoracic surgery following, no surgical intervention   f/u Cardiology Dr. Hernandez for repeat echo in 1 month.     Problem/Plan - 4:  ·  Problem: ESRD on hemodialysis.   ·  Plan: ESRD on HD T/Th/Sat at [place] follows up with Dr Calle   CHG bathing daily  f/u MRSA/MSSA pcr  monitor BMP and phos serum  Hemodialysis Renal Diet and Fluid restriction to 1L/day  Adjust meds to eGFR and avoid IV Gadolinium contrast, NSAIDs, and phosphate enema.     Problem/Plan - 5:  ·  Problem: HTN (hypertension).   ·  Plan: c/w home med labetalol 100mg TID and nifedipine 60mg BID   c/w hydralazine 25 mg tid, lasix 80 mg IV BID  BP goal <140/90.     Problem/Plan - 6:  ·  Problem: Asymptomatic bacteriuria.   ·  Plan: asymptomatic  will hold abx for now.     Problem/Plan - 7:  ·  Problem: Prophylactic measure.   ·  Plan: dvt - heparin sq.     53 y.o. female with PMH of HTN, HLD, ESRD (on dialysis for 1.7 years, T/Th/Sat), Anemia, Enlarged Thyroid - was admitted to [facility] on 11/1/23 c/o SOB, b/l lower extremities edema, dry cough started 2 weeks ago. The patient denies missing dialysis and claims that SOB was gradually getting worse. She admits to chest pain, aggravate when she is hypertensive, midsternal, 8/10, doesn't radiate, resolve when Blood pressure is normal. The patient was found to have negative Troponin, proBNP 99906.  The patient was admitted for new onset CHF, pulmonary edema, started on Lasix 80 mg IV BID with improvement in symptoms. Echo showed moderate pericardial effusion. [facility]. [facility], recommended no surgical intervention.     The patient was transferred to [facility] today for cardiac catheterization. She denies any complaints and claims that feels better since the admission.     CXR 11/8/23 Right dialysis catheter reidentified in situ. Heart size not   accurately evaluated on this projection. Improvement in congestive   changes. There may be mild residual vascular congestion. Decrease in   pleural effusions. Trace bibasilar effusions persist. No focal   consolidation or pneumothorax.    CT angiogram, chest   1.  Bilateral septal thickening and groundglass opacities can be   secondary to pulmonary edema.  2.  Bilateral pleural effusions and lower lobe passive atelectasis.  3.  Small pericardial effusion.    Echo 11/2/23   1. Normal mitral valve. Moderate to severe mitral  regurgitation.  2. Normal trileaflet aortic valve. Mild aortic  regurgitation.  3. Aortic Root: 3.3 cm.  4. Moderately dilated left atrium.  LA volume index = 46  cc/m2.  5. Normal left ventricular internal dimensions and wall  thicknesses.  6. Normal Left Ventricular Systolic Function,  (EF = 55 to  60%)  7. Grade IV diastolic dysfunction (severe with fixed  restrictive pattern).  8. Normal right atrium. Linear echodensity consistent with  a catheter is visualized in the right atrium.  9. Normal right ventricular size and systolic function  (TAPSE 2.6 cm).  10. RA Pressure is 8 mm Hg.  11. RV systolic pressure is moderately increased at  46 mm  Hg.  12. There is mild tricuspid regurgitation.  13. There is mild pulmonic regurgitation.  14. Moderate pericardial effusion, greatest diameter 1.5cm.  15. Bilateral pleural effusions.

## 2023-11-08 NOTE — PROGRESS NOTE ADULT - SUBJECTIVE AND OBJECTIVE BOX
Date of Service 11-08-23 @ 11:48    CHIEF COMPLAINT:Patient is a 53y old  Female who presents with a chief complaint of Dyspnea.Pt appears comfortable.    	  REVIEW OF SYSTEMS:  CONSTITUTIONAL: No fever, weight loss, or fatigue  EYES: No eye pain, visual disturbances, or discharge  ENT:  No difficulty hearing, tinnitus, vertigo; No sinus or throat pain  NECK: No pain or stiffness  RESPIRATORY: No cough, wheezing, chills or hemoptysis; No Shortness of Breath  CARDIOVASCULAR: No chest pain, palpitations, passing out, dizziness, or leg swelling  GASTROINTESTINAL: No abdominal or epigastric pain. No nausea, vomiting, or hematemesis; No diarrhea or constipation. No melena or hematochezia.  GENITOURINARY: No dysuria, frequency, hematuria, or incontinence  NEUROLOGICAL: No headaches, memory loss, loss of strength, numbness, or tremors  SKIN: No itching, burning, rashes, or lesions   LYMPH Nodes: No enlarged glands  ENDOCRINE: No heat or cold intolerance; No hair loss  MUSCULOSKELETAL: No joint pain or swelling; No muscle, back, or extremity pain  PSYCHIATRIC: No depression, anxiety, mood swings, or difficulty sleeping  HEME/LYMPH: No easy bruising, or bleeding gums  ALLERGY AND IMMUNOLOGIC: No hives or eczema	        PHYSICAL EXAM:  T(C): 36.6 (11-08-23 @ 11:24), Max: 36.9 (11-07-23 @ 17:14)  HR: 77 (11-08-23 @ 11:24) (74 - 83)  BP: 167/87 (11-08-23 @ 11:24) (141/74 - 182/96)  RR: 16 (11-08-23 @ 11:24) (16 - 18)  SpO2: 97% (11-08-23 @ 11:24) (95% - 98%)  Wt(kg): --  I&O's Summary    07 Nov 2023 07:01  -  08 Nov 2023 07:00  --------------------------------------------------------  IN: 0 mL / OUT: 300 mL / NET: -300 mL        Appearance: Normal	  HEENT:   Normal oral mucosa, PERRL, EOMI	  Lymphatic: No lymphadenopathy  Cardiovascular: Normal S1 S2, No JVD, No murmurs, No edema  Respiratory: Lungs clear to auscultation	  Psychiatry: A & O x 3, Mood & affect appropriate  Gastrointestinal:  Soft, Non-tender, + BS	  Skin: No rashes, No ecchymoses, No cyanosis	  Neurologic: Non-focal  Extremities: Normal range of motion, No clubbing, cyanosis or edema  Vascular: Peripheral pulses palpable 2+ bilaterally    MEDICATIONS  (STANDING):  benzonatate 100 milliGRAM(s) Oral every 8 hours  chlorhexidine 2% Cloths 1 Application(s) Topical <User Schedule>  fluticasone propionate 50 MICROgram(s)/spray Nasal Spray 1 Spray(s) Both Nostrils two times a day  furosemide   Injectable 80 milliGRAM(s) IV Push two times a day  heparin   Injectable 5000 Unit(s) SubCutaneous every 8 hours  hydrALAZINE 25 milliGRAM(s) Oral every 8 hours  influenza   Vaccine 0.5 milliLiter(s) IntraMuscular once  labetalol 300 milliGRAM(s) Oral every 8 hours  NIFEdipine XL 60 milliGRAM(s) Oral two times a day      LABS:	 	                      11.3   4.88  )-----------( 144      ( 08 Nov 2023 05:40 )             36.8     11-08    138  |  100  |  34<H>  ----------------------------<  98  4.3   |  29  |  7.44<H>    Ca    9.0      08 Nov 2023 05:40  Phos  6.6     11-07  Mg     2.3     11-07      TSH: Thyroid Stimulating Hormone, Serum: 0.42 uU/mL (11-03 @ 08:12)

## 2023-11-08 NOTE — PROGRESS NOTE ADULT - PROBLEM SELECTOR PROBLEM 3
Acute pericardial effusion

## 2023-11-08 NOTE — PROGRESS NOTE ADULT - PROBLEM SELECTOR PROBLEM 2
Acute pulmonary edema

## 2023-11-08 NOTE — PROGRESS NOTE ADULT - ASSESSMENT
52 y/o female with pmhx of HTN, ESRD on HD T/Th/Sat, presenting to the ED for 2 week shortness of breath and dry cough. Admitted for dyspnea 2/2 pulmonary edema. Chest CT: b/l ground glass opacity, and bilateral pleural effusions, also small pericardial effusion. Echo noted for normal EF, grade 4 diastolic dysfunction, and now moderate pericardial effusion.   Cardiology Dr. Hernandez following. Cardiothoracic Surgery Dr. Knight following, no surgical intervention.     Awaiting repeat CXR to re-eval pleural effusions. Pulm. Dr. Goodwin following.   Pt is also pending Cardiac Cath on 11/8/23, will need transfer to [facility].

## 2023-11-08 NOTE — PROGRESS NOTE ADULT - PROBLEM SELECTOR PLAN 4
ESRD on HD T/Th/Sat at [facility] follows up with Dr Alva HASSAN bathing daily  f/u MRSA/MSSA pcr  monitor BMP and phos serum  Hemodialysis Renal Diet and Fluid restriction to 1L/day  Adjust meds to eGFR and avoid IV Gadolinium contrast, NSAIDs, and phosphate enema

## 2023-11-08 NOTE — CONSULT NOTE ADULT - SUBJECTIVE AND OBJECTIVE BOX
[facility] [place] [facility]PHROLOGY PRACTICE   MD RAJENDRA GRANADOS MD ANGELA WONG, PA        TEL:  OFFICE: [phone]  From 5pm-7am answering service [phone]    --- INITIAL RENAL CONSULT NOTE ---date of service 23 @ 16:27    HPI:  53 y.o. female with PMH of HTN, HLD, ESRD (on dialysis for 1.7 years, T//Sat), Anemia, Enlarged Thyroid - was admitted to [facility] on 23 c/o SOB, b/l lower extremities edema, dry cough started 2 weeks ago. The patient denies missing dialysis and claims that SOB was gradually getting worse. She admits to chest pain, aggravate when she is hypertensive, midsternal, 8/10, doesn't radiate, resolve when Blood pressure is normal. The patient was found to have negative Troponin, proBNP 74897.  The patient was admitted for new onset CHF, pulmonary edema, started on Lasix 80 mg IV BID with improvement in symptoms. Echo showed moderate pericardial effusion. Cardiothoracic surgeon, from [facility], Dr. Knight, recommended no surgical intervention.     The patient was transferred to [facility] today for cardiac catheterization. seen post cath denied cp, sob currently  nephrologist Dr. Calle    Allergies:  No Known Allergies      PAST MEDICAL & SURGICAL HISTORY:  HTN (hypertension), benign      HLD (hyperlipidemia)      ESRD on dialysis      Anemia      Enlarged thyroid      H/O thrombocytopenia      H/O  section      H/O tubal ligation          Home Medications Reviewed    Hospital Medications:   MEDICATIONS  (STANDING):  chlorhexidine 2% Cloths 1 Application(s) Topical daily      SOCIAL HISTORY:  Denies ETOh, Smoking,     FAMILY HISTORY:  FH: HTN (hypertension) (Mother)        REVIEW OF SYSTEMS:  CONSTITUTIONAL: No weakness, fevers or chills  EYES/ENT: No visual changes;  No vertigo or throat pain   NECK: No pain or stiffness  RESPIRATORY: No cough, wheezing, hemoptysis; No shortness of breath  CARDIOVASCULAR: No chest pain or palpitations.  GASTROINTESTINAL: No abdominal or epigastric pain. No nausea, vomiting, or hematemesis; No diarrhea or constipation. No melena or hematochezia.  GENITOURINARY: No dysuria, frequency, foamy urine, urinary urgency, incontinence or hematuria  NEUROLOGICAL: No numbness or weakness  SKIN: No itching, burning, rashes, or lesions   VASCULAR: No bilateral lower extremity edema.   All other review of systems is negative unless indicated above.    VITALS:  T(F): 97.9 (23 @ 11:24), Max: 98.4 (23 @ 17:14)  HR: 77 (23 @ 11:24)  BP: 167/87 (23 @ 11:24)  RR: 16 (23 @ 11:24)  SpO2: 97% (23 @ 11:24)  Wt(kg): --        PHYSICAL EXAM:  General: NAD  HEENT: anicteric sclera, oropharynx clear, MMM  Neck: No JVD  Respiratory: CTAB, no wheezes, rales or rhonchi  Cardiovascular: S1, S2, RRR  Gastrointestinal: BS+, soft, NT/ND  Extremities: No cyanosis or clubbing. No peripheral edema  Neurological: A/O x 3, no focal deficits  Psychiatric: Normal mood, normal affect  : No CVA tenderness. No ellis.   Skin: No rashes  Vascular Access: right pc    LABS:      138  |  100  |  34<H>  ----------------------------<  98  4.3   |  29  |  7.44<H>    Ca    9.0      2023 05:40  Phos  6.6       Mg     2.3           Creatinine Trend: 7.44 <--, 12.20 <--, 9.90 <--, 9.71 <--, 7.30 <--, 9.62 <--, 8.21 <--                        11.3   4.88  )-----------( 144      ( 2023 05:40 )             36.8     Urine Studies:  Urinalysis Basic - ( 2023 05:40 )    Color:  / Appearance:  / SG:  / pH:   Gluc: 98 mg/dL / Ketone:   / Bili:  / Urobili:    Blood:  / Protein:  / Nitrite:    Leuk Esterase:  / RBC:  / WBC    Sq Epi:  / Non Sq Epi:  / Bacteria:           RADIOLOGY & ADDITIONAL STUDIES:

## 2023-11-09 PROCEDURE — 99223 1ST HOSP IP/OBS HIGH 75: CPT

## 2023-11-09 PROCEDURE — 99232 SBSQ HOSP IP/OBS MODERATE 35: CPT

## 2023-11-09 RX ORDER — ACETAMINOPHEN 500 MG
650 TABLET ORAL EVERY 6 HOURS
Refills: 0 | Status: DISCONTINUED | OUTPATIENT
Start: 2023-11-09 | End: 2023-11-11

## 2023-11-09 RX ORDER — HYDRALAZINE HCL 50 MG
75 TABLET ORAL THREE TIMES A DAY
Refills: 0 | Status: DISCONTINUED | OUTPATIENT
Start: 2023-11-09 | End: 2023-11-09

## 2023-11-09 RX ORDER — CHLORHEXIDINE GLUCONATE 213 G/1000ML
1 SOLUTION TOPICAL DAILY
Refills: 0 | Status: DISCONTINUED | OUTPATIENT
Start: 2023-11-09 | End: 2023-11-11

## 2023-11-09 RX ORDER — HYDRALAZINE HCL 50 MG
100 TABLET ORAL THREE TIMES A DAY
Refills: 0 | Status: DISCONTINUED | OUTPATIENT
Start: 2023-11-09 | End: 2023-11-11

## 2023-11-09 RX ADMIN — HEPARIN SODIUM 5000 UNIT(S): 5000 INJECTION INTRAVENOUS; SUBCUTANEOUS at 14:23

## 2023-11-09 RX ADMIN — Medication 300 MILLIGRAM(S): at 14:23

## 2023-11-09 RX ADMIN — Medication 300 MILLIGRAM(S): at 06:28

## 2023-11-09 RX ADMIN — Medication 100 MILLIGRAM(S): at 21:52

## 2023-11-09 RX ADMIN — Medication 80 MILLIGRAM(S): at 06:29

## 2023-11-09 RX ADMIN — HEPARIN SODIUM 5000 UNIT(S): 5000 INJECTION INTRAVENOUS; SUBCUTANEOUS at 06:29

## 2023-11-09 RX ADMIN — ATORVASTATIN CALCIUM 40 MILLIGRAM(S): 80 TABLET, FILM COATED ORAL at 21:53

## 2023-11-09 RX ADMIN — CHLORHEXIDINE GLUCONATE 1 APPLICATION(S): 213 SOLUTION TOPICAL at 11:14

## 2023-11-09 RX ADMIN — Medication 90 MILLIGRAM(S): at 06:28

## 2023-11-09 RX ADMIN — HEPARIN SODIUM 5000 UNIT(S): 5000 INJECTION INTRAVENOUS; SUBCUTANEOUS at 21:53

## 2023-11-09 RX ADMIN — Medication 81 MILLIGRAM(S): at 11:13

## 2023-11-09 RX ADMIN — Medication 50 MILLIGRAM(S): at 06:28

## 2023-11-09 RX ADMIN — Medication 300 MILLIGRAM(S): at 21:53

## 2023-11-09 RX ADMIN — Medication 75 MILLIGRAM(S): at 14:23

## 2023-11-09 NOTE — CONSULT NOTE ADULT - ASSESSMENT
CXR 11/8/2: mild residual vascular congestion. Decrease in pleural effusions. Trace bibasilar effusions persist. No focal consolidation or pneumothorax.    CT angiogram, chest   1.  Bilateral septal thickening and groundglass opacities can be secondary to pulmonary edema.  2.  Bilateral pleural effusions and lower lobe passive atelectasis.  3.  Small pericardial effusion.    Echo 11/2/23   1. Normal mitral valve. Moderate to severe mitral regurgitation.  2. Normal trileaflet aortic valve. Mild aortic regurgitation.  3. Aortic Root: 3.3 cm.  4. Moderately dilated left atrium.  LA volume index = 46 cc/m2.  5. Normal left ventricular internal dimensions and wall thicknesses.  6. Normal Left Ventricular Systolic Function,  (EF = 55 to 60%)  7. Grade IV diastolic dysfunction (severe with fixed restrictive pattern).  8. Normal right atrium. Linear echodensity consistent with a catheter is visualized in the right atrium.  9. Normal right ventricular size and systolic function (TAPSE 2.6 cm).  10. RA Pressure is 8 mm Hg.  11. RV systolic pressure is moderately increased at  46 mmHg.  12. There is mild tricuspid regurgitation.  13. There is mild pulmonic regurgitation.  14. Moderate pericardial effusion, greatest diameter 1.5cm.  15. Bilateral pleural effusions.     Telemetry: Sinus 70s    53F PMH HTN, HLD, ESRD (T/Th/Sat), Anemia, Enlarged Thyroid admitted with new-onset CHF in setting of moderate pericardial effusion without plan for drainage at this time. S/p LHC w/ non-obstructive CAD. During hospitalization was diuresed and continued dialysis for fluid removal. Antihypertensives uptitrated. Currently feeling well, will continue with uptitration of antihypertensives.     Recommendations:   - increase hydralazine to 100mg TID  - cont labetalol, nifedipine  - consider rheumatologic evaluation for new-onset pericardial effusion      All recommendations pending attending attestation. We will continue to follow with you.     Vanna Chao MD  PGY-4, Cardiology  Available on TEAMS    For all new consults  [email or web address]  Login: [email or web address]

## 2023-11-09 NOTE — CONSULT NOTE ADULT - ATTENDING COMMENTS
euvolemic on exam  feels otherwise well  given pericardial effusion and restrictive diastolic function - recommend outpt amyloid eval if not done so already

## 2023-11-09 NOTE — PROGRESS NOTE ADULT - ASSESSMENT
53 y.o. female with PMH of HTN, HLD, ESRD (on dialysis for 1.7 years, T/Th/Sat), Anemia, Enlarged Thyroid - was admitted to [facility] on 11/1/23 c/o SOB, b/l lower extremities edema, dry cough started 2 weeks ago. echo with new chf transfer to [facility] for cath and cardio work up    ESRD on HD (T/TH/SAT)  [facility]  Nephrologist Dr. Calle   s/p cath 11/8 s/p hd after  lab reviewed no need for hd today, will plan for hd tmr vs sat  consent in chart  renal diet  Adjust meds to eGFR and avoid IV Gadolinium contrast,NSAIDs, and phosphate enema.    HTN:   -bp better   increase hydralazine if needed    3. Anemia of ESRD:  at goal  -start epoen if hb <10    -F/u CBC daily  monitor    4. Mineral Bone Disease:  -continue phoslo tid  -monitor phos  check pth    5. Sob due to fluid overload:  -ECHO shows percardial effussion with mod MR.   -UF with HD.   f/u cardio  s/p cath

## 2023-11-09 NOTE — PATIENT PROFILE ADULT - DO YOU LACK THE NECESSARY SUPPORT TO HELP YOU COPE WITH LIFE CHALLENGES?
Vital Signs (24 Hrs):  T(C): 36.6 (11-27-20 @ 22:19), Max: 39.2 (11-27-20 @ 18:48)  HR: 90 (11-28-20 @ 02:30) (90 - 150)  BP: 112/70 (11-28-20 @ 02:30) (95/55 - 130/80)  RR: 20 (11-28-20 @ 02:30) (20 - 24)  SpO2: 95% (11-28-20 @ 02:30) (87% - 95%)      PHYSICAL EXAM:  GENERAL: NAD, lying in bed comfortably  HEAD:  Atraumatic, Normocephalic  EYES: EOMI, PERRLA, conjunctiva and sclera clear  ENT: Moist mucous membranes  NECK: Supple, No JVD  CHEST/LUNG: B/L CRACKLES , UNLABORED RESPIRATIONS WITH ABILITY TO TALK IN FULL SENTENCES  HEART: Regular rate and rhythm; No murmurs, rubs, or gallops  ABDOMEN: Bowel sounds present; Soft, Nontender, Nondistended. No hepatomegally  EXTREMITIES:  2+ Peripheral Pulses, brisk capillary refill. No clubbing, cyanosis, or edema  NERVOUS SYSTEM:  Alert & Oriented X3, speech clear. No deficits   MSK: FROM all 4 extremities, full and equal strength  SKIN: No rashes or lesions no

## 2023-11-09 NOTE — CONSULT NOTE ADULT - SUBJECTIVE AND OBJECTIVE BOX
CARDIOLOGY FELLOW CONSULT NOTE    HPI:  53F PMH HTN, HLD, ESRD (T//Sat), Anemia, Enlarged Thyroid - admitted to [facility] on 23 for SOB, b/l lower extremities edema, dry cough started 2 weeks ago. Found to have moderate pericardial effusion with diastolic dysfunction in restrictive pattern. Also reported chest pain and transferred for [facility] for ischemic evaluation which showed luminal irregularities but no focal obstruction.        PMHx:   HTN (hypertension), benign    HLD (hyperlipidemia)    ESRD on dialysis    Anemia    Enlarged thyroid    H/O thrombocytopenia        PSHx:   No significant past surgical history    H/O  section    H/O tubal ligation        Allergies:  No Known Allergies      Home Meds:  · 	labetalol 100 mg oral tablet: Last Dose Taken:  , 1 tab(s) orally 3 times a day  · 	NIFEdipine 60 mg oral tablet, extended release: Last Dose Taken:  , 1 tab(s) orally 2 times a day  · 	atorvastatin 40 mg oral tablet: Last Dose Taken:  , 1 tab(s) orally once a day (at bedtime)    Current Medications:   acetaminophen     Tablet .. 650 milliGRAM(s) Oral every 6 hours PRN  AQUAPHOR (petrolatum Ointment) 1 Application(s) Topical every 6 hours PRN  aspirin enteric coated 81 milliGRAM(s) Oral daily  atorvastatin 40 milliGRAM(s) Oral at bedtime  chlorhexidine 2% Cloths 1 Application(s) Topical daily  chlorhexidine 2% Cloths 1 Application(s) Topical daily  heparin   Injectable 5000 Unit(s) SubCutaneous every 8 hours  hydrALAZINE 100 milliGRAM(s) Oral three times a day  labetalol 300 milliGRAM(s) Oral three times a day  NIFEdipine XL 90 milliGRAM(s) Oral daily  sodium chloride 0.9% Bolus. 100 milliLiter(s) IV Bolus every 5 minutes PRN      FAMILY HISTORY:  FH: HTN (hypertension) (Mother)    Social History: Unchanged    REVIEW OF SYSTEMS:  CONSTITUTIONAL: No weakness, fevers or chills  EYES/ENT: No visual changes;  No dysphagia  NECK: No pain or stiffness  RESPIRATORY: No cough, wheezing, hemoptysis; No shortness of breath  CARDIOVASCULAR: No chest pain or palpitations; No lower extremity edema  GASTROINTESTINAL: No abdominal or epigastric pain. No nausea, vomiting, or hematemesis; No diarrhea or constipation. No melena or hematochezia.  BACK: No back pain  GENITOURINARY: No dysuria, frequency or hematuria  NEUROLOGICAL: No numbness or weakness  SKIN: No itching, burning, rashes, or lesions   All other review of systems is negative unless indicated above.    Physical Exam:  T(F): 98.2 (), Max: 98.2 ()  HR: 73 () (67 - 77)  BP: 151/91 () (151/91 - 172/100)  RR: 18 ()  SpO2: 97% ()  GEN: comfortable appearing, lying in bed in NAD  HEENT: NCAT, MMM  CV: Regular S1, S2, no m/r/g  RESP: CTAB  ABD: Soft, NTND, +BS  EXT: No LE edema, WWP, pulses palpable throughout  NEURO: No focal deficits, AOx3  SKIN:  No rashes    Labs: Personally reviewed                        11.3   4.88  )-----------( 144      ( 2023 05:40 )             36.8         138  |  100  |  34<H>  ----------------------------<  98  4.3   |  29  |  7.44<H>    Ca    9.0      2023 05:40  Phos  6.6       Mg     2.3                           Thyroid Stimulating Hormone, Serum: 0.42 uU/mL ( @ 08:12)

## 2023-11-09 NOTE — PATIENT PROFILE ADULT - FALL HARM RISK - HARM RISK INTERVENTIONS

## 2023-11-09 NOTE — PROGRESS NOTE ADULT - ASSESSMENT
53 y.o. female with PMH of HTN, HLD, ESRD (on dialysis for 1.7 years, T/Th/Sat), Anemia, Enlarged Thyroid - was admitted to [facility] on 11/1/23 c/o SOB, b/l lower extremities edema, dry cough started 2 weeks ago. echo with new chf transfer [facility] for cath and cardio work up    DIASTOLIC CHF  - acute - s/p cath - report noted -  dw Dr Everett this morning in person - suspect that acute dCHF d.t hypertensive crisis - now euvolemic on IV lsix - per cards no need for further diuresis and no need for maintenance once BP controlled - DC lasix and optimize BP - per cards repeat TTE in 1 month - no interventions for pericardial effusion - no objection to dc per per cards       ESRD on HD (T/TH/SAT)  [facility]  Nephrologist Dr. Calle   s/p cath 11/8 s/p hd after  consent in chart  renal diet  Adjust meds to eGFR and avoid IV Gadolinium contrast, NSAIDs and phosphate enema.  HD per renal     HTN:   -inc hydralazine to 75 mg TID for better BP control - cw labetalol and nifedipine       3. Anemia of ESRD:  at goal  -start epoen if hb <10  per renal   -F/u CBC daily  monitor    4. Mineral Bone Disease:  -continue phoslo tid  -monitor phos  check pth       53 y.o. female with PMH of HTN, HLD, ESRD (on dialysis for 1.7 years, T/Th/Sat), Anemia, Enlarged Thyroid - was admitted to [facility] on 11/1/23 c/o SOB, b/l lower extremities edema, dry cough started 2 weeks ago. echo with new chf transfer to [facility] for cath and cardio work up    DIASTOLIC CHF  - acute - s/p cath - report noted -  dw Dr Everett this morning in person - suspect that acute dCHF d.t hypertensive crisis - now euvolemic on IV lsix - per cards no need for further diuresis and no need for maintenance once BP controlled - DC lasix and optimize BP - per cards repeat TTE in 1 month - no interventions for pericardial effusion - no objection to dc per per cards       ESRD on HD (T/TH/SAT)  [facility]  Nephrologist Dr. Calle   s/p cath 11/8 s/p hd after  consent in chart  renal diet  Adjust meds to eGFR and avoid IV Gadolinium contrast, NSAIDs and phosphate enema.  HD per renal     HTN:   -inc hydralazine to 75 mg TID for better BP control - cw labetalol and nifedipine       3. Anemia of ESRD:  at goal  -start epoen if hb <10  per renal   -F/u CBC daily  monitor    4. Mineral Bone Disease:  -continue phoslo tid  -monitor phos  check pth      dispo - will keep today to monitor BP considering elevated readings w hypertensive cardiomyopathy - dc once BP better controlled

## 2023-11-10 ENCOUNTER — TRANSCRIPTION ENCOUNTER (OUTPATIENT)
Age: 53
End: 2023-11-10

## 2023-11-10 LAB
ANION GAP SERPL CALC-SCNC: 21 MMOL/L — HIGH (ref 7–14)
ANION GAP SERPL CALC-SCNC: 21 MMOL/L — HIGH (ref 7–14)
BASOPHILS # BLD AUTO: 0.03 K/UL — SIGNIFICANT CHANGE UP (ref 0–0.2)
BASOPHILS # BLD AUTO: 0.03 K/UL — SIGNIFICANT CHANGE UP (ref 0–0.2)
BASOPHILS NFR BLD AUTO: 0.6 % — SIGNIFICANT CHANGE UP (ref 0–2)
BASOPHILS NFR BLD AUTO: 0.6 % — SIGNIFICANT CHANGE UP (ref 0–2)
BUN SERPL-MCNC: 52 MG/DL — HIGH (ref 7–23)
BUN SERPL-MCNC: 52 MG/DL — HIGH (ref 7–23)
CALCIUM SERPL-MCNC: 9 MG/DL — SIGNIFICANT CHANGE UP (ref 8.4–10.5)
CALCIUM SERPL-MCNC: 9 MG/DL — SIGNIFICANT CHANGE UP (ref 8.4–10.5)
CHLORIDE SERPL-SCNC: 96 MMOL/L — LOW (ref 98–107)
CHLORIDE SERPL-SCNC: 96 MMOL/L — LOW (ref 98–107)
CHOLEST SERPL-MCNC: 173 MG/DL — SIGNIFICANT CHANGE UP
CHOLEST SERPL-MCNC: 173 MG/DL — SIGNIFICANT CHANGE UP
CO2 SERPL-SCNC: 20 MMOL/L — LOW (ref 22–31)
CO2 SERPL-SCNC: 20 MMOL/L — LOW (ref 22–31)
CREAT SERPL-MCNC: 8.51 MG/DL — HIGH (ref 0.5–1.3)
CREAT SERPL-MCNC: 8.51 MG/DL — HIGH (ref 0.5–1.3)
EGFR: 5 ML/MIN/1.73M2 — LOW
EGFR: 5 ML/MIN/1.73M2 — LOW
EOSINOPHIL # BLD AUTO: 0.37 K/UL — SIGNIFICANT CHANGE UP (ref 0–0.5)
EOSINOPHIL # BLD AUTO: 0.37 K/UL — SIGNIFICANT CHANGE UP (ref 0–0.5)
EOSINOPHIL NFR BLD AUTO: 8 % — HIGH (ref 0–6)
EOSINOPHIL NFR BLD AUTO: 8 % — HIGH (ref 0–6)
GLUCOSE BLDC GLUCOMTR-MCNC: 102 MG/DL — HIGH (ref 70–99)
GLUCOSE BLDC GLUCOMTR-MCNC: 102 MG/DL — HIGH (ref 70–99)
GLUCOSE BLDC GLUCOMTR-MCNC: 105 MG/DL — HIGH (ref 70–99)
GLUCOSE BLDC GLUCOMTR-MCNC: 105 MG/DL — HIGH (ref 70–99)
GLUCOSE SERPL-MCNC: 78 MG/DL — SIGNIFICANT CHANGE UP (ref 70–99)
GLUCOSE SERPL-MCNC: 78 MG/DL — SIGNIFICANT CHANGE UP (ref 70–99)
HCT VFR BLD CALC: 36.5 % — SIGNIFICANT CHANGE UP (ref 34.5–45)
HCT VFR BLD CALC: 36.5 % — SIGNIFICANT CHANGE UP (ref 34.5–45)
HDLC SERPL-MCNC: 46 MG/DL — LOW
HDLC SERPL-MCNC: 46 MG/DL — LOW
HGB BLD-MCNC: 11.3 G/DL — LOW (ref 11.5–15.5)
HGB BLD-MCNC: 11.3 G/DL — LOW (ref 11.5–15.5)
IANC: 2.55 K/UL — SIGNIFICANT CHANGE UP (ref 1.8–7.4)
IANC: 2.55 K/UL — SIGNIFICANT CHANGE UP (ref 1.8–7.4)
IMM GRANULOCYTES NFR BLD AUTO: 0.9 % — SIGNIFICANT CHANGE UP (ref 0–0.9)
IMM GRANULOCYTES NFR BLD AUTO: 0.9 % — SIGNIFICANT CHANGE UP (ref 0–0.9)
LIPID PNL WITH DIRECT LDL SERPL: 112 MG/DL — HIGH
LIPID PNL WITH DIRECT LDL SERPL: 112 MG/DL — HIGH
LYMPHOCYTES # BLD AUTO: 1.08 K/UL — SIGNIFICANT CHANGE UP (ref 1–3.3)
LYMPHOCYTES # BLD AUTO: 1.08 K/UL — SIGNIFICANT CHANGE UP (ref 1–3.3)
LYMPHOCYTES # BLD AUTO: 23.4 % — SIGNIFICANT CHANGE UP (ref 13–44)
LYMPHOCYTES # BLD AUTO: 23.4 % — SIGNIFICANT CHANGE UP (ref 13–44)
MAGNESIUM SERPL-MCNC: 2.2 MG/DL — SIGNIFICANT CHANGE UP (ref 1.6–2.6)
MAGNESIUM SERPL-MCNC: 2.2 MG/DL — SIGNIFICANT CHANGE UP (ref 1.6–2.6)
MCHC RBC-ENTMCNC: 28.9 PG — SIGNIFICANT CHANGE UP (ref 27–34)
MCHC RBC-ENTMCNC: 28.9 PG — SIGNIFICANT CHANGE UP (ref 27–34)
MCHC RBC-ENTMCNC: 31 GM/DL — LOW (ref 32–36)
MCHC RBC-ENTMCNC: 31 GM/DL — LOW (ref 32–36)
MCV RBC AUTO: 93.4 FL — SIGNIFICANT CHANGE UP (ref 80–100)
MCV RBC AUTO: 93.4 FL — SIGNIFICANT CHANGE UP (ref 80–100)
MONOCYTES # BLD AUTO: 0.55 K/UL — SIGNIFICANT CHANGE UP (ref 0–0.9)
MONOCYTES # BLD AUTO: 0.55 K/UL — SIGNIFICANT CHANGE UP (ref 0–0.9)
MONOCYTES NFR BLD AUTO: 11.9 % — SIGNIFICANT CHANGE UP (ref 2–14)
MONOCYTES NFR BLD AUTO: 11.9 % — SIGNIFICANT CHANGE UP (ref 2–14)
NEUTROPHILS # BLD AUTO: 2.55 K/UL — SIGNIFICANT CHANGE UP (ref 1.8–7.4)
NEUTROPHILS # BLD AUTO: 2.55 K/UL — SIGNIFICANT CHANGE UP (ref 1.8–7.4)
NEUTROPHILS NFR BLD AUTO: 55.2 % — SIGNIFICANT CHANGE UP (ref 43–77)
NEUTROPHILS NFR BLD AUTO: 55.2 % — SIGNIFICANT CHANGE UP (ref 43–77)
NON HDL CHOLESTEROL: 127 MG/DL — SIGNIFICANT CHANGE UP
NON HDL CHOLESTEROL: 127 MG/DL — SIGNIFICANT CHANGE UP
NRBC # BLD: 0 /100 WBCS — SIGNIFICANT CHANGE UP (ref 0–0)
NRBC # BLD: 0 /100 WBCS — SIGNIFICANT CHANGE UP (ref 0–0)
NRBC # FLD: 0 K/UL — SIGNIFICANT CHANGE UP (ref 0–0)
NRBC # FLD: 0 K/UL — SIGNIFICANT CHANGE UP (ref 0–0)
PHOSPHATE SERPL-MCNC: 6.8 MG/DL — HIGH (ref 2.5–4.5)
PHOSPHATE SERPL-MCNC: 6.8 MG/DL — HIGH (ref 2.5–4.5)
PLATELET # BLD AUTO: 129 K/UL — LOW (ref 150–400)
PLATELET # BLD AUTO: 129 K/UL — LOW (ref 150–400)
POTASSIUM SERPL-MCNC: 4.8 MMOL/L — SIGNIFICANT CHANGE UP (ref 3.5–5.3)
POTASSIUM SERPL-MCNC: 4.8 MMOL/L — SIGNIFICANT CHANGE UP (ref 3.5–5.3)
POTASSIUM SERPL-SCNC: 4.8 MMOL/L — SIGNIFICANT CHANGE UP (ref 3.5–5.3)
POTASSIUM SERPL-SCNC: 4.8 MMOL/L — SIGNIFICANT CHANGE UP (ref 3.5–5.3)
RBC # BLD: 3.91 M/UL — SIGNIFICANT CHANGE UP (ref 3.8–5.2)
RBC # BLD: 3.91 M/UL — SIGNIFICANT CHANGE UP (ref 3.8–5.2)
RBC # FLD: 17.2 % — HIGH (ref 10.3–14.5)
RBC # FLD: 17.2 % — HIGH (ref 10.3–14.5)
SODIUM SERPL-SCNC: 137 MMOL/L — SIGNIFICANT CHANGE UP (ref 135–145)
SODIUM SERPL-SCNC: 137 MMOL/L — SIGNIFICANT CHANGE UP (ref 135–145)
TRIGL SERPL-MCNC: 74 MG/DL — SIGNIFICANT CHANGE UP
TRIGL SERPL-MCNC: 74 MG/DL — SIGNIFICANT CHANGE UP
WBC # BLD: 4.62 K/UL — SIGNIFICANT CHANGE UP (ref 3.8–10.5)
WBC # BLD: 4.62 K/UL — SIGNIFICANT CHANGE UP (ref 3.8–10.5)
WBC # FLD AUTO: 4.62 K/UL — SIGNIFICANT CHANGE UP (ref 3.8–10.5)
WBC # FLD AUTO: 4.62 K/UL — SIGNIFICANT CHANGE UP (ref 3.8–10.5)

## 2023-11-10 PROCEDURE — 99232 SBSQ HOSP IP/OBS MODERATE 35: CPT

## 2023-11-10 PROCEDURE — 99222 1ST HOSP IP/OBS MODERATE 55: CPT

## 2023-11-10 PROCEDURE — 93306 TTE W/DOPPLER COMPLETE: CPT | Mod: 26

## 2023-11-10 RX ORDER — CALCIUM ACETATE 667 MG
667 TABLET ORAL
Refills: 0 | Status: DISCONTINUED | OUTPATIENT
Start: 2023-11-10 | End: 2023-11-11

## 2023-11-10 RX ORDER — FUROSEMIDE 40 MG
40 TABLET ORAL
Refills: 0 | Status: DISCONTINUED | OUTPATIENT
Start: 2023-11-10 | End: 2023-11-11

## 2023-11-10 RX ADMIN — Medication 100 MILLIGRAM(S): at 06:39

## 2023-11-10 RX ADMIN — Medication 300 MILLIGRAM(S): at 14:04

## 2023-11-10 RX ADMIN — Medication 667 MILLIGRAM(S): at 14:03

## 2023-11-10 RX ADMIN — Medication 300 MILLIGRAM(S): at 21:44

## 2023-11-10 RX ADMIN — Medication 300 MILLIGRAM(S): at 06:39

## 2023-11-10 RX ADMIN — Medication 667 MILLIGRAM(S): at 18:24

## 2023-11-10 RX ADMIN — Medication 100 MILLIGRAM(S): at 14:04

## 2023-11-10 RX ADMIN — Medication 100 MILLIGRAM(S): at 21:43

## 2023-11-10 RX ADMIN — ATORVASTATIN CALCIUM 40 MILLIGRAM(S): 80 TABLET, FILM COATED ORAL at 21:44

## 2023-11-10 RX ADMIN — Medication 81 MILLIGRAM(S): at 14:03

## 2023-11-10 RX ADMIN — Medication 90 MILLIGRAM(S): at 06:40

## 2023-11-10 RX ADMIN — HEPARIN SODIUM 5000 UNIT(S): 5000 INJECTION INTRAVENOUS; SUBCUTANEOUS at 21:44

## 2023-11-10 RX ADMIN — HEPARIN SODIUM 5000 UNIT(S): 5000 INJECTION INTRAVENOUS; SUBCUTANEOUS at 06:40

## 2023-11-10 RX ADMIN — CHLORHEXIDINE GLUCONATE 1 APPLICATION(S): 213 SOLUTION TOPICAL at 14:03

## 2023-11-10 RX ADMIN — Medication 40 MILLIGRAM(S): at 18:24

## 2023-11-10 RX ADMIN — HEPARIN SODIUM 5000 UNIT(S): 5000 INJECTION INTRAVENOUS; SUBCUTANEOUS at 14:04

## 2023-11-10 NOTE — PROGRESS NOTE ADULT - ASSESSMENT
53 y.o. female with PMH of HTN, HLD, ESRD (on dialysis for 1.7 years, T/Th/Sat), Anemia, Enlarged Thyroid - was admitted to [facility] on 11/1/23 c/o SOB, b/l lower extremities edema, dry cough started 2 weeks ago. echo with new chf transfer to [facility] for cath and cardio work up    ESRD on HD (T/TH/SAT)  [facility]  Nephrologist Dr. Calle   s/p cath 11/8 s/p hd after  lab reviewed no need for hd today, will plan to resume TTS schedule. hd tmr  consent in chart  renal diet  Adjust meds to eGFR and avoid IV Gadolinium contrast,NSAIDs, and phosphate enema.    HTN:   -bp better  continue current meds    3. Anemia of ESRD:  at goal  -start epoen if hb <10    -F/u CBC daily  monitor    4. Mineral Bone Disease:  -continue phoslo tid  -monitor phos  check pth    5. Sob due to fluid overload:  -ECHO shows percardial effussion with mod MR.   -UF with HD.   f/u cardio  s/p cath

## 2023-11-10 NOTE — CONSULT NOTE ADULT - ASSESSMENT
53F with HTN, hypertensive nephropathy on HD, admitted for fluid overload and moderate pericardial effusion. Recent sick exposure, past 2 weeks with cough. Most likely post-viral pericarditis versus uremic pericarditis. Low suspicion for autoimmune disease    Plan  can check MAGDALENA, RF, ANCA  follow up as outpatient  signing off    Dr. Suzette Nelson  Rheumatology Attending  [phone]  [email or web address]

## 2023-11-10 NOTE — DISCHARGE NOTE PROVIDER - HOSPITAL COURSE
53 y.o. female with PMH of HTN, HLD, ESRD (on dialysis for 1.7 years, T/Th/Sat), Anemia, Enlarged Thyroid - was admitted to [facility] on 11/1/23 c/o SOB, b/l lower extremities edema, dry cough started 2 weeks ago. echo with new chf transfer to [facility] for cath and cardio work up  DIASTOLIC CHF - acute - s/p cath - report wo sig occlusion or elevated pressures, TTE w diastolic CHF and moderate pericardial effusion, also noted elevated BP to 180s - cards evaluated: suspect acute dCHF d.t hypertensive crisis - now euvolemic s/p IV lasix - per cards no need for further inpt work up, out-pt amyloid work up - cards to clarify maintenance diuretic - repeat TTE stable effusion - per cards prudent to have rheum work up - rheum consulted     53 y.o. female with PMH of HTN, HLD, ESRD (on dialysis for 1.7 years, T/Th/Sat), Anemia, Enlarged Thyroid - was admitted to [facility] on 11/1/23 c/o SOB, b/l lower extremities edema, dry cough started 2 weeks ago. echo with new chf [facility] work up  DIASTOLIC CHF - acute - s/p cath - report wo sig occlusion or elevated pressures, TTE w diastolic CHF and moderate pericardial effusion, also noted elevated BP to 180s - cards evaluated: suspect acute dCHF d.t hypertensive crisis - now euvolemic s/p IV lasix - per cards no need for further inpt work up, out-pt amyloid work up - cards to clarify maintenance diuretic - repeat TTE stable effusion - per cards prudent to have rheum work up - rheum consulted, serologies sent - and to f.u as out-pt

## 2023-11-10 NOTE — PROGRESS NOTE ADULT - ATTENDING SUPERVISION STATEMENT
RENEA Richard [facility] Gi Nurse Msg Pool             Scheduled Colonoscopy on 3/09/2021 for Diarrhea.  COVID TEST ON 3/07/2021. SUPREP Instructions sent VIA [employer] 2/16/2021/Ashley JIMENES      Covid test ordered.    Suprep sent to preferred pharmacy.    Fellow

## 2023-11-10 NOTE — DISCHARGE NOTE PROVIDER - PROVIDER TOKENS
PROVIDER:[TOKEN:[[id number]]],PROVIDER:[TOKEN:[[id number]]],FREE:[LAST:[Dr.Ravi Calle],PHONE:[phone]],FAX:[(   )    -],ADDRESS:[Nephrologist  [address] [address]]],FREE:[LAST:[Dr. Carlos Enrique Yarbrough],PHONE:[phone]],FAX:[(   )    -],ADDRESS:[Cardiology  [address] [place] [address] [place] [address]]],FREE:[LAST:[Dr. Dong Hamilton],PHONE:[phone]],FAX:[(   )    -],ADDRESS:[Family Physician  [facility] [place] [address]]]

## 2023-11-10 NOTE — DISCHARGE NOTE PROVIDER - CARE PROVIDER_API CALL
Suzette Nelson  Rheumatology  [address], [address]  [place] [address]  Phone: [phone]  Fax: [phone]  Follow Up Time:     Sarahy Knight  Thoracic Surgery  [address] [place] [address]  Phone: [phone]  Fax: [phone]  Follow Up Time:     Dr.Ravi Calle,   Nephrologist  [address] [place] [address]  Phone: [phone]  Fax: (   )    -  Follow Up Time:     Dr. Carlos Enrique Yarbrough,   Cardiology  [address]  [address] of [place] [address]  Phone: [phone]  Fax: (   )    -  Follow Up Time:     Dr. Dong Hamilton,   Family Physician  [facility]  [address] [place] [address]  Phone: [phone]  Fax: (   )    -  Follow Up Time:

## 2023-11-10 NOTE — DISCHARGE NOTE PROVIDER - CARE PROVIDERS DIRECT ADDRESSES
[email or web address],DirectAddress_Unknown,DirectAddress_Unknown,DirectAddress_Unknown

## 2023-11-10 NOTE — DISCHARGE NOTE PROVIDER - NSDCFUADDAPPT_GEN_ALL_CORE_FT
Follow up with your Primary Care Doctor, Cardiologist, Nephrologist, Rheumatologist and Thoracic Surgeon.    Repeat Echocardiogram in 2 weeks to monitor Pericardial Effusion.    Continue the Dialysis as previous prescribed at the [facility].

## 2023-11-10 NOTE — DISCHARGE NOTE PROVIDER - NSDCMRMEDTOKEN_GEN_ALL_CORE_FT
atorvastatin 40 mg oral tablet: 1 tab(s) orally once a day (at bedtime)  labetalol 100 mg oral tablet: 1 tab(s) orally 3 times a day  NIFEdipine 60 mg oral tablet, extended release: 1 tab(s) orally 2 times a day   aspirin 81 mg oral delayed release tablet: 1 tab(s) orally once a day  atorvastatin 40 mg oral tablet: 1 tab(s) orally once a day (at bedtime)  calcium acetate 667 mg oral tablet: 1 tab(s) orally 3 times a day (with meals)  furosemide 40 mg oral tablet: 1 tab(s) orally 2 times a day  hydrALAZINE 100 mg oral tablet: 1 tab(s) orally 3 times a day  labetalol 300 mg oral tablet: 1 tab(s) orally 3 times a day  NIFEdipine 90 mg oral tablet, extended release: 1 tab(s) orally once a day

## 2023-11-10 NOTE — PROGRESS NOTE ADULT - ASSESSMENT
53 y.o. female with PMH of HTN, HLD, ESRD (on dialysis for 1.7 years, T/Th/Sat), Anemia, Enlarged Thyroid - was admitted to [facility] on 11/1/23 c/o SOB, b/l lower extremities edema, dry cough started 2 weeks ago. echo with new chf transfer to [facility] for cath and cardio work up    DIASTOLIC CHF  - acute - s/p cath - report noted -  dw cards today - suspect that acute dCHF d.t hypertensive crisis - now euvolemic s/p IV lasix - per cards no need for further inpt work up, out-pt amyloid work up - cards to clarify maintenance diuretic - repeat TTE stable effusion - per cards prudent to have rheum work up - will call to see if inpt work up warranted per rheum     ESRD on HD (T/TH/SAT)  [facility]  Nephrologist Dr. Calle   s/p cath 11/8 s/p hd after  consent in chart  renal diet  Adjust meds to eGFR and avoid IV Gadolinium contrast, NSAIDs and phosphate enema.  HD per renal     HTN:   -inc hydralazine to 100 mg TID for better BP control - cw labetalol and nifedipine       3. Anemia of ESRD:  at goal  -start epoen if hb <10  per renal   -F/u CBC daily  monitor    4. Mineral Bone Disease:  -continue phoslo tid  -monitor phos  check pth      dispo - pending rheum input

## 2023-11-10 NOTE — CONSULT NOTE ADULT - SUBJECTIVE AND OBJECTIVE BOX
HISTORY OF PRESENT ILLNESS:     54 y/o female with pmhx of HTN, ESRD on HD presenting to the ED for 2 week shortness of breath and dry cough. Patient states she developed shortness of breath at rest associated with dry cough that is severe, comes in bouts causing chest tightness. She took some over the counter cough medication that did not help. She endorses dysuria for the past 3 days. She denies any fevers, sore throat, chest pian, palpitations, abdominal pain, N/V/D. No sick contacts or recent travel. She notes increase in her lower extremity.     PAST MEDICAL & SURGICAL HISTORY:  HTN (hypertension), benign  HLD (hyperlipidemia)  ESRD on dialysis  Anemia  Enlarged thyroid  H/O thrombocytopenia  H/O  section  H/O tubal ligation    MEDICATIONS  (STANDING):  aspirin enteric coated 81 milliGRAM(s) Oral daily  atorvastatin 40 milliGRAM(s) Oral at bedtime  calcium acetate 667 milliGRAM(s) Oral three times a day with meals  chlorhexidine 2% Cloths 1 Application(s) Topical daily  chlorhexidine 2% Cloths 1 Application(s) Topical daily  heparin   Injectable 5000 Unit(s) SubCutaneous every 8 hours  hydrALAZINE 100 milliGRAM(s) Oral three times a day  labetalol 300 milliGRAM(s) Oral three times a day  NIFEdipine XL 90 milliGRAM(s) Oral daily    MEDICATIONS  (PRN):  acetaminophen     Tablet .. 650 milliGRAM(s) Oral every 6 hours PRN Temp greater or equal to 38C (100.4F), Mild Pain (1 - 3), Moderate Pain (4 - 6)  AQUAPHOR (petrolatum Ointment) 1 Application(s) Topical every 6 hours PRN itching  sodium chloride 0.9% Bolus. 100 milliLiter(s) IV Bolus every 5 minutes PRN SBP LESS THAN or EQUAL to 80 mmHg      Allergies    No Known Allergies    Intolerances        PERTINENT MEDICATION HISTORY:    SOCIAL HISTORY:    FAMILY HISTORY:  FH: HTN (hypertension) (Mother)        Vital Signs Last 24 Hrs  T(C): 37 (10 Nov 2023 13:53), Max: 37 (10 Nov 2023 13:53)  T(F): 98.6 (10 Nov 2023 13:53), Max: 98.6 (10 Nov 2023 13:53)  HR: 85 (10 Nov 2023 13:53) (73 - 85)  BP: 137/75 (10 Nov 2023 13:53) (130/76 - 137/75)  BP(mean): 96 (10 Nov 2023 06:44) (96 - 96)  RR: 18 (10 Nov 2023 13:53) (18 - 18)  SpO2: 97% (10 Nov 2023 13:53) (96% - 100%)      PHYSICAL EXAM:  Constitutional:  HEENT:  Pulmonary:  Cardio:  Abdomen:  Extremities:  Musculoskeletal:  Skin:  Neurological:  Psychiatric:    LABS:                        11.3   4.62  )-----------( 129      ( 10 Nov 2023 05:52 )             36.5     11-10    137  |  96<L>  |  52<H>  ----------------------------<  78  4.8   |  20<L>  |  8.51<H>    Ca    9.0      10 Nov 2023 05:52  Phos  6.8     11-10  Mg     2.20     11-10        Urinalysis Basic - ( 10 Nov 2023 05:52 )    Color: x / Appearance: x / SG: x / pH: x  Gluc: 78 mg/dL / Ketone: x  / Bili: x / Urobili: x   Blood: x / Protein: x / Nitrite: x   Leuk Esterase: x / RBC: x / WBC x   Sq Epi: x / Non Sq Epi: x / Bacteria: x    Respiratory Viral Panel with COVID-19 by NOAH (23 @ 06:27)   Rapid RVP Result: [facility]  SARS-CoV-2: [facility]: This Respiratory Panel uses polymerase chain reaction (PCR) to detect for   adenovirus; coronavirus (HKU1, NL63, 229E, OC43); human metapneumovirus   (hMPV); human enterovirus/rhinovirus (Entero/RV); influenza A; influenza   A/H1; influenza A/H3; influenza A/H1-2009; influenza B; parainfluenza   viruses 1, 2, 3, 4; respiratory syncytial virus; Mycoplasma pneumoniae;   Chlamydophila pneumoniae; and SARS-CoV-2.    RADIOLOGY & ADDITIONAL STUDIES:  < from: Transthoracic Echocardiogram (23 @ 07:08) >  CONCLUSIONS:  1. Normal mitral valve. Moderate to severe mitral  regurgitation.  2. Normal trileaflet aortic valve. Mild aortic  regurgitation.  3. Aortic Root: 3.3 cm.  4. Moderately dilated left atrium.  LA volume index = 46  cc/m2.  5. Normal left ventricular internal dimensions and wall  thicknesses.  6. Normal Left Ventricular Systolic Function,  (EF = 55 to  60%)  7. Grade IV diastolic dysfunction (severe with fixed  restrictive pattern).  8. Normal right atrium. Linear echodensity consistent with  a catheter is visualized in the right atrium.  9. Normal right ventricular size and systolic function  (TAPSE 2.6 cm).  10. RA Pressure is8 mm Hg.  11. RV systolic pressure is moderately increased at  46 mm  Hg.  12. There is mild tricuspid regurgitation.  13. There is mild pulmonic regurgitation.  14. Moderate pericardial effusion,greatest diameter 1.5cm.  15. Bilateral pleural effusions.    < end of copied text >   HISTORY OF PRESENT ILLNESS:     54 y/o female with pmhx of HTN, ESRD on HD presenting to the ED for 2 week shortness of breath and dry cough. Patient states she developed shortness of breath at rest associated with dry cough that is severe, comes in bouts causing chest tightness. She took some over the counter cough medication that did not help. Her daughter and grandaughter were sick with a cold recently,. Pt denies any fevers, chills, runny nose sore throat.     With high blood pressure past 11 years, since having preeclampsia with her 6th child. Past 5 years with renal disease, attributed to her hypertension. On HD past 17 months. Never had renal biopsy.    No rash, joint pain, oral or nasal ulcers, skin tightening/thickening, gerd, chest pain, SOB.      PAST MEDICAL & SURGICAL HISTORY:  HTN (hypertension), benign  HLD (hyperlipidemia)  ESRD on dialysis  Anemia  Enlarged thyroid  H/O thrombocytopenia  H/O  section  H/O tubal ligation    MEDICATIONS  (STANDING):  aspirin enteric coated 81 milliGRAM(s) Oral daily  atorvastatin 40 milliGRAM(s) Oral at bedtime  calcium acetate 667 milliGRAM(s) Oral three times a day with meals  chlorhexidine 2% Cloths 1 Application(s) Topical daily  chlorhexidine 2% Cloths 1 Application(s) Topical daily  heparin   Injectable 5000 Unit(s) SubCutaneous every 8 hours  hydrALAZINE 100 milliGRAM(s) Oral three times a day  labetalol 300 milliGRAM(s) Oral three times a day  NIFEdipine XL 90 milliGRAM(s) Oral daily    MEDICATIONS  (PRN):  acetaminophen     Tablet .. 650 milliGRAM(s) Oral every 6 hours PRN Temp greater or equal to 38C (100.4F), Mild Pain (1 - 3), Moderate Pain (4 - 6)  AQUAPHOR (petrolatum Ointment) 1 Application(s) Topical every 6 hours PRN itching  sodium chloride 0.9% Bolus. 100 milliLiter(s) IV Bolus every 5 minutes PRN SBP LESS THAN or EQUAL to 80 mmHg      Allergies  No Known Allergies      FAMILY HISTORY:  FH: HTN (hypertension) (Mother)  no autoimmune diesase        Vital Signs Last 24 Hrs  T(C): 37 (10 Nov 2023 13:53), Max: 37 (10 Nov 2023 13:53)  T(F): 98.6 (10 Nov 2023 13:53), Max: 98.6 (10 Nov 2023 13:53)  HR: 85 (10 Nov 2023 13:53) (73 - 85)  BP: 137/75 (10 Nov 2023 13:53) (130/76 - 137/75)  BP(mean): 96 (10 Nov 2023 06:44) (96 - 96)  RR: 18 (10 Nov 2023 13:53) (18 - 18)  SpO2: 97% (10 Nov 2023 13:53) (96% - 100%)      PHYSICAL EXAM:  Constitutional: nad  HEENT: no ulcers  Pulmonary: cta b/l  Cardio: rrr s1 s2 no g m r  Abdomen: soft nt nd  Extremities: no edema  Musculoskeletal: n osynovitis  Skin: no rash  Psychiatric: aaox3    LABS:                        11.3   4.62  )-----------( 129      ( 10 Nov 2023 05:52 )             36.5     11-10    137  |  96<L>  |  52<H>  ----------------------------<  78  4.8   |  20<L>  |  8.51<H>    Ca    9.0      10 Nov 2023 05:52  Phos  6.8     11-10  Mg     2.20     11-10        Urinalysis Basic - ( 10 Nov 2023 05:52 )    Color: x / Appearance: x / SG: x / pH: x  Gluc: 78 mg/dL / Ketone: x  / Bili: x / Urobili: x   Blood: x / Protein: x / Nitrite: x   Leuk Esterase: x / RBC: x / WBC x   Sq Epi: x / Non Sq Epi: x / Bacteria: x    Respiratory Viral Panel with COVID-19 by NOAH (23 @ 06:27)   Rapid RVP Result: [facility]  SARS-CoV-2: [facility]: This Respiratory Panel uses polymerase chain reaction (PCR) to detect for   adenovirus; coronavirus (HKU1, NL63, 229E, OC43); human metapneumovirus   (hMPV); human enterovirus/rhinovirus (Entero/RV); influenza A; influenza   A/H1; influenza A/H3; influenza A/H1-2009; influenza B; parainfluenza   viruses 1, 2, 3, 4; respiratory syncytial virus; Mycoplasma pneumoniae;   Chlamydophila pneumoniae; and SARS-CoV-2.    RADIOLOGY & ADDITIONAL STUDIES:  < from: Transthoracic Echocardiogram (23 @ 07:08) >  CONCLUSIONS:  1. Normal mitral valve. Moderate to severe mitral  regurgitation.  2. Normal trileaflet aortic valve. Mild aortic  regurgitation.  3. Aortic Root: 3.3 cm.  4. Moderately dilated left atrium.  LA volume index = 46  cc/m2.  5. Normal left ventricular internal dimensions and wall  thicknesses.  6. Normal Left Ventricular Systolic Function,  (EF = 55 to  60%)  7. Grade IV diastolic dysfunction (severe with fixed  restrictive pattern).  8. Normal right atrium. Linear echodensity consistent with  a catheter is visualized in the right atrium.  9. Normal right ventricular size and systolic function  (TAPSE 2.6 cm).  10. RA Pressure is8 mm Hg.  11. RV systolic pressure is moderately increased at  46 mm  Hg.  12. There is mild tricuspid regurgitation.  13. There is mild pulmonic regurgitation.  14. Moderate pericardial effusion,greatest diameter 1.5cm.  15. Bilateral pleural effusions.    < end of copied text >

## 2023-11-10 NOTE — PROGRESS NOTE ADULT - ASSESSMENT
CXR 11/8/2: mild residual vascular congestion. Decrease in pleural effusions. Trace bibasilar effusions persist. No focal consolidation or pneumothorax.    CT angiogram, chest   1.  Bilateral septal thickening and groundglass opacities can be secondary to pulmonary edema.  2.  Bilateral pleural effusions and lower lobe passive atelectasis.  3.  Small pericardial effusion.    Echo 11/2/23   1. Normal mitral valve. Moderate to severe mitral regurgitation.  2. Normal trileaflet aortic valve. Mild aortic regurgitation.  3. Aortic Root: 3.3 cm.  4. Moderately dilated left atrium.  LA volume index = 46 cc/m2.  5. Normal left ventricular internal dimensions and wall thicknesses.  6. Normal Left Ventricular Systolic Function,  (EF = 55 to 60%)  7. Grade IV diastolic dysfunction (severe with fixed restrictive pattern).  8. Normal right atrium. Linear echodensity consistent with a catheter is visualized in the right atrium.  9. Normal right ventricular size and systolic function (TAPSE 2.6 cm).  10. RA Pressure is 8 mm Hg.  11. RV systolic pressure is moderately increased at  46 mmHg.  12. There is mild tricuspid regurgitation.  13. There is mild pulmonic regurgitation.  14. Moderate pericardial effusion, greatest diameter 1.5cm.  15. Bilateral pleural effusions.     Telemetry: Sinus 70s    53F PMH HTN, HLD, ESRD (T/Th/Sat), Anemia, Enlarged Thyroid admitted with new-onset CHF in setting of moderate pericardial effusion without plan for drainage at this time. S/p LHC w/ non-obstructive CAD. During hospitalization was diuresed and continued dialysis for fluid removal. Antihypertensives uptitrated. Currently feeling well, will continue with uptitration of antihypertensives.     Recommendations:   - cont labetalol, nifedipine, hydralazine  - continue oral diuretics on discharge  - consider rheumatologic evaluation for new-onset pericardial effusion  - can follow up outpatient for further evaluation of diastolic dysfunction, would recommend close follow-up      All recommendations pending attending attestation. We will continue to follow with you.     Vanna Chao MD  PGY-4, Cardiology  Available on TEAMS    For all new consults  [email or web address]  Login: [email or web address]

## 2023-11-10 NOTE — DISCHARGE NOTE PROVIDER - NSDCCPCAREPLAN_GEN_ALL_CORE_FT
PRINCIPAL DISCHARGE DIAGNOSIS  Diagnosis: Acute diastolic congestive heart failure  Assessment and Plan of Treatment: please follow up with cardiology and rheumatology for further work up for fluid around the heart. please take your medications as directed

## 2023-11-11 ENCOUNTER — TRANSCRIPTION ENCOUNTER (OUTPATIENT)
Age: 53
End: 2023-11-11

## 2023-11-11 VITALS
OXYGEN SATURATION: 99 % | RESPIRATION RATE: 21 BRPM | DIASTOLIC BLOOD PRESSURE: 73 MMHG | SYSTOLIC BLOOD PRESSURE: 131 MMHG | TEMPERATURE: 98 F | HEART RATE: 68 BPM

## 2023-11-11 LAB
ANION GAP SERPL CALC-SCNC: 20 MMOL/L — HIGH (ref 7–14)
ANION GAP SERPL CALC-SCNC: 20 MMOL/L — HIGH (ref 7–14)
BASOPHILS # BLD AUTO: 0.02 K/UL — SIGNIFICANT CHANGE UP (ref 0–0.2)
BASOPHILS # BLD AUTO: 0.02 K/UL — SIGNIFICANT CHANGE UP (ref 0–0.2)
BASOPHILS NFR BLD AUTO: 0.5 % — SIGNIFICANT CHANGE UP (ref 0–2)
BASOPHILS NFR BLD AUTO: 0.5 % — SIGNIFICANT CHANGE UP (ref 0–2)
BUN SERPL-MCNC: 71 MG/DL — HIGH (ref 7–23)
BUN SERPL-MCNC: 71 MG/DL — HIGH (ref 7–23)
CALCIUM SERPL-MCNC: 9.2 MG/DL — SIGNIFICANT CHANGE UP (ref 8.4–10.5)
CALCIUM SERPL-MCNC: 9.2 MG/DL — SIGNIFICANT CHANGE UP (ref 8.4–10.5)
CHLORIDE SERPL-SCNC: 96 MMOL/L — LOW (ref 98–107)
CHLORIDE SERPL-SCNC: 96 MMOL/L — LOW (ref 98–107)
CO2 SERPL-SCNC: 20 MMOL/L — LOW (ref 22–31)
CO2 SERPL-SCNC: 20 MMOL/L — LOW (ref 22–31)
CREAT SERPL-MCNC: 10.35 MG/DL — HIGH (ref 0.5–1.3)
CREAT SERPL-MCNC: 10.35 MG/DL — HIGH (ref 0.5–1.3)
EGFR: 4 ML/MIN/1.73M2 — LOW
EGFR: 4 ML/MIN/1.73M2 — LOW
EOSINOPHIL # BLD AUTO: 0.39 K/UL — SIGNIFICANT CHANGE UP (ref 0–0.5)
EOSINOPHIL # BLD AUTO: 0.39 K/UL — SIGNIFICANT CHANGE UP (ref 0–0.5)
EOSINOPHIL NFR BLD AUTO: 9.1 % — HIGH (ref 0–6)
EOSINOPHIL NFR BLD AUTO: 9.1 % — HIGH (ref 0–6)
GLUCOSE BLDC GLUCOMTR-MCNC: 104 MG/DL — HIGH (ref 70–99)
GLUCOSE BLDC GLUCOMTR-MCNC: 104 MG/DL — HIGH (ref 70–99)
GLUCOSE SERPL-MCNC: 89 MG/DL — SIGNIFICANT CHANGE UP (ref 70–99)
GLUCOSE SERPL-MCNC: 89 MG/DL — SIGNIFICANT CHANGE UP (ref 70–99)
HBV CORE AB SER-ACNC: SIGNIFICANT CHANGE UP
HBV CORE AB SER-ACNC: SIGNIFICANT CHANGE UP
HBV SURFACE AB SER-ACNC: <3 MIU/ML — LOW
HBV SURFACE AB SER-ACNC: <3 MIU/ML — LOW
HBV SURFACE AG SER-ACNC: SIGNIFICANT CHANGE UP
HBV SURFACE AG SER-ACNC: SIGNIFICANT CHANGE UP
HCT VFR BLD CALC: 36.4 % — SIGNIFICANT CHANGE UP (ref 34.5–45)
HCT VFR BLD CALC: 36.4 % — SIGNIFICANT CHANGE UP (ref 34.5–45)
HCV AB S/CO SERPL IA: 0.09 S/CO — SIGNIFICANT CHANGE UP (ref 0–0.99)
HCV AB S/CO SERPL IA: 0.09 S/CO — SIGNIFICANT CHANGE UP (ref 0–0.99)
HCV AB SERPL-IMP: SIGNIFICANT CHANGE UP
HCV AB SERPL-IMP: SIGNIFICANT CHANGE UP
HGB BLD-MCNC: 11 G/DL — LOW (ref 11.5–15.5)
HGB BLD-MCNC: 11 G/DL — LOW (ref 11.5–15.5)
IANC: 2.31 K/UL — SIGNIFICANT CHANGE UP (ref 1.8–7.4)
IANC: 2.31 K/UL — SIGNIFICANT CHANGE UP (ref 1.8–7.4)
IMM GRANULOCYTES NFR BLD AUTO: 0.9 % — SIGNIFICANT CHANGE UP (ref 0–0.9)
IMM GRANULOCYTES NFR BLD AUTO: 0.9 % — SIGNIFICANT CHANGE UP (ref 0–0.9)
LYMPHOCYTES # BLD AUTO: 1.05 K/UL — SIGNIFICANT CHANGE UP (ref 1–3.3)
LYMPHOCYTES # BLD AUTO: 1.05 K/UL — SIGNIFICANT CHANGE UP (ref 1–3.3)
LYMPHOCYTES # BLD AUTO: 24.6 % — SIGNIFICANT CHANGE UP (ref 13–44)
LYMPHOCYTES # BLD AUTO: 24.6 % — SIGNIFICANT CHANGE UP (ref 13–44)
MAGNESIUM SERPL-MCNC: 2.3 MG/DL — SIGNIFICANT CHANGE UP (ref 1.6–2.6)
MAGNESIUM SERPL-MCNC: 2.3 MG/DL — SIGNIFICANT CHANGE UP (ref 1.6–2.6)
MCHC RBC-ENTMCNC: 28.6 PG — SIGNIFICANT CHANGE UP (ref 27–34)
MCHC RBC-ENTMCNC: 28.6 PG — SIGNIFICANT CHANGE UP (ref 27–34)
MCHC RBC-ENTMCNC: 30.2 GM/DL — LOW (ref 32–36)
MCHC RBC-ENTMCNC: 30.2 GM/DL — LOW (ref 32–36)
MCV RBC AUTO: 94.8 FL — SIGNIFICANT CHANGE UP (ref 80–100)
MCV RBC AUTO: 94.8 FL — SIGNIFICANT CHANGE UP (ref 80–100)
MONOCYTES # BLD AUTO: 0.46 K/UL — SIGNIFICANT CHANGE UP (ref 0–0.9)
MONOCYTES # BLD AUTO: 0.46 K/UL — SIGNIFICANT CHANGE UP (ref 0–0.9)
MONOCYTES NFR BLD AUTO: 10.8 % — SIGNIFICANT CHANGE UP (ref 2–14)
MONOCYTES NFR BLD AUTO: 10.8 % — SIGNIFICANT CHANGE UP (ref 2–14)
NEUTROPHILS # BLD AUTO: 2.31 K/UL — SIGNIFICANT CHANGE UP (ref 1.8–7.4)
NEUTROPHILS # BLD AUTO: 2.31 K/UL — SIGNIFICANT CHANGE UP (ref 1.8–7.4)
NEUTROPHILS NFR BLD AUTO: 54.1 % — SIGNIFICANT CHANGE UP (ref 43–77)
NEUTROPHILS NFR BLD AUTO: 54.1 % — SIGNIFICANT CHANGE UP (ref 43–77)
NRBC # BLD: 0 /100 WBCS — SIGNIFICANT CHANGE UP (ref 0–0)
NRBC # BLD: 0 /100 WBCS — SIGNIFICANT CHANGE UP (ref 0–0)
NRBC # FLD: 0 K/UL — SIGNIFICANT CHANGE UP (ref 0–0)
NRBC # FLD: 0 K/UL — SIGNIFICANT CHANGE UP (ref 0–0)
PHOSPHATE SERPL-MCNC: 7.1 MG/DL — HIGH (ref 2.5–4.5)
PHOSPHATE SERPL-MCNC: 7.1 MG/DL — HIGH (ref 2.5–4.5)
PLATELET # BLD AUTO: 120 K/UL — LOW (ref 150–400)
PLATELET # BLD AUTO: 120 K/UL — LOW (ref 150–400)
POTASSIUM SERPL-MCNC: 4.6 MMOL/L — SIGNIFICANT CHANGE UP (ref 3.5–5.3)
POTASSIUM SERPL-MCNC: 4.6 MMOL/L — SIGNIFICANT CHANGE UP (ref 3.5–5.3)
POTASSIUM SERPL-SCNC: 4.6 MMOL/L — SIGNIFICANT CHANGE UP (ref 3.5–5.3)
POTASSIUM SERPL-SCNC: 4.6 MMOL/L — SIGNIFICANT CHANGE UP (ref 3.5–5.3)
PTH-INTACT FLD-MCNC: 237 PG/ML — HIGH (ref 15–65)
PTH-INTACT FLD-MCNC: 237 PG/ML — HIGH (ref 15–65)
RBC # BLD: 3.84 M/UL — SIGNIFICANT CHANGE UP (ref 3.8–5.2)
RBC # BLD: 3.84 M/UL — SIGNIFICANT CHANGE UP (ref 3.8–5.2)
RBC # FLD: 17.2 % — HIGH (ref 10.3–14.5)
RBC # FLD: 17.2 % — HIGH (ref 10.3–14.5)
SODIUM SERPL-SCNC: 136 MMOL/L — SIGNIFICANT CHANGE UP (ref 135–145)
SODIUM SERPL-SCNC: 136 MMOL/L — SIGNIFICANT CHANGE UP (ref 135–145)
WBC # BLD: 4.27 K/UL — SIGNIFICANT CHANGE UP (ref 3.8–10.5)
WBC # BLD: 4.27 K/UL — SIGNIFICANT CHANGE UP (ref 3.8–10.5)
WBC # FLD AUTO: 4.27 K/UL — SIGNIFICANT CHANGE UP (ref 3.8–10.5)
WBC # FLD AUTO: 4.27 K/UL — SIGNIFICANT CHANGE UP (ref 3.8–10.5)

## 2023-11-11 PROCEDURE — 99239 HOSP IP/OBS DSCHRG MGMT >30: CPT

## 2023-11-11 RX ORDER — NIFEDIPINE 30 MG
1 TABLET, EXTENDED RELEASE 24 HR ORAL
Refills: 0 | DISCHARGE

## 2023-11-11 RX ORDER — LABETALOL HCL 100 MG
1 TABLET ORAL
Qty: 90 | Refills: 0
Start: 2023-11-11 | End: 2023-12-10

## 2023-11-11 RX ORDER — ASPIRIN/CALCIUM CARB/MAGNESIUM 324 MG
1 TABLET ORAL
Qty: 30 | Refills: 0
Start: 2023-11-11 | End: 2023-12-10

## 2023-11-11 RX ORDER — CALCIUM ACETATE 667 MG
1 TABLET ORAL
Qty: 90 | Refills: 0
Start: 2023-11-11 | End: 2023-12-10

## 2023-11-11 RX ORDER — FUROSEMIDE 40 MG
1 TABLET ORAL
Qty: 60 | Refills: 0
Start: 2023-11-11 | End: 2023-12-10

## 2023-11-11 RX ORDER — HYDRALAZINE HCL 50 MG
1 TABLET ORAL
Qty: 84 | Refills: 0
Start: 2023-11-11 | End: 2023-12-08

## 2023-11-11 RX ORDER — NIFEDIPINE 30 MG
1 TABLET, EXTENDED RELEASE 24 HR ORAL
Qty: 30 | Refills: 0
Start: 2023-11-11 | End: 2023-12-10

## 2023-11-11 RX ADMIN — Medication 90 MILLIGRAM(S): at 15:08

## 2023-11-11 RX ADMIN — Medication 40 MILLIGRAM(S): at 13:58

## 2023-11-11 RX ADMIN — HEPARIN SODIUM 5000 UNIT(S): 5000 INJECTION INTRAVENOUS; SUBCUTANEOUS at 05:18

## 2023-11-11 RX ADMIN — Medication 100 MILLIGRAM(S): at 13:58

## 2023-11-11 RX ADMIN — HEPARIN SODIUM 5000 UNIT(S): 5000 INJECTION INTRAVENOUS; SUBCUTANEOUS at 13:57

## 2023-11-11 RX ADMIN — CHLORHEXIDINE GLUCONATE 1 APPLICATION(S): 213 SOLUTION TOPICAL at 11:58

## 2023-11-11 RX ADMIN — Medication 300 MILLIGRAM(S): at 13:58

## 2023-11-11 RX ADMIN — Medication 667 MILLIGRAM(S): at 11:55

## 2023-11-11 RX ADMIN — Medication 81 MILLIGRAM(S): at 11:55

## 2023-11-11 RX ADMIN — Medication 40 MILLIGRAM(S): at 05:18

## 2023-11-11 RX ADMIN — Medication 100 MILLIGRAM(S): at 05:18

## 2023-11-11 NOTE — PROGRESS NOTE ADULT - ASSESSMENT
53 y.o. female with PMH of HTN, HLD, ESRD (on dialysis for 1.7 years, T/Th/Sat), Anemia, Enlarged Thyroid - was admitted to [facility] on 11/1/23 c/o SOB, b/l lower extremities edema, dry cough started 2 weeks ago. echo with new chf transfer to [facility] for cath and cardio work up    DIASTOLIC CHF  - acute - s/p cath - report noted -  dw cards today - suspect that acute dCHF d.t hypertensive crisis - now euvolemic s/p IV lasix - per cards no need for further inpt work up, out-pt amyloid work up - per cards dc on maintenance po diuretic - repeat TTE stable effusion - per cards prudent to have rheum work up - per rheum doubt autoimmune, likely viral, but sent serologies as rec - plan for out-pt follow up      ESRD on HD (T/TH/SAT)  [facility]  Nephrologist Dr. Calle   s/p cath 11/8 s/p hd after  consent in chart  renal diet  Adjust meds to eGFR and avoid IV Gadolinium contrast, NSAIDs and phosphate enema.  HD per renal     HTN:   -increased hydralazine to 100 mg TID for better BP control - cw labetalol and nifedipine       3. Anemia of ESRD:  at goal  -start epoen if hb <10  per renal   -F/u CBC daily  monitor    4. Mineral Bone Disease:  -continue phoslo tid  -monitor phos  check pth      dispo - stable for dc w out-pt cards and rheum f.u

## 2023-11-11 NOTE — PROGRESS NOTE ADULT - SUBJECTIVE AND OBJECTIVE BOX
Patient is a 53y old  Female who presents with a chief complaint of SOB (08 Nov 2023 14:04)      SUBJECTIVE / OVERNIGHT EVENTS: family at bedside - offered  phone but pt prefers family translating - states no SOB or CO - LE edema has resolved     ROS:  No HA/DZ  No Vision changes   No CP, SOB  No N/V/D  No Edema  No Rash  NO weakness, numbness    MEDICATIONS  (STANDING):  aspirin enteric coated 81 milliGRAM(s) Oral daily  atorvastatin 40 milliGRAM(s) Oral at bedtime  chlorhexidine 2% Cloths 1 Application(s) Topical daily  chlorhexidine 2% Cloths 1 Application(s) Topical daily  heparin   Injectable 5000 Unit(s) SubCutaneous every 8 hours  hydrALAZINE 50 milliGRAM(s) Oral three times a day  labetalol 300 milliGRAM(s) Oral three times a day  NIFEdipine XL 90 milliGRAM(s) Oral daily    MEDICATIONS  (PRN):  acetaminophen     Tablet .. 650 milliGRAM(s) Oral every 6 hours PRN Temp greater or equal to 38C (100.4F), Mild Pain (1 - 3), Moderate Pain (4 - 6)  AQUAPHOR (petrolatum Ointment) 1 Application(s) Topical every 6 hours PRN itching  sodium chloride 0.9% Bolus. 100 milliLiter(s) IV Bolus every 5 minutes PRN SBP LESS THAN or EQUAL to 80 mmHg      T(C): 36.8 (11-09-23 @ 10:40)  HR: 73 (11-09-23 @ 10:40)  BP: 151/91 (11-09-23 @ 10:40)  RR: 18 (11-09-23 @ 10:40)  SpO2: 97% (11-09-23 @ 10:40)  CAPILLARY BLOOD GLUCOSE        I&O's Summary    08 Nov 2023 07:01  -  09 Nov 2023 07:00  --------------------------------------------------------  IN: 400 mL / OUT: 1400 mL / NET: -1000 mL        PHYSICAL EXAM:  GENERAL: NAD, well-developed, AOx3  HEAD:  Atraumatic, Normocephalic  EYES: EOMI, PERRL, conjunctiva and sclera clear  NECK: Supple, No JVD  CHEST/LUNG: Clear to auscultation bilaterally  HEART: Regular rate and rhythm; No murmurs, rubs, or gallops, No Edema  ABDOMEN: Soft, Nontender, Nondistended; Bowel sounds present  EXTREMITIES:  2+ Peripheral Pulses, No clubbing, cyanosis  PSYCH: No SI/HI  NEUROLOGY: non-focal  SKIN: No rashes or lesions    LABS:                        11.3   4.88  )-----------( 144      ( 08 Nov 2023 05:40 )             36.8     11-08    138  |  100  |  34<H>  ----------------------------<  98  4.3   |  29  |  7.44<H>    Ca    9.0      08 Nov 2023 05:40  Phos  6.6     11-07  Mg     2.3     11-07            Urinalysis Basic - ( 08 Nov 2023 05:40 )    Color: x / Appearance: x / SG: x / pH: x  Gluc: 98 mg/dL / Ketone: x  / Bili: x / Urobili: x   Blood: x / Protein: x / Nitrite: x   Leuk Esterase: x / RBC: x / WBC x   Sq Epi: x / Non Sq Epi: x / Bacteria: x            RADIOLOGY & ADDITIONAL TESTS:    Imaging Personally Reviewed:    Consultant(s) Notes Reviewed:      Care Discussed with Consultants/Other Providers:  
Patient is a 53y old  Female who presents with a chief complaint of SOB (08 Nov 2023 14:04)      SUBJECTIVE / OVERNIGHT EVENTS: pt seen in HD - no events - feels well     ROS:  No HA/DZ  No Vision changes   No CP, SOB  No N/V/D  No Edema  No Rash  NO weakness, numbness    MEDICATIONS  (STANDING):  aspirin enteric coated 81 milliGRAM(s) Oral daily  atorvastatin 40 milliGRAM(s) Oral at bedtime  calcium acetate 667 milliGRAM(s) Oral three times a day with meals  chlorhexidine 2% Cloths 1 Application(s) Topical daily  chlorhexidine 2% Cloths 1 Application(s) Topical daily  furosemide    Tablet 40 milliGRAM(s) Oral two times a day  heparin   Injectable 5000 Unit(s) SubCutaneous every 8 hours  hydrALAZINE 100 milliGRAM(s) Oral three times a day  labetalol 300 milliGRAM(s) Oral three times a day  NIFEdipine XL 90 milliGRAM(s) Oral daily    MEDICATIONS  (PRN):  acetaminophen     Tablet .. 650 milliGRAM(s) Oral every 6 hours PRN Temp greater or equal to 38C (100.4F), Mild Pain (1 - 3), Moderate Pain (4 - 6)  AQUAPHOR (petrolatum Ointment) 1 Application(s) Topical every 6 hours PRN itching  sodium chloride 0.9% Bolus. 100 milliLiter(s) IV Bolus every 5 minutes PRN SBP LESS THAN or EQUAL to 80 mmHg      T(C): 36.8 (11-11-23 @ 09:23)  HR: 70 (11-11-23 @ 09:23)  BP: 147/77 (11-11-23 @ 09:23)  RR: 18 (11-11-23 @ 09:23)  SpO2: 98% (11-11-23 @ 05:11)  CAPILLARY BLOOD GLUCOSE      POCT Blood Glucose.: 104 mg/dL (11 Nov 2023 05:05)  POCT Blood Glucose.: 102 mg/dL (10 Nov 2023 11:59)    I&O's Summary    11 Nov 2023 07:01  -  11 Nov 2023 10:02  --------------------------------------------------------  IN: 400 mL / OUT: 2400 mL / NET: -2000 mL        PHYSICAL EXAM:  GENERAL: NAD, well-developed, AOx3  HEAD:  Atraumatic, Normocephalic  EYES: EOMI, PERRL, conjunctiva and sclera clear  NECK: Supple, No JVD  CHEST/LUNG: Clear to auscultation bilaterally  HEART: Regular rate and rhythm; No murmurs, rubs, or gallops, No Edema  ABDOMEN: Soft, Nontender, Nondistended; Bowel sounds present  EXTREMITIES:  2+ Peripheral Pulses, No clubbing, cyanosis  PSYCH: No SI/HI  NEUROLOGY: non-focal  SKIN: No rashes or lesions    LABS:                        11.0   4.27  )-----------( 120      ( 11 Nov 2023 07:33 )             36.4     11-11    136  |  96<L>  |  71<H>  ----------------------------<  89  4.6   |  20<L>  |  10.35<H>    Ca    9.2      11 Nov 2023 07:33  Phos  7.1     11-11  Mg     2.30     11-11            Urinalysis Basic - ( 11 Nov 2023 07:33 )    Color: x / Appearance: x / SG: x / pH: x  Gluc: 89 mg/dL / Ketone: x  / Bili: x / Urobili: x   Blood: x / Protein: x / Nitrite: x   Leuk Esterase: x / RBC: x / WBC x   Sq Epi: x / Non Sq Epi: x / Bacteria: x            RADIOLOGY & ADDITIONAL TESTS:    Imaging Personally Reviewed:    Consultant(s) Notes Reviewed:      Care Discussed with Consultants/Other Providers:  
  Overnight Events: NAEO    Review Of Systems: No chest pain, shortness of breath, or palpitations            Current Meds:  acetaminophen     Tablet .. 650 milliGRAM(s) Oral every 6 hours PRN  AQUAPHOR (petrolatum Ointment) 1 Application(s) Topical every 6 hours PRN  aspirin enteric coated 81 milliGRAM(s) Oral daily  atorvastatin 40 milliGRAM(s) Oral at bedtime  calcium acetate 667 milliGRAM(s) Oral three times a day with meals  chlorhexidine 2% Cloths 1 Application(s) Topical daily  chlorhexidine 2% Cloths 1 Application(s) Topical daily  furosemide    Tablet 40 milliGRAM(s) Oral two times a day  heparin   Injectable 5000 Unit(s) SubCutaneous every 8 hours  hydrALAZINE 100 milliGRAM(s) Oral three times a day  labetalol 300 milliGRAM(s) Oral three times a day  NIFEdipine XL 90 milliGRAM(s) Oral daily  sodium chloride 0.9% Bolus. 100 milliLiter(s) IV Bolus every 5 minutes PRN      Vitals:  T(F): 98.6 (11-10), Max: 98.6 (11-10)  HR: 85 (11-10) (73 - 85)  BP: 137/75 (11-10) (130/76 - 137/75)  RR: 18 (11-10)  SpO2: 97% (-10)  I&O's Summary      Physical Exam:  GEN: comfortable appearing, lying in bed in NAD  HEENT: NCAT, MMM  CV: Regular S1, S2, no m/r/g  RESP: CTAB  ABD: Soft, NTND, +BS  EXT: No LE edema, WWP, pulses palpable throughout  NEURO: No focal deficits, AOx3  SKIN:  No rashes                          11.3   4.62  )-----------( 129      ( 10 Nov 2023 05:52 )             36.5     11-10    137  |  96<L>  |  52<H>  ----------------------------<  78  4.8   |  20<L>  |  8.51<H>    Ca    9.0      10 Nov 2023 05:52  Phos  6.8     11-10  Mg     2.20     11-10              Total Cholesterol: 173  LDL: --  HDL: 46  T      
[facility] [place] [facility]ACT[facility]   MD RAJENDRA GRANADOS MD RUORU WONG, PA    TEL:  FROM 9 AM to 5 PM ---OFFICE: [phone]  AVAILABLE ON TEAMS    FROM 5 PM - 9 AM PLEASE CALL ANSWERING SERVICE: [phone]    RENAL FOLLOW UP NOTE--Date of Service 11-11-23 @ 10:37  --------------------------------------------------------------------------------  HPI:      Pt seen and examined at bedside.   Luz SOB, chest pain     PAST HISTORY  --------------------------------------------------------------------------------  No significant changes to PMH, PSH, FHx, SHx, unless otherwise noted    ALLERGIES & MEDICATIONS  --------------------------------------------------------------------------------  Allergies    No Known Allergies    Intolerances      Standing Inpatient Medications  aspirin enteric coated 81 milliGRAM(s) Oral daily  atorvastatin 40 milliGRAM(s) Oral at bedtime  calcium acetate 667 milliGRAM(s) Oral three times a day with meals  chlorhexidine 2% Cloths 1 Application(s) Topical daily  chlorhexidine 2% Cloths 1 Application(s) Topical daily  furosemide    Tablet 40 milliGRAM(s) Oral two times a day  heparin   Injectable 5000 Unit(s) SubCutaneous every 8 hours  hydrALAZINE 100 milliGRAM(s) Oral three times a day  labetalol 300 milliGRAM(s) Oral three times a day  NIFEdipine XL 90 milliGRAM(s) Oral daily    PRN Inpatient Medications  acetaminophen     Tablet .. 650 milliGRAM(s) Oral every 6 hours PRN  AQUAPHOR (petrolatum Ointment) 1 Application(s) Topical every 6 hours PRN  sodium chloride 0.9% Bolus. 100 milliLiter(s) IV Bolus every 5 minutes PRN      REVIEW OF SYSTEMS  --------------------------------------------------------------------------------  General: no fever  MSK: no edema     VITALS/PHYSICAL EXAM  --------------------------------------------------------------------------------  T(C): 36.8 (11-11-23 @ 09:23), Max: 37 (11-10-23 @ 13:53)  HR: 70 (11-11-23 @ 09:23) (67 - 85)  BP: 147/77 (11-11-23 @ 09:23) (132/74 - 147/77)  RR: 18 (11-11-23 @ 09:23) (16 - 18)  SpO2: 98% (11-11-23 @ 05:11) (97% - 98%)  Wt(kg): --        11-11-23 @ 07:01  -  11-11-23 @ 10:37  --------------------------------------------------------  IN: 400 mL / OUT: 2400 mL / NET: -2000 mL      Physical Exam:  	Gen: NAD  	HEENT: MMM  	Pulm: CTA B/L  	CV: S1S2  	Abd: Soft, +BS  	Ext: No LE edema B/L                      Neuro: Awake   	Skin: Warm and Dry   	Vascular access:  HD catheter            no caleb  LABS/STUDIES  --------------------------------------------------------------------------------              11.0   4.27  >-----------<  120      [11-11-23 @ 07:33]              36.4     136  |  96  |  71  ----------------------------<  89      [11-11-23 @ 07:33]  4.6   |  20  |  10.35        Ca     9.2     [11-11-23 @ 07:33]      Mg     2.30     [11-11-23 @ 07:33]      Phos  7.1     [11-11-23 @ 07:33]            Creatinine Trend:  SCr 10.35 [11-11 @ 07:33]  SCr 8.51 [11-10 @ 05:52]  SCr 7.44 [11-08 @ 05:40]  SCr 12.20 [11-07 @ 17:38]  SCr 9.90 [11-06 @ 06:15]    Urinalysis - [11-11-23 @ 07:33]      Color  / Appearance  / SG  / pH       Gluc 89 / Ketone   / Bili  / Urobili        Blood  / Protein  / Leuk Est  / Nitrite       RBC  / WBC  / Hyaline  / Gran  / Sq Epi  / Non Sq Epi  / Bacteria       PTH -- (Ca --)      [11-11-23 @ 07:33]   237  TSH 0.42      [11-03-23 @ 08:12]  Lipid: chol 173, TG 74, HDL 46, LDL --      [11-10-23 @ 05:52]    HBsAg Nonreact      [11-02-23 @ 05:09]    
[facility]   MD RAJENDRA GRANADOS MD ANGELA WONG, PA    TEL:  OFFICE: [phone]  From 5pm-7am Answering Service [phone]    -- RENAL FOLLOW UP NOTE ---Date of Service 11-09-23 @ 11:42    Patient is a 53y old  Female who presents with a chief complaint of SOB (08 Nov 2023 14:04)      Patient seen and examined at bedside. No chest pain/sob    VITALS:  T(F): 98.2 (11-09-23 @ 10:40), Max: 98.2 (11-08-23 @ 19:55)  HR: 73 (11-09-23 @ 10:40)  BP: 151/91 (11-09-23 @ 10:40)  RR: 18 (11-09-23 @ 10:40)  SpO2: 97% (11-09-23 @ 10:40)  Wt(kg): --    11-08 @ 07:01  -  11-09 @ 07:00  --------------------------------------------------------  IN: 400 mL / OUT: 1400 mL / NET: -1000 mL          PHYSICAL EXAM:  General: NAD  Neck: No JVD  Respiratory: CTAB, no wheezes, rales or rhonchi  Cardiovascular: S1, S2, RRR  Gastrointestinal: BS+, soft, NT/ND  Extremities: No peripheral edema    Hospital Medications:   MEDICATIONS  (STANDING):  aspirin enteric coated 81 milliGRAM(s) Oral daily  atorvastatin 40 milliGRAM(s) Oral at bedtime  chlorhexidine 2% Cloths 1 Application(s) Topical daily  chlorhexidine 2% Cloths 1 Application(s) Topical daily  furosemide   Injectable 80 milliGRAM(s) IV Push two times a day  heparin   Injectable 5000 Unit(s) SubCutaneous every 8 hours  hydrALAZINE 50 milliGRAM(s) Oral three times a day  labetalol 300 milliGRAM(s) Oral three times a day  NIFEdipine XL 90 milliGRAM(s) Oral daily      LABS:  11-08    138  |  100  |  34<H>  ----------------------------<  98  4.3   |  29  |  7.44<H>    Ca    9.0      08 Nov 2023 05:40  Phos  6.6     11-07  Mg     2.3     11-07      Creatinine Trend: 7.44 <--, 12.20 <--, 9.90 <--, 9.71 <--, 7.30 <--                                11.3   4.88  )-----------( 144      ( 08 Nov 2023 05:40 )             36.8     Urine Studies:  Urinalysis - [11-08-23 @ 05:40]      Color  / Appearance  / SG  / pH       Gluc 98 / Ketone   / Bili  / Urobili        Blood  / Protein  / Leuk Est  / Nitrite       RBC  / WBC  / Hyaline  / Gran  / Sq Epi  / Non Sq Epi  / Bacteria       TSH 0.42      [11-03-23 @ 08:12]    HBsAg Nonreact      [11-02-23 @ 05:09]      RADIOLOGY & ADDITIONAL STUDIES:  
Patient is a 53y old  Female who presents with a chief complaint of SOB (08 Nov 2023 14:04)      SUBJECTIVE / OVERNIGHT EVENTS:  phone used ID # [id number] - feels well, no complaints -  at bedside     ROS:  No HA/DZ  No Vision changes   No CP, SOB  No N/V/D  No Edema  No Rash  NO weakness, numbness    MEDICATIONS  (STANDING):  aspirin enteric coated 81 milliGRAM(s) Oral daily  atorvastatin 40 milliGRAM(s) Oral at bedtime  calcium acetate 667 milliGRAM(s) Oral three times a day with meals  chlorhexidine 2% Cloths 1 Application(s) Topical daily  chlorhexidine 2% Cloths 1 Application(s) Topical daily  heparin   Injectable 5000 Unit(s) SubCutaneous every 8 hours  hydrALAZINE 100 milliGRAM(s) Oral three times a day  labetalol 300 milliGRAM(s) Oral three times a day  NIFEdipine XL 90 milliGRAM(s) Oral daily    MEDICATIONS  (PRN):  acetaminophen     Tablet .. 650 milliGRAM(s) Oral every 6 hours PRN Temp greater or equal to 38C (100.4F), Mild Pain (1 - 3), Moderate Pain (4 - 6)  AQUAPHOR (petrolatum Ointment) 1 Application(s) Topical every 6 hours PRN itching  sodium chloride 0.9% Bolus. 100 milliLiter(s) IV Bolus every 5 minutes PRN SBP LESS THAN or EQUAL to 80 mmHg      T(C): 36.7 (11-10-23 @ 06:44)  HR: 73 (11-10-23 @ 06:44)  BP: 135/78 (11-10-23 @ 06:44)  RR: 18 (11-10-23 @ 06:44)  SpO2: 96% (11-10-23 @ 06:44)  CAPILLARY BLOOD GLUCOSE      POCT Blood Glucose.: 102 mg/dL (10 Nov 2023 11:59)  POCT Blood Glucose.: 105 mg/dL (10 Nov 2023 08:44)    I&O's Summary      PHYSICAL EXAM:  GENERAL: NAD, well-developed, AOx3  HEAD:  Atraumatic, Normocephalic  EYES: EOMI, PERRL, conjunctiva and sclera clear  NECK: Supple, No JVD  CHEST/LUNG: Clear to auscultation bilaterally  HEART: Regular rate and rhythm; No murmurs, rubs, or gallops, No Edema  ABDOMEN: Soft, Nontender, Nondistended; Bowel sounds present  EXTREMITIES:  2+ Peripheral Pulses, No clubbing, cyanosis  PSYCH: No SI/HI  NEUROLOGY: non-focal  SKIN: No rashes or lesions    LABS:                        11.3   4.62  )-----------( 129      ( 10 Nov 2023 05:52 )             36.5     11-10    137  |  96<L>  |  52<H>  ----------------------------<  78  4.8   |  20<L>  |  8.51<H>    Ca    9.0      10 Nov 2023 05:52  Phos  6.8     11-10  Mg     2.20     11-10            Urinalysis Basic - ( 10 Nov 2023 05:52 )    Color: x / Appearance: x / SG: x / pH: x  Gluc: 78 mg/dL / Ketone: x  / Bili: x / Urobili: x   Blood: x / Protein: x / Nitrite: x   Leuk Esterase: x / RBC: x / WBC x   Sq Epi: x / Non Sq Epi: x / Bacteria: x            RADIOLOGY & ADDITIONAL TESTS:    Imaging Personally Reviewed:    Consultant(s) Notes Reviewed:      Care Discussed with Consultants/Other Providers:  
[facility]   MD RAJENDRA GRANADOS MD ANGELA WONG, PA    TEL:  OFFICE: [phone]  From 5pm-7am Answering Service [phone]    -- RENAL FOLLOW UP NOTE ---Date of Service 11-10-23 @ 10:49    Patient is a 53y old  Female who presents with a chief complaint of SOB (08 Nov 2023 14:04)      Patient seen and examined at bedside. No chest pain/sob    VITALS:  T(F): 98 (11-10-23 @ 06:44), Max: 98.3 (11-09-23 @ 21:16)  HR: 73 (11-10-23 @ 06:44)  BP: 135/78 (11-10-23 @ 06:44)  RR: 18 (11-10-23 @ 06:44)  SpO2: 96% (11-10-23 @ 06:44)  Wt(kg): --        PHYSICAL EXAM:  General: NAD  Neck: No JVD  Respiratory: CTAB, no wheezes, rales or rhonchi  Cardiovascular: S1, S2, RRR  Gastrointestinal: BS+, soft, NT/ND  Extremities: No peripheral edema    Hospital Medications:   MEDICATIONS  (STANDING):  aspirin enteric coated 81 milliGRAM(s) Oral daily  atorvastatin 40 milliGRAM(s) Oral at bedtime  chlorhexidine 2% Cloths 1 Application(s) Topical daily  chlorhexidine 2% Cloths 1 Application(s) Topical daily  heparin   Injectable 5000 Unit(s) SubCutaneous every 8 hours  hydrALAZINE 100 milliGRAM(s) Oral three times a day  labetalol 300 milliGRAM(s) Oral three times a day  NIFEdipine XL 90 milliGRAM(s) Oral daily      LABS:  11-10    137  |  96<L>  |  52<H>  ----------------------------<  78  4.8   |  20<L>  |  8.51<H>    Ca    9.0      10 Nov 2023 05:52  Phos  6.8     11-10  Mg     2.20     11-10      Creatinine Trend: 8.51 <--, 7.44 <--, 12.20 <--, 9.90 <--, 9.71 <--    Phosphorus: 6.8 mg/dL (11-10 @ 05:52)                              11.3   4.62  )-----------( 129      ( 10 Nov 2023 05:52 )             36.5     Urine Studies:  Urinalysis - [11-10-23 @ 05:52]      Color  / Appearance  / SG  / pH       Gluc 78 / Ketone   / Bili  / Urobili        Blood  / Protein  / Leuk Est  / Nitrite       RBC  / WBC  / Hyaline  / Gran  / Sq Epi  / Non Sq Epi  / Bacteria       TSH 0.42      [11-03-23 @ 08:12]  Lipid: chol 173, TG 74, HDL 46, LDL --      [11-10-23 @ 05:52]    HBsAg Nonreact      [11-02-23 @ 05:09]      RADIOLOGY & ADDITIONAL STUDIES:

## 2023-11-11 NOTE — DISCHARGE NOTE NURSING/CASE MANAGEMENT/SOCIAL WORK - PATIENT PORTAL LINK FT
You can access the FollowMyHealth Patient Portal offered by [facility] by registering at the following website: [email or web address]. By joining [employer]’s FollowMyHealth portal, you will also be able to view your health information using other applications (apps) compatible with our system.

## 2023-11-11 NOTE — DISCHARGE NOTE NURSING/CASE MANAGEMENT/SOCIAL WORK - NSDCFUADDAPPT_GEN_ALL_CORE_FT
Follow up with your Primary Care Doctor, Cardiologist, Nephrologist, Rheumatologist and Thoracic Surgeon.    Repeat Echocardiogram in 2 weeks to monitor Pericardial Effusion.    Continue the Dialysis as previous prescribed at the [facility].

## 2023-11-11 NOTE — CHART NOTE - NSCHARTNOTEFT_GEN_A_CORE
Medicine PA Note    Spoke to CAPO Lewis. HD was reinstated at the [facility].    Bassam Hackett PA-C  x [phone]

## 2023-11-11 NOTE — PROGRESS NOTE ADULT - ASSESSMENT
53 y.o. female with PMH of HTN, HLD, ESRD (on dialysis for 1.7 years, T/Th/Sat), Anemia, Enlarged Thyroid - was admitted to [place] on 11/1/23 c/o SOB, b/l lower extremities edema, dry cough started 2 weeks ago. echo with new chf transfer to [facility] for cath and cardio work up    ESRD on HD (T/TH/SAT)  Access: [facility]  Nephrologist Dr. Calle   s/p cath 11/8 s/p hd after  Continue TTS schedule   consent in chart  renal diet  Adjust meds to eGFR and avoid IV Gadolinium contrast,NSAIDs, and phosphate enema.    HTN:   -bp stable  continue current meds    3. Anemia of ESRD:  at goal  -start epoen if hb <10    -F/u CBC daily  monitor    4. Mineral Bone Disease:  -continue phoslo tid  -monitor phos  check pth    5. Sob due to fluid overload:  -ECHO shows percardial effussion with mod MR.   -UF with HD.   f/u cardio  s/p cath

## 2023-11-12 LAB
RHEUMATOID FACT SERPL-ACNC: <10 IU/ML — SIGNIFICANT CHANGE UP (ref 0–13)
RHEUMATOID FACT SERPL-ACNC: <10 IU/ML — SIGNIFICANT CHANGE UP (ref 0–13)

## 2023-11-13 NOTE — CHART NOTE - NSCHARTNOTEFT_GEN_A_CORE
Post-Discharge Medication Review    Patient contacted to offer medication counseling post-discharge. Medication reconciliation completed. Per patient, medications include:    1.   aspirin 81 mg oral delayed release tablet 1 tab(s) orally once a day  2.   atorvastatin 40 mg oral tablet 1 tab(s) orally once a day (at bedtime)  3.   calcium acetate 667 mg oral tablet 1 tab(s) orally 3 times a day (with meals)  4.   furosemide 40 mg oral tablet 1 tab(s) orally 2 times a day  5.   hydrALAZINE 100 mg oral tablet 1 tab(s) orally 3 times a day  6.   labetalol 300 mg oral tablet 1 tab(s) orally 3 times a day  7.   NIFEdipine 90 mg oral tablet, extended release 1 tab(s) orally once a day    Medication name, indication, administration, side effects, and monitoring reviewed for new medications post-discharge with patient. Patient demonstrated understanding. Counseling offered for all medications.     Patient experiencing nausea and decreased appetite and buzzing in the ears and will follow-up with PCP office.    Patient’s preferred pharmacy was updated in [facility]:  [facility] [address] [place] [address]    Patient to make appointment to PCP, cardiology, rheumatology, nephrologist, thoracic surgery.   Encouraged patient to follow up with providers and keep appointments once made. Post-Discharge Medication Review    : Ofe ID#: [id number]    Patient contacted to offer medication counseling post-discharge. Medication reconciliation completed. Per patient, medications include:    1.   aspirin 81 mg oral delayed release tablet 1 tab(s) orally once a day  2.   atorvastatin 40 mg oral tablet 1 tab(s) orally once a day (at bedtime)  3.   calcium acetate 667 mg oral tablet 1 tab(s) orally 3 times a day (with meals)  4.   furosemide 40 mg oral tablet 1 tab(s) orally 2 times a day  5.   hydrALAZINE 100 mg oral tablet 1 tab(s) orally 3 times a day  6.   labetalol 300 mg oral tablet 1 tab(s) orally 3 times a day  7.   NIFEdipine 90 mg oral tablet, extended release 1 tab(s) orally once a day    Medication name, indication, administration, side effects, and monitoring reviewed for new medications post-discharge with patient. Patient demonstrated understanding. Counseling offered for all medications.     Patient experiencing nausea and decreased appetite and buzzing in the ears and will follow-up with PCP office.    Patient’s preferred pharmacy was updated in [facility]:  [facility] [address] [place] [address]    Patient to make appointment to PCP, cardiology, rheumatology, nephrologist, thoracic surgery.   Encouraged patient to follow up with providers and keep appointments once made.

## 2023-11-14 LAB
AUTO DIFF PNL BLD: NEGATIVE — SIGNIFICANT CHANGE UP
AUTO DIFF PNL BLD: NEGATIVE — SIGNIFICANT CHANGE UP
C-ANCA SER-ACNC: NEGATIVE — SIGNIFICANT CHANGE UP
C-ANCA SER-ACNC: NEGATIVE — SIGNIFICANT CHANGE UP
P-ANCA SER-ACNC: NEGATIVE — SIGNIFICANT CHANGE UP
P-ANCA SER-ACNC: NEGATIVE — SIGNIFICANT CHANGE UP

## 2023-11-15 LAB
ANA TITR SER: NEGATIVE — SIGNIFICANT CHANGE UP
ANA TITR SER: NEGATIVE — SIGNIFICANT CHANGE UP

## 2023-11-17 PROBLEM — Z86.2 PERSONAL HISTORY OF DISEASES OF THE BLOOD AND BLOOD-FORMING ORGANS AND CERTAIN DISORDERS INVOLVING THE IMMUNE MECHANISM: Chronic | Status: ACTIVE | Noted: 2023-11-08

## 2023-11-17 PROBLEM — E78.5 HYPERLIPIDEMIA, UNSPECIFIED: Chronic | Status: ACTIVE | Noted: 2023-11-08

## 2023-11-17 PROBLEM — D64.9 ANEMIA, UNSPECIFIED: Chronic | Status: ACTIVE | Noted: 2023-11-08

## 2023-11-17 PROBLEM — E04.9 NONTOXIC GOITER, UNSPECIFIED: Chronic | Status: ACTIVE | Noted: 2023-11-08

## 2023-11-17 PROBLEM — N18.6 END STAGE RENAL DISEASE: Chronic | Status: ACTIVE | Noted: 2023-11-08

## 2023-11-17 PROBLEM — I10 ESSENTIAL (PRIMARY) HYPERTENSION: Chronic | Status: ACTIVE | Noted: 2023-11-08

## 2023-11-17 PROBLEM — Z00.00 ENCOUNTER FOR PREVENTIVE HEALTH EXAMINATION: Status: ACTIVE | Noted: 2023-11-17

## 2023-11-27 ENCOUNTER — APPOINTMENT (OUTPATIENT)
Dept: THORACIC SURGERY | Facility: CLINIC | Age: 53
End: 2023-11-27

## 2024-01-11 ENCOUNTER — INPATIENT (INPATIENT)
Facility: HOSPITAL | Age: 54
LOS: 0 days | Discharge: HOME | End: 2024-01-12
Attending: STUDENT IN AN ORGANIZED HEALTH CARE EDUCATION/TRAINING PROGRAM | Admitting: STUDENT IN AN ORGANIZED HEALTH CARE EDUCATION/TRAINING PROGRAM
Payer: MEDICAID

## 2024-01-11 VITALS
HEART RATE: 88 BPM | DIASTOLIC BLOOD PRESSURE: 84 MMHG | TEMPERATURE: 98 F | OXYGEN SATURATION: 100 % | RESPIRATION RATE: 15 BRPM | SYSTOLIC BLOOD PRESSURE: 123 MMHG

## 2024-01-11 DIAGNOSIS — Z98.51 TUBAL LIGATION STATUS: Chronic | ICD-10-CM

## 2024-01-11 DIAGNOSIS — Z98.891 HISTORY OF UTERINE SCAR FROM PREVIOUS SURGERY: Chronic | ICD-10-CM

## 2024-01-11 PROCEDURE — 99285 EMERGENCY DEPT VISIT HI MDM: CPT

## 2024-01-12 ENCOUNTER — TRANSCRIPTION ENCOUNTER (OUTPATIENT)
Age: 54
End: 2024-01-12

## 2024-01-12 VITALS — SYSTOLIC BLOOD PRESSURE: 157 MMHG | DIASTOLIC BLOOD PRESSURE: 68 MMHG | HEART RATE: 74 BPM

## 2024-01-12 DIAGNOSIS — T82.41XA BREAKDOWN (MECHANICAL) OF VASCULAR DIALYSIS CATHETER, INITIAL ENCOUNTER: ICD-10-CM

## 2024-01-12 DIAGNOSIS — E78.5 HYPERLIPIDEMIA, UNSPECIFIED: ICD-10-CM

## 2024-01-12 DIAGNOSIS — N18.6 END STAGE RENAL DISEASE: ICD-10-CM

## 2024-01-12 DIAGNOSIS — I10 ESSENTIAL (PRIMARY) HYPERTENSION: ICD-10-CM

## 2024-01-12 DIAGNOSIS — Z99.2 DEPENDENCE ON RENAL DIALYSIS: Chronic | ICD-10-CM

## 2024-01-12 DIAGNOSIS — Z29.9 ENCOUNTER FOR PROPHYLACTIC MEASURES, UNSPECIFIED: ICD-10-CM

## 2024-01-12 DIAGNOSIS — I50.32 CHRONIC DIASTOLIC (CONGESTIVE) HEART FAILURE: ICD-10-CM

## 2024-01-12 DIAGNOSIS — D64.9 ANEMIA, UNSPECIFIED: ICD-10-CM

## 2024-01-12 LAB
ALBUMIN SERPL ELPH-MCNC: 3.1 G/DL — LOW (ref 3.3–5)
ALBUMIN SERPL ELPH-MCNC: 3.1 G/DL — LOW (ref 3.3–5)
ALP SERPL-CCNC: 80 U/L — SIGNIFICANT CHANGE UP (ref 40–120)
ALP SERPL-CCNC: 80 U/L — SIGNIFICANT CHANGE UP (ref 40–120)
ALT FLD-CCNC: 17 U/L — SIGNIFICANT CHANGE UP (ref 4–33)
ALT FLD-CCNC: 17 U/L — SIGNIFICANT CHANGE UP (ref 4–33)
ANION GAP SERPL CALC-SCNC: 10 MMOL/L — SIGNIFICANT CHANGE UP (ref 7–14)
ANION GAP SERPL CALC-SCNC: 10 MMOL/L — SIGNIFICANT CHANGE UP (ref 7–14)
APTT BLD: 27.5 SEC — SIGNIFICANT CHANGE UP (ref 24.5–35.6)
APTT BLD: 27.5 SEC — SIGNIFICANT CHANGE UP (ref 24.5–35.6)
AST SERPL-CCNC: 13 U/L — SIGNIFICANT CHANGE UP (ref 4–32)
AST SERPL-CCNC: 13 U/L — SIGNIFICANT CHANGE UP (ref 4–32)
BASOPHILS # BLD AUTO: 0.02 K/UL — SIGNIFICANT CHANGE UP (ref 0–0.2)
BASOPHILS # BLD AUTO: 0.02 K/UL — SIGNIFICANT CHANGE UP (ref 0–0.2)
BASOPHILS NFR BLD AUTO: 0.4 % — SIGNIFICANT CHANGE UP (ref 0–2)
BASOPHILS NFR BLD AUTO: 0.4 % — SIGNIFICANT CHANGE UP (ref 0–2)
BILIRUB SERPL-MCNC: 0.2 MG/DL — SIGNIFICANT CHANGE UP (ref 0.2–1.2)
BILIRUB SERPL-MCNC: 0.2 MG/DL — SIGNIFICANT CHANGE UP (ref 0.2–1.2)
BLD GP AB SCN SERPL QL: POSITIVE — SIGNIFICANT CHANGE UP
BLD GP AB SCN SERPL QL: POSITIVE — SIGNIFICANT CHANGE UP
BUN SERPL-MCNC: 36 MG/DL — HIGH (ref 7–23)
BUN SERPL-MCNC: 36 MG/DL — HIGH (ref 7–23)
CALCIUM SERPL-MCNC: 8.4 MG/DL — SIGNIFICANT CHANGE UP (ref 8.4–10.5)
CALCIUM SERPL-MCNC: 8.4 MG/DL — SIGNIFICANT CHANGE UP (ref 8.4–10.5)
CHLORIDE SERPL-SCNC: 102 MMOL/L — SIGNIFICANT CHANGE UP (ref 98–107)
CHLORIDE SERPL-SCNC: 102 MMOL/L — SIGNIFICANT CHANGE UP (ref 98–107)
CO2 SERPL-SCNC: 28 MMOL/L — SIGNIFICANT CHANGE UP (ref 22–31)
CO2 SERPL-SCNC: 28 MMOL/L — SIGNIFICANT CHANGE UP (ref 22–31)
CREAT SERPL-MCNC: 6.26 MG/DL — HIGH (ref 0.5–1.3)
CREAT SERPL-MCNC: 6.26 MG/DL — HIGH (ref 0.5–1.3)
EGFR: 7 ML/MIN/1.73M2 — LOW
EGFR: 7 ML/MIN/1.73M2 — LOW
EOSINOPHIL # BLD AUTO: 0.16 K/UL — SIGNIFICANT CHANGE UP (ref 0–0.5)
EOSINOPHIL # BLD AUTO: 0.16 K/UL — SIGNIFICANT CHANGE UP (ref 0–0.5)
EOSINOPHIL NFR BLD AUTO: 3.1 % — SIGNIFICANT CHANGE UP (ref 0–6)
EOSINOPHIL NFR BLD AUTO: 3.1 % — SIGNIFICANT CHANGE UP (ref 0–6)
GLUCOSE SERPL-MCNC: 85 MG/DL — SIGNIFICANT CHANGE UP (ref 70–99)
GLUCOSE SERPL-MCNC: 85 MG/DL — SIGNIFICANT CHANGE UP (ref 70–99)
HCT VFR BLD CALC: 34.6 % — SIGNIFICANT CHANGE UP (ref 34.5–45)
HCT VFR BLD CALC: 34.6 % — SIGNIFICANT CHANGE UP (ref 34.5–45)
HGB BLD-MCNC: 10.5 G/DL — LOW (ref 11.5–15.5)
HGB BLD-MCNC: 10.5 G/DL — LOW (ref 11.5–15.5)
IANC: 3.36 K/UL — SIGNIFICANT CHANGE UP (ref 1.8–7.4)
IANC: 3.36 K/UL — SIGNIFICANT CHANGE UP (ref 1.8–7.4)
IMM GRANULOCYTES NFR BLD AUTO: 1.2 % — HIGH (ref 0–0.9)
IMM GRANULOCYTES NFR BLD AUTO: 1.2 % — HIGH (ref 0–0.9)
INR BLD: 0.95 RATIO — SIGNIFICANT CHANGE UP (ref 0.85–1.18)
INR BLD: 0.95 RATIO — SIGNIFICANT CHANGE UP (ref 0.85–1.18)
LYMPHOCYTES # BLD AUTO: 0.99 K/UL — LOW (ref 1–3.3)
LYMPHOCYTES # BLD AUTO: 0.99 K/UL — LOW (ref 1–3.3)
LYMPHOCYTES # BLD AUTO: 19.5 % — SIGNIFICANT CHANGE UP (ref 13–44)
LYMPHOCYTES # BLD AUTO: 19.5 % — SIGNIFICANT CHANGE UP (ref 13–44)
MCHC RBC-ENTMCNC: 28.4 PG — SIGNIFICANT CHANGE UP (ref 27–34)
MCHC RBC-ENTMCNC: 28.4 PG — SIGNIFICANT CHANGE UP (ref 27–34)
MCHC RBC-ENTMCNC: 30.3 GM/DL — LOW (ref 32–36)
MCHC RBC-ENTMCNC: 30.3 GM/DL — LOW (ref 32–36)
MCV RBC AUTO: 93.5 FL — SIGNIFICANT CHANGE UP (ref 80–100)
MCV RBC AUTO: 93.5 FL — SIGNIFICANT CHANGE UP (ref 80–100)
MONOCYTES # BLD AUTO: 0.49 K/UL — SIGNIFICANT CHANGE UP (ref 0–0.9)
MONOCYTES # BLD AUTO: 0.49 K/UL — SIGNIFICANT CHANGE UP (ref 0–0.9)
MONOCYTES NFR BLD AUTO: 9.6 % — SIGNIFICANT CHANGE UP (ref 2–14)
MONOCYTES NFR BLD AUTO: 9.6 % — SIGNIFICANT CHANGE UP (ref 2–14)
NEUTROPHILS # BLD AUTO: 3.36 K/UL — SIGNIFICANT CHANGE UP (ref 1.8–7.4)
NEUTROPHILS # BLD AUTO: 3.36 K/UL — SIGNIFICANT CHANGE UP (ref 1.8–7.4)
NEUTROPHILS NFR BLD AUTO: 66.2 % — SIGNIFICANT CHANGE UP (ref 43–77)
NEUTROPHILS NFR BLD AUTO: 66.2 % — SIGNIFICANT CHANGE UP (ref 43–77)
NRBC # BLD: 0 /100 WBCS — SIGNIFICANT CHANGE UP (ref 0–0)
NRBC # BLD: 0 /100 WBCS — SIGNIFICANT CHANGE UP (ref 0–0)
NRBC # FLD: 0 K/UL — SIGNIFICANT CHANGE UP (ref 0–0)
NRBC # FLD: 0 K/UL — SIGNIFICANT CHANGE UP (ref 0–0)
PLATELET # BLD AUTO: 139 K/UL — LOW (ref 150–400)
PLATELET # BLD AUTO: 139 K/UL — LOW (ref 150–400)
POTASSIUM SERPL-MCNC: 4.5 MMOL/L — SIGNIFICANT CHANGE UP (ref 3.5–5.3)
POTASSIUM SERPL-MCNC: 4.5 MMOL/L — SIGNIFICANT CHANGE UP (ref 3.5–5.3)
POTASSIUM SERPL-SCNC: 4.5 MMOL/L — SIGNIFICANT CHANGE UP (ref 3.5–5.3)
POTASSIUM SERPL-SCNC: 4.5 MMOL/L — SIGNIFICANT CHANGE UP (ref 3.5–5.3)
PROT SERPL-MCNC: 5.5 G/DL — LOW (ref 6–8.3)
PROT SERPL-MCNC: 5.5 G/DL — LOW (ref 6–8.3)
PROTHROM AB SERPL-ACNC: 10.7 SEC — SIGNIFICANT CHANGE UP (ref 9.5–13)
PROTHROM AB SERPL-ACNC: 10.7 SEC — SIGNIFICANT CHANGE UP (ref 9.5–13)
RBC # BLD: 3.7 M/UL — LOW (ref 3.8–5.2)
RBC # BLD: 3.7 M/UL — LOW (ref 3.8–5.2)
RBC # FLD: 15.5 % — HIGH (ref 10.3–14.5)
RBC # FLD: 15.5 % — HIGH (ref 10.3–14.5)
RH IG SCN BLD-IMP: POSITIVE — SIGNIFICANT CHANGE UP
SODIUM SERPL-SCNC: 140 MMOL/L — SIGNIFICANT CHANGE UP (ref 135–145)
SODIUM SERPL-SCNC: 140 MMOL/L — SIGNIFICANT CHANGE UP (ref 135–145)
WBC # BLD: 5.08 K/UL — SIGNIFICANT CHANGE UP (ref 3.8–10.5)
WBC # BLD: 5.08 K/UL — SIGNIFICANT CHANGE UP (ref 3.8–10.5)
WBC # FLD AUTO: 5.08 K/UL — SIGNIFICANT CHANGE UP (ref 3.8–10.5)
WBC # FLD AUTO: 5.08 K/UL — SIGNIFICANT CHANGE UP (ref 3.8–10.5)

## 2024-01-12 PROCEDURE — 99223 1ST HOSP IP/OBS HIGH 75: CPT

## 2024-01-12 RX ORDER — FUROSEMIDE 40 MG
40 TABLET ORAL
Refills: 0 | Status: DISCONTINUED | OUTPATIENT
Start: 2024-01-12 | End: 2024-01-12

## 2024-01-12 RX ORDER — ASPIRIN/CALCIUM CARB/MAGNESIUM 324 MG
81 TABLET ORAL DAILY
Refills: 0 | Status: DISCONTINUED | OUTPATIENT
Start: 2024-01-12 | End: 2024-01-12

## 2024-01-12 RX ORDER — INFLUENZA VIRUS VACCINE 15; 15; 15; 15 UG/.5ML; UG/.5ML; UG/.5ML; UG/.5ML
0.5 SUSPENSION INTRAMUSCULAR ONCE
Refills: 0 | Status: DISCONTINUED | OUTPATIENT
Start: 2024-01-12 | End: 2024-01-12

## 2024-01-12 RX ORDER — CALCIUM ACETATE 667 MG
667 TABLET ORAL
Refills: 0 | Status: DISCONTINUED | OUTPATIENT
Start: 2024-01-12 | End: 2024-01-12

## 2024-01-12 RX ORDER — ATORVASTATIN CALCIUM 80 MG/1
40 TABLET, FILM COATED ORAL AT BEDTIME
Refills: 0 | Status: DISCONTINUED | OUTPATIENT
Start: 2024-01-12 | End: 2024-01-12

## 2024-01-12 RX ORDER — HYDRALAZINE HCL 50 MG
100 TABLET ORAL EVERY 8 HOURS
Refills: 0 | Status: DISCONTINUED | OUTPATIENT
Start: 2024-01-12 | End: 2024-01-12

## 2024-01-12 RX ORDER — LABETALOL HCL 100 MG
300 TABLET ORAL THREE TIMES A DAY
Refills: 0 | Status: DISCONTINUED | OUTPATIENT
Start: 2024-01-12 | End: 2024-01-12

## 2024-01-12 RX ORDER — NIFEDIPINE 30 MG
30 TABLET, EXTENDED RELEASE 24 HR ORAL DAILY
Refills: 0 | Status: DISCONTINUED | OUTPATIENT
Start: 2024-01-12 | End: 2024-01-12

## 2024-01-12 RX ADMIN — Medication 100 MILLIGRAM(S): at 13:25

## 2024-01-12 RX ADMIN — Medication 100 MILLIGRAM(S): at 06:44

## 2024-01-12 RX ADMIN — Medication 300 MILLIGRAM(S): at 13:24

## 2024-01-12 RX ADMIN — Medication 667 MILLIGRAM(S): at 13:24

## 2024-01-12 RX ADMIN — Medication 300 MILLIGRAM(S): at 06:45

## 2024-01-12 RX ADMIN — Medication 40 MILLIGRAM(S): at 13:24

## 2024-01-12 RX ADMIN — Medication 81 MILLIGRAM(S): at 13:24

## 2024-01-12 RX ADMIN — Medication 40 MILLIGRAM(S): at 06:45

## 2024-01-12 NOTE — H&P ADULT - NSHPPHYSICALEXAM_GEN_ALL_CORE
Vital Signs Last 24 Hrs  T(C): 36.6 (12 Jan 2024 00:55), Max: 36.8 (11 Jan 2024 20:54)  T(F): 97.9 (12 Jan 2024 00:55), Max: 98.3 (11 Jan 2024 20:54)  HR: 80 (12 Jan 2024 00:55) (80 - 88)  BP: 145/78 (12 Jan 2024 00:55) (123/84 - 145/78)  RR: 16 (12 Jan 2024 00:55) (15 - 16)  SpO2: 98% (12 Jan 2024 00:55) (98% - 100%)    Parameters below as of 12 Jan 2024 00:55  Patient On (Oxygen Delivery Method): room air    PHYSICAL EXAM:  GENERAL: NAD  HEAD:  Atraumatic, Normocephalic  EYES: PERRL, conjunctiva and sclera clear  NECK: Supple, No JVD  CHEST/LUNG: Clear to auscultation bilaterally; No wheezes, rales or rhonchi; normal work of breathing, speaking in full sentences  HEART: Regular rate and rhythm; No murmurs, rubs, or gallops, (+)S1, S2  ABDOMEN: Soft, Nontender, Nondistended; Normal Bowel sounds   EXTREMITIES:  2+ Peripheral Pulses, No clubbing, cyanosis, or edema  PSYCH: normal mood and affect, A&Ox3  NEUROLOGY: no focal neuro deficits  SKIN: No rashes or lesions on limited exam

## 2024-01-12 NOTE — ED PROVIDER NOTE - OBJECTIVE STATEMENT
53-year-old female with history of end-stage renal disease, Tuesday Thursday Saturday presents from dialysis for evaluation of port.  Patient completed dialysis without complication but upon completion the clamp for one of the lines broke.  She was advised to come to ED for further evaluation.  Patient has no complaints denying headache, chest pain, shortness of breath, abdominal pain, fever, chills, nausea vomiting diarrhea.  No otherwise complaints.  Hx translated by  at bedside.

## 2024-01-12 NOTE — DISCHARGE NOTE PROVIDER - NSDCFUADDAPPT_GEN_ALL_CORE_FT
Continue hemodialysis T/TH/Sat Continue hemodialysis T/TH/Sat    PMD - Dr. Joy Umana  Renal - Dr. Marco Calle  Cards - Dr. Stokes Continue hemodialysis T/TH/Sat- NEXT SATURDAY    PMD - Dr. Joy Umaan  Renal - Dr. Marco Calle  Cards - Dr. Stokes Continue hemodialysis T/TH/Sat- NEXT SATURDAY    PMD - Dr. Joy Umana  Renal - Dr. Marco Calle  Cards - Dr. Stokes

## 2024-01-12 NOTE — ED PROVIDER NOTE - CLINICAL SUMMARY MEDICAL DECISION MAKING FREE TEXT BOX
53-year-old female with history of end-stage renal disease, Tuesday Thursday Saturday presents from dialysis for evaluation of port.  Patient completed dialysis without complication but upon completion the clamp for one of the lines broke.  She was advised to come to ED for further evaluation.  Patient has no complaints denying headache, chest pain, shortness of breath, abdominal pain, fever, chills, nausea vomiting diarrhea.  No otherwise complaints.  Hx translated by  at bedside.   VSS. Exam as noted above. Plan for basic preop labs and admission for IR exchange of catheter,.  D/w Dr. WANG Phan (hospitalist) who accepts case for admission

## 2024-01-12 NOTE — ED PROVIDER NOTE - NSICDXPASTMEDICALHX_GEN_ALL_CORE_FT
roxi all pertinent systems normal
PAST MEDICAL HISTORY:  Anemia     Enlarged thyroid     ESRD on dialysis     H/O thrombocytopenia     HLD (hyperlipidemia)     HTN (hypertension), benign

## 2024-01-12 NOTE — DISCHARGE NOTE NURSING/CASE MANAGEMENT/SOCIAL WORK - NSDCPEFALRISK_GEN_ALL_CORE
For information on Fall & Injury Prevention, visit: [email or web address] OR  [email or web address] OR  https://www.cdc.gov/steadi/patient.html For information on Fall & Injury Prevention, visit: [email or web address] OR  [email or web address] OR  https://www.cdc.gov/steadi/patient.html

## 2024-01-12 NOTE — DISCHARGE NOTE PROVIDER - NSDCCPCAREPLAN_GEN_ALL_CORE_FT
PRINCIPAL DISCHARGE DIAGNOSIS  Diagnosis: Hemodialysis catheter malfunction  Assessment and Plan of Treatment: Your permacath was fixed by interventional radiologist. Resume hemodialysis per your routine.  Follow up with your primary care physician and nephrologist for further monitoring in 1-2 weeks. Please call to arrange appointment.      SECONDARY DISCHARGE DIAGNOSES  Diagnosis: ESRD on dialysis  Assessment and Plan of Treatment: Continue hemodialsis per your routine. Outpt follow up as noted above.    Diagnosis: Essential hypertension  Assessment and Plan of Treatment: Ensure compliance with your medications at home. Follow up with your primary care physician for further monitoring in 1-2 weeks. Please call to arrange appointment. Low salt diet.    Diagnosis: Chronic diastolic congestive heart failure  Assessment and Plan of Treatment: Ensure compliance with your medications at home. Follow up with your primary care physician/Cardiologist for further monitoring in 1-2 weeks. Please call to arrange appointment. Low cholesterol diet.    Diagnosis: Hyperlipidemia  Assessment and Plan of Treatment: Ensure compliance with your medications at home. Follow up with your primary care physician for further monitoring in 1-2 weeks. Please call to arrange appointment. Low cholesterol diet.     PRINCIPAL DISCHARGE DIAGNOSIS  Diagnosis: Hemodialysis catheter malfunction  Assessment and Plan of Treatment: Your permacath was fixed by interventional radiology team. Resume hemodialysis per your routine.  Follow up with your primary care physician and nephrologist for further monitoring in 1-2 weeks. Please call to arrange appointment.      SECONDARY DISCHARGE DIAGNOSES  Diagnosis: ESRD on dialysis  Assessment and Plan of Treatment: Continue hemodialsis per your routine. Outpt follow up as noted above.    Diagnosis: Essential hypertension  Assessment and Plan of Treatment: Ensure compliance with your medications at home. Follow up with your primary care physician for further monitoring in 1-2 weeks. Please call to arrange appointment. Low salt diet.    Diagnosis: Chronic diastolic congestive heart failure  Assessment and Plan of Treatment: Ensure compliance with your medications at home. Follow up with your primary care physician/Cardiologist for further monitoring in 1-2 weeks. Please call to arrange appointment. Low cholesterol diet.    Diagnosis: Hyperlipidemia  Assessment and Plan of Treatment: Ensure compliance with your medications at home. Follow up with your primary care physician for further monitoring in 1-2 weeks. Please call to arrange appointment. Low cholesterol diet.

## 2024-01-12 NOTE — DISCHARGE NOTE NURSING/CASE MANAGEMENT/SOCIAL WORK - NSDCFUADDAPPT_GEN_ALL_CORE_FT
Continue hemodialysis T/TH/Sat- NEXT SATURDAY    PMD - Dr. Joy Umana  Renal - Dr. Marco Calle  Cards - Dr. Stokes

## 2024-01-12 NOTE — H&P ADULT - PROBLEM SELECTOR PLAN 5
c/w home antihypertensives with hold parameters   (reportedly takes a second dose of nifedipine if BP remains elevated during the day)

## 2024-01-12 NOTE — DISCHARGE NOTE NURSING/CASE MANAGEMENT/SOCIAL WORK - PATIENT PORTAL LINK FT
You can access the FollowMyHealth Patient Portal offered by [facility] by registering at the following website: [email or web address]. By joining [employer]’s FollowMyHealth portal, you will also be able to view your health information using other applications (apps) compatible with our system. You can access the FollowMyHealth Patient Portal offered by [facility] by registering at the following website: [email or web address]. By joining "[employer]’s FollowMyHealth portal, you will also be able to view your health information using other applications (apps) compatible with our system.

## 2024-01-12 NOTE — DISCHARGE NOTE PROVIDER - PROVIDER TOKENS
PROVIDER:[TOKEN:[[id number]]] FREE:[LAST:[PCP Dr. Umana],PHONE:[(   )    -],FAX:[(   )    -],ADDRESS:[1 week follow up]],PROVIDER:[TOKEN:[[id number]]] FREE:[LAST:[PCP Dr. Umana],PHONE:[(   )    -],FAX:[(   )    -],ADDRESS:[1 week follow up]],PROVIDER:[TOKEN:[[id number]]]

## 2024-01-12 NOTE — CONSULT NOTE ADULT - ASSESSMENT
53 -year-old female with HTN, HLD, anemia, dHF, ESRD on HD Tu/Th/Sat, presenting with malfunctioned HD catheter. Patient completed HD today and blue catheter port clamp broke, therefore she was advised to come to the ED for evaluation. Patient is without complaints. IR consulted to replace blue clamp on HD cath.     - In sterile fashion, blue clamp was successfully replaced at bedside.   - OK to use cathteter for HD   - Reconsult IR PRN     [id number]  53 -year-old female with HTN, HLD, anemia, dHF, ESRD on HD Tu/Th/Sat, presenting with malfunctioned HD catheter. Patient completed HD today and blue catheter port clamp broke, therefore she was advised to come to the ED for evaluation. Patient is without complaints. IR consulted to replace blue clamp on HD cath.     - In sterile fashion, blue clamp was successfully replaced at bedside.   - OK to use cathteter for HD   - Reconsult IR PRN     [id number]

## 2024-01-12 NOTE — CONSULT NOTE ADULT - ASSESSMENT
53 -year-old female with HTN, HLD, anemia, dHF, ESRD on HD Tu/Th/Sat, presenting with malfunctioned HD catheter. Patient completed HD 1/11 and blue catheter port clamp broke, therefore she was advised to come to the ED for evaluation. Patient is without complaint.  Nephrology consulted for HD needs.    A/P:  ESRD on HD:  Schedule: (TTS)  Access: CVC R IJ  Out[facility]  Nephrologist: Dr. Marco Calle  Last dialyzed: 1/11  Plan for CVC exchange today.  [employer]  [id number] assisted with translation - pt refused to stay for HD post CVC exchange.  NP explained to pt and  at bedside importance of HD after CVC exchange to make sure it works.  Per pt and , they are tired and just want to go home to resume HD outpatient tomorrow.  Renal diet.  Monitor BMP.    Anemia:  Hgb at goal.  Monitor Hgb.    HTN:  Suboptimal.  Continue home meds.  C/W lasix.  Monitor BP.    CKD - MBD:   - at goal.  Low PO4.  C/W calcium acetate 667mg TID.  Monitor PO4 and Ca daily.   53 -year-old female with HTN, HLD, anemia, dHF, ESRD on HD Tu/Th/Sat, presenting with malfunctioned HD catheter. Patient completed HD 1/11 and blue catheter port clamp broke, therefore she was advised to come to the ED for evaluation. Patient is without complaint.  Nephrology consulted for HD needs.    A/P:  ESRD on HD:  Schedule: (TTS)  Access: CVC R IJ  [facility]  Nephrologist: Dr. Marco Calle  Last dialyzed: 1/11  Plan for CVC exchange today.  [employer]  [id number] assisted with translation - pt refused to stay for HD post CVC exchange.  NP explained to pt and  at bedside importance of HD after CVC exchange to make sure it works.  Per pt and , they are tired and just want to go home to resume HD outpatient tomorrow.  Renal diet.  Monitor BMP.    Anemia:  Hgb at goal.  Monitor Hgb.    HTN:  Suboptimal.  Continue home meds.  C/W lasix.  Monitor BP.    CKD - MBD:   - at goal.  Low PO4.  C/W calcium acetate 667mg TID.  Monitor PO4 and Ca daily.

## 2024-01-12 NOTE — H&P ADULT - NSHPLABSRESULTS_GEN_ALL_CORE
10.5   5.08  )-----------( 139      ( 12 Jan 2024 00:20 )             34.6     140  |  102  |  36<H>  ----------------------------<  85     01-12  4.5   |  28  |  6.26<H>    Ca    8.4      12 Jan 2024 00:20    TPro  5.5<L>  /  Alb  3.1<L>  /  TBili  0.2  /  DBili  x   /  AST  13  /  ALT  17  /  AlkPhos  80  01-12    PT/INR: 10.7/0.95 (01-12-24 @ 00:20)  PTT: 27.5 (01-12-24 @ 00:20)    < from: Transthoracic Echocardiogram (11.02.23 @ 07:08) >  CONCLUSIONS:  1. Normal mitral valve. Moderate to severe mitral regurgitation.  2. Normal trileaflet aortic valve. Mild aortic regurgitation.  3. Aortic Root: 3.3 cm.  4. Moderately dilated left atrium.  LA volume index = 46 cc/m2.  5. Normal left ventricular internal dimensions and wall thicknesses.  6. Normal Left Ventricular Systolic Function,  (EF = 55 to 60%)  7. Grade IV diastolic dysfunction (severe with fixed restrictive pattern).  8. Normal right atrium. Linear echodensity consistent with a catheter is visualized in the right atrium.  9. Normal right ventricular size and systolic function (TAPSE 2.6 cm).  10. RA Pressure is8 mm Hg.  11. RV systolic pressure is moderately increased at  46 mm Hg.  12. There is mild tricuspid regurgitation.  13. There is mild pulmonic regurgitation.  14. Moderate pericardial effusion,greatest diameter 1.5cm.  15. Bilateral pleural effusions.  < end of copied text >

## 2024-01-12 NOTE — PROGRESS NOTE ADULT - PROBLEM SELECTOR PLAN 2
Reinstate HD for discharge  c/w phosphate binders Reinstate HD for discharge. Confirmed with SW pt can be discharged today   c/w phosphate binders

## 2024-01-12 NOTE — DISCHARGE NOTE PROVIDER - HOSPITAL COURSE
52yo F with HTN, HLD, anemia, dHF, ESRD on HD Tu/Th/Sat, presenting with broken clamp on HD catheter    Hemodialysis catheter malfunction, IR called for permacath malfunction and in sterile fashion, blue clamp was successfully replaced at bedside.     ESRD on dialysis: s/p HD Thursday, c/w phosphate binders. Renal following, Pt does not want to stay for HD on this admission, would prefer to follow at outpt HD center.     Per ID PA, no contraindication to discharge. Case discussed with Dr. Knight, labs/vitals/medications reviewed, Pt medically cleared for discharge to home with outpt follow up as directed. SW aware to reinstate HD. 52yo F with HTN, HLD, anemia, dHF, ESRD on HD Tu/Th/Sat, presenting with broken clamp on HD catheter.    Hemodialysis catheter malfunction, IR called for permacath malfunction and in sterile fashion, blue clamp was successfully replaced at bedside.     ESRD on dialysis: s/p HD Thursday, c/w phosphate binders. Renal following, Pt does not want to stay for HD on this admission, would prefer to follow at outpt HD center.     Per ID PA, no contraindication to discharge. Case discussed with Dr. Knight, labs/vitals/medications reviewed, Pt medically cleared for discharge to home with outpt follow up as directed. SW aware to reinstate HD.

## 2024-01-12 NOTE — H&P ADULT - ASSESSMENT
53 -year-old female with HTN, HLD, anemia, dHF, ESRD on HD Tu/Th/Sat, presenting with broken clamp on HD catheter

## 2024-01-12 NOTE — ED ADULT NURSE NOTE - CHIEF COMPLAINT QUOTE
presents with broken Dialysis catheter clamp. no distress. pt received full treatment. No complaints of chest pain, headache, nausea, dizziness, vomiting  SOB, fever, chills verbalized. Phx ESRD HTN

## 2024-01-12 NOTE — CONSULT NOTE ADULT - SUBJECTIVE AND OBJECTIVE BOX
[facility]   MD RAJENDRA GRANADOS MD ANGELA WONG, PA QIAN CHEN, NP      TEL:  OFFICE: [phone]  From 5pm-7am answering service [phone]    --- INITIAL RENAL CONSULT NOTE ---date of service 24 @ 12:07    HPI:  53 -year-old female with HTN, HLD, anemia, dHF, ESRD on HD //Sat, presenting with malfunctioned HD catheter. Patient completed HD  and blue catheter port clamp broke, therefore she was advised to come to the ED for evaluation. Patient is without complaint.      Allergies:  No Known Allergies      PAST MEDICAL & SURGICAL HISTORY:  HTN (hypertension), benign    HLD (hyperlipidemia)    ESRD on dialysis    Anemia    Enlarged thyroid    H/O thrombocytopenia    H/O  section    H/O tubal ligation    S/P hemodialysis catheter insertion      Home Medications Reviewed    Hospital Medications:   MEDICATIONS  (STANDING):  aspirin enteric coated 81 milliGRAM(s) Oral daily  atorvastatin 40 milliGRAM(s) Oral at bedtime  calcium acetate 667 milliGRAM(s) Oral three times a day with meals  furosemide    Tablet 40 milliGRAM(s) Oral two times a day  hydrALAZINE 100 milliGRAM(s) Oral every 8 hours  influenza   Vaccine 0.5 milliLiter(s) IntraMuscular once  labetalol 300 milliGRAM(s) Oral three times a day  NIFEdipine XL 30 milliGRAM(s) Oral daily      SOCIAL HISTORY:  Denies ETOh, Smoking,     FAMILY HISTORY:  FH: HTN (hypertension) (Mother)        REVIEW OF SYSTEMS:  CONSTITUTIONAL: No weakness, fevers or chills  EYES/ENT: No visual changes;  No vertigo or throat pain   NECK: No pain or stiffness  RESPIRATORY: No cough, wheezing, hemoptysis; No shortness of breath  CARDIOVASCULAR: No chest pain or palpitations.  GASTROINTESTINAL: No abdominal or epigastric pain. No nausea, vomiting, or hematemesis; No diarrhea or constipation. No melena or hematochezia.  GENITOURINARY: No dysuria, frequency, foamy urine, urinary urgency, incontinence or hematuria  NEUROLOGICAL: No numbness or weakness  SKIN: No itching, burning, rashes, or lesions   VASCULAR: No bilateral lower extremity edema.   All other review of systems is negative unless indicated above.    VITALS:  T(F): 98.5 (24 @ 05:58), Max: 98.5 (24 @ 05:58)  HR: 80 (24 @ 05:58)  BP: 154/89 (24 @ 05:58)  RR: 18 (24 @ 05:58)  SpO2: 100% (24 @ 05:58)  Wt(kg): --        PHYSICAL EXAM:  General: NAD  HEENT: anicteric sclera, oropharynx clear, MMM  Neck: No JVD  Respiratory: CTAB, no wheezes, rales or rhonchi  Cardiovascular: S1, S2, RRR  Gastrointestinal: BS+, soft, NT/ND  Extremities: No cyanosis or clubbing. No peripheral edema  Neurological: A/O x 3, no focal deficits  Psychiatric: Normal mood, normal affect  : No CVA tenderness. No ellis.   Skin: No rashes  Vascular Access: CVC R IJ.    LABS:      140  |  102  |  36<H>  ----------------------------<  85  4.5   |  28  |  6.26<H>    Ca    8.4      2024 00:20    TPro  5.5<L>  /  Alb  3.1<L>  /  TBili  0.2  /  DBili      /  AST  13  /  ALT  17  /  AlkPhos  80      Creatinine Trend: 6.26 <--                        10.5   5.08  )-----------( 139      ( 2024 00:20 )             34.6     Urine Studies:  Urinalysis Basic - ( 2024 00:20 )    Color:  / Appearance:  / SG:  / pH:   Gluc: 85 mg/dL / Ketone:   / Bili:  / Urobili:    Blood:  / Protein:  / Nitrite:    Leuk Esterase:  / RBC:  / WBC    Sq Epi:  / Non Sq Epi:  / Bacteria:           RADIOLOGY & ADDITIONAL STUDIES:                 [facility]   MD RAJENDRA GRANADOS MD ANGELA WONG, PA QIAN CHEN, NP      TEL:  OFFICE: [phone]  From 5pm-7am answering service [phone]    --- INITIAL RENAL CONSULT NOTE ---date of service 24 @ 12:07    HPI:  53 -year-old female with HTN, HLD, anemia, dHF, ESRD on HD //Sat, presenting with malfunctioned HD catheter. Patient completed HD  and blue catheter port clamp broke, therefore she was advised to come to the ED for evaluation. Patient is without complaint.      Allergies:  No Known Allergies      PAST MEDICAL & SURGICAL HISTORY:  HTN (hypertension), benign    HLD (hyperlipidemia)    ESRD on dialysis    Anemia    Enlarged thyroid    H/O thrombocytopenia    H/O  section    H/O tubal ligation    S/P hemodialysis catheter insertion      Home Medications Reviewed    Hospital Medications:   MEDICATIONS  (STANDING):  aspirin enteric coated 81 milliGRAM(s) Oral daily  atorvastatin 40 milliGRAM(s) Oral at bedtime  calcium acetate 667 milliGRAM(s) Oral three times a day with meals  furosemide    Tablet 40 milliGRAM(s) Oral two times a day  hydrALAZINE 100 milliGRAM(s) Oral every 8 hours  influenza   Vaccine 0.5 milliLiter(s) IntraMuscular once  labetalol 300 milliGRAM(s) Oral three times a day  NIFEdipine XL 30 milliGRAM(s) Oral daily      SOCIAL HISTORY:  Denies ETOh, Smoking,     FAMILY HISTORY:  FH: HTN (hypertension) (Mother)        REVIEW OF SYSTEMS:  CONSTITUTIONAL: No weakness, fevers or chills  EYES/ENT: No visual changes;  No vertigo or throat pain   NECK: No pain or stiffness  RESPIRATORY: No cough, wheezing, hemoptysis; No shortness of breath  CARDIOVASCULAR: No chest pain or palpitations.  GASTROINTESTINAL: No abdominal or epigastric pain. No nausea, vomiting, or hematemesis; No diarrhea or constipation. No melena or hematochezia.  GENITOURINARY: No dysuria, frequency, foamy urine, urinary urgency, incontinence or hematuria  NEUROLOGICAL: No numbness or weakness  SKIN: No itching, burning, rashes, or lesions   VASCULAR: No bilateral lower extremity edema.   All other review of systems is negative unless indicated above.    VITALS:  T(F): 98.5 (24 @ 05:58), Max: 98.5 (24 @ 05:58)  HR: 80 (24 @ 05:58)  BP: 154/89 (24 @ 05:58)  RR: 18 (24 @ 05:58)  SpO2: 100% (24 @ 05:58)  Wt(kg): --        PHYSICAL EXAM:  General: NAD  HEENT: anicteric sclera, oropharynx clear, MMM  Neck: No JVD  Respiratory: CTAB, no wheezes, rales or rhonchi  Cardiovascular: S1, S2, RRR  Gastrointestinal: BS+, soft, NT/ND  Extremities: No cyanosis or clubbing. No peripheral edema  Neurological: A/O x 3, no focal deficits  Psychiatric: Normal mood, normal affect  : No CVA tenderness. No ellis.   Skin: No rashes  Vascular Access: CVC R IJ.    LABS:      140  |  102  |  36<H>  ----------------------------<  85  4.5   |  28  |  6.26<H>    Ca    8.4      2024 00:20    TPro  5.5<L>  /  Alb  3.1<L>  /  TBili  0.2  /  DBili      /  AST  13  /  ALT  17  /  AlkPhos  80      Creatinine Trend: 6.26 <--                        10.5   5.08  )-----------( 139      ( 2024 00:20 )             34.6     Urine Studies:  Urinalysis Basic - ( 2024 00:20 )    Color:  / Appearance:  / SG:  / pH:   Gluc: 85 mg/dL / Ketone:   / Bili:  / Urobili:    Blood:  / Protein:  / Nitrite:    Leuk Esterase:  / RBC:  / WBC    Sq Epi:  / Non Sq Epi:  / Bacteria:           RADIOLOGY & ADDITIONAL STUDIES:

## 2024-01-12 NOTE — ED ADULT NURSE NOTE - OBJECTIVE STATEMENT
Pt received in INT8. C/o broken dialysis catheter clamp. Pt's last dialysis today, completed session. PMHx ESRD on dialysis T, Th, Sa, HTN. A&Ox4, ambulatory. Breathing even and unlabored. No complaints of chest pain, headache, nausea, dizziness, vomiting  SOB, fever, or chills. 20G IV placed on left AC. Labs drawn and sent.

## 2024-01-12 NOTE — DISCHARGE NOTE PROVIDER - NSDCMRMEDTOKEN_GEN_ALL_CORE_FT
aspirin 81 mg oral delayed release tablet: 1 tab(s) orally once a day  atorvastatin 40 mg oral tablet: 1 tab(s) orally once a day (at bedtime)  calcium acetate 667 mg oral tablet: 1 tab(s) orally 3 times a day (with meals)  furosemide 40 mg oral tablet: 1 tab(s) orally 2 times a day  hydrALAZINE 100 mg oral tablet: 1 tab(s) orally 3 times a day  labetalol 300 mg oral tablet: 1 tab(s) orally 3 times a day  NIFEdipine (Eqv-Adalat CC) 30 mg oral tablet, extended release: 1 tab(s) orally once a day

## 2024-01-12 NOTE — H&P ADULT - PROBLEM SELECTOR PLAN 1
call IR in AM for fix vs replacement of catheter   currently w/blue hemostat forceps in place of clamp

## 2024-01-12 NOTE — ED PROVIDER NOTE - ATTENDING APP SHARED VISIT CONTRIBUTION OF CARE
I, Stefany Agudelo DO reviewed the [facility] plan of care and discussed the case with the ACP.  I was available for any questions and concerns.

## 2024-01-12 NOTE — H&P ADULT - PROBLEM SELECTOR PLAN 6
w/recent echo notable for pericardial effusion, reportedly last seen by cards 4days ago  clinically euvolemic  denies chest pain, palpitations, LE edema or shortness of breath  outpt f/u with cardiology

## 2024-01-12 NOTE — PATIENT PROFILE ADULT - FALL HARM RISK - HARM RISK INTERVENTIONS
Communicate Risk of Fall with Harm to all staff/Reinforce activity limits and safety measures with patient and family/Tailored Fall Risk Interventions/Visual Cue: Yellow wristband and red socks/Bed in lowest position, wheels locked, appropriate side rails in place/Call bell, personal items and telephone in reach/Instruct patient to call for assistance before getting out of bed or chair/Non-slip footwear when patient is out of bed/[place] to call system/Physically safe environment - no spills, clutter or unnecessary equipment/Purposeful Proactive Rounding/Room/bathroom lighting operational, light cord in reach Communicate Risk of Fall with Harm to all staff/Reinforce activity limits and safety measures with patient and family/Tailored Fall Risk Interventions/Visual Cue: Yellow wristband and red socks/Bed in lowest position, wheels locked, appropriate side rails in place/Call bell, personal items and telephone in reach/Instruct patient to call for assistance before getting out of bed or chair/Non-slip footwear when patient is out of bed/[place] to call system/Physically safe environment - no spills, clutter or unnecessary equipment/Purposeful Proactive Rounding/Room/bathroom lighting operational, light cord in reach

## 2024-01-12 NOTE — CONSULT NOTE ADULT - SUBJECTIVE AND OBJECTIVE BOX
Patient is a 53y old  Female who presents with a chief complaint of HD catheter malfunction (2024 12:07)      HPI:  53 -year-old female with HTN, HLD, anemia, dHF, ESRD on HD //Sat, presenting with malfunctioned HD catheter. Patient completed HD today and blue catheter port clamp broke, therefore she was advised to come to the ED for evaluation. Patient is without complaint.    IR consulted to replace blue clamp on PermCath.       REVIEW OF SYSTEMS: See HPI      PAST MEDICAL & SURGICAL HISTORY:  HTN (hypertension), benign  HLD (hyperlipidemia)  ESRD on dialysis  Anemia  Enlarged thyroid  H/O thrombocytopenia  H/O  section  H/O tubal ligation  S/P hemodialysis catheter insertion    Allergies  No Known Allergies  Intolerances    MEDICATIONS  (STANDING):  aspirin enteric coated 81 milliGRAM(s) Oral daily  atorvastatin 40 milliGRAM(s) Oral at bedtime  calcium acetate 667 milliGRAM(s) Oral three times a day with meals  furosemide    Tablet 40 milliGRAM(s) Oral two times a day  hydrALAZINE 100 milliGRAM(s) Oral every 8 hours  influenza   Vaccine 0.5 milliLiter(s) IntraMuscular once  labetalol 300 milliGRAM(s) Oral three times a day  NIFEdipine XL 30 milliGRAM(s) Oral daily    MEDICATIONS  (PRN):    SOCIAL HISTORY:    FAMILY HISTORY:  FH: HTN (hypertension) (Mother)    PHYSICAL EXAM:  General: well appearing, in no acute distress resting comfortably   Right PermCath: Dressing C/D/I, blue clamp noted to be broken. Red clamp intact.     Vital Signs Last 24 Hrs  T(C): 36.8 (2024 12:40), Max: 36.9 (2024 05:58)  T(F): 98.3 (2024 12:40), Max: 98.5 (2024 05:58)  HR: 74 (2024 13:20) (74 - 88)  BP: 157/68 (2024 13:20) (123/84 - 157/68)  BP(mean): 106 (2024 12:40) (106 - 106)  RR: 18 (2024 12:40) (15 - 18)  SpO2: 97% (2024 12:40) (97% - 100%)    Parameters below as of 2024 12:40  Patient On (Oxygen Delivery Method): room air      LABS:                        10.5   5.08  )-----------( 139      ( 2024 00:20 )             34.6     -12    140  |  102  |  36<H>  ----------------------------<  85  4.5   |  28  |  6.26<H>    Ca    8.4      2024 00:20    TPro  5.5<L>  /  Alb  3.1<L>  /  TBili  0.2  /  DBili  x   /  AST  13  /  ALT  17  /  AlkPhos  80  -12    PT/INR - ( 2024 00:20 )   PT: 10.7 sec;   INR: 0.95 ratio         PTT - ( 2024 00:20 )  PTT:27.5 sec  Urinalysis Basic - ( 2024 00:20 )    Color: x / Appearance: x / SG: x / pH: x  Gluc: 85 mg/dL / Ketone: x  / Bili: x / Urobili: x   Blood: x / Protein: x / Nitrite: x   Leuk Esterase: x / RBC: x / WBC x   Sq Epi: x / Non Sq Epi: x / Bacteria: x

## 2024-01-12 NOTE — ED ADULT NURSE NOTE - NSFALLUNIVINTERV_ED_ALL_ED
06-08 @ 07:31 POCT 326 mg/dL  06-08 @ 05:56 POCT 81 mg/dL  06-08 @ 02:57 POCT 319 mg/dL  06-08 @ 00:00 POCT 375 mg/dL  06-07 @ 21:56 POCT 415 mg/dL  06-07 @ 18:56 POCT 348 mg/dL  06-07 @ 16:01 POCT 122 mg/dL  06-07 @ 13:25 POCT 407 mg/dL  06-07 @ 11:20 POCT 337 mg/dL Bed/Stretcher in lowest position, wheels locked, appropriate side rails in place/Call bell, personal items and telephone in reach/Instruct patient to call for assistance before getting out of bed/chair/stretcher/Non-slip footwear applied when patient is off stretcher/[place] to call system/Physically safe environment - no spills, clutter or unnecessary equipment/Purposeful proactive rounding/Room/bathroom lighting operational, light cord in reach Bed/Stretcher in lowest position, wheels locked, appropriate side rails in place/Call bell, personal items and telephone in reach/Instruct patient to call for assistance before getting out of bed/chair/stretcher/Non-slip footwear applied when patient is off stretcher/[place] to call system/Physically safe environment - no spills, clutter or unnecessary equipment/Purposeful proactive rounding/Room/bathroom lighting operational, light cord in reach

## 2024-01-12 NOTE — H&P ADULT - HISTORY OF PRESENT ILLNESS
53 -year-old female with HTN, HLD, anemia, dHF, ESRD on HD Tu/Th/Sat, presenting with malfunctioned HD catheter. Patient completed HD today and blue catheter port clamp broke, therefore she was advised to come to the ED for evaluation. Patient is without complaint.    In the ED VS:  98.3  88  123-84  15  100%RA

## 2024-01-12 NOTE — H&P ADULT - NSICDXPASTSURGICALHX_GEN_ALL_CORE_FT
PAST SURGICAL HISTORY:  H/O  section     H/O tubal ligation     S/P hemodialysis catheter insertion

## 2024-01-12 NOTE — H&P ADULT - TIME BILLING
Preparing to see the patient including review of tests and other providers' notes, confirming history with patient/, performing medical examination and evaluation, counseling and educating the patient/, ordering medications, documenting clinical information in the EMR, independently interpreting results and communicating results to the patient/, care coordination

## 2024-01-12 NOTE — PROGRESS NOTE ADULT - SUBJECTIVE AND OBJECTIVE BOX
Patient is a 53y old  Female who presents with a chief complaint of HD catheter malfunction (12 Jan 2024 15:50)      SUBJECTIVE / OVERNIGHT EVENTS: No acute events overnight. Pt has no new complaints.    ADDITIONAL REVIEW OF SYSTEMS:    MEDICATIONS  (STANDING):  aspirin enteric coated 81 milliGRAM(s) Oral daily  atorvastatin 40 milliGRAM(s) Oral at bedtime  calcium acetate 667 milliGRAM(s) Oral three times a day with meals  furosemide    Tablet 40 milliGRAM(s) Oral two times a day  hydrALAZINE 100 milliGRAM(s) Oral every 8 hours  influenza   Vaccine 0.5 milliLiter(s) IntraMuscular once  labetalol 300 milliGRAM(s) Oral three times a day  NIFEdipine XL 30 milliGRAM(s) Oral daily    MEDICATIONS  (PRN):      CAPILLARY BLOOD GLUCOSE        I&O's Summary      PHYSICAL EXAM:  Vital Signs Last 24 Hrs  T(C): 36.8 (12 Jan 2024 12:40), Max: 36.9 (12 Jan 2024 05:58)  T(F): 98.3 (12 Jan 2024 12:40), Max: 98.5 (12 Jan 2024 05:58)  HR: 74 (12 Jan 2024 13:20) (74 - 88)  BP: 157/68 (12 Jan 2024 13:20) (123/84 - 157/68)  BP(mean): 106 (12 Jan 2024 12:40) (106 - 106)  RR: 18 (12 Jan 2024 12:40) (15 - 18)  SpO2: 97% (12 Jan 2024 12:40) (97% - 100%)    Parameters below as of 12 Jan 2024 12:40  Patient On (Oxygen Delivery Method): room air      CONSTITUTIONAL: NAD  EYES: PERRLA; conjunctiva and sclera clear  ENMT: Moist oral mucosa, no pharyngeal injection or exudates; normal dentition  NECK: Supple, no palpable masses; no thyromegaly  RESPIRATORY: Normal respiratory effort; lungs are clear to auscultation bilaterally  CARDIOVASCULAR: Regular rate and rhythm, normal S1 and S2, no murmur/rub/gallop; No lower extremity edema; Peripheral pulses are 2+ bilaterally  ABDOMEN: Nontender to palpation, normoactive bowel sounds, no rebound/guarding; No hepatosplenomegaly  MUSCULOSKELETAL:  Normal gait; no clubbing or cyanosis of digits; no joint swelling or tenderness to palpation  PSYCH: A+O to person, place, and time; affect appropriate  NEUROLOGY: CN 2-12 are intact and symmetric; no gross sensory deficits   SKIN: No rashes; no palpable lesions    LABS:                        10.5   5.08  )-----------( 139      ( 12 Jan 2024 00:20 )             34.6     01-12    140  |  102  |  36<H>  ----------------------------<  85  4.5   |  28  |  6.26<H>    Ca    8.4      12 Jan 2024 00:20    TPro  5.5<L>  /  Alb  3.1<L>  /  TBili  0.2  /  DBili  x   /  AST  13  /  ALT  17  /  AlkPhos  80  01-12    PT/INR - ( 12 Jan 2024 00:20 )   PT: 10.7 sec;   INR: 0.95 ratio         PTT - ( 12 Jan 2024 00:20 )  PTT:27.5 sec      Urinalysis Basic - ( 12 Jan 2024 00:20 )    Color: x / Appearance: x / SG: x / pH: x  Gluc: 85 mg/dL / Ketone: x  / Bili: x / Urobili: x   Blood: x / Protein: x / Nitrite: x   Leuk Esterase: x / RBC: x / WBC x   Sq Epi: x / Non Sq Epi: x / Bacteria: x          RADIOLOGY & ADDITIONAL TESTS:  Results Reviewed:   Imaging Personally Reviewed:  Electrocardiogram Personally Reviewed:    COORDINATION OF CARE:  Care Discussed with Consultants/Other Providers [Y/N]:  Prior or Outpatient Records Reviewed [Y/N]:

## 2024-01-12 NOTE — DISCHARGE NOTE PROVIDER - CARE PROVIDERS DIRECT ADDRESSES
[email or web address] ,DirectAddress_Unknown,DirectAddress_Unknown

## 2024-01-12 NOTE — DISCHARGE NOTE PROVIDER - CARE PROVIDER_API CALL
Guy Michael [facility]  Nephrology  [address], [address]  [place] [address]  Phone: [phone]  Fax: [phone]  Follow Up Time:    Guy Michael [facility]  Nephrology  [address], [address]  [place] [address]  Phone: [phone]  Fax: [phone]  Follow Up Time:    PCP Dr. Umana,   1 week follow up  Phone: (   )    -  Fax: (   )    -  Follow Up Time:     aMrco Calle  Internal Medicine  [address] [place] [id number]  Phone: [phone]  Fax: [phone]  Follow Up Time:    PCP Dr. Umana,   1 week follow up  Phone: (   )    -  Fax: (   )    -  Follow Up Time:     Marco Calle  Internal Medicine  [address] [place] [id number]  Phone: [phone]  Fax: [phone]  Follow Up Time:

## 2024-01-12 NOTE — H&P ADULT - PROBLEM SELECTOR PLAN 2
s/p HD Thursday, will need to reinstate HD prior to discharge for Saturday  c/w phosphate binders  if any indication that patient will require admission beyond Friday will need to contact renal for consult for HD on Saturday

## 2024-01-12 NOTE — ED PROVIDER NOTE - PHYSICAL EXAMINATION
CONSTITUTIONAL: Well-appearing; well-nourished; in no apparent distress. Non-toxic appearing.   NEURO: Alert & oriented. Gait steady without assistance. Sensory and motor functions are grossly intact.  PSYCH: Mood appropriate. Thought processes intact.   NECK: Supple  CARD: Regular rate and rhythm, no murmurs. Right upper chest wall permacatheter in place with alligator clamp on blue cord.   RESP: No accessory muscle use; breath sounds clear and equal bilaterally; no wheezes, rhonchi, or rales     ABD: Soft; non-distended; non-tender. No guarding or rebound.   MUSCULOSKELETAL/EXTREMITIES: FROM in all four extremities; no extremity edema.  SKIN: Warm; dry; no apparent lesions or exudate

## 2024-01-18 PROBLEM — Z00.00 ENCOUNTER FOR PREVENTIVE HEALTH EXAMINATION: Status: ACTIVE | Noted: 2024-01-18

## 2024-02-02 ENCOUNTER — APPOINTMENT (OUTPATIENT)
Dept: VASCULAR SURGERY | Facility: CLINIC | Age: 54
End: 2024-02-02

## 2024-02-03 NOTE — PROGRESS NOTE ADULT - PROBLEM SELECTOR PROBLEM 4
ESRD on hemodialysis
[  ] STAT REQUEST              [ X ] ROUTINE REQUEST    Patient is a 76 year old female with abdominal pain. GI consulted to evaluate.        HPI:  76 year old female from home, ambulates with cane, with past medical history significant for HTN, T2DM, CAD, pulmonary fibrosis, and depression who presented to the emergency room with one week history of progressively worsening 8/10 intensity non radiating crampy LLQ abdominal pain associated with nausea, non bloody vomiting and diarrhea. patient denies hematemesis, hematochezia, melena, fever, chills, chest pain, SOB, hematuria, dysuria, recent antibiotic intake or travelling.        PAIN MANAGEMENT:  Pain Scale:                8 /10  Pain Location:         PAST MEDICAL HISTORY    CAD (Coronary Artery Disease)    Coronary Stent    HTN (Hypertension)    Diabetes Mellitus Type II    Diabetic Neuropathy    Diabetic Nephropathy    Hypercholesterolemia    OA (Osteoarthritis) of Knee    GERD (Gastroesophageal Reflux Disease)    Anemia    Pudendal Ulcer    Depression    PUD (Peptic Ulcer Disease)    Heart Attack    Renal insufficiency    Sleep apnea, unspecified type        PAST SURGICAL HISTORY    Hip replacement    Bowel obstruction    Stented coronary artery    Cataract extraction        Allergies    penicillin (Swelling)    Intolerances  None       MEDICATIONS  (STANDING):  allopurinol 100 milliGRAM(s) Oral two times a day  atorvastatin 40 milliGRAM(s) Oral at bedtime  busPIRone 10 milliGRAM(s) Oral three times a day  ciprofloxacin   IVPB 400 milliGRAM(s) IV Intermittent every 12 hours  diltiazem    milliGRAM(s) Oral daily  heparin   Injectable 5000 Unit(s) SubCutaneous every 8 hours  hydrALAZINE 10 milliGRAM(s) Oral three times a day  isosorbide   mononitrate ER Tablet (IMDUR) 30 milliGRAM(s) Oral daily  lactated ringers. 1000 milliLiter(s) (75 mL/Hr) IV Continuous <Continuous>  metroNIDAZOLE  IVPB 500 milliGRAM(s) IV Intermittent every 8 hours  traZODone 50 milliGRAM(s) Oral daily           SOCIAL HISTORY  Advanced Directives:       [ X ] Full Code       [  ] DNR  Marital Status:         [  ] M      [ X ] S      [  ] D       [  ] W  Children:       [X  ] Yes      [  ] No  Occupation:        [  ] Employed       [ X ] Unemployed       [  ] Retired  Diet:       [X  ] Regular       [  ] PEG feeding          [  ] NG tube feeding  Drug Use:           [ X ] Patient denied          [  ] Yes  Alcohol:           [ X ] No             [  ] Yes (socially)         [  ] Yes (chronic)  Tobacco:           [  ] Yes           [ X ] No      FAMILY HISTORY  [ X ] Heart Disease            [ X ] Diabetes             [ X ] HTN             [  ] Colon Cancer             [  ] Stomach Cancer              [  ] Pancreatic Cancer      VITAL SIGNS   Vital Signs Last 24 Hrs  T(C): 36.9 (24 @ 05:17), Max: 37.3 (24 @ 20:06)  T(F): 98.4 (24 @ 05:17), Max: 99.2 (24 @ 20:06)  HR: 77 (24 @ 05:17) (76 - 85)  BP: 147/74 (24 @ 05:17) (122/63 - 159/82)   RR: 18 (24 @ 05:17) (17 - 18)  SpO2: 99% (24 @ 05:17) (96% - 99%)  Daily Weight in k.4 (2024 02:45)         CBC Full  -  ( 2024 07:48 )  WBC Count : 6.91 K/uL  RBC Count : 3.75 M/uL  Hemoglobin : 11.4 g/dL  Hematocrit : 35.9 %  Platelet Count - Automated : 201 K/uL  Mean Cell Volume : 95.7 fl  Mean Cell Hemoglobin : 30.4 pg  Mean Cell Hemoglobin Concentration : 31.8 gm/dL  Auto Neutrophil # : x  Auto Lymphocyte # : x  Auto Monocyte # : x  Auto Eosinophil # : x  Auto Basophil # : x  Auto Neutrophil % : x  Auto Lymphocyte % : x  Auto Monocyte % : x  Auto Eosinophil % : x  Auto Basophil % : x          140  |  107  |  13  ----------------------------<  136<H>  3.7   |  26  |  1.72<H>    Ca    9.0      2024 07:48  Phos  3.1     -  Mg     2.1         TPro  8.1  /  Alb  3.6  /  TBili  0.5  /  DBili  <0.1  /  AST  34  /  ALT  31  /  AlkPhos  54  -      Lipase: 34 U/L ( @ 13:05)     Urinalysis (24 @ 17:35)   Blood, Urine: Negative  pH Urine: 5.0  Glucose Qualitative, Urine: >=1000 mg/dL  Color: Yellow  Urine Appearance: Clear  Bilirubin: Negative  Ketone - Urine: Trace mg/dL  Specific Gravity: 1.019  Protein, Urine: 100 mg/dL  Urobilinogen: 0.2 mg/dL  Nitrite: Negative  Leukocyte Esterase Concentration: Negative    ECG    Ventricular Rate 101 BPM    Atrial Rate 101 BPM    P-R Interval 158 ms    QRS Duration 72 ms    Q-T Interval 340 ms    QTC Calculation(Bazett) 440 ms    P Axis 47 degrees    R Axis 17 degrees    T Axis 37 degrees    Diagnosis Line Sinus tachycardia  Otherwise normal ECG           RADIOLOGY/IMAGING                  ACC: [id number] EXAM:  CT ABDOMEN AND PELVIS   ORDERED BY: LIZY JON     PROCEDURE DATE:  2024          INTERPRETATION:  CLINICAL INFORMATION: Lower abdominal pain    COMPARISON: CT abdomen and pelvis 2023    CONTRAST/COMPLICATIONS:  IV Contrast: NONE  Oral Contrast: NONE  Complications: None reported at time of study completion    PROCEDURE:  CT of the Abdomen and Pelvis was performed.  Sagittal and coronal reformats were performed.    FINDINGS:  LOWER CHEST: Coronary artery calcification.  Bibasilar bronchiectatic and interstitial fibrotic changes.    LIVER: Calcified granuloma left lobe.  BILE DUCTS: Normal caliber.  GALLBLADDER: Cholelithiasis.  SPLEEN: Within normal limits.  PANCREAS: Within normal limits.  ADRENALS: Within normal limits.  KIDNEYS/URETERS: No hydronephrosis. Nonobstructive right renal calculi   largest measuring 7 mm..    BLADDER: Partially obscured by artifact from right THR. Visualized   portions are unremarkable.  REPRODUCTIVE ORGANS: Partially obscured by artifact from right THR.   Visualized portion unremarkable    BOWEL: No bowel obstruction. Colonic diverticulosis. Pericolonic   stranding associated with some proximal sigmoid diverticula consistent   with acute diverticulitis.. Appendix normal  PERITONEUM: No ascites, extraluminal gas or drainable collection.  VESSELS: Atherosclerotic nonaneurysmal.  RETROPERITONEUM/LYMPH NODES: No lymphadenopathy.  ABDOMINAL WALL: Within normal limits.  BONES: Right THR. Degenerative changes..    IMPRESSION:  Acute uncomplicated sigmoid diverticulitis.    Cholelithiasis.    Nonobstructive right nephrolithiasis.            
ESRD on hemodialysis

## 2024-02-29 ENCOUNTER — EMERGENCY (EMERGENCY)
Facility: HOSPITAL | Age: 54
LOS: 1 days | Discharge: HOME | End: 2024-02-29
Attending: EMERGENCY MEDICINE
Payer: MEDICAID

## 2024-02-29 VITALS
HEART RATE: 98 BPM | OXYGEN SATURATION: 93 % | SYSTOLIC BLOOD PRESSURE: 173 MMHG | RESPIRATION RATE: 18 BRPM | WEIGHT: 152.12 LBS | TEMPERATURE: 99 F | DIASTOLIC BLOOD PRESSURE: 88 MMHG

## 2024-02-29 VITALS
SYSTOLIC BLOOD PRESSURE: 153 MMHG | RESPIRATION RATE: 18 BRPM | OXYGEN SATURATION: 94 % | HEART RATE: 94 BPM | TEMPERATURE: 99 F | DIASTOLIC BLOOD PRESSURE: 82 MMHG

## 2024-02-29 DIAGNOSIS — Z99.2 DEPENDENCE ON RENAL DIALYSIS: Chronic | ICD-10-CM

## 2024-02-29 DIAGNOSIS — Z98.51 TUBAL LIGATION STATUS: Chronic | ICD-10-CM

## 2024-02-29 DIAGNOSIS — Z98.891 HISTORY OF UTERINE SCAR FROM PREVIOUS SURGERY: Chronic | ICD-10-CM

## 2024-02-29 LAB
ALBUMIN SERPL ELPH-MCNC: 3.3 G/DL — LOW (ref 3.5–5)
ALP SERPL-CCNC: 78 U/L — SIGNIFICANT CHANGE UP (ref 40–120)
ALT FLD-CCNC: 36 U/L DA — SIGNIFICANT CHANGE UP (ref 10–60)
ANION GAP SERPL CALC-SCNC: 9 MMOL/L — SIGNIFICANT CHANGE UP (ref 5–17)
APPEARANCE UR: ABNORMAL
AST SERPL-CCNC: 33 U/L — SIGNIFICANT CHANGE UP (ref 10–40)
BACTERIA # UR AUTO: ABNORMAL /HPF
BILIRUB SERPL-MCNC: 0.6 MG/DL — SIGNIFICANT CHANGE UP (ref 0.2–1.2)
BILIRUB UR-MCNC: NEGATIVE — SIGNIFICANT CHANGE UP
BUN SERPL-MCNC: 59 MG/DL — HIGH (ref 7–18)
CALCIUM SERPL-MCNC: 8.5 MG/DL — SIGNIFICANT CHANGE UP (ref 8.4–10.5)
CHLORIDE SERPL-SCNC: 104 MMOL/L — SIGNIFICANT CHANGE UP (ref 96–108)
CO2 SERPL-SCNC: 23 MMOL/L — SIGNIFICANT CHANGE UP (ref 22–31)
COLOR SPEC: YELLOW — SIGNIFICANT CHANGE UP
CREAT SERPL-MCNC: 9.97 MG/DL — HIGH (ref 0.5–1.3)
DIFF PNL FLD: ABNORMAL
EGFR: 4 ML/MIN/1.73M2 — LOW
EPI CELLS # UR: PRESENT
FLUAV H3 RNA SPEC QL NAA+PROBE: DETECTED
GLUCOSE SERPL-MCNC: 106 MG/DL — HIGH (ref 70–99)
GLUCOSE UR QL: 100 MG/DL
HCT VFR BLD CALC: 34.4 % — LOW (ref 34.5–45)
HGB BLD-MCNC: 10.5 G/DL — LOW (ref 11.5–15.5)
KETONES UR-MCNC: NEGATIVE MG/DL — SIGNIFICANT CHANGE UP
LEUKOCYTE ESTERASE UR-ACNC: ABNORMAL
MCHC RBC-ENTMCNC: 29.4 PG — SIGNIFICANT CHANGE UP (ref 27–34)
MCHC RBC-ENTMCNC: 30.5 GM/DL — LOW (ref 32–36)
MCV RBC AUTO: 96.4 FL — SIGNIFICANT CHANGE UP (ref 80–100)
NITRITE UR-MCNC: NEGATIVE — SIGNIFICANT CHANGE UP
NRBC # BLD: 0 /100 WBCS — SIGNIFICANT CHANGE UP (ref 0–0)
PH UR: 8 — SIGNIFICANT CHANGE UP (ref 5–8)
PLATELET # BLD AUTO: 79 K/UL — LOW (ref 150–400)
POTASSIUM SERPL-MCNC: 5.5 MMOL/L — HIGH (ref 3.5–5.3)
POTASSIUM SERPL-SCNC: 5.5 MMOL/L — HIGH (ref 3.5–5.3)
PROT SERPL-MCNC: 6.5 G/DL — SIGNIFICANT CHANGE UP (ref 6–8.3)
PROT UR-MCNC: 300 MG/DL
RAPID RVP RESULT: DETECTED
RBC # BLD: 3.57 M/UL — LOW (ref 3.8–5.2)
RBC # FLD: 16.6 % — HIGH (ref 10.3–14.5)
RBC CASTS # UR COMP ASSIST: 45 /HPF — HIGH (ref 0–4)
SARS-COV-2 RNA SPEC QL NAA+PROBE: SIGNIFICANT CHANGE UP
SODIUM SERPL-SCNC: 136 MMOL/L — SIGNIFICANT CHANGE UP (ref 135–145)
SP GR SPEC: 1.01 — SIGNIFICANT CHANGE UP (ref 1–1.03)
UROBILINOGEN FLD QL: 0.2 MG/DL — SIGNIFICANT CHANGE UP (ref 0.2–1)
WBC # BLD: 4.67 K/UL — SIGNIFICANT CHANGE UP (ref 3.8–10.5)
WBC # FLD AUTO: 4.67 K/UL — SIGNIFICANT CHANGE UP (ref 3.8–10.5)
WBC UR QL: 1 /HPF — SIGNIFICANT CHANGE UP (ref 0–5)

## 2024-02-29 PROCEDURE — 99284 EMERGENCY DEPT VISIT MOD MDM: CPT | Mod: 25

## 2024-02-29 PROCEDURE — 96374 THER/PROPH/DIAG INJ IV PUSH: CPT

## 2024-02-29 PROCEDURE — 85027 COMPLETE CBC AUTOMATED: CPT

## 2024-02-29 PROCEDURE — 80053 COMPREHEN METABOLIC PANEL: CPT

## 2024-02-29 PROCEDURE — 0225U NFCT DS DNA&RNA 21 SARSCOV2: CPT

## 2024-02-29 PROCEDURE — 99284 EMERGENCY DEPT VISIT MOD MDM: CPT

## 2024-02-29 PROCEDURE — 87086 URINE CULTURE/COLONY COUNT: CPT

## 2024-02-29 PROCEDURE — 81001 URINALYSIS AUTO W/SCOPE: CPT

## 2024-02-29 PROCEDURE — 94640 AIRWAY INHALATION TREATMENT: CPT

## 2024-02-29 PROCEDURE — 36415 COLL VENOUS BLD VENIPUNCTURE: CPT

## 2024-02-29 RX ORDER — KETOROLAC TROMETHAMINE 30 MG/ML
15 SYRINGE (ML) INJECTION ONCE
Refills: 0 | Status: DISCONTINUED | OUTPATIENT
Start: 2024-02-29 | End: 2024-02-29

## 2024-02-29 RX ORDER — FLUTICASONE PROPIONATE 50 MCG
1 SPRAY, SUSPENSION NASAL ONCE
Refills: 0 | Status: COMPLETED | OUTPATIENT
Start: 2024-02-29 | End: 2024-02-29

## 2024-02-29 RX ORDER — LIDOCAINE 4 G/100G
1 CREAM TOPICAL ONCE
Refills: 0 | Status: COMPLETED | OUTPATIENT
Start: 2024-02-29 | End: 2024-02-29

## 2024-02-29 RX ORDER — ONDANSETRON 8 MG/1
4 TABLET, FILM COATED ORAL ONCE
Refills: 0 | Status: COMPLETED | OUTPATIENT
Start: 2024-02-29 | End: 2024-02-29

## 2024-02-29 RX ORDER — IPRATROPIUM/ALBUTEROL SULFATE 18-103MCG
3 AEROSOL WITH ADAPTER (GRAM) INHALATION ONCE
Refills: 0 | Status: COMPLETED | OUTPATIENT
Start: 2024-02-29 | End: 2024-02-29

## 2024-02-29 RX ORDER — FLUTICASONE PROPIONATE 50 MCG
1 SPRAY, SUSPENSION NASAL
Qty: 1 | Refills: 0
Start: 2024-02-29 | End: 2024-03-06

## 2024-02-29 RX ORDER — CEFTRIAXONE 500 MG/1
1000 INJECTION, POWDER, FOR SOLUTION INTRAMUSCULAR; INTRAVENOUS ONCE
Refills: 0 | Status: COMPLETED | OUTPATIENT
Start: 2024-02-29 | End: 2024-02-29

## 2024-02-29 RX ORDER — ONDANSETRON 8 MG/1
4 TABLET, FILM COATED ORAL ONCE
Refills: 0 | Status: DISCONTINUED | OUTPATIENT
Start: 2024-02-29 | End: 2024-02-29

## 2024-02-29 RX ORDER — OXYCODONE AND ACETAMINOPHEN 5; 325 MG/1; MG/1
1 TABLET ORAL
Qty: 9 | Refills: 0
Start: 2024-02-29 | End: 2024-03-02

## 2024-02-29 RX ORDER — DIPHENHYDRAMINE HYDROCHLORIDE AND LIDOCAINE HYDROCHLORIDE AND ALUMINUM HYDROXIDE AND MAGNESIUM HYDRO
5 KIT ONCE
Refills: 0 | Status: COMPLETED | OUTPATIENT
Start: 2024-02-29 | End: 2024-02-29

## 2024-02-29 RX ORDER — LIDOCAINE 4 G/100G
1 CREAM TOPICAL
Qty: 3 | Refills: 0
Start: 2024-02-29 | End: 2024-03-14

## 2024-02-29 RX ORDER — ONDANSETRON 8 MG/1
1 TABLET, FILM COATED ORAL
Qty: 1 | Refills: 0
Start: 2024-02-29

## 2024-02-29 RX ORDER — SODIUM CHLORIDE 9 MG/ML
1000 INJECTION INTRAMUSCULAR; INTRAVENOUS; SUBCUTANEOUS ONCE
Refills: 0 | Status: DISCONTINUED | OUTPATIENT
Start: 2024-02-29 | End: 2024-02-29

## 2024-02-29 RX ORDER — ALBUTEROL 90 UG/1
2 AEROSOL, METERED ORAL
Qty: 1 | Refills: 2
Start: 2024-02-29 | End: 2024-05-28

## 2024-02-29 RX ORDER — DIPHENHYDRAMINE HYDROCHLORIDE AND LIDOCAINE HYDROCHLORIDE AND ALUMINUM HYDROXIDE AND MAGNESIUM HYDRO
5 KIT
Qty: 1 | Refills: 0
Start: 2024-02-29 | End: 2024-03-04

## 2024-02-29 RX ORDER — DEXTROMETHORPHAN HYDROBROMIDE AND PROMETHAZINE HYDROCHLORIDE 15; 6.25 MG/5ML; MG/5ML
5 SYRUP ORAL
Qty: 100 | Refills: 0
Start: 2024-02-29 | End: 2024-03-06

## 2024-02-29 RX ORDER — LIDOCAINE 4 G/100G
1 CREAM TOPICAL ONCE
Refills: 0 | Status: DISCONTINUED | OUTPATIENT
Start: 2024-02-29 | End: 2024-02-29

## 2024-02-29 RX ORDER — AZITHROMYCIN 500 MG/1
1 TABLET, FILM COATED ORAL
Qty: 3 | Refills: 0
Start: 2024-02-29 | End: 2024-03-02

## 2024-02-29 RX ADMIN — Medication 1 SPRAY(S): at 07:34

## 2024-02-29 RX ADMIN — LIDOCAINE 1 PATCH: 4 CREAM TOPICAL at 03:43

## 2024-02-29 RX ADMIN — Medication 3 MILLILITER(S): at 08:02

## 2024-02-29 RX ADMIN — CEFTRIAXONE 100 MILLIGRAM(S): 500 INJECTION, POWDER, FOR SOLUTION INTRAMUSCULAR; INTRAVENOUS at 05:32

## 2024-02-29 RX ADMIN — ONDANSETRON 4 MILLIGRAM(S): 8 TABLET, FILM COATED ORAL at 03:43

## 2024-02-29 RX ADMIN — DIPHENHYDRAMINE HYDROCHLORIDE AND LIDOCAINE HYDROCHLORIDE AND ALUMINUM HYDROXIDE AND MAGNESIUM HYDRO 5 MILLILITER(S): KIT at 07:34

## 2024-02-29 NOTE — ED PROVIDER NOTE - PATIENT PORTAL LINK FT
You can access the FollowMyHealth Patient Portal offered by [facility] by registering at the following website: [email or web address]. By joining [employer]’s FollowMyHealth portal, you will also be able to view your health information using other applications (apps) compatible with our system.

## 2024-02-29 NOTE — ED PROVIDER NOTE - OBJECTIVE STATEMENT
53-year-old female with end-stage renal disease  dialysis Tuesday Thursday Saturday, hypertension,  hypercholesterolemia, CHF  presents with myalgias, lower back pain, vomiting, coughing.  She denies shortness of breath.

## 2024-02-29 NOTE — ED PROVIDER NOTE - NSFOLLOWUPINSTRUCTIONS_ED_ALL_ED_FT
Tu Tienes INfluenza    El tratamiento para todos los virus es el mismo.  1. PACIENCIA - mejorarás  2. Elba Tylenol 650 or 1000 cada 6 horas por fiebre y dolor.   3. Usa Lidocaine patches por dolor.   5. DESCANSO. Si hace otras cosas con bardales energía, le llevará más tiempo mejorar. Tu Tienes INfluenza    El tratamiento para todos los virus es el mismo.  1. PACIENCIA - mejorarás  2. Elba Tylenol 650 or 1000 cada 6 horas por fiebre y dolor.   3. Usa Lidocaine patches por dolor.   5. DESCANSO. Si hace otras cosas con bardales energía, le llevará más tiempo mejorar.      Tienes cale en la orina. Debes hacer seguimiento con el urólogo. Nuestro coordinador le llamará para ayudarle a concertar tonya vaishnavi.

## 2024-02-29 NOTE — ED PROVIDER NOTE - PHYSICAL EXAMINATION
General:   Mild distress, aching from body pain  HEENT: normocephalic, atraumatic   Respiratory: normal work of breathing  MSK: no swelling or tenderness of lower extremities, moving all extremities spontaneously   Skin: warm, dry  Neuro: A&Ox3, cranial nerves II-XII intact, 5/5 strength in all extremities, no sensory deficits, normal gait   Psych: appropriate affect

## 2024-02-29 NOTE — ED PROVIDER NOTE - CLINICAL SUMMARY MEDICAL DECISION MAKING FREE TEXT BOX
Patient with myalgias,  nausea vomiting and cough.  Symptoms consistent with viral syndrome.  Will give analgesia, Zofran, check flu and reassess.

## 2024-03-01 LAB
CULTURE RESULTS: SIGNIFICANT CHANGE UP
SPECIMEN SOURCE: SIGNIFICANT CHANGE UP

## 2024-04-27 ENCOUNTER — INPATIENT (INPATIENT)
Facility: HOSPITAL | Age: 54
LOS: 5 days | Discharge: HOME | DRG: 853 | End: 2024-05-03
Attending: INTERNAL MEDICINE | Admitting: INTERNAL MEDICINE
Payer: MEDICAID

## 2024-04-27 VITALS
RESPIRATION RATE: 19 BRPM | WEIGHT: 164.02 LBS | HEART RATE: 103 BPM | DIASTOLIC BLOOD PRESSURE: 86 MMHG | TEMPERATURE: 101 F | SYSTOLIC BLOOD PRESSURE: 170 MMHG | OXYGEN SATURATION: 93 % | HEIGHT: 62 IN

## 2024-04-27 DIAGNOSIS — Z98.891 HISTORY OF UTERINE SCAR FROM PREVIOUS SURGERY: Chronic | ICD-10-CM

## 2024-04-27 DIAGNOSIS — R50.9 FEVER, UNSPECIFIED: ICD-10-CM

## 2024-04-27 DIAGNOSIS — Z99.2 DEPENDENCE ON RENAL DIALYSIS: Chronic | ICD-10-CM

## 2024-04-27 DIAGNOSIS — Z98.51 TUBAL LIGATION STATUS: Chronic | ICD-10-CM

## 2024-04-27 LAB
ALBUMIN SERPL ELPH-MCNC: 3.2 G/DL — LOW (ref 3.5–5)
ALP SERPL-CCNC: 69 U/L — SIGNIFICANT CHANGE UP (ref 40–120)
ALT FLD-CCNC: 17 U/L DA — SIGNIFICANT CHANGE UP (ref 10–60)
ANION GAP SERPL CALC-SCNC: 8 MMOL/L — SIGNIFICANT CHANGE UP (ref 5–17)
APPEARANCE UR: CLEAR — SIGNIFICANT CHANGE UP
APTT BLD: 30.2 SEC — SIGNIFICANT CHANGE UP (ref 24.5–35.6)
AST SERPL-CCNC: 21 U/L — SIGNIFICANT CHANGE UP (ref 10–40)
BACTERIA # UR AUTO: ABNORMAL /HPF
BASOPHILS # BLD AUTO: 0.01 K/UL — SIGNIFICANT CHANGE UP (ref 0–0.2)
BASOPHILS NFR BLD AUTO: 0.2 % — SIGNIFICANT CHANGE UP (ref 0–2)
BILIRUB SERPL-MCNC: 0.6 MG/DL — SIGNIFICANT CHANGE UP (ref 0.2–1.2)
BILIRUB UR-MCNC: NEGATIVE — SIGNIFICANT CHANGE UP
BUN SERPL-MCNC: 29 MG/DL — HIGH (ref 7–18)
CALCIUM SERPL-MCNC: 8.1 MG/DL — LOW (ref 8.4–10.5)
CHLORIDE SERPL-SCNC: 103 MMOL/L — SIGNIFICANT CHANGE UP (ref 96–108)
CO2 SERPL-SCNC: 26 MMOL/L — SIGNIFICANT CHANGE UP (ref 22–31)
COLOR SPEC: YELLOW — SIGNIFICANT CHANGE UP
COMMENT - URINE: SIGNIFICANT CHANGE UP
CREAT SERPL-MCNC: 5.89 MG/DL — HIGH (ref 0.5–1.3)
DIFF PNL FLD: NEGATIVE — SIGNIFICANT CHANGE UP
EGFR: 8 ML/MIN/1.73M2 — LOW
EOSINOPHIL # BLD AUTO: 0.11 K/UL — SIGNIFICANT CHANGE UP (ref 0–0.5)
EOSINOPHIL NFR BLD AUTO: 1.9 % — SIGNIFICANT CHANGE UP (ref 0–6)
EPI CELLS # UR: PRESENT
FLUAV AG NPH QL: SIGNIFICANT CHANGE UP
FLUBV AG NPH QL: SIGNIFICANT CHANGE UP
GLUCOSE SERPL-MCNC: 97 MG/DL — SIGNIFICANT CHANGE UP (ref 70–99)
GLUCOSE UR QL: 100 MG/DL
HCT VFR BLD CALC: 37.8 % — SIGNIFICANT CHANGE UP (ref 34.5–45)
HGB BLD-MCNC: 12.2 G/DL — SIGNIFICANT CHANGE UP (ref 11.5–15.5)
IMM GRANULOCYTES NFR BLD AUTO: 0.3 % — SIGNIFICANT CHANGE UP (ref 0–0.9)
INR BLD: 0.96 RATIO — SIGNIFICANT CHANGE UP (ref 0.85–1.18)
KETONES UR-MCNC: NEGATIVE MG/DL — SIGNIFICANT CHANGE UP
LACTATE SERPL-SCNC: 0.4 MMOL/L — LOW (ref 0.7–2)
LEUKOCYTE ESTERASE UR-ACNC: ABNORMAL
LYMPHOCYTES # BLD AUTO: 0.44 K/UL — LOW (ref 1–3.3)
LYMPHOCYTES # BLD AUTO: 7.7 % — LOW (ref 13–44)
MCHC RBC-ENTMCNC: 30.2 PG — SIGNIFICANT CHANGE UP (ref 27–34)
MCHC RBC-ENTMCNC: 32.3 GM/DL — SIGNIFICANT CHANGE UP (ref 32–36)
MCV RBC AUTO: 93.6 FL — SIGNIFICANT CHANGE UP (ref 80–100)
MONOCYTES # BLD AUTO: 0.38 K/UL — SIGNIFICANT CHANGE UP (ref 0–0.9)
MONOCYTES NFR BLD AUTO: 6.6 % — SIGNIFICANT CHANGE UP (ref 2–14)
NEUTROPHILS # BLD AUTO: 4.78 K/UL — SIGNIFICANT CHANGE UP (ref 1.8–7.4)
NEUTROPHILS NFR BLD AUTO: 83.3 % — HIGH (ref 43–77)
NITRITE UR-MCNC: NEGATIVE — SIGNIFICANT CHANGE UP
NRBC # BLD: 0 /100 WBCS — SIGNIFICANT CHANGE UP (ref 0–0)
PH UR: 8.5 (ref 5–8)
PLATELET # BLD AUTO: 62 K/UL — LOW (ref 150–400)
POTASSIUM SERPL-MCNC: 4.7 MMOL/L — SIGNIFICANT CHANGE UP (ref 3.5–5.3)
POTASSIUM SERPL-SCNC: 4.7 MMOL/L — SIGNIFICANT CHANGE UP (ref 3.5–5.3)
PROT SERPL-MCNC: 6.4 G/DL — SIGNIFICANT CHANGE UP (ref 6–8.3)
PROT UR-MCNC: 300 MG/DL
PROTHROM AB SERPL-ACNC: 11 SEC — SIGNIFICANT CHANGE UP (ref 9.5–13)
RBC # BLD: 4.04 M/UL — SIGNIFICANT CHANGE UP (ref 3.8–5.2)
RBC # FLD: 15 % — HIGH (ref 10.3–14.5)
RBC CASTS # UR COMP ASSIST: 1 /HPF — SIGNIFICANT CHANGE UP (ref 0–4)
SARS-COV-2 RNA SPEC QL NAA+PROBE: SIGNIFICANT CHANGE UP
SODIUM SERPL-SCNC: 137 MMOL/L — SIGNIFICANT CHANGE UP (ref 135–145)
SP GR SPEC: 1.01 — SIGNIFICANT CHANGE UP (ref 1–1.03)
UROBILINOGEN FLD QL: 0.2 MG/DL — SIGNIFICANT CHANGE UP (ref 0.2–1)
WBC # BLD: 5.74 K/UL — SIGNIFICANT CHANGE UP (ref 3.8–10.5)
WBC # FLD AUTO: 5.74 K/UL — SIGNIFICANT CHANGE UP (ref 3.8–10.5)
WBC UR QL: 4 /HPF — SIGNIFICANT CHANGE UP (ref 0–5)

## 2024-04-27 PROCEDURE — 71045 X-RAY EXAM CHEST 1 VIEW: CPT | Mod: 26

## 2024-04-27 PROCEDURE — 99223 1ST HOSP IP/OBS HIGH 75: CPT | Mod: GC

## 2024-04-27 PROCEDURE — 99285 EMERGENCY DEPT VISIT HI MDM: CPT

## 2024-04-27 RX ORDER — ACETAMINOPHEN 500 MG
650 TABLET ORAL ONCE
Refills: 0 | Status: COMPLETED | OUTPATIENT
Start: 2024-04-27 | End: 2024-04-27

## 2024-04-27 RX ORDER — LABETALOL HCL 100 MG
300 TABLET ORAL EVERY 8 HOURS
Refills: 0 | Status: DISCONTINUED | OUTPATIENT
Start: 2024-04-27 | End: 2024-05-02

## 2024-04-27 RX ORDER — VANCOMYCIN HCL 1 G
1000 VIAL (EA) INTRAVENOUS ONCE
Refills: 0 | Status: COMPLETED | OUTPATIENT
Start: 2024-04-27 | End: 2024-04-27

## 2024-04-27 RX ORDER — ACETAMINOPHEN 500 MG
1000 TABLET ORAL ONCE
Refills: 0 | Status: COMPLETED | OUTPATIENT
Start: 2024-04-27 | End: 2024-04-27

## 2024-04-27 RX ORDER — CEFEPIME 1 G/1
1000 INJECTION, POWDER, FOR SOLUTION INTRAMUSCULAR; INTRAVENOUS ONCE
Refills: 0 | Status: COMPLETED | OUTPATIENT
Start: 2024-04-27 | End: 2024-04-27

## 2024-04-27 RX ADMIN — Medication 1000 MILLIGRAM(S): at 22:05

## 2024-04-27 RX ADMIN — CEFEPIME 1000 MILLIGRAM(S): 1 INJECTION, POWDER, FOR SOLUTION INTRAMUSCULAR; INTRAVENOUS at 19:10

## 2024-04-27 RX ADMIN — Medication 650 MILLIGRAM(S): at 18:23

## 2024-04-27 RX ADMIN — CEFEPIME 100 MILLIGRAM(S): 1 INJECTION, POWDER, FOR SOLUTION INTRAMUSCULAR; INTRAVENOUS at 18:23

## 2024-04-27 RX ADMIN — Medication 250 MILLIGRAM(S): at 18:23

## 2024-04-27 RX ADMIN — Medication 400 MILLIGRAM(S): at 23:01

## 2024-04-27 RX ADMIN — Medication 650 MILLIGRAM(S): at 19:10

## 2024-04-27 RX ADMIN — Medication 300 MILLIGRAM(S): at 23:01

## 2024-04-27 NOTE — H&P ADULT - REASON FOR ADMISSION
10/15/19      To Whom it may concern:    _Marikasammfeliberto Jasmeet________________________ was in our Emergency Department today, 10/15/19. with a patient who needed their assistance.  Please excuse them from work/school.      Sincerely,         sepsis 2/2 possible RIJ tunneled catheter infection

## 2024-04-27 NOTE — H&P ADULT - ATTENDING COMMENTS
Patient is a 53 year old female with hx of HTN, HLD, anemia, diastolic HF, ESRD (HD TTS), presenting with chills, fevers, productive cough, sore throat and bilateral flank pain since Thursday. Patient underwent dialysis catheter exchange (right chest wall permacath) on Monday, due to a malfunctioning catheter. She states the exchange was complicated with bleeding. She notes receiving dialysis on Tuesday, Thursday and Saturday, without disruptions. She states symptoms onset was Thursday, but is unable to remember if it was associated with dialysis. In addition, she reports her daughter having flu-like symptoms last week. In the ED, vitals significant for Tmax of 101F, tachycardic and hypertensive. PE notable for clear lung sounds, tachycardia with +FRANCOISE, no CVA tenderness or edema. Permacath site clean, without surrounding cellulitis or discharge. Labs notable for no leukocytosis, thrombocytopenia of 62, and lactate of 0.4. CXR with pulmonary vascular congestion. Patient is a 53 year old female with hx of HTN, HLD, anemia, diastolic HF, ESRD (HD TTS), presenting with chills, fevers, productive cough, sore throat and bilateral flank pain since Thursday. Patient underwent dialysis catheter exchange (right chest wall permacath) on Monday, due to a malfunctioning catheter. She states the exchange was complicated with bleeding. She notes receiving dialysis on Tuesday, Thursday and Saturday, without disruptions. She states symptoms onset was Thursday, but is unable to remember if it was associated with dialysis. In addition, she reports her daughter having flu-like symptoms last week. In the ED, vitals significant for Tmax of 101F, tachycardic and hypertensive. PE notable for clear lung sounds, tachycardia with +FRANCOISE, no CVA tenderness or edema. Permacath site clean, without surrounding cellulitis or discharge. Labs notable for no leukocytosis, thrombocytopenia of 62, and lactate of 0.4. CXR consistent with fluid overload. Patient admitted for sepsis 2/2 catheter infection vs viral infection.    A/P  #Sepsis 2/2 infected catheter vs viral infection  #+human metapneumovirus   #Thrombocytopenia   #Heart failure, diastolic  #ESRD on dialysis TTS  #HTN  #HLD  #ppx measures   - Sepsis 2/2 to either infected dialysis catheter vs viral infection. RVP returned positive for human metapneumovirus, which could explain patient's symptoms. She was unable to state whether her symptoms were associated with dialysis, which makes me believe it is less likely due to an infected catheter. She received dialysis on Tues/Thurs/Sat post-catheter exchange without complications. Blood cultures were obtained, if patient is found to be bacteremic, then catheter may need to be removed for source control   - Follow up blood cultures  - Continue empiric Cefepime and Vancomycin by level for suspected infected catheter.   - Nephro consult. Consider IR consult for catheter exchange if patient found to be bacteremic   - If bacteremic, consider Echo   - Tylenol prn for fevers     Rest of management as described above in Resident's note Patient is a 53 year old female with hx of HTN, HLD, anemia, diastolic HF, ESRD (HD TTS), presenting with chills, fevers, productive cough, sore throat and bilateral flank pain since Thursday. Patient underwent dialysis catheter exchange (right chest wall permacath) on Monday, due to a malfunctioning catheter. She states the exchange was complicated with bleeding. She notes receiving dialysis on Tuesday, Thursday and Saturday, without disruptions. She states symptoms onset was Thursday, but is unable to remember if it was associated with dialysis. In addition, she reports her daughter having flu-like symptoms last week. In the ED, vitals significant for Tmax of 101F, tachycardic and hypertensive. PE notable for clear lung sounds, tachycardia with +FRANCOISE, no CVA tenderness or edema. Permacath site clean, without surrounding cellulitis or discharge. Labs notable for no leukocytosis, thrombocytopenia of 62, and lactate of 0.4. CXR consistent with fluid overload. Patient admitted for sepsis 2/2 catheter infection vs viral infection.    A/P  #Sepsis 2/2 infected catheter vs viral infection  #+human metapneumovirus   #Thrombocytopenia   #Heart failure, diastolic  #ESRD on dialysis TTS  #HTN  #HLD  #ppx measures     - Sepsis 2/2 to either infected dialysis catheter vs viral infection. RVP returned positive for human metapneumovirus, which could explain patient's symptoms. She was unable to state whether her symptoms were associated with dialysis, which makes me believe it is less likely due to an infected catheter. She received dialysis on Tues/Thurs/Sat post-catheter exchange without complications. Blood cultures were obtained, if patient is found to be bacteremic, then catheter may need to be removed for source control   - Follow up blood cultures  - Continue empiric Cefepime and Vancomycin by level for suspected infected catheter.   - Nephro consult. Consider IR consult for catheter exchange if patient found to be bacteremic   - If bacteremic, consider Echo   - Tylenol prn for fevers     Rest of management as described above in Resident's note

## 2024-04-27 NOTE — H&P ADULT - PROBLEM SELECTOR PLAN 1
- presents to the ED with chills/rigors, fevers around 102 F, productive cough, sore throat, and b/l flank pain since Thursday (3 days), with recent catheter exchange on Monday 4/22.   - Vitals sig for temp of 101.5 F, , sys  > 158.  -  WC 5.74, cov/flu negative.  - CXR-  Increasing CHF. Probable heart enlargement.   - Admitted for sepsis likely 2/2 to HD tunneled catheter infection vs. viral infection, and fluid overload in the setting of ESRD.  - first set of blood cultures sent around 5pm, second set sent when pt spiked another fever of 103 F  - f/u full RVP add on   - given cefepime and vanc in ED   - c/w cefepime 1g qd, vanc level in AM  - ID consult in the AM by primary team   - Nephro consult in the AM by primary team   - consider IR consult in the AM by primary team for possible catheter exchange/replacement - presents to the ED with chills/rigors, fevers around 102 F, productive cough, sore throat, and b/l flank pain since Thursday (3 days), with recent catheter exchange on Monday 4/22.   - Vitals sig for temp of 101.5 F, , sys  > 158.  -  WC 5.74, cov/flu negative.  - CXR-  Increasing CHF. Probable heart enlargement.   - Admitted for sepsis likely 2/2 to HD tunneled catheter infection vs. viral infection, and fluid overload in the setting of ESRD.  - first set of blood cultures sent around 5pm, second set sent when pt spiked another fever of 103 F  - f/u full RVP add on   - given cefepime and vanc in ED   - c/w cefepime 1g q 12, vanc level in AM  - ID consult in the AM by primary team   - Nephro consult in the AM by primary team   - consider IR consult in the AM by primary team for possible catheter exchange/replacement - presents to the ED with chills/rigors, fevers around 102 F, productive cough, sore throat, and b/l flank pain since Thursday (3 days), with recent catheter exchange on Monday 4/22.   - Vitals sig for temp of 101.5 F, , sys  > 158.  -  WC 5.74, cov/flu negative. lactate 0.4   - CXR-  Increasing CHF. Probable heart enlargement.   - Admitted for sepsis likely 2/2 to HD tunneled catheter infection vs. viral infection, and fluid overload in the setting of ESRD.  - first set of blood cultures sent around 5pm, second set sent when pt spiked another fever of 103 F  - f/u full RVP add on   - given cefepime and vanc in ED   - c/w cefepime 1g q 12, vanc level in AM  - ID consult in the AM by primary team   - Nephro consult in the AM by primary team   - consider IR consult in the AM by primary team for possible catheter exchange/replacement    ADDEMDUM  - Pt found to be hMPV positive, ordered droplet isolation

## 2024-04-27 NOTE — ED PROVIDER NOTE - PHYSICAL EXAMINATION
CONSTITUTIONAL: non-toxic, well appearing  SKIN: no rash, no petechiae.  EYES: pink conjunctiva, anicteric  ENT: tongue and uvular midline, no exudates, moist mucous membranes  NECK: Supple; no meningismus, no JVD  CARD: RRR, no murmurs, equal radial pulses bilaterally 2+. Right chest wall permacath site C/D/I.   RESP: CTAB, no respiratory distress  ABD: Soft, non-tender, non-distended, no peritoneal signs  EXT: Normal ROM x4. No edema.   NEURO: Alert, oriented. Neuro exam nonfocal  PSYCH: Cooperative, appropriate.

## 2024-04-27 NOTE — H&P ADULT - HISTORY OF PRESENT ILLNESS
Pt is a 54yo F with HTN, HLD, anemia, diastolic HF (last TTE 11/02/2023- Grade IV diastolic dysfunction (severe with fixed restrictive pattern), mild TR, mild VT, RV systolic pressure mod inc 46 mmHg, moderate pericardial effusion), ESRD on HD Tu/Th/Sat (diagnosed 2 years ago, last HD this AM), presents to the ED with chills/rigors, fevers around 102 F, productive cough, sore throat, and b/l flank pain since Thursday (3 days). Pt noted that she got her HD catheter exchanged on Monday 4/22 (due to catheter malfunction), pt reported mild complication of bleeding during the procedure that was controlled. Pt has since received HD on tues/thus/sat without issue/complication. Pt did not specifically notice fevers/rigors intra-HD sessions. Pt also reported that her daughter had a cold last week (was not diagnosed with the flu). Pt states that when she coughs she intermittently gets specks of blood in her sputum.     Of note, pt was admitted 11/2023 for acute diastolic CHF.   Also of note, pt was evaluated for fistula placement, however pt stated that it would not be covered by her insurance.

## 2024-04-27 NOTE — H&P ADULT - PROBLEM SELECTOR PLAN 3
- hx of ESRD on HD Tues/thurs/Sat   - next HD Tuesday  - f/u Nephro consult in the AM by primary team   - vanc level based on level in the AM

## 2024-04-27 NOTE — ED PROVIDER NOTE - CLINICAL SUMMARY MEDICAL DECISION MAKING FREE TEXT BOX
[place]: 53-year-old female with past medical history ESRD on hemodialysis (Tuesday, Thursday, Saturday, last full session today), hypertension presents with fevers x 3 days.  Patient reports fevers, chills, cough over the past 3 days.  Patient reports occasional pain with swallowing and bilateral mid back pain, but denies any abdominal pain, vomiting, diarrhea, dysuria, numbness, weakness, rash.  Patient states she had her right chest wall permacath changed approximately 6 days ago.  Physical exam per above. Patient febrile on arrival, unclear source. Will obtain labs, imaging r/o PNA provide supportive treatment with dispo pending workup.

## 2024-04-27 NOTE — H&P ADULT - NSHPPHYSICALEXAM_GEN_ALL_CORE
T(C): 37.9 (04-28-24 @ 00:31), Max: 39.4 (04-27-24 @ 22:35)  HR: 101 (04-28-24 @ 00:31) (99 - 110)  BP: 163/88 (04-28-24 @ 00:31) (158/80 - 185/100)  RR: 19 (04-28-24 @ 00:31) (19 - 24)  SpO2: 96% (04-28-24 @ 00:31) (89% - 96%)    CONSTITUTIONAL: Well groomed, noticeable rigor/chills, warm to the touch   EYES: PERRLA and symmetric, EOMI, No conjunctival or scleral injection, non-icteric  ENMT: Oral mucosa with moist membranes.   RESP: No respiratory distress, no use of accessory muscles; CTA b/l, no WRR  CV: RRR, +S1S2, no MRG; no JVD; no peripheral edema  : no CVA tenderness noted   GI: ND, no TTP in all four quadrants   MSK: Normal ROM without pain, no spinal tenderness, normal muscle strength/tone  SKIN: No rashes or ulcers noted; no subcutaneous nodules or induration palpable  (+) RIJ catheter in place, no surrounding erythema or drainage  (+) warm to the touch   NEURO: CN II-XII intact; normal reflexes in upper and lower extremities, sensation intact in upper and lower extremities b/l to light touch   PSYCH: Appropriate insight/judgment; A+O x 3, mood and affect appropriate, recent/remote memory intact T(C): 37.9 (04-28-24 @ 00:31), Max: 39.4 (04-27-24 @ 22:35)  HR: 101 (04-28-24 @ 00:31) (99 - 110)  BP: 163/88 (04-28-24 @ 00:31) (158/80 - 185/100)  RR: 19 (04-28-24 @ 00:31) (19 - 24)  SpO2: 96% (04-28-24 @ 00:31) (89% - 96%)    CONSTITUTIONAL: Well groomed, noticeable rigor/chills, warm to the touch   EYES: PERRLA and symmetric, EOMI, No conjunctival or scleral injection, non-icteric  ENMT: Oral mucosa with moist membranes.   RESP: No respiratory distress, no use of accessory muscles; CTA b/l, no WRR  CV: RRR, +S1S2, +Murmur; no JVD; no peripheral edema  : no CVA tenderness noted   GI: ND, no TTP in all four quadrants   MSK: Normal ROM without pain, no spinal tenderness, normal muscle strength/tone  SKIN: No rashes or ulcers noted; no subcutaneous nodules or induration palpable  (+) RIJ catheter in place, no surrounding erythema or drainage  (+) warm to the touch   NEURO: CN II-XII intact; normal reflexes in upper and lower extremities, sensation intact in upper and lower extremities b/l to light touch   PSYCH: Appropriate insight/judgment; A+O x 3, mood and affect appropriate, recent/remote memory intact

## 2024-04-27 NOTE — ED PROVIDER NOTE - OBJECTIVE STATEMENT
#[id number]    53-year-old female with past medical history ESRD on hemodialysis (Tuesday, Thursday, Saturday, last full session today), hypertension presents with fevers x 3 days.  Patient reports fevers, chills, cough over the past 3 days.  Patient reports occasional pain with swallowing and bilateral mid back pain, but denies any abdominal pain, vomiting, diarrhea, dysuria, numbness, weakness, rash.  Patient states she had her right chest wall permacath changed approximately 6 days ago.  Denies any additional complaints.

## 2024-04-27 NOTE — ED PROVIDER NOTE - PROGRESS NOTE DETAILS
[place]-DO: pt seen and re-evaluated at bedside.  Pt states their symptoms have improved.  Pt comfortable in NAD.  CXR showing ?CHF. Patient with persistent fever and rigors, concern for bacteremia, will admit. Discussed with hospitalist and MAR re: admission.

## 2024-04-27 NOTE — H&P ADULT - ASSESSMENT
Pt is a 52yo F with HTN, HLD, anemia, diastolic HF (last TTE 11/02/2023- Grade IV diastolic dysfunction (severe with fixed restrictive pattern), mild TR, mild OR, RV systolic pressure mod inc 46 mmHg, moderate pericardial effusion), ESRD on HD Tu/Th/Sat (diagnosed 2 years ago, last HD this AM), presents to the ED with chills/rigors, fevers around 102 F, productive cough, sore throat, and b/l flank pain since Thursday (3 days), with recent catheter exchange on Monday 4/22. Vitals sig for temp of 101.5 F, , sys  > 158. WC 5.74, cov/flu negative. CXR-  Increasing CHF. Probable heart enlargement. Admitted for sepsis likely 2/2 to HD tunneled catheter infection vs. viral infection, and fluid overload in the setting of ESRD.

## 2024-04-27 NOTE — H&P ADULT - PROBLEM SELECTOR PLAN 4
- hx of HTN on hydral, labetalol, nifedipine   - home meds continued with parameters as pt is hypertensive to 180s in ED

## 2024-04-27 NOTE — H&P ADULT - PROBLEM SELECTOR PLAN 2
- hx of diastolic HF  (last TTE 11/02/2023- Grade IV diastolic dysfunction (severe with fixed restrictive pattern), mild TR, mild VA, RV systolic pressure mod inc 46 mmHg, moderate pericardial effusion)  - f/u repeat TTE   - not in acute exacerbation clinically, saturating well on RA,  no signs of fluid overload on exam   - c/w home dose lasix 40 qd   - next HD session likely Tuesday

## 2024-04-27 NOTE — ED ADULT NURSE NOTE - NSFALLUNIVINTERV_ED_ALL_ED
Bed/Stretcher in lowest position, wheels locked, appropriate side rails in place/Call bell, personal items and telephone in reach/Instruct patient to call for assistance before getting out of bed/chair/stretcher/Non-slip footwear applied when patient is off stretcher/[place] to call system/Physically safe environment - no spills, clutter or unnecessary equipment/Purposeful proactive rounding/Room/bathroom lighting operational, light cord in reach

## 2024-04-27 NOTE — H&P ADULT - NSHPREVIEWOFSYSTEMS_GEN_ALL_CORE
REVIEW OF SYSTEMS:    CONSTITUTIONAL: No weakness, (+)fevers/chills  EYES/ENT: No visual changes;  No vertigo or throat pain   NECK: No pain or stiffness  RESPIRATORY: (+) cough, no wheezing, hemoptysis; No shortness of breath  CARDIOVASCULAR: No chest pain or palpitations  GASTROINTESTINAL: No abdominal or epigastric pain. No nausea, vomiting, or hematemesis; No diarrhea or constipation. No melena or hematochezia.  GENITOURINARY: No dysuria, frequency or hematuria  NEUROLOGICAL: No numbness or weakness  SKIN: No itching, burning, rashes, or lesions   All other review of systems is negative unless indicated above.

## 2024-04-28 ENCOUNTER — RESULT REVIEW (OUTPATIENT)
Age: 54
End: 2024-04-28

## 2024-04-28 DIAGNOSIS — I10 ESSENTIAL (PRIMARY) HYPERTENSION: ICD-10-CM

## 2024-04-28 DIAGNOSIS — E78.5 HYPERLIPIDEMIA, UNSPECIFIED: ICD-10-CM

## 2024-04-28 DIAGNOSIS — N18.6 END STAGE RENAL DISEASE: ICD-10-CM

## 2024-04-28 DIAGNOSIS — Z29.9 ENCOUNTER FOR PROPHYLACTIC MEASURES, UNSPECIFIED: ICD-10-CM

## 2024-04-28 DIAGNOSIS — I50.32 CHRONIC DIASTOLIC (CONGESTIVE) HEART FAILURE: ICD-10-CM

## 2024-04-28 DIAGNOSIS — A41.9 SEPSIS, UNSPECIFIED ORGANISM: ICD-10-CM

## 2024-04-28 LAB
ALBUMIN SERPL ELPH-MCNC: 3.1 G/DL — LOW (ref 3.5–5)
ALP SERPL-CCNC: 65 U/L — SIGNIFICANT CHANGE UP (ref 40–120)
ALT FLD-CCNC: 16 U/L DA — SIGNIFICANT CHANGE UP (ref 10–60)
ANION GAP SERPL CALC-SCNC: 9 MMOL/L — SIGNIFICANT CHANGE UP (ref 5–17)
AST SERPL-CCNC: 19 U/L — SIGNIFICANT CHANGE UP (ref 10–40)
BASOPHILS # BLD AUTO: 0.01 K/UL — SIGNIFICANT CHANGE UP (ref 0–0.2)
BASOPHILS NFR BLD AUTO: 0.2 % — SIGNIFICANT CHANGE UP (ref 0–2)
BILIRUB SERPL-MCNC: 0.7 MG/DL — SIGNIFICANT CHANGE UP (ref 0.2–1.2)
BUN SERPL-MCNC: 43 MG/DL — HIGH (ref 7–18)
CALCIUM SERPL-MCNC: 7.8 MG/DL — LOW (ref 8.4–10.5)
CHLORIDE SERPL-SCNC: 103 MMOL/L — SIGNIFICANT CHANGE UP (ref 96–108)
CO2 SERPL-SCNC: 24 MMOL/L — SIGNIFICANT CHANGE UP (ref 22–31)
CREAT SERPL-MCNC: 7.73 MG/DL — HIGH (ref 0.5–1.3)
EGFR: 6 ML/MIN/1.73M2 — LOW
EOSINOPHIL # BLD AUTO: 0.04 K/UL — SIGNIFICANT CHANGE UP (ref 0–0.5)
EOSINOPHIL NFR BLD AUTO: 0.8 % — SIGNIFICANT CHANGE UP (ref 0–6)
GAS PNL BLDA: SIGNIFICANT CHANGE UP
GLUCOSE SERPL-MCNC: 101 MG/DL — HIGH (ref 70–99)
HCT VFR BLD CALC: 37.7 % — SIGNIFICANT CHANGE UP (ref 34.5–45)
HGB BLD-MCNC: 12.1 G/DL — SIGNIFICANT CHANGE UP (ref 11.5–15.5)
HMPV RNA SPEC QL NAA+PROBE: DETECTED
IMM GRANULOCYTES NFR BLD AUTO: 0.6 % — SIGNIFICANT CHANGE UP (ref 0–0.9)
LYMPHOCYTES # BLD AUTO: 0.7 K/UL — LOW (ref 1–3.3)
LYMPHOCYTES # BLD AUTO: 13.7 % — SIGNIFICANT CHANGE UP (ref 13–44)
MAGNESIUM SERPL-MCNC: 2 MG/DL — SIGNIFICANT CHANGE UP (ref 1.6–2.6)
MCHC RBC-ENTMCNC: 30.3 PG — SIGNIFICANT CHANGE UP (ref 27–34)
MCHC RBC-ENTMCNC: 32.1 GM/DL — SIGNIFICANT CHANGE UP (ref 32–36)
MCV RBC AUTO: 94.3 FL — SIGNIFICANT CHANGE UP (ref 80–100)
MONOCYTES # BLD AUTO: 0.37 K/UL — SIGNIFICANT CHANGE UP (ref 0–0.9)
MONOCYTES NFR BLD AUTO: 7.3 % — SIGNIFICANT CHANGE UP (ref 2–14)
NEUTROPHILS # BLD AUTO: 3.95 K/UL — SIGNIFICANT CHANGE UP (ref 1.8–7.4)
NEUTROPHILS NFR BLD AUTO: 77.4 % — HIGH (ref 43–77)
NRBC # BLD: 0 /100 WBCS — SIGNIFICANT CHANGE UP (ref 0–0)
PHOSPHATE SERPL-MCNC: 4.4 MG/DL — SIGNIFICANT CHANGE UP (ref 2.5–4.5)
PLATELET # BLD AUTO: 64 K/UL — LOW (ref 150–400)
POTASSIUM SERPL-MCNC: 5.2 MMOL/L — SIGNIFICANT CHANGE UP (ref 3.5–5.3)
POTASSIUM SERPL-SCNC: 5.2 MMOL/L — SIGNIFICANT CHANGE UP (ref 3.5–5.3)
PROT SERPL-MCNC: 6.1 G/DL — SIGNIFICANT CHANGE UP (ref 6–8.3)
RAPID RVP RESULT: DETECTED
RBC # BLD: 4 M/UL — SIGNIFICANT CHANGE UP (ref 3.8–5.2)
RBC # FLD: 15 % — HIGH (ref 10.3–14.5)
SARS-COV-2 RNA SPEC QL NAA+PROBE: SIGNIFICANT CHANGE UP
SODIUM SERPL-SCNC: 136 MMOL/L — SIGNIFICANT CHANGE UP (ref 135–145)
TROPONIN I, HIGH SENSITIVITY RESULT: 97.9 NG/L — HIGH
WBC # BLD: 5.1 K/UL — SIGNIFICANT CHANGE UP (ref 3.8–10.5)
WBC # FLD AUTO: 5.1 K/UL — SIGNIFICANT CHANGE UP (ref 3.8–10.5)

## 2024-04-28 PROCEDURE — 99233 SBSQ HOSP IP/OBS HIGH 50: CPT

## 2024-04-28 PROCEDURE — 71045 X-RAY EXAM CHEST 1 VIEW: CPT | Mod: 26

## 2024-04-28 RX ORDER — ASPIRIN/CALCIUM CARB/MAGNESIUM 324 MG
81 TABLET ORAL DAILY
Refills: 0 | Status: DISCONTINUED | OUTPATIENT
Start: 2024-04-28 | End: 2024-05-02

## 2024-04-28 RX ORDER — CEFEPIME 1 G/1
1000 INJECTION, POWDER, FOR SOLUTION INTRAMUSCULAR; INTRAVENOUS EVERY 12 HOURS
Refills: 0 | Status: DISCONTINUED | OUTPATIENT
Start: 2024-04-28 | End: 2024-04-30

## 2024-04-28 RX ORDER — LABETALOL HCL 100 MG
5 TABLET ORAL ONCE
Refills: 0 | Status: COMPLETED | OUTPATIENT
Start: 2024-04-28 | End: 2024-04-28

## 2024-04-28 RX ORDER — ALBUTEROL 90 UG/1
2 AEROSOL, METERED ORAL EVERY 6 HOURS
Refills: 0 | Status: DISCONTINUED | OUTPATIENT
Start: 2024-04-28 | End: 2024-05-02

## 2024-04-28 RX ORDER — CEFEPIME 1 G/1
1000 INJECTION, POWDER, FOR SOLUTION INTRAMUSCULAR; INTRAVENOUS EVERY 24 HOURS
Refills: 0 | Status: DISCONTINUED | OUTPATIENT
Start: 2024-04-28 | End: 2024-04-28

## 2024-04-28 RX ORDER — ACETAMINOPHEN 500 MG
1000 TABLET ORAL ONCE
Refills: 0 | Status: COMPLETED | OUTPATIENT
Start: 2024-04-28 | End: 2024-04-28

## 2024-04-28 RX ORDER — HYDRALAZINE HCL 50 MG
100 TABLET ORAL EVERY 8 HOURS
Refills: 0 | Status: DISCONTINUED | OUTPATIENT
Start: 2024-04-28 | End: 2024-05-02

## 2024-04-28 RX ORDER — ACETAMINOPHEN 500 MG
650 TABLET ORAL EVERY 6 HOURS
Refills: 0 | Status: COMPLETED | OUTPATIENT
Start: 2024-04-28 | End: 2024-04-30

## 2024-04-28 RX ORDER — LABETALOL HCL 100 MG
1 TABLET ORAL
Refills: 0 | DISCHARGE

## 2024-04-28 RX ORDER — FUROSEMIDE 40 MG
40 TABLET ORAL ONCE
Refills: 0 | Status: COMPLETED | OUTPATIENT
Start: 2024-04-28 | End: 2024-04-28

## 2024-04-28 RX ORDER — FUROSEMIDE 40 MG
40 TABLET ORAL DAILY
Refills: 0 | Status: DISCONTINUED | OUTPATIENT
Start: 2024-04-28 | End: 2024-04-28

## 2024-04-28 RX ORDER — HEPARIN SODIUM 5000 [USP'U]/ML
5000 INJECTION INTRAVENOUS; SUBCUTANEOUS EVERY 12 HOURS
Refills: 0 | Status: DISCONTINUED | OUTPATIENT
Start: 2024-04-28 | End: 2024-05-01

## 2024-04-28 RX ORDER — ATORVASTATIN CALCIUM 80 MG/1
1 TABLET, FILM COATED ORAL
Refills: 0 | DISCHARGE

## 2024-04-28 RX ORDER — FUROSEMIDE 40 MG
120 TABLET ORAL ONCE
Refills: 0 | Status: COMPLETED | OUTPATIENT
Start: 2024-04-28 | End: 2024-04-28

## 2024-04-28 RX ORDER — MORPHINE SULFATE 50 MG/1
1 CAPSULE, EXTENDED RELEASE ORAL ONCE
Refills: 0 | Status: DISCONTINUED | OUTPATIENT
Start: 2024-04-28 | End: 2024-04-28

## 2024-04-28 RX ORDER — NIFEDIPINE 30 MG
30 TABLET, EXTENDED RELEASE 24 HR ORAL DAILY
Refills: 0 | Status: DISCONTINUED | OUTPATIENT
Start: 2024-04-28 | End: 2024-05-02

## 2024-04-28 RX ADMIN — Medication 40 MILLIGRAM(S): at 05:13

## 2024-04-28 RX ADMIN — Medication 300 MILLIGRAM(S): at 14:58

## 2024-04-28 RX ADMIN — HEPARIN SODIUM 5000 UNIT(S): 5000 INJECTION INTRAVENOUS; SUBCUTANEOUS at 06:08

## 2024-04-28 RX ADMIN — Medication 100 MILLIGRAM(S): at 21:46

## 2024-04-28 RX ADMIN — Medication 81 MILLIGRAM(S): at 11:33

## 2024-04-28 RX ADMIN — Medication 650 MILLIGRAM(S): at 12:55

## 2024-04-28 RX ADMIN — CEFEPIME 100 MILLIGRAM(S): 1 INJECTION, POWDER, FOR SOLUTION INTRAMUSCULAR; INTRAVENOUS at 06:07

## 2024-04-28 RX ADMIN — Medication 650 MILLIGRAM(S): at 23:21

## 2024-04-28 RX ADMIN — Medication 124 MILLIGRAM(S): at 10:19

## 2024-04-28 RX ADMIN — Medication 100 MILLIGRAM(S): at 14:58

## 2024-04-28 RX ADMIN — MORPHINE SULFATE 1 MILLIGRAM(S): 50 CAPSULE, EXTENDED RELEASE ORAL at 07:14

## 2024-04-28 RX ADMIN — Medication 1000 MILLIGRAM(S): at 01:51

## 2024-04-28 RX ADMIN — HEPARIN SODIUM 5000 UNIT(S): 5000 INJECTION INTRAVENOUS; SUBCUTANEOUS at 17:42

## 2024-04-28 RX ADMIN — Medication 650 MILLIGRAM(S): at 12:04

## 2024-04-28 RX ADMIN — Medication 40 MILLIGRAM(S): at 06:08

## 2024-04-28 RX ADMIN — Medication 300 MILLIGRAM(S): at 21:46

## 2024-04-28 RX ADMIN — Medication 200 MILLIGRAM(S): at 04:05

## 2024-04-28 RX ADMIN — Medication 40 MILLIGRAM(S): at 06:59

## 2024-04-28 RX ADMIN — Medication 100 MILLIGRAM(S): at 06:08

## 2024-04-28 RX ADMIN — Medication 400 MILLIGRAM(S): at 06:17

## 2024-04-28 RX ADMIN — CEFEPIME 100 MILLIGRAM(S): 1 INJECTION, POWDER, FOR SOLUTION INTRAMUSCULAR; INTRAVENOUS at 18:59

## 2024-04-28 RX ADMIN — Medication 650 MILLIGRAM(S): at 17:42

## 2024-04-28 RX ADMIN — ALBUTEROL 2 PUFF(S): 90 AEROSOL, METERED ORAL at 04:05

## 2024-04-28 NOTE — CONSULT NOTE ADULT - ASSESSMENT
1 ESRD on HD (T/TH/SAT)  -No urgent need for HD today; plan for HD tomorrow for fluid removal.   -Hemodialysis Renal Diet and Fluid restriction to 1L/day  -Adjust meds to eGFR and avoid IV Gadolinium contrast,NSAIDs, and phosphate enema.  -Monitor Electrolytes daily.  2. HTN:   -bp is acceptable  -titrate bp meds to keep sbp >110 and < 130  3. Anemia of ESRD:  -start epogen if hb <10  -F/u CBC daily  -transfuse if HB < 7.0.  4. Mineral Bone Disease:  -continue phoslo tid  -monitor phos  5. Sob/fever due to fluid overload with hmp virus  -continue lasix 80mg iv bid   -ECHO pending  -UF during HD.       Discussed with patient via  in detail regarding the renal plan and care. 1 ESRD on HD (T/TH/SAT)  -No urgent need for HD today; plan for HD tomorrow for fluid removal.   -Hemodialysis Renal Diet and Fluid restriction to 1L/day  -Adjust meds to eGFR and avoid IV Gadolinium contrast,NSAIDs, and phosphate enema.  -Monitor Electrolytes daily.  2. HTN:   -bp is acceptable  -titrate bp meds to keep sbp >110 and < 130  3. Anemia of ESRD:  -start epogen if hb <10  -F/u CBC daily  -transfuse if HB < 7.0.  4. Mineral Bone Disease:  -continue phoslo tid  -monitor phos  5. Sob/fever due to fluid overload with hmp virus  -start lasix 80mg iv bid   -pt is off bipap   -f/u blood culutres and on iv abx.  -ECHO pending  -UF during HD.   6. AVF placement:  -please consult vascular for fistula placement prior  to discharge  -pt has anxiety for the doing placement as outpatient so better off doing inpatient        Discussed with patient via  in detail regarding the renal plan and care.

## 2024-04-28 NOTE — CONSULT NOTE ADULT - SUBJECTIVE AND OBJECTIVE BOX
NEPHROLOGY MEDICAL CARE, [facility] - Dr. Marco Calle/ Dr. Nicholas Mckeon/ Dr. Ry Zuniga/ Dr. Roxi Reese    Date of Service: 24    Patient was seen and examined at bedside.    Consultation requested by:  Gilson Tom    Reason for Consult: End Stage Renal Disease management    HPI:  Pt is a 54yo F with HTN, HLD, anemia, diastolic HF (last TTE 2023- Grade IV diastolic dysfunction (severe with fixed restrictive pattern), mild TR, mild NE, RV systolic pressure mod inc 46 mmHg, moderate pericardial effusion), ESRD on HD (T//Sat at [facility], Nephro: Dr. Calle) presents to the ED with chills/rigors, fevers around 102 F, productive cough, sore throat, and b/l flank pain since Thursday (3 days). Pt noted that she got her HD catheter exchanged on  (due to catheter malfunction), pt reported mild complication of bleeding during the procedure that was controlled. Pt has since received HD yesterday without issue/complication but had fever/chills last 10mins; blood cultures were taken without abx. Pt also reported that her daughter had a cold last week (was not diagnosed with the flu). Pt states that when she coughs she intermittently gets specks of blood in her sputum. Pt has been dialysis dependent since 2 years.     Of note, pt was admitted 2023 for acute diastolic CHF.   Also of note, pt was evaluated for fistula placement at [facility], however pt stated that it would not be covered by her insurance.              PMH:   HTN (hypertension), benign    HLD (hyperlipidemia)    ESRD on dialysis    Anemia    Enlarged thyroid    H/O thrombocytopenia        PSH:     H/O  section    H/O tubal ligation    S/P hemodialysis catheter insertion        FAMILY HISTORY:  FH: HTN (hypertension) (Mother)        Social History:  non-smoker/ non-alcoholic     Home Meds:  Home Medications:  labetalol 300 mg oral tablet: 1 tab(s) orally 3 times a day (2024 00:54)  Lasix 40 mg oral tablet: 1 tab(s) orally once a day (2024 00:54)  NIFEdipine (Eqv-Adalat CC) 30 mg oral tablet, extended release: 1 tab(s) orally once a day (2024 00:43)      Allergies:  Allergies    No Known Allergies    Intolerances        REVIEW OF SYSTEMS:  CONSTITUTIONAL: No fever; No weight loss; No fatigue  EYES: No eye pain; No visual disturbances; No discharge  ENMT:  No difficulty hearing; No tinnitus;  No vertigo; No sinus; No throat pain  NECK: No pain; No stiffness  BREASTS: No pain; No masses; No nipple discharge  RESPIRATORY: No cough; No wheezing; No chills; No hemoptysis; No shortness of breath  CARDIOVASCULAR: No chest pain; No palpitations; No dizziness; No leg swelling  GASTROINTESTINAL: No abdominal pain; No epigastric pain; No nausea; No vomiting; No hematemesis; No diarrhea; No constipation. No melena   GENITOURINARY: No dysuria No frequency; No hematuria; No incontinence  NEUROLOGICAL: No headaches; No memory loss; No loss of strength; No numbness; No tremors  SKIN: No itching; No burning; No rashes  ENDOCRINE: No heat or cold intolerance; No hair loss  MUSCULOSKELETAL: No joint pain or swelling; No muscle, back, or extremity pain  PSYCHIATRIC: No depression; No anxiety; No mood swings; No difficulty sleeping  HEME/LYMPH: No easy bruising; No bleeding gums  ALLERY AND IMMUNOLOGIC: No hives or eczema    Vital Signs Last 24 Hrs  T(C): 36.6 (2024 10:51), Max: 39.4 (2024 22:35)  T(F): 97.9 (2024 10:51), Max: 103 (2024 22:35)  HR: 77 (2024 12:06) (73 - 110)  BP: 151/82 (2024 12:06) (143/85 - 185/100)  BP(mean): --  RR: 18 (2024 12:06) (18 - 24)  SpO2: 96% (2024 12:06) (89% - 100%)    Parameters below as of 2024 12:06  Patient On (Oxygen Delivery Method): nasal cannula  O2 Flow (L/min): 4        PHYSICAL EXAM:  General: No acute respiratory distress.  Eyes: conjunctiva and sclera clear  ENMT: Atraumatic, Normocephalic, supple, No JVD present. Moist mucous membranes  Respiratory:   Bilaterally clear lungs; No rales, rhonchi, wheezing  Cardiovascular: S1S2+; no m/r/g  Gastrointestinal: Soft, Non-tender, Nondistended; Bowel sounds present, no hepatosplenomegaly.   Neuro:  Awake, Alert & Oriented X3, No focal deficits present.   Ext:  2+ Peripheral Pulses and No edema, No Cyanosis  Skin: No rashes  Back: No CVA tenderness.  Dialysis Access::  AVF thrill/bruit+ or AVG thrill/bruit+ or PERMACATH    LABS:                        12.1   5.10  )-----------( 64       ( 2024 04:50 )             37.7         136  |  103  |  43<H>  ----------------------------<  101<H>  5.2   |  24  |  7.73<H>    Ca    7.8<L>      2024 04:50  Phos  4.4       Mg     2.0         TPro  6.1  /  Alb  3.1<L>  /  TBili  0.7  /  DBili  x   /  AST  19  /  ALT  16  /  AlkPhos  65      PT/INR - ( 2024 17:49 )   PT: 11.0 sec;   INR: 0.96 ratio         PTT - ( 2024 17:49 )  PTT:30.2 sec  Urinalysis Basic - ( 2024 04:50 )    Color: x / Appearance: x / SG: x / pH: x  Gluc: 101 mg/dL / Ketone: x  / Bili: x / Urobili: x   Blood: x / Protein: x / Nitrite: x   Leuk Esterase: x / RBC: x / WBC x   Sq Epi: x / Non Sq Epi: x / Bacteria: x      Magnesium: 2.0 mg/dL ( @ 04:50)  Phosphorus: 4.4 mg/dL ( @ 04:50)    Urine studies      Medications:  acetaminophen     Tablet .. 650 milliGRAM(s) Oral every 6 hours  albuterol    90 MICROgram(s) HFA Inhaler 2 Puff(s) Inhalation every 6 hours PRN  aspirin enteric coated 81 milliGRAM(s) Oral daily  cefepime   IVPB 1000 milliGRAM(s) IV Intermittent every 12 hours  furosemide    Tablet 40 milliGRAM(s) Oral daily  guaiFENesin Oral Liquid (Sugar-Free) 200 milliGRAM(s) Oral every 6 hours PRN  heparin   Injectable 5000 Unit(s) SubCutaneous every 12 hours  hydrALAZINE 100 milliGRAM(s) Oral every 8 hours  labetalol 300 milliGRAM(s) Oral every 8 hours  NIFEdipine XL 30 milliGRAM(s) Oral daily         NEPHROLOGY MEDICAL CARE, [facility] - Dr. Marco Calle/ Dr. Nicholas Mckeon/ Dr. Ry Zuniga/ Dr. Roxi Reese    Date of Service: 24    Patient was seen and examined at bedside.    Consultation requested by:  Gilson Tom    Reason for Consult: End Stage Renal Disease management    HPI:  Pt is a 54yo F with HTN, HLD, anemia, diastolic HF (last TTE 2023- Grade IV diastolic dysfunction (severe with fixed restrictive pattern), mild TR, mild DE, RV systolic pressure mod inc 46 mmHg, moderate pericardial effusion), ESRD on HD (T//Sat at [facility], Nephro: Dr. Calle) presents to the ED with chills/rigors, fevers around 102 F, productive cough, sore throat, and b/l flank pain since Thursday (3 days). Pt noted that she got her HD catheter exchanged on  (due to catheter malfunction), pt reported mild complication of bleeding during the procedure that was controlled. Pt has since received HD yesterday without issue/complication but had fever/chills last 10mins; blood cultures were taken without abx. Pt also reported that her daughter had a cold last week (was not diagnosed with the flu). Pt states that when she coughs she intermittently gets specks of blood in her sputum. Pt has been dialysis dependent since 2 years.     Of note, pt was admitted 2023 for acute diastolic CHF.   Also of note, pt was evaluated for fistula placement at [facility], however pt stated that it would not be covered by her insurance.              PMH:   HTN (hypertension), benign    HLD (hyperlipidemia)    ESRD on dialysis    Anemia    Enlarged thyroid    H/O thrombocytopenia        PSH:     H/O  section    H/O tubal ligation    S/P hemodialysis catheter insertion        FAMILY HISTORY:  FH: HTN (hypertension) (Mother)        Social History:  non-smoker/ non-alcoholic     Home Meds:  Home Medications:  labetalol 300 mg oral tablet: 1 tab(s) orally 3 times a day (2024 00:54)  Lasix 40 mg oral tablet: 1 tab(s) orally once a day (2024 00:54)  NIFEdipine (Eqv-Adalat CC) 30 mg oral tablet, extended release: 1 tab(s) orally once a day (2024 00:43)      Allergies:  Allergies    No Known Allergies    Intolerances        REVIEW OF SYSTEMS:  CONSTITUTIONAL: fever; No weight loss; No fatigue  EYES: No eye pain; No visual disturbances; No discharge  ENMT:  No difficulty hearing; No tinnitus;  No vertigo; No sinus; No throat pain  NECK: No pain; No stiffness  BREASTS: No pain; No masses; No nipple discharge  RESPIRATORY: No cough; No wheezing; No chills; No hemoptysis; No shortness of breath  CARDIOVASCULAR: No chest pain; No palpitations; No dizziness; No leg swelling  GASTROINTESTINAL: No abdominal pain; No epigastric pain; No nausea; No vomiting; No hematemesis; No diarrhea; No constipation. No melena   GENITOURINARY: No dysuria No frequency; No hematuria; No incontinence  NEUROLOGICAL: No headaches; No memory loss; No loss of strength; No numbness; No tremors  SKIN: No itching; No burning; No rashes  ENDOCRINE: No heat or cold intolerance; No hair loss  MUSCULOSKELETAL: No joint pain or swelling; No muscle, back, or extremity pain  PSYCHIATRIC: No depression; No anxiety; No mood swings; No difficulty sleeping  HEME/LYMPH: No easy bruising; No bleeding gums  ALLERY AND IMMUNOLOGIC: No hives or eczema    Vital Signs Last 24 Hrs  T(C): 36.6 (2024 10:51), Max: 39.4 (2024 22:35)  T(F): 97.9 (2024 10:51), Max: 103 (2024 22:35)  HR: 77 (2024 12:06) (73 - 110)  BP: 151/82 (2024 12:06) (143/85 - 185/100)  BP(mean): --  RR: 18 (2024 12:06) (18 - 24)  SpO2: 96% (2024 12:06) (89% - 100%)    Parameters below as of 2024 12:06  Patient On (Oxygen Delivery Method): nasal cannula  O2 Flow (L/min): 4        PHYSICAL EXAM:  General: No acute respiratory distress.  Eyes: conjunctiva and sclera clear  ENMT: Atraumatic, Normocephalic, supple, No JVD present. Moist mucous membranes  Respiratory: Right sided rhonchi >Lt; No rales, wheezing  Cardiovascular: S1S2+; no m/r/g  Gastrointestinal: Soft, Non-tender, Nondistended; Bowel sounds present,   Neuro:  Awake, Alert & Oriented X3, No focal deficits present.   Ext:  2+ Peripheral Pulses and No edema, No Cyanosis  Skin: No rashes  Dialysis Access::  Right Chest PERMACATH    LABS:                        12.1   5.10  )-----------( 64       ( 2024 04:50 )             37.7         136  |  103  |  43<H>  ----------------------------<  101<H>  5.2   |  24  |  7.73<H>    Ca    7.8<L>      2024 04:50  Phos  4.4       Mg     2.0         TPro  6.1  /  Alb  3.1<L>  /  TBili  0.7  /  DBili  x   /  AST  19  /  ALT  16  /  AlkPhos  65      PT/INR - ( 2024 17:49 )   PT: 11.0 sec;   INR: 0.96 ratio         PTT - ( 2024 17:49 )  PTT:30.2 sec  Urinalysis Basic - ( 2024 04:50 )    Color: x / Appearance: x / SG: x / pH: x  Gluc: 101 mg/dL / Ketone: x  / Bili: x / Urobili: x   Blood: x / Protein: x / Nitrite: x   Leuk Esterase: x / RBC: x / WBC x   Sq Epi: x / Non Sq Epi: x / Bacteria: x      Magnesium: 2.0 mg/dL ( @ 04:50)  Phosphorus: 4.4 mg/dL ( @ 04:50)    Urine studies      Medications:  acetaminophen     Tablet .. 650 milliGRAM(s) Oral every 6 hours  albuterol    90 MICROgram(s) HFA Inhaler 2 Puff(s) Inhalation every 6 hours PRN  aspirin enteric coated 81 milliGRAM(s) Oral daily  cefepime   IVPB 1000 milliGRAM(s) IV Intermittent every 12 hours  furosemide    Tablet 40 milliGRAM(s) Oral daily  guaiFENesin Oral Liquid (Sugar-Free) 200 milliGRAM(s) Oral every 6 hours PRN  heparin   Injectable 5000 Unit(s) SubCutaneous every 12 hours  hydrALAZINE 100 milliGRAM(s) Oral every 8 hours  labetalol 300 milliGRAM(s) Oral every 8 hours  NIFEdipine XL 30 milliGRAM(s) Oral daily

## 2024-04-28 NOTE — PATIENT PROFILE ADULT - FALL HARM RISK - HARM RISK INTERVENTIONS

## 2024-04-28 NOTE — PROGRESS NOTE ADULT - NS ATTEST RISK PROBLEM GEN_ALL_CORE FT
-Acute Hypoxic Resp Failure 2/2 Flash Pulmonary Edema requiring BIPAP initiation  -high dose IV Lasix requiring electrolyte monitoring

## 2024-04-28 NOTE — PROGRESS NOTE ADULT - SUBJECTIVE AND OBJECTIVE BOX
S: Patient is wearing a BIPAP mask, focusing on her breathing, not speaking to provider, but appears calm and comfortable  Obtained collateral history from patient's partner at bedside - he says she has been having fever. She urinates 2-3 times per day reportedly, but he is not 100% sure    O:  Vital Signs Last 24 Hrs  T(C): 38.2 (28 Apr 2024 07:56), Max: 39.4 (27 Apr 2024 22:35)  T(F): 100.8 (28 Apr 2024 07:56), Max: 103 (27 Apr 2024 22:35)  HR: 73 (28 Apr 2024 08:46) (73 - 110)  BP: 146/85 (28 Apr 2024 08:46) (146/85 - 185/100)  BP(mean): --  RR: 20 (28 Apr 2024 08:46) (18 - 24)  SpO2: 100% (28 Apr 2024 08:46) (89% - 100%)    Parameters below as of 28 Apr 2024 08:46  Patient On (Oxygen Delivery Method): BiPAP/CPAP        GENERAL: laying in bed in mild distress with increased work of breathing, wearing BIPAP for resp support  HEAD:  Atraumatic, Normocephalic  EYES: conjunctiva and sclera clear  NECK: Supple, No JVD  CHEST/LUNG: diffuse crackles in the lungs, saturating 100% on BiPAP at present  HEART: Regular rate and rhythm; No murmurs, rubs, or gallops  ABDOMEN: Soft, Nontender, Nondistended; Bowel sounds present  EXTREMITIES:  2+ Peripheral Pulses, No clubbing, cyanosis, or edema  PSYCH: calm  NEUROLOGY: non-focal  SKIN: No rashes or lesions    acetaminophen     Tablet .. 650 milliGRAM(s) Oral every 6 hours  albuterol    90 MICROgram(s) HFA Inhaler 2 Puff(s) Inhalation every 6 hours PRN  aspirin enteric coated 81 milliGRAM(s) Oral daily  cefepime   IVPB 1000 milliGRAM(s) IV Intermittent every 12 hours  furosemide    Tablet 40 milliGRAM(s) Oral daily  furosemide   IVPB 120 milliGRAM(s) IV Intermittent once  guaiFENesin Oral Liquid (Sugar-Free) 200 milliGRAM(s) Oral every 6 hours PRN  heparin   Injectable 5000 Unit(s) SubCutaneous every 12 hours  hydrALAZINE 100 milliGRAM(s) Oral every 8 hours  labetalol 300 milliGRAM(s) Oral every 8 hours  NIFEdipine XL 30 milliGRAM(s) Oral daily                            12.1   5.10  )-----------( 64       ( 28 Apr 2024 04:50 )             37.7       04-28    136  |  103  |  43<H>  ----------------------------<  101<H>  5.2   |  24  |  7.73<H>    Ca    7.8<L>      28 Apr 2024 04:50  Phos  4.4     04-28  Mg     2.0     04-28    TPro  6.1  /  Alb  3.1<L>  /  TBili  0.7  /  DBili  x   /  AST  19  /  ALT  16  /  AlkPhos  65  04-28

## 2024-04-28 NOTE — PROGRESS NOTE ADULT - ASSESSMENT
I have reviewed     Patient is a 53 year old female with hx of HTN, HLD, anemia, diastolic HF, ESRD (HD TTS), presenting with chills, fevers, productive cough, sore throat and bilateral flank pain since Thursday. Patient underwent dialysis catheter exchange (right chest wall permacath) on Monday, due to a malfunctioning catheter. She states the exchange was complicated with bleeding. She notes receiving dialysis on Tuesday, Thursday and Saturday, without disruptions. She states symptoms onset was Thursday, but is unable to remember if it was associated with dialysis. In addition, she reports her daughter having flu-like symptoms last week. In the ED, vitals significant for Tmax of 101F, tachycardic and hypertensive. PE notable for clear lung sounds, tachycardia with +FRANCOISE, no CVA tenderness or edema. Permacath site clean, without surrounding cellulitis or discharge. Labs notable for no leukocytosis, thrombocytopenia of 62, and lactate of 0.4. CXR consistent with fluid overload. Patient admitted for sepsis 2/2 catheter infection vs viral infection.    A/P  #Sepsis 2/2 infected catheter vs viral infection  #+human metapneumovirus   #Thrombocytopenia   #Heart failure, diastolic  #ESRD on dialysis TTS  #HTN  #HLD  #ppx measures     - Sepsis 2/2 to either infected dialysis catheter vs viral infection. RVP returned positive for human metapneumovirus, which could explain patient's symptoms. She was unable to state whether her symptoms were associated with dialysis, which makes me believe it is less likely due to an infected catheter. She received dialysis on Tues/Thurs/Sat post-catheter exchange without complications. Blood cultures were obtained, if patient is found to be bacteremic, then catheter may need to be removed for source control   - Follow up blood cultures  - Continue empiric Cefepime and Vancomycin by level for suspected infected catheter.   - Nephro consult. Consider IR consult for catheter exchange if patient found to be bacteremic   - If bacteremic, consider Echo   - Tylenol prn for fevers        I have reviewed CBC, BMP, trop, lactic acid. Platelet count is low at 64. No leukocytosis. Trop is mildly elevated at 97. Lactic acid is negative.     I have personally reviewed CXR - there is diffuse pulmonary edema, much worse on the right side.     Assessment and Plan:    Patient is a 53 year old female with hx of HTN, HLD, anemia, diastolic HF, ESRD (HD TTS), presenting with chills, fevers, productive cough, sore throat and bilateral flank pain since Thursday. Patient underwent dialysis catheter exchange (right chest wall permacath) on Monday, due to a malfunctioning catheter. She states the exchange was complicated with bleeding. She notes receiving dialysis on Tuesday, Thursday and Saturday, without disruptions. She states symptoms onset was Thursday, but is unable to remember if it was associated with dialysis. In addition, she reports her daughter having flu-like symptoms last week. In the ED, vitals significant for Tmax of 101F, tachycardic and hypertensive. PE notable for clear lung sounds, tachycardia with +FRANCOISE, no CVA tenderness or edema. Permacath site clean, without surrounding cellulitis or discharge. Labs notable for no leukocytosis, thrombocytopenia of 62, and lactate of 0.4. CXR consistent with fluid overload. Patient admitted for sepsis 2/2 catheter infection vs viral infection.    Course complicated by acute hypoxic respiratory failure 2/2 flash pulmonary edema in the AM on 4/28, requiring BiPAP.     A/P  #Sepsis 2/2 infected catheter vs viral infection  #+human metapneumovirus   #Thrombocytopenia   #Heart failure, diastolic  #ESRD on dialysis TTS  #HTN  #HLD  #ppx measures     - re respiratory failure: will trial high dose IV Lasix 120 mg x 1 in en effort to improve pulmonary edema, but may not be effective given ESRD status; ICU consult if unable to wean from BiPAP; in addition, will need urgent HD for UF session to enable volume removal  - Sepsis 2/2 to either infected dialysis catheter vs viral infection. RVP returned positive for human metapneumovirus, which could explain patient's symptoms. She was unable to state whether her symptoms were associated with dialysis, which makes me believe it is less likely due to an infected catheter. She received dialysis on Tues/Thurs/Sat post-catheter exchange without complications. Blood cultures were obtained, if patient is found to be bacteremic, then catheter may need to be removed for source control   - Follow up blood cultures  - Continue empiric Cefepime and Vancomycin by level for suspected infected catheter.   - Nephro consult. Consider IR consult for catheter exchange if patient found to be bacteremic   - If bacteremic, consider Echo   - Tylenol prn for fevers

## 2024-04-29 LAB
ALBUMIN SERPL ELPH-MCNC: 2.7 G/DL — LOW (ref 3.5–5)
ALP SERPL-CCNC: 61 U/L — SIGNIFICANT CHANGE UP (ref 40–120)
ALT FLD-CCNC: 17 U/L DA — SIGNIFICANT CHANGE UP (ref 10–60)
ANION GAP SERPL CALC-SCNC: 13 MMOL/L — SIGNIFICANT CHANGE UP (ref 5–17)
AST SERPL-CCNC: 27 U/L — SIGNIFICANT CHANGE UP (ref 10–40)
BASOPHILS # BLD AUTO: 0.01 K/UL — SIGNIFICANT CHANGE UP (ref 0–0.2)
BASOPHILS NFR BLD AUTO: 0.2 % — SIGNIFICANT CHANGE UP (ref 0–2)
BILIRUB SERPL-MCNC: 0.6 MG/DL — SIGNIFICANT CHANGE UP (ref 0.2–1.2)
BUN SERPL-MCNC: 76 MG/DL — HIGH (ref 7–18)
CALCIUM SERPL-MCNC: 8 MG/DL — LOW (ref 8.4–10.5)
CHLORIDE SERPL-SCNC: 100 MMOL/L — SIGNIFICANT CHANGE UP (ref 96–108)
CO2 SERPL-SCNC: 23 MMOL/L — SIGNIFICANT CHANGE UP (ref 22–31)
CREAT SERPL-MCNC: 10.4 MG/DL — HIGH (ref 0.5–1.3)
EGFR: 4 ML/MIN/1.73M2 — LOW
EOSINOPHIL # BLD AUTO: 0.03 K/UL — SIGNIFICANT CHANGE UP (ref 0–0.5)
EOSINOPHIL NFR BLD AUTO: 0.6 % — SIGNIFICANT CHANGE UP (ref 0–6)
GLUCOSE SERPL-MCNC: 124 MG/DL — HIGH (ref 70–99)
HBV SURFACE AG SER-ACNC: SIGNIFICANT CHANGE UP
HCT VFR BLD CALC: 34.3 % — LOW (ref 34.5–45)
HGB BLD-MCNC: 11.1 G/DL — LOW (ref 11.5–15.5)
IMM GRANULOCYTES NFR BLD AUTO: 0.2 % — SIGNIFICANT CHANGE UP (ref 0–0.9)
LYMPHOCYTES # BLD AUTO: 0.52 K/UL — LOW (ref 1–3.3)
LYMPHOCYTES # BLD AUTO: 10 % — LOW (ref 13–44)
MAGNESIUM SERPL-MCNC: 2.1 MG/DL — SIGNIFICANT CHANGE UP (ref 1.6–2.6)
MCHC RBC-ENTMCNC: 30.7 PG — SIGNIFICANT CHANGE UP (ref 27–34)
MCHC RBC-ENTMCNC: 32.4 GM/DL — SIGNIFICANT CHANGE UP (ref 32–36)
MCV RBC AUTO: 94.8 FL — SIGNIFICANT CHANGE UP (ref 80–100)
MONOCYTES # BLD AUTO: 0.23 K/UL — SIGNIFICANT CHANGE UP (ref 0–0.9)
MONOCYTES NFR BLD AUTO: 4.4 % — SIGNIFICANT CHANGE UP (ref 2–14)
NEUTROPHILS # BLD AUTO: 4.38 K/UL — SIGNIFICANT CHANGE UP (ref 1.8–7.4)
NEUTROPHILS NFR BLD AUTO: 84.6 % — HIGH (ref 43–77)
NRBC # BLD: 0 /100 WBCS — SIGNIFICANT CHANGE UP (ref 0–0)
PHOSPHATE SERPL-MCNC: 6.7 MG/DL — HIGH (ref 2.5–4.5)
PLATELET # BLD AUTO: 64 K/UL — LOW (ref 150–400)
POTASSIUM SERPL-MCNC: 5 MMOL/L — SIGNIFICANT CHANGE UP (ref 3.5–5.3)
POTASSIUM SERPL-SCNC: 5 MMOL/L — SIGNIFICANT CHANGE UP (ref 3.5–5.3)
PROT SERPL-MCNC: 5.8 G/DL — LOW (ref 6–8.3)
RBC # BLD: 3.62 M/UL — LOW (ref 3.8–5.2)
RBC # FLD: 14.9 % — HIGH (ref 10.3–14.5)
SODIUM SERPL-SCNC: 136 MMOL/L — SIGNIFICANT CHANGE UP (ref 135–145)
WBC # BLD: 5.18 K/UL — SIGNIFICANT CHANGE UP (ref 3.8–10.5)
WBC # FLD AUTO: 5.18 K/UL — SIGNIFICANT CHANGE UP (ref 3.8–10.5)

## 2024-04-29 PROCEDURE — 99233 SBSQ HOSP IP/OBS HIGH 50: CPT | Mod: GC

## 2024-04-29 RX ORDER — CHLORHEXIDINE GLUCONATE 213 G/1000ML
1 SOLUTION TOPICAL DAILY
Refills: 0 | Status: DISCONTINUED | OUTPATIENT
Start: 2024-04-29 | End: 2024-05-02

## 2024-04-29 RX ORDER — FUROSEMIDE 40 MG
80 TABLET ORAL
Refills: 0 | Status: DISCONTINUED | OUTPATIENT
Start: 2024-04-29 | End: 2024-05-01

## 2024-04-29 RX ORDER — CALCIUM ACETATE 667 MG
667 TABLET ORAL
Refills: 0 | Status: DISCONTINUED | OUTPATIENT
Start: 2024-04-29 | End: 2024-05-02

## 2024-04-29 RX ADMIN — Medication 300 MILLIGRAM(S): at 13:41

## 2024-04-29 RX ADMIN — Medication 650 MILLIGRAM(S): at 05:40

## 2024-04-29 RX ADMIN — CEFEPIME 100 MILLIGRAM(S): 1 INJECTION, POWDER, FOR SOLUTION INTRAMUSCULAR; INTRAVENOUS at 05:40

## 2024-04-29 RX ADMIN — Medication 200 MILLIGRAM(S): at 13:41

## 2024-04-29 RX ADMIN — Medication 300 MILLIGRAM(S): at 21:37

## 2024-04-29 RX ADMIN — Medication 100 MILLIGRAM(S): at 05:40

## 2024-04-29 RX ADMIN — Medication 200 MILLIGRAM(S): at 21:35

## 2024-04-29 RX ADMIN — Medication 650 MILLIGRAM(S): at 18:05

## 2024-04-29 RX ADMIN — Medication 650 MILLIGRAM(S): at 19:05

## 2024-04-29 RX ADMIN — HEPARIN SODIUM 5000 UNIT(S): 5000 INJECTION INTRAVENOUS; SUBCUTANEOUS at 18:05

## 2024-04-29 RX ADMIN — CEFEPIME 100 MILLIGRAM(S): 1 INJECTION, POWDER, FOR SOLUTION INTRAMUSCULAR; INTRAVENOUS at 18:05

## 2024-04-29 RX ADMIN — Medication 200 MILLIGRAM(S): at 05:55

## 2024-04-29 RX ADMIN — Medication 80 MILLIGRAM(S): at 13:41

## 2024-04-29 RX ADMIN — Medication 300 MILLIGRAM(S): at 05:41

## 2024-04-29 RX ADMIN — Medication 650 MILLIGRAM(S): at 23:59

## 2024-04-29 RX ADMIN — Medication 100 MILLIGRAM(S): at 21:36

## 2024-04-29 RX ADMIN — Medication 100 MILLIGRAM(S): at 13:41

## 2024-04-29 RX ADMIN — Medication 650 MILLIGRAM(S): at 13:18

## 2024-04-29 RX ADMIN — HEPARIN SODIUM 5000 UNIT(S): 5000 INJECTION INTRAVENOUS; SUBCUTANEOUS at 05:41

## 2024-04-29 RX ADMIN — Medication 81 MILLIGRAM(S): at 12:17

## 2024-04-29 RX ADMIN — Medication 30 MILLIGRAM(S): at 05:41

## 2024-04-29 RX ADMIN — Medication 650 MILLIGRAM(S): at 12:18

## 2024-04-29 RX ADMIN — Medication 667 MILLIGRAM(S): at 18:05

## 2024-04-29 RX ADMIN — Medication 667 MILLIGRAM(S): at 12:18

## 2024-04-29 NOTE — PROGRESS NOTE ADULT - ASSESSMENT
1 ESRD on HD (T/TH/SAT): pt will be off schedule while on the hospital.   --s/p HD today with UF 2.0kg.   -Hemodialysis Renal Diet and Fluid restriction to 1L/day  -Adjust meds to eGFR and avoid IV Gadolinium contrast,NSAIDs, and phosphate enema.  -Monitor Electrolytes daily.  2. HTN:   -bp is acceptable  -titrate bp meds to keep sbp >110 and < 130  3. Anemia of ESRD:  -start epogen if hb <10  -F/u CBC daily  -transfuse if HB < 7.0.  4. Mineral Bone Disease:  -continue phoslo tid  -monitor phos  5. Sob/fever due to fluid overload with hmp virus  -pt is off bipap   -f/u blood culutres and on iv abx.  -ECHO noted.  -UF during HD.   6. AVF placement:  -please consult vascular for fistula placement prior  to discharge  -pt has anxiety for the doing placement as outpatient so better off doing inpatient        Discussed with patient via  [id number]) in detail regarding the renal plan and care.

## 2024-04-29 NOTE — CONSULT NOTE ADULT - SUBJECTIVE AND OBJECTIVE BOX
VASCULAR SURGERY CONSULTATION NOTE    HPI: 53F with HTN, HLD, anemia, diastolic HF (last TTE 2023- Grade IV diastolic dysfunction, (severe with fixed restrictive pattern), mild TR, mild [place], RV systolic pressure mod inc 46 mmHg, moderate pericardial effusion), ESRD on HD //Sat presented to the ED with chills/rigors, fevers, productive cough, sore throat, and b/l flank pain since Thursday (3 days). Pt got her HD catheter exchanged on  (due to catheter malfunction), pt reported mild complication of bleeding during the procedure that was controlled. Pt did not specifically notice fevers/rigors intra-HD sessions. Pt also reported that her daughter had a cold last week. Pt states that when she coughs she intermittently gets specks of blood in her sputum.   Pt in need of AVF creation for initiation of long-term dialysis.    REVIEW OF SYSTEMS:   Negative except stated above in HPI    Allergies  No Known Allergies  Intolerances    MEDICATIONS:  aspirin enteric coated 81 milliGRAM(s) Oral daily  furosemide   Injectable 80 milliGRAM(s) IV Push two times a day  heparin   Injectable 5000 Unit(s) SubCutaneous every 12 hours  hydrALAZINE 100 milliGRAM(s) Oral every 8 hours  labetalol 300 milliGRAM(s) Oral every 8 hours  NIFEdipine XL 30 milliGRAM(s) Oral daily  cefepime   IVPB 1000 milliGRAM(s) IV Intermittent every 12 hours  albuterol    90 MICROgram(s) HFA Inhaler 2 Puff(s) Inhalation every 6 hours PRN  guaiFENesin Oral Liquid (Sugar-Free) 200 milliGRAM(s) Oral every 6 hours PRN  acetaminophen     Tablet .. 650 milliGRAM(s) Oral every 6 hours  calcium acetate 667 milliGRAM(s) Oral three times a day with meals  chlorhexidine 2% Cloths 1 Application(s) Topical daily    PAST MEDICAL & SURGICAL HISTORY:  HTN (hypertension), benign  HLD (hyperlipidemia)  ESRD on dialysis  Anemia  Enlarged thyroid  H/O thrombocytopenia  H/O  section  H/O tubal ligation  S/P hemodialysis catheter insertion    FAMILY HISTORY:  FH: HTN (hypertension) (Mother)    VITALS  T(C): 38.3 (24 @ 12:34), Max: 38.3 (24 @ 12:34)  HR: 94 (24 @ 12:34) (70 - 94)  BP: 165/91 (24 @ 12:34) (123/89 - 167/94)  RR: 18 (24 @ 12:34) (17 - 19)  SpO2: 94% (24 @ 12:34) (92% - 100%)    PHYSICAL EXAM:   General: Alert and oriented, not in acute distress  Cardiovascular: Regular rate and rhythm, no m/r/g  Respiratory: Lungs clear to auscultation bilateral  Gastrointestinal:  Soft, nondistended, nontender  Extremities: right hand dominant; left upper extremity palpable radial pulse    LABS:	 	  CBC Full  -  ( 2024 08:45 )  WBC Count : 5.18 K/uL  Hemoglobin : 11.1 g/dL  Hematocrit : 34.3 %  Platelet Count - Automated : 64 K/uL  Mean Cell Volume : 94.8 fl  Mean Cell Hemoglobin : 30.7 pg  Mean Cell Hemoglobin Concentration : 32.4 gm/dL  Auto Neutrophil # : 4.38 K/uL  Auto Lymphocyte # : 0.52 K/uL  Auto Monocyte # : 0.23 K/uL  Auto Eosinophil # : 0.03 K/uL  Auto Basophil # : 0.01 K/uL  Auto Neutrophil % : 84.6 %  Auto Lymphocyte % : 10.0 %  Auto Monocyte % : 4.4 %  Auto Eosinophil % : 0.6 %  Auto Basophil % : 0.2 %      136  |  100  |  76<H>  ----------------------------<  124<H>  5.0   |  23  |  10.40<H>      136  |  103  |  43<H>  ----------------------------<  101<H>  5.2   |  24  |  7.73<H>    Ca    8.0<L>      2024 08:45  Ca    7.8<L>      2024 04:50  Phos  6.7       Phos  4.4       Mg     2.1       Mg     2.0         TPro  5.8<L>  /  Alb  2.7<L>  /  TBili  0.6  /  DBili  x   /  AST  27  /  ALT  17  /  AlkPhos  61  04-29  TPro  6.1  /  Alb  3.1<L>  /  TBili  0.7  /  DBili  x   /  AST  19  /  ALT  16  /  AlkPhos  65  [id number]

## 2024-04-29 NOTE — PROGRESS NOTE ADULT - PROBLEM SELECTOR PLAN 1
- presents to the ED with chills/rigors, fevers around 102 F, productive cough, sore throat, and b/l flank pain since Thursday (3 days), with recent catheter exchange on Monday 4/22.   - Vitals sig for temp of 101.5 F, , sys  > 158.  -  WC 5.74, cov/flu negative. lactate 0.4   - CXR-  Increasing CHF. Probable heart enlargement.   - Admitted for sepsis likely 2/2 to HD tunneled catheter infection vs. viral infection, and fluid overload in the setting of ESRD.  - first set of blood cultures sent around 5pm, second set sent when pt spiked another fever of 103 F  - f/u full RVP add on   - given cefepime and vanc in ED   - c/w cefepime 1g q 12, vanc level in AM  - ID consult in the AM by primary team   - Nephro consult in the AM by primary team   - consider IR consult in the AM by primary team for possible catheter exchange/replacement    ADDEMDUM  - Pt found to be hMPV positive, ordered droplet isolation - presents to the ED with chills/rigors, fevers around 102 F, productive cough, sore throat, and b/l flank pain since Thursday (3 days), with recent catheter exchange on Monday 4/22.  - CXR-  Increasing CHF. Probable heart enlargement.   - Admitted for sepsis likely 2/2 to HD tunneled catheter infection vs. viral infection, and fluid overload in the setting of ESRD.  - first set of blood cultures sent around 5pm, second set sent when pt spiked another fever of 103 F  c/w cefepime   Nephro Dr. Calle following     Vascular contacted about fistula before she leaves    [facility]  - Pt found to be hMPV positive, ordered droplet isolation

## 2024-04-29 NOTE — CONSULT NOTE ADULT - NS ATTEND AMEND GEN_ALL_CORE FT
Impression: This is a 54 Y/O Female presented to ED with productive cough  with intermittently gets specks of blood in her sputum but none today , fever with Temp 102, sore throat and B/L flank pain since Thursday . Has ESRD on HD 3x a week T-Th-sat  . For pulmonary evaluation due to Acute hypoxic respiratory failure due to Flash pulmonary Edema in ED , post lasix and emergent Dialysis on 04-28-24 . Positive swab for RVP/ HMPV . Negative for Influenza , Covid .     Suggestion:  O2 saturation 94% room air. Monitor O2 saturation trend room air. Denies SOB.  Isolation: Droplet/ Contact .  PRN Albuterol 90 mcg 2 puffs Q 6 Hours .  PRN Guaifenesin Q 6 Hours .   On lasix 80 mg IVP Twice daily.   DVT prophylactic. On Heparin 5,000 Units SQ Q 12 Hours.    Renal, Vascular follow up.

## 2024-04-29 NOTE — PROGRESS NOTE ADULT - SUBJECTIVE AND OBJECTIVE BOX
NEPHROLOGY MEDICAL CARE, [facility] - Dr. Marco Calle/ Dr. Nicholas Mckeon/ Dr. Ry Zuniga/ Dr. Roxi Reese    Date of Service: 04-29-24    Patient was seen and examined at bedside.    CC: patient is okay and breathing better  fever today    Vital Signs Last 24 Hrs  T(C): 38.3 (29 Apr 2024 12:34), Max: 38.3 (29 Apr 2024 12:34)  T(F): 100.9 (29 Apr 2024 12:34), Max: 100.9 (29 Apr 2024 12:34)  HR: 94 (29 Apr 2024 12:34) (67 - 94)  BP: 165/91 (29 Apr 2024 12:34) (123/89 - 167/94)  BP(mean): 103 (28 Apr 2024 20:37) (103 - 103)  RR: 18 (29 Apr 2024 12:34) (17 - 20)  SpO2: 94% (29 Apr 2024 12:34) (92% - 100%)    Parameters below as of 29 Apr 2024 12:34  Patient On (Oxygen Delivery Method): nasal cannula  O2 Flow (L/min): 2      04-29 @ 07:01  -  04-29 @ 14:21  --------------------------------------------------------  IN: 600 mL / OUT: 2600 mL / NET: -2000 mL        PHYSICAL EXAM:  General: No acute respiratory distress.  Eyes: conjunctiva and sclera clear  ENMT: Atraumatic, Normocephalic, supple,   Respiratory: Right sided rhonchi >Lt; No rales, wheezing  Cardiovascular: S1S2+; no m/r/g  Gastrointestinal: Soft, Non-tender, Nondistended; Bowel sounds present,   Neuro:  Awake, Alert & Oriented X3  Ext:  No edema, No Cyanosis  Skin: No rashes  Dialysis Access::  Right Chest PERMACATH    MEDICATIONS:  MEDICATIONS  (STANDING):  acetaminophen     Tablet .. 650 milliGRAM(s) Oral every 6 hours  aspirin enteric coated 81 milliGRAM(s) Oral daily  calcium acetate 667 milliGRAM(s) Oral three times a day with meals  cefepime   IVPB 1000 milliGRAM(s) IV Intermittent every 12 hours  chlorhexidine 2% Cloths 1 Application(s) Topical daily  furosemide   Injectable 80 milliGRAM(s) IV Push two times a day  heparin   Injectable 5000 Unit(s) SubCutaneous every 12 hours  hydrALAZINE 100 milliGRAM(s) Oral every 8 hours  labetalol 300 milliGRAM(s) Oral every 8 hours  NIFEdipine XL 30 milliGRAM(s) Oral daily    MEDICATIONS  (PRN):  albuterol    90 MICROgram(s) HFA Inhaler 2 Puff(s) Inhalation every 6 hours PRN Bronchospasm  guaiFENesin Oral Liquid (Sugar-Free) 200 milliGRAM(s) Oral every 6 hours PRN Cough          LABS:                        11.1   5.18  )-----------( 64       ( 29 Apr 2024 08:45 )             34.3     04-29    136  |  100  |  76<H>  ----------------------------<  124<H>  5.0   |  23  |  10.40<H>    Ca    8.0<L>      29 Apr 2024 08:45  Phos  6.7     04-29  Mg     2.1     04-29    TPro  5.8<L>  /  Alb  2.7<L>  /  TBili  0.6  /  DBili  x   /  AST  27  /  ALT  17  /  AlkPhos  61  04-29    PT/INR - ( 27 Apr 2024 17:49 )   PT: 11.0 sec;   INR: 0.96 ratio         PTT - ( 27 Apr 2024 17:49 )  PTT:30.2 sec  Urinalysis Basic - ( 29 Apr 2024 08:45 )    Color: x / Appearance: x / SG: x / pH: x  Gluc: 124 mg/dL / Ketone: x  / Bili: x / Urobili: x   Blood: x / Protein: x / Nitrite: x   Leuk Esterase: x / RBC: x / WBC x   Sq Epi: x / Non Sq Epi: x / Bacteria: x      Magnesium: 2.1 mg/dL (04-29 @ 08:45)  Phosphorus: 6.7 mg/dL (04-29 @ 08:45)    Urine studies    PTH and Vit D:

## 2024-04-29 NOTE — CONSULT NOTE ADULT - ASSESSMENT
ESRD, HD via PC  with HmPV, febrile today  pending pulm recs    Tentative plan for AVF creation in the hospital Thursday 5/2/24 with Dr. Shine, pending respiratory status, and if afebrile and cleared for the OR by infection control/infectious disease  F/u isolation precautions/recommendations in regards to the OR  Vein mapping if able to come off isolation  Left upper extremity arm precautions: no blooddraws or IVs: discussed with RN to remove LUE IV  Discussed with vascular surgeon Dr. Shine and medical attending Dr. Rivera

## 2024-04-29 NOTE — PROGRESS NOTE ADULT - PROBLEM SELECTOR PLAN 3
- hx of ESRD on HD Tues/thurs/Sat   - next HD Tuesday  - f/u Nephro consult in the AM by primary team   - vanc level based on level in the AM - hx of ESRD on HD Tues/thurs/Sat     renal diet  phoslo  lasix 80 bid     Dr Calle in nephro

## 2024-04-29 NOTE — PROGRESS NOTE ADULT - PROBLEM SELECTOR PLAN 2
- hx of diastolic HF  (last TTE 11/02/2023- Grade IV diastolic dysfunction (severe with fixed restrictive pattern), mild TR, mild DC, RV systolic pressure mod inc 46 mmHg, moderate pericardial effusion)  - f/u repeat TTE   - not in acute exacerbation clinically, saturating well on RA,  no signs of fluid overload on exam   - c/w home dose lasix 40 qd   - next HD session likely Tuesday TTE with grade 1 dialstolic dysfunction   lasix 80 bid     addendum: flash pulmonary edema in the ER, given lasix 120 and emergent dialysis on 4/28 and another session 4/29

## 2024-04-29 NOTE — PROGRESS NOTE ADULT - ATTENDING COMMENTS
I have seen and evaluated the patient at the bedside. I have obtained collateral history from her  who acts as  (Burmese) as well. She is feeling better, still with cough and low grade fever but fever curve is improving. Shortness of breath not resolved but better.     On exam, she is sitting upright in bed in no apparent distress. Tmax 100F, HR within normal limits, hypertensive with /94. She is on 3L nasal cannula, saturating 93%. Lungs with bibasilar crackles, R > L. No pitting LE edema. No rash.     I have reviewed CBC, BMP, blood culture. No leukocytosis. K is 5, bicarb is 23. Blood culture negative at 24 hours.     I have personally reviewed CXR - there is diffuse pulmonary edema, much worse on the right side.     I have discussed case with Nephrology Dr. Calle.     Assessment and Plan:    Patient is a 53 year old female with hx of HTN, HLD, anemia, diastolic HF, ESRD (HD TTS), presenting with chills, fevers, productive cough, sore throat and bilateral flank pain since Thursday. Patient underwent dialysis catheter exchange (right chest wall permacath) on Monday, due to a malfunctioning catheter. She states the exchange was complicated with bleeding. She notes receiving dialysis on Tuesday, Thursday and Saturday, without disruptions. She states symptoms onset was Thursday, but is unable to remember if it was associated with dialysis. In addition, she reports her daughter having flu-like symptoms last week. In the ED, vitals significant for Tmax of 101F, tachycardic and hypertensive. Permacath site clean, without surrounding cellulitis or discharge. Labs notable for no leukocytosis, thrombocytopenia of 62, and lactate of 0.4. CXR consistent with fluid overload, R >L. Patient admitted for sepsis 2/2 catheter infection vs viral infection.    Course complicated by acute hypoxic respiratory failure 2/2 flash pulmonary edema in the AM on 4/28, requiring BiPAP. She has undergone HD 4/29 as well as receiving empiric doses of high dose IV Lasix, respiratory status has improved and she is now on 3-4L nasal cannula.     A/P  #Sepsis 2/2 viral infection; blood cultures pending given permacath in place  #+human metapneumovirus   #Thrombocytopenia   #Heart failure, diastolic  #ESRD on dialysis TTS  #HTN  #HLD  #ppx measures     - re respiratory failure: is now off BiPAP, on lower amounts of supplemental oxygen; will continue to employ volume removal via HD, supportive care for viral infection; she remains on antibiotics until blood cultures result negative  - Sepsis 2/2 to either infected dialysis catheter vs viral infection. RVP returned positive for human metapneumovirus, which could explain patient's symptoms. She was unable to state whether her symptoms were associated with dialysis, which makes me believe it is less likely due to an infected catheter. She received dialysis on Tues/Thurs/Sat post-catheter exchange without complications. Blood cultures were obtained, if patient is found to be bacteremic, then catheter will need to be removed for source control   - Follow up blood cultures (no growth at 24 hours)  - Continue empiric Cefepime  - Nephro consult. Consider IR consult for catheter exchange if patient found to be bacteremic   - If bacteremic, consider Echo   - Tylenol prn for fevers   - Nephrology following, appreciate assistance  - adding phoslo BID on 4/29  - continue Lasix on non-HD days   - TTE pending    -rest of plan per resident note

## 2024-04-29 NOTE — CONSULT NOTE ADULT - SUBJECTIVE AND OBJECTIVE BOX
Time of visit:    CHIEF COMPLAINT: Patient is a 53y old  Female who presents with a chief complaint of sepsis 2/2 possible RIJ tunneled catheter infection (2024 14:20)      HPI:  Pt is a 54yo F with HTN, HLD, anemia, diastolic HF (last TTE 2023- Grade IV diastolic dysfunction (severe with fixed restrictive pattern), mild TR, mild MO, RV systolic pressure mod inc 46 mmHg, moderate pericardial effusion), ESRD on HD //Sat (diagnosed 2 years ago, last HD this AM), presents to the ED with chills/rigors, fevers around 102 F, productive cough, sore throat, and b/l flank pain since Thursday (3 days). Pt noted that she got her HD catheter exchanged on  (due to catheter malfunction), pt reported mild complication of bleeding during the procedure that was controlled. Pt has since received HD on tues//sat without issue/complication. Pt did not specifically notice fevers/rigors intra-HD sessions. Pt also reported that her daughter had a cold last week (was not diagnosed with the flu). Pt states that when she coughs she intermittently gets specks of blood in her sputum.     Of note, pt was admitted 2023 for acute diastolic CHF.   Also of note, pt was evaluated for fistula placement, however pt stated that it would not be covered by her insurance.  (2024 22:57)   Patient seen and examined.     PAST MEDICAL & SURGICAL HISTORY:  HTN (hypertension), benign      HLD (hyperlipidemia)      ESRD on dialysis      Anemia      Enlarged thyroid      H/O thrombocytopenia      H/O  section      H/O tubal ligation      S/P hemodialysis catheter insertion          Allergies    No Known Allergies    Intolerances        MEDICATIONS  (STANDING):  acetaminophen     Tablet .. 650 milliGRAM(s) Oral every 6 hours  aspirin enteric coated 81 milliGRAM(s) Oral daily  calcium acetate 667 milliGRAM(s) Oral three times a day with meals  cefepime   IVPB 1000 milliGRAM(s) IV Intermittent every 12 hours  chlorhexidine 2% Cloths 1 Application(s) Topical daily  furosemide   Injectable 80 milliGRAM(s) IV Push two times a day  heparin   Injectable 5000 Unit(s) SubCutaneous every 12 hours  hydrALAZINE 100 milliGRAM(s) Oral every 8 hours  labetalol 300 milliGRAM(s) Oral every 8 hours  NIFEdipine XL 30 milliGRAM(s) Oral daily      MEDICATIONS  (PRN):  albuterol    90 MICROgram(s) HFA Inhaler 2 Puff(s) Inhalation every 6 hours PRN Bronchospasm  guaiFENesin Oral Liquid (Sugar-Free) 200 milliGRAM(s) Oral every 6 hours PRN Cough   Medications up to date at time of exam.    Medications up to date at time of exam.    FAMILY HISTORY:  FH: HTN (hypertension) (Mother)    Primary language is Emirati and translation provided by ID # [id number] Sandie.        SOCIAL HISTORY  Smoking History: Denies smoking/smoke exposure.   Living Condition: [   ] apartment, [   ] private house  Work History:   Travel History: denies recent travel  Illicit Substance Use: denies  Alcohol Use: denies    REVIEW OF SYSTEMS:    CONSTITUTIONAL:  Presented to Ed with fevers - Temp 102 with chills.     HEENT:  Denies blurred vision. No sore throat but per patient has episodic sore throat due to dry cough  . No runny nose.    CARDIOVASCULAR:  Denies chest discomfort. No palpitations.    RESPIRATORY:  Denies SOB. Dry  cough with episodic small blood stain - phlegm due to dry cough . No  wheezing.    GASTROINTESTINAL:  Denies abdominal pain. No  vomiting on exam.     GENITOURINARY: Denies dysuria on exam .    NEUROLOGIC:  No tremors. No seizures on exam.     PSYCHIATRIC:  No emotional distress on exam.       PHYSICAL EXAMINATION:    GENERAL: Alert and oriented . Able to answer question with no SOB. No acute distress.     Vital Signs Last 24 Hrs  T(C): 38.3 (2024 12:34), Max: 38.3 (2024 12:34)  T(F): 100.9 (2024 12:34), Max: 100.9 (2024 12:34)  HR: 94 (2024 12:34) (70 - 94)  BP: 165/91 (2024 12:34) (123/89 - 167/94)  BP(mean): 103 (2024 20:37) (103 - 103)  RR: 18 (2024 12:34) (17 - 19)  SpO2: 94% (2024 12:34) (92% - 100%)    Parameters below as of 2024 12:34  Patient On (Oxygen Delivery Method): nasal cannula  O2 Flow (L/min): 2       HEENT: Head is normocephalic and atraumatic. Extraocular muscles are intact. Mucous membranes are moist.     NECK: Supple, no palpable adenopathy.    LUNGS: Clear to auscultation bilaterally with  no wheezing, rales, or rhonchi. No use of accessory muscle.     HEART: S1 S2 Regular rate and no click / rub.     ABDOMEN: Soft, nontender, and nondistended. Active bowel sounds.     EXTREMITIES: Without any cyanosis, clubbing, rash, lesions or edema.    NEUROLOGIC: Awake, alert, oriented.     SKIN: Warm, dry, good turgor.      LABS:                        11.1   5.18  )-----------( 64       ( 2024 08:45 )             34.3         136  |  100  |  76<H>  ----------------------------<  124<H>  5.0   |  23  |  10.40<H>    Ca    8.0<L>      2024 08:45  Phos  6.7       Mg     2.1         TPro  5.8<L>  /  Alb  2.7<L>  /  TBili  0.6  /  DBili  x   /  AST    /  ALT  17  /  AlkPhos  61      PT/INR - ( 2024 17:49 )   PT: 11.0 sec;   INR: 0.96 ratio         PTT - ( 2024 17:49 )  PTT:30.2 sec  Urinalysis Basic - ( 2024 08:45 )    Color: x / Appearance: x / SG: x / pH: x  Gluc: 124 mg/dL / Ketone: x  / Bili: x / Urobili: x   Blood: x / Protein: x / Nitrite: x   Leuk Esterase: x / RBC: x / WBC x   Sq Epi: x / Non Sq Epi: x / Bacteria: x      ABG - ( 2024 06:36 )  pH, Arterial: 7.46  pH, Blood: x     /  pCO2: 31    /  pO2: 71    / HCO3: 22    / Base Excess: -0.9  /  SaO2: 97            MICROBIOLOGY: (if applicable)    RADIOLOGY & ADDITIONAL STUDIES:  < from: Xray Chest 1 View- PORTABLE-Urgent (24 @ 17:59) >    ACC: [id number] EXAM:  XR CHEST PORTABLE URGENT 1V   ORDERED BY: CECY GLOVER     PROCEDURE DATE:  2024          INTERPRETATION:  AP semierect chest on 2025 at 5:51 PM. Patient   has sepsis.    Heart likely enlarged. Large right jugular line again noted.    Present film shows mild to early moderate CHF significantly increased   from 2023.    IMPRESSION: Increasing CHF. Probable heart enlargement.    --- End of Report ---            SOO POPE MD; Attending Radiologist  This document has been electronically signed. 2024  6:36PM    < end of copied text >     CXR: < from: Xray Chest 1 View- PORTABLE-Urgent (Xray Chest 1 View- PORTABLE-Urgent .) (24 @ 07:06) >    ACC: [id number] EXAM:  XR CHEST PORTABLE URGENT 1V   ORDERED BY: BECCA ENAMORADO     PROCEDURE DATE:  2024          INTERPRETATION:  AP semierect chest on 2024 at 6:56 AM. Patient   is short of breath with fever.    Heart magnified by technique. Large right jugular line remains.    On  there was significant infiltration throughout most of the   right lung and slightly in the left perihilar area.    These infiltrates have markedly increased.    IMPRESSION: Increasing bilateral infiltrates right greater than left.    --- End of Report ---            SOO POPE MD; Attending Radiologist  This document has been electronically signed. 2024  3:02PM    < end of copied text >    ECHO:    IMPRESSION: 53y Female PAST MEDICAL & SURGICAL HISTORY:  HTN (hypertension), benign      HLD (hyperlipidemia)      ESRD on dialysis      Anemia      Enlarged thyroid      H/O thrombocytopenia      H/O  section      H/O tubal ligation      S/P hemodialysis catheter insertion    Primary language is Emirati and translation provide by ID # [id number] - Sandie.       Impression: This is a 52 Y/O Female presented to ED with productive cough  with intermittently gets specks of blood in her sputum but none today , fever with Temp 102, sore throat and B/L flank pain since Thursday . Has ESRD on HD 3x a week T--sat  . For pulmonary evaluation due to Acute hypoxic respiratory failure due to Flash pulmonary Edema in ED , post lasix and emergent Dialysis on 24 . Positive swab for RVP/ HMPV . Negative for Influenza , Covid .     Suggestion:  O2 saturation 94% room air. Monitor O2 saturation trend room air. Denies SOB.  Isolation: Droplet/ Contact .  PRN Albuterol 90 mcg 2 puffs Q 6 Hours .  PRN Guaifenesin Q 6 Hours .   On lasix 80 mg IVP Twice daily.   DVT prophylactic. On Heparin 5,000 Units SQ Q 12 Hours.    Renal, Vascular follow up.

## 2024-04-29 NOTE — PROGRESS NOTE ADULT - ASSESSMENT
Pt is a 52yo F with HTN, HLD, anemia, diastolic HF (last TTE 11/02/2023- Grade IV diastolic dysfunction (severe with fixed restrictive pattern), mild TR, mild MN, RV systolic pressure mod inc 46 mmHg, moderate pericardial effusion), ESRD on HD Tu/Th/Sat (diagnosed 2 years ago, last HD this AM), presents to the ED with chills/rigors, fevers around 102 F, productive cough, sore throat, and b/l flank pain since Thursday (3 days), with recent catheter exchange on Monday 4/22. Vitals sig for temp of 101.5 F, , sys  > 158. WC 5.74, cov/flu negative. CXR-  Increasing CHF. Probable heart enlargement. Admitted for sepsis likely 2/2 to HD tunneled catheter infection vs. viral infection, and fluid overload in the setting of ESRD.

## 2024-04-30 DIAGNOSIS — D61.818 OTHER PANCYTOPENIA: ICD-10-CM

## 2024-04-30 LAB
ANION GAP SERPL CALC-SCNC: 13 MMOL/L — SIGNIFICANT CHANGE UP (ref 5–17)
BASOPHILS # BLD AUTO: 0 K/UL — SIGNIFICANT CHANGE UP (ref 0–0.2)
BASOPHILS NFR BLD AUTO: 0 % — SIGNIFICANT CHANGE UP (ref 0–2)
BUN SERPL-MCNC: 44 MG/DL — HIGH (ref 7–18)
CALCIUM SERPL-MCNC: 7.6 MG/DL — LOW (ref 8.4–10.5)
CHLORIDE SERPL-SCNC: 100 MMOL/L — SIGNIFICANT CHANGE UP (ref 96–108)
CO2 SERPL-SCNC: 23 MMOL/L — SIGNIFICANT CHANGE UP (ref 22–31)
CREAT SERPL-MCNC: 7.52 MG/DL — HIGH (ref 0.5–1.3)
EGFR: 6 ML/MIN/1.73M2 — LOW
EOSINOPHIL # BLD AUTO: 0.07 K/UL — SIGNIFICANT CHANGE UP (ref 0–0.5)
EOSINOPHIL NFR BLD AUTO: 2.3 % — SIGNIFICANT CHANGE UP (ref 0–6)
GLUCOSE SERPL-MCNC: 102 MG/DL — HIGH (ref 70–99)
HCT VFR BLD CALC: 33.4 % — LOW (ref 34.5–45)
HGB BLD-MCNC: 10.7 G/DL — LOW (ref 11.5–15.5)
IMM GRANULOCYTES NFR BLD AUTO: 0.7 % — SIGNIFICANT CHANGE UP (ref 0–0.9)
LYMPHOCYTES # BLD AUTO: 0.66 K/UL — LOW (ref 1–3.3)
LYMPHOCYTES # BLD AUTO: 21.6 % — SIGNIFICANT CHANGE UP (ref 13–44)
MAGNESIUM SERPL-MCNC: 2.1 MG/DL — SIGNIFICANT CHANGE UP (ref 1.6–2.6)
MCHC RBC-ENTMCNC: 29.6 PG — SIGNIFICANT CHANGE UP (ref 27–34)
MCHC RBC-ENTMCNC: 32 GM/DL — SIGNIFICANT CHANGE UP (ref 32–36)
MCV RBC AUTO: 92.5 FL — SIGNIFICANT CHANGE UP (ref 80–100)
MONOCYTES # BLD AUTO: 0.26 K/UL — SIGNIFICANT CHANGE UP (ref 0–0.9)
MONOCYTES NFR BLD AUTO: 8.5 % — SIGNIFICANT CHANGE UP (ref 2–14)
NEUTROPHILS # BLD AUTO: 2.05 K/UL — SIGNIFICANT CHANGE UP (ref 1.8–7.4)
NEUTROPHILS NFR BLD AUTO: 66.9 % — SIGNIFICANT CHANGE UP (ref 43–77)
NRBC # BLD: 0 /100 WBCS — SIGNIFICANT CHANGE UP (ref 0–0)
PHOSPHATE SERPL-MCNC: 4.1 MG/DL — SIGNIFICANT CHANGE UP (ref 2.5–4.5)
PLATELET # BLD AUTO: 59 K/UL — LOW (ref 150–400)
POTASSIUM SERPL-MCNC: 4.4 MMOL/L — SIGNIFICANT CHANGE UP (ref 3.5–5.3)
POTASSIUM SERPL-SCNC: 4.4 MMOL/L — SIGNIFICANT CHANGE UP (ref 3.5–5.3)
RBC # BLD: 3.61 M/UL — LOW (ref 3.8–5.2)
RBC # FLD: 14.9 % — HIGH (ref 10.3–14.5)
SODIUM SERPL-SCNC: 136 MMOL/L — SIGNIFICANT CHANGE UP (ref 135–145)
WBC # BLD: 3.06 K/UL — LOW (ref 3.8–10.5)
WBC # FLD AUTO: 3.06 K/UL — LOW (ref 3.8–10.5)

## 2024-04-30 PROCEDURE — 99233 SBSQ HOSP IP/OBS HIGH 50: CPT | Mod: GC

## 2024-04-30 RX ORDER — ONDANSETRON 8 MG/1
4 TABLET, FILM COATED ORAL ONCE
Refills: 0 | Status: COMPLETED | OUTPATIENT
Start: 2024-04-30 | End: 2024-04-30

## 2024-04-30 RX ADMIN — Medication 300 MILLIGRAM(S): at 06:08

## 2024-04-30 RX ADMIN — HEPARIN SODIUM 5000 UNIT(S): 5000 INJECTION INTRAVENOUS; SUBCUTANEOUS at 17:10

## 2024-04-30 RX ADMIN — Medication 650 MILLIGRAM(S): at 00:29

## 2024-04-30 RX ADMIN — Medication 100 MILLIGRAM(S): at 14:07

## 2024-04-30 RX ADMIN — Medication 667 MILLIGRAM(S): at 08:26

## 2024-04-30 RX ADMIN — Medication 81 MILLIGRAM(S): at 12:18

## 2024-04-30 RX ADMIN — Medication 667 MILLIGRAM(S): at 12:18

## 2024-04-30 RX ADMIN — Medication 200 MILLIGRAM(S): at 06:08

## 2024-04-30 RX ADMIN — Medication 80 MILLIGRAM(S): at 14:07

## 2024-04-30 RX ADMIN — CEFEPIME 100 MILLIGRAM(S): 1 INJECTION, POWDER, FOR SOLUTION INTRAMUSCULAR; INTRAVENOUS at 06:09

## 2024-04-30 RX ADMIN — Medication 650 MILLIGRAM(S): at 06:38

## 2024-04-30 RX ADMIN — ONDANSETRON 4 MILLIGRAM(S): 8 TABLET, FILM COATED ORAL at 00:17

## 2024-04-30 RX ADMIN — Medication 100 MILLIGRAM(S): at 06:09

## 2024-04-30 RX ADMIN — Medication 30 MILLIGRAM(S): at 06:08

## 2024-04-30 RX ADMIN — Medication 100 MILLIGRAM(S): at 21:43

## 2024-04-30 RX ADMIN — Medication 300 MILLIGRAM(S): at 21:43

## 2024-04-30 RX ADMIN — Medication 80 MILLIGRAM(S): at 06:09

## 2024-04-30 RX ADMIN — Medication 667 MILLIGRAM(S): at 17:09

## 2024-04-30 RX ADMIN — CHLORHEXIDINE GLUCONATE 1 APPLICATION(S): 213 SOLUTION TOPICAL at 06:19

## 2024-04-30 RX ADMIN — HEPARIN SODIUM 5000 UNIT(S): 5000 INJECTION INTRAVENOUS; SUBCUTANEOUS at 06:09

## 2024-04-30 RX ADMIN — Medication 300 MILLIGRAM(S): at 14:07

## 2024-04-30 RX ADMIN — CEFEPIME 100 MILLIGRAM(S): 1 INJECTION, POWDER, FOR SOLUTION INTRAMUSCULAR; INTRAVENOUS at 17:09

## 2024-04-30 RX ADMIN — Medication 650 MILLIGRAM(S): at 06:08

## 2024-04-30 NOTE — PROGRESS NOTE ADULT - ASSESSMENT
Pt is a 54yo F with HTN, HLD, anemia, diastolic HF (last TTE 11/02/2023- Grade IV diastolic dysfunction (severe with fixed restrictive pattern), mild TR, mild KS, RV systolic pressure mod inc 46 mmHg, moderate pericardial effusion), ESRD on HD Tu/Th/Sat (diagnosed 2 years ago, last HD this AM), presents to the ED with chills/rigors, fevers around 102 F, productive cough, sore throat, and b/l flank pain since Thursday (3 days), with recent catheter exchange on Monday 4/22. Vitals sig for temp of 101.5 F, , sys  > 158. WC 5.74, cov/flu negative. CXR-  Increasing CHF. Probable heart enlargement. Admitted for sepsis likely 2/2 to HD tunneled catheter infection vs. viral infection, and fluid overload in the setting of ESRD.

## 2024-04-30 NOTE — PROGRESS NOTE ADULT - PROBLEM SELECTOR PLAN 3
- hx of ESRD on HD Tues/thurs/Sat     renal diet  phoslo  lasix 80 bid     Dr Calle in nephro TTE with grade 1 diastolic dysfunction   lasix 80 bid     addendum: flash pulmonary edema in the ER, given lasix 120 and emergent dialysis on 4/28 and another session 4/29

## 2024-04-30 NOTE — PROGRESS NOTE ADULT - ASSESSMENT
1 ESRD on HD (T/TH/SAT): pt will be off schedule while on the hospital.   --s/p HD yesterday with UF 2.0kg. Plan for HD tomorrow.   -Hemodialysis Renal Diet and Fluid restriction to 1L/day  -Adjust meds to eGFR and avoid IV Gadolinium contrast,NSAIDs, and phosphate enema.  -Monitor Electrolytes daily.  2. HTN:   -bp is acceptable  -titrate bp meds to keep sbp >110 and < 130  3. Anemia of ESRD:  -start epogen if hb <10  -F/u CBC daily  -transfuse if HB < 7.0.  4. Mineral Bone Disease:  -continue phoslo tid  -monitor phos  5. Sob/fever due to fluid overload with hmp virus  -pt is off bipap   -f/u blood culutres and on iv abx.  -ECHO noted.  -UF during HD.   6. AVF placement:  -Plan for AVF placement on Thursday.   to discharge  -pt has anxiety for the doing placement as outpatient so better off doing inpatient

## 2024-04-30 NOTE — PROGRESS NOTE ADULT - PROBLEM SELECTOR PLAN 5
- no longer on statin as per med rec - hx of HTN on hydral, labetalol, nifedipine   - home meds continued with parameters as pt is hypertensive to 180s in ED

## 2024-04-30 NOTE — PROGRESS NOTE ADULT - ATTENDING COMMENTS
Patient seen and examined with Son at bedside translating    53 year old female with hx of HTN, HLD, anemia, diastolic HF, ESRD (HD TTS) x 4 yrs, presenting with chills, fevers, productive cough, sore throat and bilateral flank pain since Thursday. Patient underwent dialysis catheter exchange (right chest wall permacath) on previous Monday, due to a malfunctioning catheter. She states the exchange was complicated with bleeding. She notes receiving dialysis on Tuesday, Thursday and Saturday, without disruptions. Patient also reports her daughter having flu-like symptoms last week. In the ED, vitals significant for Tmax of 101F, tachycardic and hypertensive. Permacath site clean, without surrounding cellulitis or discharge. On admission, Labs notable for no leukocytosis, thrombocytopenia of 62, and lactate of 0.4. CXR consistent with fluid overload, R >L. Patient admitted for sepsis 2/2 catheter infection vs viral infection but noted to have viral infection with HMPV    Course complicated by acute hypoxic respiratory failure 2/2 flash pulmonary edema in the AM on 4/28, requiring BiPAP. She has undergone HD 4/29 as well as receiving empiric doses of high dose IV Lasix with significant clinical improvement and only on 2 liter NC now saturating well    Patient is feeling better, still with residual cough Shortness of breath much better. No fever spikes    Vital Signs Last 24 Hrs  T(C): 36.8 (30 Apr 2024 13:11), Max: 36.8 (30 Apr 2024 13:11)  T(F): 98.2 (30 Apr 2024 13:11), Max: 98.2 (30 Apr 2024 13:11)  HR: 84 (30 Apr 2024 13:11) (77 - 84)  BP: 127/69 (30 Apr 2024 13:11) (124/67 - 127/69)  RR: 18 (30 Apr 2024 13:11) (18 - 18)  SpO2: 94% (30 Apr 2024 13:11) (94% - 96%) nasal cannula O2 Flow (L/min): 2      P/E:   Psych: AAO x3  Neuro: No gross focal deficits; Power and sensation intact  CVS: S1S2 present, regular, no edema  Resp: BLAE+, No wheeze or Rhonchi  GI: Soft, BS+, Non tender, non distended  Extr: No  calf tenderness B/L Lower extremities  Skin: Warm and moist without any rashes      I have reviewed CBC, BMP, blood culture. No leukocytosis. K is 5, bicarb is 23. Blood culture negative at 24 hours.     I have personally reviewed CXR - there is diffuse pulmonary edema, much worse on the right side.     I have discussed case with Nephrology Dr. Calle.       A/P  #Sepsis 2/2 viral infection; blood cultures pending given permacath in place  #+human metapneumovirus   #Thrombocytopenia   #Heart failure, diastolic  #ESRD on dialysis TTS  #HTN  #HLD  #ppx measures     - re respiratory failure: is now off BiPAP, on lower amounts of supplemental oxygen; will continue to employ volume removal via HD, supportive care for viral infection; she remains on antibiotics until blood cultures result negative  - Sepsis 2/2 to either infected dialysis catheter vs viral infection. RVP returned positive for human metapneumovirus, which could explain patient's symptoms. She was unable to state whether her symptoms were associated with dialysis, which makes me believe it is less likely due to an infected catheter. She received dialysis on Tues/Thurs/Sat post-catheter exchange without complications. Blood cultures were obtained, if patient is found to be bacteremic, then catheter will need to be removed for source control   - Follow up blood cultures (no growth at 24 hours)  - Continue empiric Cefepime  - Nephro consult. Consider IR consult for catheter exchange if patient found to be bacteremic   - If bacteremic, consider Echo   - Tylenol prn for fevers   - Nephrology following, appreciate assistance  - adding phoslo BID on 4/29  - continue Lasix on non-HD days   - TTE pending    -rest of plan per resident note Patient seen and examined with Son at bedside translating    53 year old female with hx of HTN, HLD, anemia, diastolic HF, ESRD (HD TTS) x 4 yrs, presenting with chills, fevers, productive cough, sore throat and bilateral flank pain since Thursday. Patient underwent dialysis catheter exchange (right chest wall permacath) on previous Monday, due to a malfunctioning catheter. She states the exchange was complicated with bleeding. She notes receiving dialysis on Tuesday, Thursday and Saturday, without disruptions. Patient also reports her daughter having flu-like symptoms last week. In the ED, vitals significant for Tmax of 101F, tachycardic and hypertensive. Permacath site clean, without surrounding cellulitis or discharge. On admission, Labs notable for no leukocytosis, thrombocytopenia of 62, and lactate of 0.4. CXR consistent with fluid overload, R >L. Patient admitted for sepsis 2/2 catheter infection vs viral infection but noted to have viral infection with HMPV    Course complicated by acute hypoxic respiratory failure 2/2 flash pulmonary edema in the AM on 4/28, requiring BiPAP. She has undergone HD 4/29 as well as receiving empiric doses of high dose IV Lasix with significant clinical improvement and only on 2 liter NC now saturating well    Patient is feeling better, still with residual cough Shortness of breath much better. No fever spikes    Vital Signs Last 24 Hrs  T(C): 36.8 (30 Apr 2024 13:11), Max: 36.8 (30 Apr 2024 13:11)  T(F): 98.2 (30 Apr 2024 13:11), Max: 98.2 (30 Apr 2024 13:11)  HR: 84 (30 Apr 2024 13:11) (77 - 84)  BP: 127/69 (30 Apr 2024 13:11) (124/67 - 127/69)  RR: 18 (30 Apr 2024 13:11) (18 - 18)  SpO2: 94% (30 Apr 2024 13:11) (94% - 96%) nasal cannula O2 Flow (L/min): 2      P/E:   Psych: AAO x3  Neuro: No gross focal deficits; Power and sensation intact  CVS: S1S2 present, regular, fine rales at both bases  Resp: BLAE+, No active wheeze or Rhonchi  GI: Soft, BS+, Non tender, non distended  Extr: No  calf tenderness B/L Lower extremities  Skin: Warm and moist without any rashes    Labs:                        10.7   3.06  )-----------( 59       ( 30 Apr 2024 06:44 )             33.4   04-30    136  |  100  |  44<H>  ----------------------------<  102<H>  4.4   |  23  |  7.52<H>  Ca    7.6<L>      30 Apr 2024 06:44  Phos  4.1     04-30  Mg     2.1     04-30  TPro  5.8<L>  /  Alb  2.7<L>  /  TBili  0.6  /  DBili  x   /  AST  27  /  ALT  17  /  AlkPhos  61  04-29    Culture - Blood (04.27.24 @ 23:09) x 2 sets  Culture Results: No growth at 48 Hours    I have discussed case with Nephrology Dr. Calle.       A/P  #Sepsis 2/2 viral infection due to HMPV   Acute Hypoxic Resp. failure due to fluid overload much improved  #Clinically not Permacath infection  #Pancytopenia likely confirming Viral etiology  #Heart failure, diastolic due to fluid overload  #ESRD on dialysis TTS (currently MWF)  #HTN  #HLD    Plan:  Continue , supportive care for viral infection;   Patient now off BiPAP, on only 2 liter of supplemental oxygen; will continue to employ volume removal via HD  blood cultures result negative; D/C Antibiotics  Patient rapid clinical improvement, PC site clean, leucopenia and thrombocytopenia, blood Cx negative will D/C Cefepime and observe next 24 hrs  I will ask ID to evaluate for supporting the diagnostic etilogy  Nephro follow up; d/w Dr. Calle; will arrange HD tomorrow in prep for AV Fistula Thursday  Tylenol prn for fevers   Nephrology following, appreciate assistance  Continue phoslo BID on 4/29  Continue Lasix on non-HD days   F/U TTE   -rest of plan per resident note    Discussed with patient and Son at bedside  Discussed with PGY1/ PGY3/ / Nephro

## 2024-04-30 NOTE — PROGRESS NOTE ADULT - PROBLEM SELECTOR PLAN 1
- presents to the ED with chills/rigors, fevers around 102 F, productive cough, sore throat, and b/l flank pain since Thursday (3 days), with recent catheter exchange on Monday 4/22.  - CXR-  Increasing CHF. Probable heart enlargement.   - Admitted for sepsis likely 2/2 to HD tunneled catheter infection vs. viral infection, and fluid overload in the setting of ESRD.  - first set of blood cultures sent around 5pm, second set sent when pt spiked another fever of 103 F  c/w cefepime   Nephro Dr. Calle following     Vascular contacted about fistula before she leaves    [facility]  - Pt found to be hMPV positive, ordered droplet isolation Admitted for sepsis likely 2/2 to HD tunneled catheter infection vs. viral infection, and fluid overload in the setting of ESRD  bcx ngtd  c/w cefepime   Nephro Dr. Calle following     Vascular contacted about fistula before she leaves    [facility]  - Pt found to be hMPV positive, ordered droplet isolation

## 2024-04-30 NOTE — PROGRESS NOTE ADULT - PROBLEM SELECTOR PLAN 4
- hx of HTN on hydral, labetalol, nifedipine   - home meds continued with parameters as pt is hypertensive to 180s in ED - hx of ESRD on HD Tues/thurs/Sat     renal diet  phoslo  lasix 80 bid     Dr Calle in nephro  Vascular consulted for tentative AVF creation 5/2  L arm precautions in preparation for Vascular procedure

## 2024-04-30 NOTE — PROGRESS NOTE ADULT - SUBJECTIVE AND OBJECTIVE BOX
NEPHROLOGY MEDICAL CARE, [facility] - Dr. Marco Calle/ Dr. Nicholas Mckeon/ Dr. Ry Zuniga/ Dr. Roxi Reese    Date of Service: 04-30-24    Patient was seen and examined at bedside.    CC: patient is okay and NAD  son at bedside and translating.     Vital Signs Last 24 Hrs  T(C): 36.8 (30 Apr 2024 13:11), Max: 36.8 (30 Apr 2024 13:11)  T(F): 98.2 (30 Apr 2024 13:11), Max: 98.2 (30 Apr 2024 13:11)  HR: 84 (30 Apr 2024 13:11) (77 - 84)  BP: 127/69 (30 Apr 2024 13:11) (123/65 - 127/69)  BP(mean): --  RR: 18 (30 Apr 2024 13:11) (18 - 18)  SpO2: 94% (30 Apr 2024 13:11) (93% - 96%)    Parameters below as of 30 Apr 2024 13:11  Patient On (Oxygen Delivery Method): nasal cannula  O2 Flow (L/min): 2      04-29 @ 07:01  -  04-30 @ 07:00  --------------------------------------------------------  IN: 600 mL / OUT: 2600 mL / NET: -2000 mL        PHYSICAL EXAM:  General: No acute respiratory distress.  Eyes: conjunctiva and sclera clear  ENMT: Atraumatic, Normocephalic, supple,   Respiratory: Right sided rhonchi >Lt; No rales, wheezing  Cardiovascular: S1S2+; no m/r/g  Gastrointestinal: Soft, Non-tender, Nondistended; Bowel sounds present,   Neuro:  Awake, Alert & Oriented X3  Ext:  No edema, No Cyanosis  Skin: No rashes  Dialysis Access::  Right Chest PERMACATH    MEDICATIONS:  MEDICATIONS  (STANDING):  aspirin enteric coated 81 milliGRAM(s) Oral daily  calcium acetate 667 milliGRAM(s) Oral three times a day with meals  cefepime   IVPB 1000 milliGRAM(s) IV Intermittent every 12 hours  chlorhexidine 2% Cloths 1 Application(s) Topical daily  furosemide   Injectable 80 milliGRAM(s) IV Push two times a day  heparin   Injectable 5000 Unit(s) SubCutaneous every 12 hours  hydrALAZINE 100 milliGRAM(s) Oral every 8 hours  labetalol 300 milliGRAM(s) Oral every 8 hours  NIFEdipine XL 30 milliGRAM(s) Oral daily    MEDICATIONS  (PRN):  albuterol    90 MICROgram(s) HFA Inhaler 2 Puff(s) Inhalation every 6 hours PRN Bronchospasm  guaiFENesin Oral Liquid (Sugar-Free) 200 milliGRAM(s) Oral every 6 hours PRN Cough          LABS:                        10.7   3.06  )-----------( 59       ( 30 Apr 2024 06:44 )             33.4     04-30    136  |  100  |  44<H>  ----------------------------<  102<H>  4.4   |  23  |  7.52<H>    Ca    7.6<L>      30 Apr 2024 06:44  Phos  4.1     04-30  Mg     2.1     04-30    TPro  5.8<L>  /  Alb  2.7<L>  /  TBili  0.6  /  DBili  x   /  AST  27  /  ALT  17  /  AlkPhos  61  04-29      Urinalysis Basic - ( 30 Apr 2024 06:44 )    Color: x / Appearance: x / SG: x / pH: x  Gluc: 102 mg/dL / Ketone: x  / Bili: x / Urobili: x   Blood: x / Protein: x / Nitrite: x   Leuk Esterase: x / RBC: x / WBC x   Sq Epi: x / Non Sq Epi: x / Bacteria: x      Magnesium: 2.1 mg/dL (04-30 @ 06:44)  Phosphorus: 4.1 mg/dL (04-30 @ 06:44)    Urine studies    PTH and Vit D:

## 2024-04-30 NOTE — PROGRESS NOTE ADULT - PROBLEM SELECTOR PLAN 2
TTE with grade 1 dialstolic dysfunction   lasix 80 bid     addendum: flash pulmonary edema in the ER, given lasix 120 and emergent dialysis on 4/28 and another session 4/29 likely in the setting of viral infection   monitor cbc, transfuse as indicated

## 2024-04-30 NOTE — PROGRESS NOTE ADULT - PROBLEM SELECTOR PROBLEM 6
Prophylactic measure HLD (hyperlipidemia) Finasteride Pregnancy And Lactation Text: This medication is absolutely contraindicated during pregnancy. It is unknown if it is excreted in breast milk.

## 2024-04-30 NOTE — PROGRESS NOTE ADULT - SUBJECTIVE AND OBJECTIVE BOX
PGY-1 Progress Note discussed with attending    PAGER #: [[phone]] TILL 5:00 PM  PLEASE CONTACT ON CALL TEAM:  - On Call Team (Please refer to Danie) FROM 5:00 PM - 8:30PM  - Nightfloat Team FROM 8:30 -7:30 AM    INTERVAL HPI/OVERNIGHT EVENTS:     MEDICATIONS  (STANDING):  aspirin enteric coated 81 milliGRAM(s) Oral daily  calcium acetate 667 milliGRAM(s) Oral three times a day with meals  cefepime   IVPB 1000 milliGRAM(s) IV Intermittent every 12 hours  chlorhexidine 2% Cloths 1 Application(s) Topical daily  furosemide   Injectable 80 milliGRAM(s) IV Push two times a day  heparin   Injectable 5000 Unit(s) SubCutaneous every 12 hours  hydrALAZINE 100 milliGRAM(s) Oral every 8 hours  labetalol 300 milliGRAM(s) Oral every 8 hours  NIFEdipine XL 30 milliGRAM(s) Oral daily    MEDICATIONS  (PRN):  albuterol    90 MICROgram(s) HFA Inhaler 2 Puff(s) Inhalation every 6 hours PRN Bronchospasm  guaiFENesin Oral Liquid (Sugar-Free) 200 milliGRAM(s) Oral every 6 hours PRN Cough      REVIEW OF SYSTEMS:  CONSTITUTIONAL:  No fevers or chills, good appetite, good general state  EYES/ENT:  No visual changes;  No vertigo or throat pain   NECK:  No neck pain or stiffness  RESPIRATORY:  No cough, wheezing, hemoptysis; No shortness of breath  CARDIOVASCULAR:  No chest pain or palpitations  GASTROINTESTINAL:  No abdominal pain. No nausea, vomiting, or hematemesis; No diarrhea or constipation. No melena or hematochezia.  GENITOURINARY:  No dysuria, frequency or hematuria  NEUROLOGICAL:  No HA, no numbness or LE weakness  MSK: no back pain, no joint pain  SKIN:  No itching, no skin rash    Vital Signs Last 24 Hrs  T(C): 36.6 (30 Apr 2024 05:19), Max: 38.3 (29 Apr 2024 12:34)  T(F): 97.9 (30 Apr 2024 05:19), Max: 100.9 (29 Apr 2024 12:34)  HR: 77 (30 Apr 2024 05:19) (77 - 94)  BP: 124/67 (30 Apr 2024 05:19) (123/65 - 166/92)  BP(mean): --  RR: 18 (30 Apr 2024 05:19) (18 - 18)  SpO2: 96% (30 Apr 2024 05:19) (93% - 100%)    Parameters below as of 30 Apr 2024 05:19  Patient On (Oxygen Delivery Method): nasal cannula  O2 Flow (L/min): 2      PHYSICAL EXAM:  VITALS: Vital Signs Last 24 Hrs  T(C): 36.6 (30 Apr 2024 05:19), Max: 38.3 (29 Apr 2024 12:34)  T(F): 97.9 (30 Apr 2024 05:19), Max: 100.9 (29 Apr 2024 12:34)  HR: 77 (30 Apr 2024 05:19) (77 - 94)  BP: 124/67 (30 Apr 2024 05:19) (123/65 - 166/92)  BP(mean): --  RR: 18 (30 Apr 2024 05:19) (18 - 18)  SpO2: 96% (30 Apr 2024 05:19) (93% - 100%)    Parameters below as of 30 Apr 2024 05:19  Patient On (Oxygen Delivery Method): nasal cannula  O2 Flow (L/min): 2    Gen: AOx3, NAD, non-toxic, pleasant  HEAD:  Atraumatic, Normocephalic  EYES: PERRLA, conjunctiva and sclera clear  ENT: Moist mucous membranes  NECK: Supple, No JVD  CV: S1+S2 normal, no murmurs   Resp: Clear bilat, no resp distress, no crackles/wheezes  Abd: Soft, nontender, +BS  Ext: No LE edema, no cyanosis, LE pulses present  : Henry (+/-)  IV/Skin: No thrombophlebitis, no skin rash  Msk: No arthralgias, no joint swelling  Neuro: AAOx3. No sensory deficits, no motor deficits                          10.7   3.06  )-----------( 59       ( 30 Apr 2024 06:44 )             33.4     04-30    136  |  100  |  44<H>  ----------------------------<  102<H>  4.4   |  23  |  7.52<H>    Ca    7.6<L>      30 Apr 2024 06:44  Phos  4.1     04-30  Mg     2.1     04-30    TPro  5.8<L>  /  Alb  2.7<L>  /  TBili  0.6  /  DBili  x   /  AST  27  /  ALT  17  /  AlkPhos  61  04-29    LIVER FUNCTIONS - ( 29 Apr 2024 08:45 )  Alb: 2.7 g/dL / Pro: 5.8 g/dL / ALK PHOS: 61 U/L / ALT: 17 U/L DA / AST: 27 U/L / GGT: x                   CAPILLARY BLOOD GLUCOSE      RADIOLOGY & ADDITIONAL TESTS:                   PGY-1 Progress Note discussed with attending    PAGER #: [[phone]] TILL 5:00 PM  PLEASE CONTACT ON CALL TEAM:  - On Call Team (Please refer to Danie) FROM 5:00 PM - 8:30PM  - Nightfloat Team FROM 8:30 -7:30 AM    INTERVAL HPI/OVERNIGHT EVENTS: no events overnight. Still in isolation. Had a second round of dialysis on 4/29, breathing well on room air. For AVF creation likely Thursday.     MEDICATIONS  (STANDING):  aspirin enteric coated 81 milliGRAM(s) Oral daily  calcium acetate 667 milliGRAM(s) Oral three times a day with meals  cefepime   IVPB 1000 milliGRAM(s) IV Intermittent every 12 hours  chlorhexidine 2% Cloths 1 Application(s) Topical daily  furosemide   Injectable 80 milliGRAM(s) IV Push two times a day  heparin   Injectable 5000 Unit(s) SubCutaneous every 12 hours  hydrALAZINE 100 milliGRAM(s) Oral every 8 hours  labetalol 300 milliGRAM(s) Oral every 8 hours  NIFEdipine XL 30 milliGRAM(s) Oral daily    MEDICATIONS  (PRN):  albuterol    90 MICROgram(s) HFA Inhaler 2 Puff(s) Inhalation every 6 hours PRN Bronchospasm  guaiFENesin Oral Liquid (Sugar-Free) 200 milliGRAM(s) Oral every 6 hours PRN Cough      REVIEW OF SYSTEMS:  CONSTITUTIONAL:  No fevers or chills, good appetite, good general state  EYES/ENT:  No visual changes;  No vertigo or throat pain   NECK:  No neck pain or stiffness  RESPIRATORY:  +cough; no wheezing, hemoptysis; No shortness of breath  CARDIOVASCULAR:  No chest pain or palpitations  GASTROINTESTINAL:  No abdominal pain. No nausea, vomiting, or hematemesis; No diarrhea or constipation. No melena or hematochezia.  GENITOURINARY:  No dysuria, frequency or hematuria  NEUROLOGICAL:  +HA and mild dizziness   MSK: no back pain, no joint pain  SKIN:  No itching, no skin rash    Vital Signs Last 24 Hrs  T(C): 36.6 (30 Apr 2024 05:19), Max: 38.3 (29 Apr 2024 12:34)  T(F): 97.9 (30 Apr 2024 05:19), Max: 100.9 (29 Apr 2024 12:34)  HR: 77 (30 Apr 2024 05:19) (77 - 94)  BP: 124/67 (30 Apr 2024 05:19) (123/65 - 166/92)  BP(mean): --  RR: 18 (30 Apr 2024 05:19) (18 - 18)  SpO2: 96% (30 Apr 2024 05:19) (93% - 100%)    Parameters below as of 30 Apr 2024 05:19  Patient On (Oxygen Delivery Method): nasal cannula  O2 Flow (L/min): 2      PHYSICAL EXAM:  VITALS: Vital Signs Last 24 Hrs  T(C): 36.6 (30 Apr 2024 05:19), Max: 38.3 (29 Apr 2024 12:34)  T(F): 97.9 (30 Apr 2024 05:19), Max: 100.9 (29 Apr 2024 12:34)  HR: 77 (30 Apr 2024 05:19) (77 - 94)  BP: 124/67 (30 Apr 2024 05:19) (123/65 - 166/92)  BP(mean): --  RR: 18 (30 Apr 2024 05:19) (18 - 18)  SpO2: 96% (30 Apr 2024 05:19) (93% - 100%)    Parameters below as of 30 Apr 2024 05:19  Patient On (Oxygen Delivery Method): nasal cannula  O2 Flow (L/min): 2    Gen: AOx3, NAD, non-toxic, pleasant  HEAD:  Atraumatic, Normocephalic  EYES: PERRLA, conjunctiva and sclera clear  ENT: Moist mucous membranes  NECK: Supple, No JVD  CV: S1+S2 normal, no murmurs   Resp: Clear bilat, no resp distress, no crackles/wheezes  Abd: Soft, nontender, +BS  Ext: No LE edema, no cyanosis, LE pulses present  : No ellis  IV/Skin: No thrombophlebitis, no skin rash  Msk: No arthralgias, no joint swelling  Neuro: AAOx3. No sensory deficits, no motor deficits                          10.7   3.06  )-----------( 59       ( 30 Apr 2024 06:44 )             33.4     04-30    136  |  100  |  44<H>  ----------------------------<  102<H>  4.4   |  23  |  7.52<H>    Ca    7.6<L>      30 Apr 2024 06:44  Phos  4.1     04-30  Mg     2.1     04-30    TPro  5.8<L>  /  Alb  2.7<L>  /  TBili  0.6  /  DBili  x   /  AST  27  /  ALT  17  /  AlkPhos  61  04-29    LIVER FUNCTIONS - ( 29 Apr 2024 08:45 )  Alb: 2.7 g/dL / Pro: 5.8 g/dL / ALK PHOS: 61 U/L / ALT: 17 U/L DA / AST: 27 U/L / GGT: x                   CAPILLARY BLOOD GLUCOSE      RADIOLOGY & ADDITIONAL TESTS:

## 2024-05-01 ENCOUNTER — TRANSCRIPTION ENCOUNTER (OUTPATIENT)
Age: 54
End: 2024-05-01

## 2024-05-01 DIAGNOSIS — J06.9 ACUTE UPPER RESPIRATORY INFECTION, UNSPECIFIED: ICD-10-CM

## 2024-05-01 DIAGNOSIS — I50.33 ACUTE ON CHRONIC DIASTOLIC (CONGESTIVE) HEART FAILURE: ICD-10-CM

## 2024-05-01 DIAGNOSIS — J96.01 ACUTE RESPIRATORY FAILURE WITH HYPOXIA: ICD-10-CM

## 2024-05-01 DIAGNOSIS — J81.0 ACUTE PULMONARY EDEMA: ICD-10-CM

## 2024-05-01 LAB
ANION GAP SERPL CALC-SCNC: 11 MMOL/L — SIGNIFICANT CHANGE UP (ref 5–17)
BLD GP AB SCN SERPL QL: SIGNIFICANT CHANGE UP
BUN SERPL-MCNC: 61 MG/DL — HIGH (ref 7–18)
CALCIUM SERPL-MCNC: 7.6 MG/DL — LOW (ref 8.4–10.5)
CHLORIDE SERPL-SCNC: 96 MMOL/L — SIGNIFICANT CHANGE UP (ref 96–108)
CO2 SERPL-SCNC: 25 MMOL/L — SIGNIFICANT CHANGE UP (ref 22–31)
CREAT SERPL-MCNC: 9.54 MG/DL — HIGH (ref 0.5–1.3)
EGFR: 4 ML/MIN/1.73M2 — LOW
GLUCOSE SERPL-MCNC: 86 MG/DL — SIGNIFICANT CHANGE UP (ref 70–99)
HCT VFR BLD CALC: 32.1 % — LOW (ref 34.5–45)
HGB BLD-MCNC: 10.4 G/DL — LOW (ref 11.5–15.5)
MAGNESIUM SERPL-MCNC: 2.2 MG/DL — SIGNIFICANT CHANGE UP (ref 1.6–2.6)
MCHC RBC-ENTMCNC: 30 PG — SIGNIFICANT CHANGE UP (ref 27–34)
MCHC RBC-ENTMCNC: 32.4 GM/DL — SIGNIFICANT CHANGE UP (ref 32–36)
MCV RBC AUTO: 92.5 FL — SIGNIFICANT CHANGE UP (ref 80–100)
NRBC # BLD: 0 /100 WBCS — SIGNIFICANT CHANGE UP (ref 0–0)
PHOSPHATE SERPL-MCNC: 4 MG/DL — SIGNIFICANT CHANGE UP (ref 2.5–4.5)
PLATELET # BLD AUTO: 68 K/UL — LOW (ref 150–400)
POTASSIUM SERPL-MCNC: 5 MMOL/L — SIGNIFICANT CHANGE UP (ref 3.5–5.3)
POTASSIUM SERPL-SCNC: 5 MMOL/L — SIGNIFICANT CHANGE UP (ref 3.5–5.3)
RBC # BLD: 3.47 M/UL — LOW (ref 3.8–5.2)
RBC # FLD: 14.9 % — HIGH (ref 10.3–14.5)
SODIUM SERPL-SCNC: 132 MMOL/L — LOW (ref 135–145)
WBC # BLD: 2.7 K/UL — LOW (ref 3.8–10.5)
WBC # FLD AUTO: 2.7 K/UL — LOW (ref 3.8–10.5)

## 2024-05-01 PROCEDURE — 99222 1ST HOSP IP/OBS MODERATE 55: CPT

## 2024-05-01 PROCEDURE — 99233 SBSQ HOSP IP/OBS HIGH 50: CPT | Mod: GC

## 2024-05-01 PROCEDURE — 93990 DOPPLER FLOW TESTING: CPT | Mod: 26

## 2024-05-01 RX ORDER — FUROSEMIDE 40 MG
1 TABLET ORAL
Qty: 30 | Refills: 0
Start: 2024-05-01 | End: 2024-05-30

## 2024-05-01 RX ORDER — ONDANSETRON 8 MG/1
4 TABLET, FILM COATED ORAL
Refills: 0 | Status: DISCONTINUED | OUTPATIENT
Start: 2024-05-01 | End: 2024-05-02

## 2024-05-01 RX ORDER — CALCIUM ACETATE 667 MG
1 TABLET ORAL
Qty: 1 | Refills: 0
Start: 2024-05-01 | End: 2024-05-30

## 2024-05-01 RX ORDER — FUROSEMIDE 40 MG
1 TABLET ORAL
Refills: 0 | DISCHARGE

## 2024-05-01 RX ADMIN — Medication 81 MILLIGRAM(S): at 12:26

## 2024-05-01 RX ADMIN — Medication 100 MILLIGRAM(S): at 21:25

## 2024-05-01 RX ADMIN — Medication 667 MILLIGRAM(S): at 12:26

## 2024-05-01 RX ADMIN — Medication 667 MILLIGRAM(S): at 07:47

## 2024-05-01 RX ADMIN — Medication 100 MILLIGRAM(S): at 05:38

## 2024-05-01 RX ADMIN — Medication 200 MILLIGRAM(S): at 10:29

## 2024-05-01 RX ADMIN — HEPARIN SODIUM 5000 UNIT(S): 5000 INJECTION INTRAVENOUS; SUBCUTANEOUS at 05:38

## 2024-05-01 RX ADMIN — Medication 300 MILLIGRAM(S): at 21:26

## 2024-05-01 RX ADMIN — Medication 300 MILLIGRAM(S): at 05:38

## 2024-05-01 RX ADMIN — ONDANSETRON 4 MILLIGRAM(S): 8 TABLET, FILM COATED ORAL at 13:43

## 2024-05-01 RX ADMIN — CHLORHEXIDINE GLUCONATE 1 APPLICATION(S): 213 SOLUTION TOPICAL at 05:37

## 2024-05-01 RX ADMIN — Medication 30 MILLIGRAM(S): at 05:38

## 2024-05-01 RX ADMIN — Medication 80 MILLIGRAM(S): at 05:38

## 2024-05-01 RX ADMIN — Medication 667 MILLIGRAM(S): at 16:36

## 2024-05-01 NOTE — CONSULT NOTE ADULT - REASON FOR ADMISSION
sepsis 2/2 possible RIJ tunneled catheter infection

## 2024-05-01 NOTE — PROGRESS NOTE ADULT - SUBJECTIVE AND OBJECTIVE BOX
NEPHROLOGY MEDICAL CARE, [facility] - Dr. Marco Calle/ Dr. Nicholas Mckeon/ Dr. Ry Zuniga/ Dr. Roxi Reese    Date of Service: 05-01-24    Patient was seen and examined at bedside.    CC: patient is breathing is better; no sob    Vital Signs Last 24 Hrs  T(C): 36.7 (01 May 2024 13:20), Max: 37.2 (01 May 2024 05:41)  T(F): 98.1 (01 May 2024 13:20), Max: 98.9 (01 May 2024 05:41)  HR: 71 (01 May 2024 13:20) (70 - 73)  BP: 119/89 (01 May 2024 13:20) (117/67 - 128/79)  BP(mean): --  RR: 18 (01 May 2024 13:20) (18 - 18)  SpO2: 94% (01 May 2024 13:20) (94% - 94%)    Parameters below as of 01 May 2024 13:20  Patient On (Oxygen Delivery Method): room air          PHYSICAL EXAM:  General: No acute respiratory distress.  Eyes: conjunctiva and sclera clear  ENMT: Atraumatic, Normocephalic, supple,   Respiratory: Right sided rhonchi >Lt; No rales, wheezing  Cardiovascular: S1S2+; no m/r/g  Gastrointestinal: Soft, Non-tender, Nondistended; Bowel sounds present,   Neuro:  Awake, Alert & Oriented X3  Ext:  No edema, No Cyanosis  Skin: No rashes  Dialysis Access::  Right Chest PERMACATH    MEDICATIONS:  MEDICATIONS  (STANDING):  aspirin enteric coated 81 milliGRAM(s) Oral daily  calcium acetate 667 milliGRAM(s) Oral three times a day with meals  chlorhexidine 2% Cloths 1 Application(s) Topical daily  hydrALAZINE 100 milliGRAM(s) Oral every 8 hours  labetalol 300 milliGRAM(s) Oral every 8 hours  NIFEdipine XL 30 milliGRAM(s) Oral daily    MEDICATIONS  (PRN):  albuterol    90 MICROgram(s) HFA Inhaler 2 Puff(s) Inhalation every 6 hours PRN Bronchospasm  guaiFENesin Oral Liquid (Sugar-Free) 200 milliGRAM(s) Oral every 6 hours PRN Cough  ondansetron Injectable 4 milliGRAM(s) IV Push two times a day PRN Nausea and/or Vomiting          LABS:                        10.4   2.70  )-----------( 68       ( 01 May 2024 07:05 )             32.1     05-01    132<L>  |  96  |  61<H>  ----------------------------<  86  5.0   |  25  |  9.54<H>    Ca    7.6<L>      01 May 2024 07:05  Phos  4.0     05-01  Mg     2.2     05-01        Urinalysis Basic - ( 01 May 2024 07:05 )    Color: x / Appearance: x / SG: x / pH: x  Gluc: 86 mg/dL / Ketone: x  / Bili: x / Urobili: x   Blood: x / Protein: x / Nitrite: x   Leuk Esterase: x / RBC: x / WBC x   Sq Epi: x / Non Sq Epi: x / Bacteria: x      Magnesium: 2.2 mg/dL (05-01 @ 07:05)  Phosphorus: 4.0 mg/dL (05-01 @ 07:05)    Urine studies    PTH and Vit D:

## 2024-05-01 NOTE — PROGRESS NOTE ADULT - ATTENDING COMMENTS
Patient seen and examined this morning     53 year old female with hx of HTN, HLD, anemia, diastolic HF, ESRD (HD TTS) x 4 yrs, presenting with chills, fevers, productive cough, sore throat and bilateral flank pain since Thursday. Patient underwent dialysis catheter exchange (right chest wall permacath) on previous Monday, due to a malfunctioning catheter. She states the exchange was complicated with bleeding. She notes receiving dialysis on Tuesday, Thursday and Saturday, without disruptions. Patient also reports her daughter having flu-like symptoms last week. In the ED, vitals significant for Tmax of 101F, tachycardic and hypertensive. Permacath site clean, without surrounding cellulitis or discharge. On admission, Labs notable for no leukocytosis, thrombocytopenia of 62, and lactate of 0.4. CXR consistent with fluid overload, R >L. Patient admitted for sepsis 2/2 catheter infection vs viral infection but noted to have viral infection with HMPV    Course complicated by acute hypoxic respiratory failure 2/2 flash pulmonary edema in the AM on 4/28, requiring BiPAP. She has undergone HD 4/29 as well as receiving empiric doses of high dose IV Lasix with significant clinical improvement and only on 2 liter NC now saturating well    Patient is feeling better, still with residual cough Shortness of breath much better. No fever spikes    Vital Signs Last 24 Hrs  T(C): 36.2 (01 May 2024 17:29), Max: 37.2 (01 May 2024 05:41)  T(F): 97.1 (01 May 2024 17:29), Max: 98.9 (01 May 2024 05:41)  HR: 69 (01 May 2024 17:29) (69 - 73)  BP: 126/71 (01 May 2024 17:29) (117/67 - 128/79)  RR: 16 (01 May 2024 17:29) (16 - 18)  SpO2: 98% (01 May 2024 17:29) (94% - 98%)room air          P/E:   Psych: AAO x3  Neuro: No gross focal deficits; Power and sensation intact  CVS: S1S2 present, regular, fine rales at both bases  Resp: BLAE+, No active wheeze or Rhonchi  GI: Soft, BS+, Non tender, non distended  Extr: No  calf tenderness B/L Lower extremities  Skin: Warm and moist without any rashes    Labs:                        10.4   2.70  )-----------( 68       ( 01 May 2024 07:05 )             32.1     05-01    132<L>  |  96  |  61<H>  ----------------------------<  86  5.0   |  25  |  9.54<H>    Ca    7.6<L>      01 May 2024 07:05  Phos  4.0     05-01  Mg     2.2     05-01                 Culture - Blood (04.27.24 @ 23:09) x 2 sets  Culture Results: No growth at 48 Hours    I have discussed case with Nephrology Dr. Calle.       A/P  #Sepsis 2/2 viral infection due to HMPV   Acute Hypoxic Resp. failure due to fluid overload much improved  #Clinically not Permacath infection  #Pancytopenia likely confirming Viral etiology  #Heart failure, diastolic due to fluid overload  #ESRD on dialysis TTS (currently MWF)  #HTN  #HLD    Plan:  Continue , supportive care for viral infection;   Patient now off BiPAP, on only 2 liter of supplemental oxygen; will continue to employ volume removal via HD  blood cultures result negative; D/C Antibiotics  Patient rapid clinical improvement, PC site clean, leucopenia and thrombocytopenia, blood Cx negative will D/C Cefepime and observe next 24 hrs  I will ask ID to evaluate for supporting the diagnostic etilogy  Nephro follow up; d/w Dr. Calle; will arrange HD tomorrow in prep for AV Fistula Thursday  Tylenol prn for fevers   Nephrology following, appreciate assistance  Continue phoslo BID on 4/29  Continue Lasix on non-HD days   F/U TTE   -rest of plan per resident note    Discussed with patient and Son at bedside  Discussed with PGY1/ PGY3/ / Nephro Patient seen and examined this morning     53 year old female with hx of HTN, HLD, anemia, diastolic HF, ESRD (HD TTS) x 4 yrs, presenting with chills, fevers, productive cough, sore throat and bilateral flank pain since Thursday. Patient underwent dialysis catheter exchange (right chest wall permacath) on previous Monday, due to a malfunctioning catheter. She states the exchange was complicated with bleeding. She notes receiving dialysis on Tuesday, Thursday and Saturday, without disruptions. Patient also reports her daughter having flu-like symptoms last week. In the ED, vitals significant for Tmax of 101F, tachycardic and hypertensive. Permacath site clean, without surrounding cellulitis or discharge. On admission, Labs notable for no leukocytosis, thrombocytopenia of 62, and lactate of 0.4. CXR consistent with fluid overload, R >L. Patient admitted for sepsis 2/2 catheter infection vs viral infection but noted to have viral infection with HMPV    Course complicated by acute hypoxic respiratory failure 2/2 flash pulmonary edema in the AM on 4/28, requiring BiPAP. She has undergone HD 4/29 as well as receiving empiric doses of high dose IV Lasix with significant clinical improvement and only on 2 liter NC now saturating well    Patient is feeling better, still with residual cough Shortness of breath much better. No fever spikes    Vital Signs Last 24 Hrs  T(C): 36.2 (01 May 2024 17:29), Max: 37.2 (01 May 2024 05:41)  T(F): 97.1 (01 May 2024 17:29), Max: 98.9 (01 May 2024 05:41)  HR: 69 (01 May 2024 17:29) (69 - 73)  BP: 126/71 (01 May 2024 17:29) (117/67 - 128/79)  RR: 16 (01 May 2024 17:29) (16 - 18)  SpO2: 98% (01 May 2024 17:29) (94% - 98%)room air          P/E:   Psych: AAO x3  Neuro: No gross focal deficits; Power and sensation intact  CVS: S1S2 present, regular, fine rales at both bases  Resp: BLAE+, No active wheeze or Rhonchi  GI: Soft, BS+, Non tender, non distended  Extr: No  calf tenderness B/L Lower extremities  Skin: Warm and moist without any rashes    Labs:                        10.4   2.70  )-----------( 68       ( 01 May 2024 07:05 )             32.1     05-01    132<L>  |  96  |  61<H>  ----------------------------<  86  5.0   |  25  |  9.54<H>    Ca    7.6<L>      01 May 2024 07:05  Phos  4.0     05-01  Mg     2.2     05-01                 Culture - Blood (04.27.24 @ 23:09) x 2 sets  Culture Results: No growth at 48 Hours    I have discussed case with Nephrology Dr. Calle.       A/P  #Sepsis 2/2 viral infection due to HMPV   Acute Hypoxic Resp. failure due to fluid overload much improved  #Clinically not Permacath infection  #Pancytopenia likely confirming Viral etiology  #Heart failure, diastolic due to fluid overload  Preop evaluation  #ESRD on dialysis TTS (currently MWF)  #HTN  #HLD    Plan:  Patient rapid clinical improvement, PC site clean, leucopenia and thrombocytopenia, blood Cx negative   Continue , supportive care for viral infection; D/C Isolation; remain afebrile for 48 hrs; off Antibiotics for 24 hrs  ID consult appreciated; no clinical evidence of Bacterial infeciton or catheter infection   Patient saturating well on room air  Nephro follow up; d/w Dr. Calle; will arrange HD today in prep for AV Fistula Thursday  Patient is Optimized for planned Vasular procedure at intermediate risk; d/w Alcides Ku  Nephrology following, appreciate assistance; as d/w Dr. Calle once AVF in place may plan for discharge on Friday with outpatient HD resumption Saturday or if any issues will Dialyze on Saturday and discharge   Continue phoslo BID on 4/29  Continue Lasix on non-HD days   F/U TTE   -rest of plan per resident note    Discussed with patient and Son at bedside  Discussed with PGY1/ PGY3/ / Nephro Patient seen and examined this morning     53 year old female with hx of HTN, HLD, anemia, diastolic HF, ESRD (HD TTS) x 4 yrs, presenting with chills, fevers, productive cough, sore throat and bilateral flank pain since Thursday. Patient underwent dialysis catheter exchange (right chest wall permacath) on previous Monday, due to a malfunctioning catheter. She states the exchange was complicated with bleeding. She notes receiving dialysis on Tuesday, Thursday and Saturday, without disruptions. Patient also reports her daughter having flu-like symptoms last week. In the ED, vitals significant for Tmax of 101F, tachycardic and hypertensive. Permacath site clean, without surrounding cellulitis or discharge. On admission, Labs notable for no leukocytosis, thrombocytopenia of 62, and lactate of 0.4. CXR consistent with fluid overload, R >L. Patient admitted for sepsis 2/2 catheter infection vs viral infection but noted to have viral infection with HMPV    Course complicated by acute hypoxic respiratory failure 2/2 flash pulmonary edema in the AM on 4/28, requiring BiPAP. She has undergone HD 4/29 as well as receiving empiric doses of high dose IV Lasix with significant clinical improvement and only on 2 liter NC now saturating well    Patient is feeling better, still with residual cough Shortness of breath much better. No fever spikes    Vital Signs Last 24 Hrs  T(C): 36.2 (01 May 2024 17:29), Max: 37.2 (01 May 2024 05:41)  T(F): 97.1 (01 May 2024 17:29), Max: 98.9 (01 May 2024 05:41)  HR: 69 (01 May 2024 17:29) (69 - 73)  BP: 126/71 (01 May 2024 17:29) (117/67 - 128/79)  RR: 16 (01 May 2024 17:29) (16 - 18)  SpO2: 98% (01 May 2024 17:29) (94% - 98%)room air    P/E:   Psych: AAO x3  Neuro: No gross focal deficits; Power and sensation intact  CVS: S1S2 present, regular, fine rales at both bases  Resp: BLAE+, No active wheeze or Rhonchi  GI: Soft, BS+, Non tender, non distended  Extr: No  calf tenderness B/L Lower extremities  Skin: Warm and moist without any rashes    Labs:                      10.4   2.70  )-----------( 68       ( 01 May 2024 07:05 )             32.1     05-01  132<L>  |  96  |  61<H>  ----------------------------<  86  5.0   |  25  |  9.54<H>  Ca    7.6<L>      01 May 2024 07:05  Phos  4.0     05-01  Mg     2.2     05-01             Culture - Blood (04.27.24 @ 23:09) x 2 sets  Culture Results: No growth at 48 Hours    TTE W or WO Ultrasound Enhancing Agent (04.28.24 @ 12:16)   CONCLUSIONS:   1. Left ventricular cavity is moderately dilated. Left ventricular wall thickness is normal. Left ventricular systolic function is normalwith an ejection fraction of 54 % by Peralta's method of disks. There are no regional wall motion abnormalities seen.   2. There is mild (grade 1) left ventricular diastolic dysfunction.   3. Normal right ventricular cavity size, with normal wall thickness, and normal systolic function. Tricuspid annular plane systolic excursion (TAPSE) is 2.8 cm (normal >=1.7 cm).   4. Mild to moderate mitral regurgitation.   5. Normal left and right atrial size.   6. Mild tricuspid regurgitation.   7. Estimated pulmonary artery systolic pressure is 37 mmHg.   8. Left pleural effusion noted.   9. Mild aortic regurgitation.  10. Mild pulmonic regurgitation.  11. There is increased LV mass and eccentric hypertrophy.  12. Small pericardial effusion.  13. No prior echocardiogram is available for comparison.    A/P  #Sepsis 2/2 viral infection due to HMPV   Acute Hypoxic Resp. failure due to fluid overload much improved  #Clinically not Permacath infection  #Pancytopenia likely confirming Viral etiology  #Heart failure, diastolic due to fluid overload  Preop evaluation  #ESRD on dialysis TTS (currently MWF)  #HTN/ HLD    Plan:  Patient rapid clinical improvement, PC site clean, leucopenia and thrombocytopenia, blood Cx negative   Continue , supportive care for viral infection; D/C Isolation; remain afebrile for 48 hrs; off Antibiotics for 24 hrs  ID consult appreciated; no clinical evidence of Bacterial infection or catheter infection   Patient saturating well on room air  Nephro follow up; d/w Dr. Calle; will arrange HD today in prep for AV Fistula Thursday  Patient is Optimized for planned Vascular procedure at intermediate risk; d/w Alcides Ku  Nephrology following, appreciate assistance; as d/w Dr. Calle once AVF in place may plan for discharge on Friday with outpatient HD resumption Saturday or if any issues will Dialyze on Saturday and discharge   Continue Phoslo BID on 4/29  Continue Lasix on non-HD days   TTE insignificant except for mild to moderate MR  -rest of plan per resident note    Discussed with patient and Son at bedside  Discussed with PGY1/ PGY3/ / Nephro

## 2024-05-01 NOTE — DISCHARGE NOTE PROVIDER - HOSPITAL COURSE
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 She complains of upper respiratory symptoms and bilateral flank pain since three days before admission. She had her HG catheter exchanged on the 22nd due to a malfunction. She was started on empiric cefepime for catheter related bloodstream infection. However she then tested positive for hmpv. Her blood cultures remained negative. During her stay she used BiPAP initially. And the emergency room she had a flash pulmonary edema followed by emergent dialysis and heavy diuresis. She had dialysis the following day as well. Vascular surgery saw her for the placement of an avf. Her symptoms began to improve and she was discharged with -----    Given patient's improved clinical status and current hemodynamic stability, decision was made to discharge. Discussed with attending. Please refer to patient's complete medical chart with documents for a full hospital course, for this is only a brief summary.   53f with hypertension, hyperlipidemia, anemia, diastolic heart function, came to the ER with rigors and fever. She complained of upper respiratory symptoms and bilateral flank pain since three days before admission. She had her HD catheter exchanged on the 22nd due to a malfunction. She was started on empiric cefepime for catheter related bloodstream infection. However she then tested positive for hmpv. Her blood cultures remained negative, and she was taken off cefepime. During her stay she used BiPAP initially. And the emergency room she had a flash pulmonary edema followed by emergent dialysis and heavy diuresis. She had dialysis the following day as well. Her respiratory symptoms improved. On 5/2 an AVF was placed with Vascular. She can be dc without lasix, as she is euvolemic.     Given patient's improved clinical status and current hemodynamic stability, decision was made to discharge. Discussed with attending. Please refer to patient's complete medical chart with documents for a full hospital course, for this is only a brief summary.   53f with hypertension, hyperlipidemia, anemia, diastolic heart function, came to the ER with rigors and fever. She complained of upper respiratory symptoms and bilateral flank pain since three days before admission. She had her HD catheter exchanged on the 22nd due to a malfunction. She was started on empiric cefepime for catheter related bloodstream infection. However she then tested positive for hmpv. She was pancytopenic, likely in the setting of the virus. On DC her labs uptrending toward normal. Her blood cultures remained negative, and she was taken off cefepime. During her stay she used BiPAP initially. And the emergency room she had a flash pulmonary edema followed by emergent dialysis and heavy diuresis. She had dialysis the following day as well. Her respiratory symptoms improved. On 5/2 an AVF was placed with Vascular. She can be dc without lasix, as she is euvolemic.     Given patient's improved clinical status and current hemodynamic stability, decision was made to discharge. Discussed with attending. Please refer to patient's complete medical chart with documents for a full hospital course, for this is only a brief summary.

## 2024-05-01 NOTE — PROGRESS NOTE ADULT - ASSESSMENT
Pt is a 52yo F with HTN, HLD, anemia, diastolic HF (last TTE 11/02/2023- Grade IV diastolic dysfunction (severe with fixed restrictive pattern), mild TR, mild CO, RV systolic pressure mod inc 46 mmHg, moderate pericardial effusion), ESRD on HD Tu/Th/Sat (diagnosed 2 years ago, last HD this AM), presents to the ED with chills/rigors, fevers around 102 F, productive cough, sore throat, and b/l flank pain since Thursday (3 days), with recent catheter exchange on Monday 4/22. Vitals sig for temp of 101.5 F, , sys  > 158. WC 5.74, cov/flu negative. CXR-  Increasing CHF. Probable heart enlargement. Admitted for sepsis likely 2/2 to HD tunneled catheter infection vs. viral infection, and fluid overload in the setting of ESRD.

## 2024-05-01 NOTE — PROGRESS NOTE ADULT - SUBJECTIVE AND OBJECTIVE BOX
S:  Breathing has improved. No acute complaints.       O:  Vital Signs Last 24 Hrs  T(C): 37.2 (01 May 2024 05:41), Max: 37.2 (01 May 2024 05:41)  T(F): 98.9 (01 May 2024 05:41), Max: 98.9 (01 May 2024 05:41)  HR: 70 (01 May 2024 05:41) (70 - 84)  BP: 128/79 (01 May 2024 05:41) (117/67 - 128/79)  BP(mean): --  RR: 18 (01 May 2024 05:41) (18 - 18)  SpO2: 94% (01 May 2024 05:41) (94% - 94%)    Parameters below as of 01 May 2024 05:41  Patient On (Oxygen Delivery Method): room air    Exam:  Gen: awake, alert, NAD  Resp: nonlabored  Extremities: LUE precautions bracelet in place    LABS:                      10.4   2.70  )-----------( 68       ( 01 May 2024 07:05 )             32.1   05-01    132<L>  |  96  |  61<H>  ----------------------------<  86  5.0   |  25  |  9.54<H>    Ca    7.6<L>      01 May 2024 07:05  Phos  4.0     05-01  Mg     2.2     05-01

## 2024-05-01 NOTE — CONSULT NOTE ADULT - CONSULT REASON
Dry Cough , Positive for RVP/ HMPV.
AVF creation
Sepsis 2/2 viral infection, antibiotics recommendations
ESRD management

## 2024-05-01 NOTE — DISCHARGE NOTE PROVIDER - ATTENDING DISCHARGE PHYSICAL EXAMINATION:
Psych: AAO x3  Neuro: No gross focal deficits; Power and sensation intact  CVS: S1S2 present, regular, no edema  Resp: BLAE+, No wheeze or Rhonchi  GI: Soft, BS+, Non tender, non distended  Extr: No  calf tenderness B/L Lower extremities  Skin: Warm and moist without any rashes   Patient admitted with fluid overload in ESRD due to HMPV infection improved with supportive care and HD sessions s/p AV Fistula doing well    P/E:  Psych: AAO x3  Neuro: No gross focal deficits; Power and sensation intact  CVS: S1S2 present, regular, no edema  Resp: BLAE+, No wheeze or Rhonchi  GI: Soft, BS+, Non tender, non distended  Extr: No  calf tenderness B/L Lower extremities' Lt AV Fistula site clean, no bleed  Skin: Warm and moist without any rashes    Plan:  D/C Home with resumption of outpt HD Saturday  No fluid overload; outpt HD reinstated by SWer  Discussed with Dr. Calle Renal, will f/u patient labs/ clinical status with HD  Discussed with Housestaff  Discussed with Patient and Son at bedside on discharge

## 2024-05-01 NOTE — DISCHARGE NOTE PROVIDER - NSDCFUSCHEDAPPT_GEN_ALL_CORE_FT
Krish Shine  [facility]  VASCULAR [id number]  Scheduled Appointment: 05/02/2024

## 2024-05-01 NOTE — PROGRESS NOTE ADULT - ASSESSMENT
1 ESRD on HD (T/TH/SAT): pt will be off schedule while on the hospital.   -plan for HD today  -Hemodialysis Renal Diet and Fluid restriction to 1L/day  -Adjust meds to eGFR and avoid IV Gadolinium contrast,NSAIDs, and phosphate enema.  -Monitor Electrolytes daily.  2. HTN:   -bp is acceptable  -titrate bp meds to keep sbp >110 and < 130  3. Anemia of ESRD:  -start epogen if hb <10  -F/u CBC daily  -transfuse if HB < 7.0.  4. Mineral Bone Disease:  -continue phoslo tid  -monitor phos  5. Sob/fever due to fluid overload with hmp virus  -off iv abx since less likely permacath infection.   -ECHO noted.  -UF during HD.   6. AVF placement:  -Plan for AVF placement on Thursday.

## 2024-05-01 NOTE — PROGRESS NOTE ADULT - PROBLEM SELECTOR PLAN 3
TTE with grade 1 diastolic dysfunction   lasix 80 bid     addendum: flash pulmonary edema in the ER, given lasix 120 and emergent dialysis on 4/28 and another session 4/29 TTE with grade 1 diastolic dysfunction   lasix 80 bid on non HD days     addendum: flash pulmonary edema in the ER, given lasix 120 and emergent dialysis on 4/28 and another session 4/29

## 2024-05-01 NOTE — PROGRESS NOTE ADULT - PROBLEM SELECTOR PLAN 4
- hx of ESRD on HD Tues/thurs/Sat     renal diet  phoslo  lasix 80 bid     Dr Calle in nephro  Vascular consulted for tentative AVF creation 5/2  L arm precautions in preparation for Vascular procedure - hx of ESRD on HD Tues/thurs/Sat     renal diet  phoslo  lasix 80 bid on days when no HD     Dr Calle in nephro  AVF creation with Vascular 5/2  L arm precautions in preparation for Vascular procedure - hx of ESRD on HD Tues/thurs/Sat     renal diet  phoslo  on dc will take lasix 80 qd on days when no HD    Dr Calle in nephro  AVF creation with Vascular 5/2  L arm precautions in preparation for Vascular procedure

## 2024-05-01 NOTE — PROGRESS NOTE ADULT - ASSESSMENT
53F with ESRD, HD via PC, in need of permanent HD access: to be dialyzed today via PC  also with HmPV, afebrile    Plan for AVF creation tomorrow Thursday 5/2/24 with Dr. Shine  Please document medical clearance   F/u isolation precautions/recommendations in regards to the OR  Vein mapping (ordered)  NPO after midnight; type and screen, coags, cbc, bmp tomorrow AM  Left upper extremity arm precautions: no blooddraws or IVs: discussed with RN to remove LUE IV  Case signed out to PA covering [phone]

## 2024-05-01 NOTE — DISCHARGE NOTE PROVIDER - CARE PROVIDER_API CALL
JUAN REGALADO  Follow Up Time: 1 week   JUAN REGALADO  Follow Up Time: 1 week    Marco Calle  Internal Medicine  [address] [place] [id number]  Phone: [phone]  Fax: [phone]  Follow Up Time:

## 2024-05-01 NOTE — CONSULT NOTE ADULT - ASSESSMENT
53 year old female with hx of HTN, HLD, anemia, diastolic HF, ESRD (HD TTS) x 4 yrs, presenting with chills, fevers, productive cough, sore throat and bilateral flank pain since 1 week. Patient underwent dialysis catheter exchange (right chest wall PermCath on 4/22), due to a malfunctioning catheter. . She notes receiving dialysis (TTS) without complications. Patient also reports her daughter having flu-like symptoms last week. In the ED, vitals significant for Tmax of 101F, tachycardic and hypertensive. Permacath site clean, without surrounding cellulitis or discharge. On admission, Labs notable for no leukocytosis, thrombocytopenia of 62, and lactate of 0.4. CXR consistent with fluid overload, R >L. Patient admitted for sepsis 2/2 catheter infection vs viral infection, started on cefepime which was discontinued by primary team as she was noted to have viral infection with HMPV. Course complicated by acute hypoxic respiratory failure 2/2 flash pulmonary edema in the AM on 4/28, requiring BiPAP. Last HD 4/29, being diuresed with marked clinical improvement in AHRF ( Now on 2 L NC)  Infectious disease consulted for antibiotics recommendations, given history of recent permcath exchange and sepsis presentation. As per clinical exam and lab findings, clinical presentation is NOT consistent with permcath related infection. Most likely etiology of sepsis remains viral - HMPV. BCx ( 4/27) negative, UCx ( 4/29) negative     Dx   #Sepsis 2/2 viral infection due to HMPV   #Pancytopenia likely confirming Viral etiology  #Acute Hypoxic Resp. failure due to fluid overload much improved  #Heart failure, diastolic due to fluid overload  #ESRD on dialysis TTS (currently MWF)      PLAN         THIS NOTE IS INCOMPLETE TILL ATTENDING SIGNS  53 year old female with hx of HTN, HLD, anemia, diastolic HF, ESRD (HD TTS) x 4 yrs, presenting with chills, fevers, productive cough, sore throat and bilateral flank pain since 1 week. Patient underwent dialysis catheter exchange (right chest wall PermCath on 4/22), due to a malfunctioning catheter. . She notes receiving dialysis (TTS) without complications. Patient also reports her daughter having flu-like symptoms last week. In the ED, vitals significant for Tmax of 101F, tachycardic and hypertensive. Permacath site clean, without surrounding cellulitis or discharge. On admission, Labs notable for no leukocytosis, thrombocytopenia of 62, and lactate of 0.4. CXR consistent with fluid overload, R >L. Patient admitted for sepsis 2/2 catheter infection vs viral infection, started on cefepime which was discontinued by primary team as she was noted to have viral infection with HMPV. Course complicated by acute hypoxic respiratory failure 2/2 flash pulmonary edema in the AM on 4/28, requiring BiPAP. Last HD 4/29, being diuresed with marked clinical improvement in AHRF.  Infectious disease consulted for antibiotics recommendations, given history of recent permcath exchange and sepsis presentation. As per clinical exam and lab findings, clinical presentation is NOT consistent with permcath related infection. Most likely etiology of sepsis remains viral - HMPV. BCx ( 4/27) negative, UCx ( 4/29) negative     Dx   #Sepsis 2/2 viral infection due to HMPV   #Pancytopenia likely confirming Viral etiology  #Acute Hypoxic Resp. failure due to fluid overload much improved  #Heart failure, diastolic due to fluid overload  #ESRD on dialysis TTS (currently MW)      PLAN         THIS NOTE IS INCOMPLETE TILL ATTENDING SIGNS  53 year old female with hx of HTN, HLD, anemia, diastolic HF, ESRD (HD TTS) x 4 yrs, presenting with chills, fevers, productive cough, sore throat and bilateral flank pain since 1 week. Patient underwent dialysis catheter exchange (right chest wall PermCath on 4/22), due to a malfunctioning catheter. . She notes receiving dialysis (TTS) without complications. Patient also reports her daughter having flu-like symptoms last week. In the ED, vitals significant for Tmax of 101F, tachycardic and hypertensive. Permacath site clean, without surrounding cellulitis or discharge. On admission, Labs notable for no leukocytosis, thrombocytopenia of 62, and lactate of 0.4. CXR consistent with fluid overload, R >L. Patient admitted for sepsis 2/2 catheter infection vs viral infection, started on cefepime which was discontinued by primary team as she was noted to have viral infection with HMPV. Course complicated by acute hypoxic respiratory failure 2/2 flash pulmonary edema in the AM on 4/28, requiring BiPAP. Last HD 4/29, planned for HD today,  being diuresed with clinical resolution of AHRF.  Infectious disease consulted for antibiotics recommendations, given history of recent PermCath exchange and sepsis presentation. As per clinical exam and lab findings, clinical presentation is NOT consistent with permcath related infection. Patient is s/p 3 days of Cefepime- serves as empiric coverage for CAP. Most likely etiology of sepsis remains viral - HMPV. BCx ( 4/27) negative, UCx ( 4/29) negative . No clinical/ laboratory evidence of pneumonia or blood stream infection at this time.     Dx   #Sepsis 2/2 viral infection due to HMPV   #Pancytopenia consistent with  Viral etiology  #AHRF 2/2 fluid overload- signs of Pulmonary edema on CXR   #Heart failure, diastolic due to fluid overload  #ESRD on dialysis TTS (currently MWF)      PLAN   -- Agree with primary team decision to STOP ABx   -- c/w with HD as per primary team / nephrology  -- c/w Diureses as per primary team   -- Supportive- symptomatic care for viral infection         Plan of care discussed with primary team - Dr. Mo and Dr. Cantu    Thank you for this consult. Infectious disease will sign off with above recommendations. Please re- consult if need be.     THIS NOTE IS INCOMPLETE TILL ATTENDING SIGNS

## 2024-05-01 NOTE — DISCHARGE NOTE PROVIDER - NSDCCPCAREPLAN_GEN_ALL_CORE_FT
PRINCIPAL DISCHARGE DIAGNOSIS  Diagnosis: Sepsis  Assessment and Plan of Treatment: You were admitted for sepsis, which means you had a fever, high heart rate, and a high white cell count. Initially we treated you with an antibiotic called cefepime in case your dialysis catheter was infected. We jamar blood cultures that never grew any bacteria, and your symptoms abated      SECONDARY DISCHARGE DIAGNOSES  Diagnosis: Heart failure, diastolic, chronic  Assessment and Plan of Treatment:     Diagnosis: ESRD on dialysis  Assessment and Plan of Treatment:     Diagnosis: Surgically constructed arteriovenous fistula  Assessment and Plan of Treatment:     Diagnosis: HTN (hypertension)  Assessment and Plan of Treatment:     Diagnosis: Pancytopenia  Assessment and Plan of Treatment:      PRINCIPAL DISCHARGE DIAGNOSIS  Diagnosis: Sepsis  Assessment and Plan of Treatment: You were admitted for sepsis, which means you had a fever, high heart rate, and a high white cell count. in the setting of an infection. Initially we treated you with an antibiotic called cefepime in case your dialysis catheter was infected. We jamar blood cultures that never grew any bacteria, and your symptoms abated. You also tested positive for hMPV, which is a common respiratory virus. Likely, all your symptoms were related to the respiratory virus. We treated you symptomatically with cough medicine and tylenol, and you felt better.      SECONDARY DISCHARGE DIAGNOSES  Diagnosis: Heart failure, diastolic, chronic  Assessment and Plan of Treatment: You have diastolic heart failure, which means your heart doesn't pump as effectively as it should. This can lead to a buildup of fluid in your lungs. You take a water pill called LASIX at home. We increased your dose to 80mg twice a day while you were in the hospital. When you go home, please take 80mg once a day on days when you do not have dialysis.    Diagnosis: ESRD on dialysis  Assessment and Plan of Treatment: You have dialysis sessions three days a week. In the hospital, you had an emergency dialysis after your lungs filled with fluid. You had several more sessions while you were in the hospital. You also met with the vascular surgery team to place a fistula in your arm so that you do not need to use the catheter anymore. It will take several months to mature, so until the vascular team says it's ready, you will continue dialysis with your regular catheter.    Diagnosis: Surgically constructed arteriovenous fistula  Assessment and Plan of Treatment: The Vascular Surgery team placed an AV fistula in your arm. It will take several months for it to mature to the point when it's ready to be used, so for the next several months, you will continue to use the same dialysis catheter you've been using as usual. Follow with your kidney doctor.    Diagnosis: HTN (hypertension)  Assessment and Plan of Treatment: You have high blood pressure. We continued your home medications. Please continue to take as usual and follow with your primary care doctor.    Diagnosis: Pancytopenia  Assessment and Plan of Treatment: You have pancytopenia, which means that your bone marrow isn't making enough blood cells. Sometimes viruses cause this to happen. Likely, your respiratory virus caused it. It's not an emergency now, but you will need to get regular blood work to make sure your numbers don't get too low. Please follow with your primary care doctor within the week for followup labs.    Diagnosis: Flash pulmonary edema  Assessment and Plan of Treatment: You had a flash pulmonary edema, which means that your lungs filled with fluid over a short period of time. We gave you a high dose of lasix, a water pill, in order to clear out the fluid, and you had several sessions of dialysis. Please continue to take 80mg LASIX every day that you do not have dialysis.     PRINCIPAL DISCHARGE DIAGNOSIS  Diagnosis: Sepsis  Assessment and Plan of Treatment: You were admitted for sepsis, which means you had a fever, high heart rate, and a high white cell count. in the setting of an infection. Initially we treated you with an antibiotic called cefepime in case your dialysis catheter was infected. We jamar blood cultures that never grew any bacteria, and your symptoms abated.   You also tested positive for hMPV, which is a common respiratory virus. Likely, all your symptoms were related to the respiratory virus. We treated you symptomatically with cough medicine and tylenol, and you felt better.      SECONDARY DISCHARGE DIAGNOSES  Diagnosis: Infection due to human metapneumovirus (hMPV)  Assessment and Plan of Treatment: You was noted have a viral infection which likely contributed to your worsening shortness of breath and fluid overload needing hospitalization. This has resolved. You was treated with supportive care and supplemental oxygen which has been tapered off    Diagnosis: Pancytopenia  Assessment and Plan of Treatment: You have pancytopenia, which means that your bone marrow isn't making enough blood cells (WBC, Hemoglobin, Platelets). Viral infections are likely to cause Bome marrow suppression. Likely, your respiratory virus caused it.    Please follow with your primary care doctor within the week for follow up repeat blood work in one week; Could be done at Dialysis unit    Diagnosis: Heart failure, diastolic, chronic  Assessment and Plan of Treatment: You have diastolic heart failure, which means your heart doesn't fill as effectively as it should when blood returns to heart. This can lead to a buildup of fluid in your lungs. You do not need any water pill at discharge but if any fluid overload in the future your Nephrologist may place you on Furosemide 80 mg on days you are not getting Dialysis    Diagnosis: ESRD on dialysis  Assessment and Plan of Treatment: You have dialysis sessions three days a week. In the hospital, you had an emergency dialysis after your lungs filled with fluid. You had several more sessions while you were in the hospital. You also met with the vascular surgery team to place a fistula in your arm so that you do not need to use the catheter anymore. It will take several months to mature, so until the vascular team says it's ready, you will continue dialysis with your regular catheter.    Diagnosis: HTN (hypertension)  Assessment and Plan of Treatment: You have high blood pressure. We continued your home medications. Please continue to take as usual and follow with your primary care doctor.    Diagnosis: Surgically constructed arteriovenous fistula  Assessment and Plan of Treatment: The Vascular Surgery team placed an AV fistula in your arm. It will take couple months for it to mature to the point when it's ready to be used, so for the next several months, you will continue to use the same dialysis catheter you've been using as usual. Follow with your kidney doctor. Once AV Fistula can be accessed and functional, your Vascular surgeon may remove chest cathter    Diagnosis: Flash pulmonary edema  Assessment and Plan of Treatment: You had a flash pulmonary edema, which means that your lungs filled with fluid over a short period of time. We gave you a high dose of lasix, a water pill, in order to clear out the fluid, and you had several sessions of dialysis. Please continue to take 80mg LASIX every day that you do not have dialysis.     PRINCIPAL DISCHARGE DIAGNOSIS  Diagnosis: Sepsis  Assessment and Plan of Treatment: You were admitted for sepsis, which means you had a fever, high heart rate, and a high white cell count. in the setting of an infection. Initially we treated you with an antibiotic called cefepime in case your dialysis catheter was infected. We jamar blood cultures that never grew any bacteria, and your symptoms abated.   You also tested positive for hMPV, which is a common respiratory virus. Likely, all your symptoms were related to the respiratory virus. We treated you symptomatically with cough medicine and tylenol, and you felt better.      SECONDARY DISCHARGE DIAGNOSES  Diagnosis: Infection due to human metapneumovirus (hMPV)  Assessment and Plan of Treatment: You was noted have a viral infection which likely contributed to your worsening shortness of breath and fluid overload needing hospitalization. This has resolved. You was treated with supportive care and supplemental oxygen which has been tapered off    Diagnosis: Pancytopenia  Assessment and Plan of Treatment: You have pancytopenia, which means that your bone marrow isn't making enough blood cells (WBC, Hemoglobin, Platelets). Viral infections are likely to cause Bome marrow suppression. Likely, your respiratory virus caused it.    Please follow with your primary care doctor within the week for follow up repeat blood work in one week; Could be done at Dialysis unit    Diagnosis: Heart failure, diastolic, chronic  Assessment and Plan of Treatment: You have diastolic heart failure, which means your heart doesn't fill as effectively as it should when blood returns to heart. This can lead to a buildup of fluid in your lungs. You do not need any water pill at discharge but if any fluid overload in the future your Nephrologist may place you on Furosemide 80 mg on days you are not getting Dialysis    Diagnosis: ESRD on dialysis  Assessment and Plan of Treatment: You have dialysis sessions three days a week. In the hospital, you had an emergency dialysis after your lungs filled with fluid. You had several more sessions while you were in the hospital. You also met with the vascular surgery team to place a fistula in your arm so that you do not need to use the catheter anymore. It will take several months to mature, so until the vascular team says it's ready, you will continue dialysis with your regular catheter.    Diagnosis: HTN (hypertension)  Assessment and Plan of Treatment: You have high blood pressure. We continued your home medications. Please continue to take as usual and follow with your primary care doctor.    Diagnosis: Surgically constructed arteriovenous fistula  Assessment and Plan of Treatment: The Vascular Surgery team placed an AV fistula in your arm. It will take couple months for it to mature to the point when it's ready to be used, so for the next several months, you will continue to use the same dialysis catheter you've been using as usual. Follow with your kidney doctor. Once AV Fistula can be accessed and functional, your Vascular surgeon may remove chest cathter    Diagnosis: Flash pulmonary edema  Assessment and Plan of Treatment: You had a flash pulmonary edema, which means that your lungs filled with fluid over a short period of time. We gave you a high dose of lasix, a water pill, in order to clear out the fluid, and you had several sessions of dialysis. Please continue to take 80mg LASIX every day that you do not have dialysis.    Diagnosis: At risk for constipation  Assessment and Plan of Treatment: You has intermittent constipation. Take Senna 2 tabs bedtime daily; Yake Miralax every other day only if no bowel movements

## 2024-05-01 NOTE — CONSULT NOTE ADULT - ATTENDING COMMENTS
53 year old female with hx of HTN, HLD, HFpEF, pHTNESRD (HD TTS) who presented to the ED 4/27 with fever/chills, malaise, cough, myalgias and SOB for ~ 2-3 days PTA. Per pt her daughter had URI recently, she had recent R upper chest perm cath change 4/22 and admitting team had concerns cath infection. She was started on abx empirically.  With AHRF in the setting of CHF exacerbation/Pulmonary edema.   + RVP hMPV  Hemodialysis on 4/29   CXR on 4/27 and 4/28 previous HD and fluid removal with infiltrated consistent with pulm edema  Low grade Temps since admission until 4/29 afebrile now, non toxic , clinically stable, on RA  Blood cultures 4/27 4 sets 4 sets NGTD  UA and urine cx neg.  Rapid RVP Result: Detected *!* (04-27-24 @ 17:49) hMPV (RapRVP): Detected (04.27.24 @ 17:49)    -AHRF  -Acute Pulmonary edema/fluid overload (improved after diuretics and HD with fluid removal)   -HFpEF  -URI- viral infection hMPV +  -ESRD on HD    S/P 3 days of Cefepime, primary team d/c antibiotics and needed ID input reviewing the case for additional review of the case.  At this moment there is no evidence of bacterial infection, URI  viral infection ~ DAY 7 of the viral disease course with significant improvement.  Resp status improved after CHF optimization, Fluid removal during HD and diuresis (pt still produces urine)  No further abx recommended  Symptomatic management  Rest per primary  Discussed with Dr. Cantu    Please reach ID with any questions or concerns  Dr. Liss Valentino  Available in Teams 53 year old female with hx of HTN, HLD, HFpEF, pHTN, ESRD (HD TTS) who presented to the ED 4/27 with fever/chills, malaise, cough, myalgias and SOB for ~ 2-3 days PTA. Per pt her daughter had URI recently, she had recent R upper chest perm cath change 4/22 and admitting team had concerns cath infection. She was started on abx empirically.  With AHRF in the setting of CHF exacerbation/Pulmonary edema.   + RVP hMPV  Hemodialysis on 4/29   CXR on 4/27 and 4/28 previous HD and fluid removal with infiltrated consistent with pulm edema  Low grade Temps since admission until 4/29 afebrile now, non toxic , clinically stable, on RA  Blood cultures 4/27 4 sets 4 sets NGTD  UA and urine cx neg.  Rapid RVP Result: Detected *!* (04-27-24 @ 17:49) hMPV (RapRVP): Detected (04.27.24 @ 17:49)    -AHRF  -Acute Pulmonary edema/fluid overload (improved after diuretics and HD with fluid removal)   -HFpEF  -URI- viral infection hMPV +  -ESRD on HD    S/P 3 days of Cefepime, primary team d/c antibiotics and needed ID input reviewing the case for additional review of the case.  At this moment there is no evidence of bacterial infection, URI  viral infection ~ DAY 7 of the viral disease course with significant improvement.  Resp status improved after CHF optimization, Fluid removal during HD and diuresis (pt still produces urine)  No further abx recommended  Symptomatic management  Rest per primary  Discussed with Dr. Cantu    Please reach ID with any questions or concerns  Dr. Liss Valentino  Available in Teams 53 year old female with hx of HTN, HLD, HFpEF, pHTN, ESRD (HD TTS) who presented to the ED 4/27 with fever/chills, malaise, cough, myalgias and SOB for ~ 2-3 days PTA. Per pt her daughter had URI recently, she had recent R upper chest perm cath change 4/22 and admitting team had concerns cath infection. She was started on abx empirically.  With AHRF in the setting of CHF exacerbation/Pulmonary edema.   + RVP hMPV  Hemodialysis on 4/29   CXR on 4/27 and 4/28 previous HD and fluid removal with infiltrated consistent with pulm edema  Low grade Temps since admission until 4/29 afebrile now, non toxic , clinically stable, on RA  Blood cultures 4/27 4 sets 4 sets NGTD  UA and urine cx neg.  Rapid RVP Result: Detected *!* (04-27-24 @ 17:49) hMPV (RapRVP): Detected (04.27.24 @ 17:49)    -AHRF  -Acute Pulmonary edema/fluid overload (improved after diuretics and HD with fluid removal)   -HFpEF  -URI- viral infection hMPV +  -ESRD on HD    S/P 3 days of Cefepime, primary team d/c antibiotics and needed ID input reviewing the case for additional guidance.   At this moment there is no evidence of bacterial infection, URI  viral infection ~ DAY 7 of the viral disease course with significant improvement.  Resp status improved after CHF optimization, Fluid removal during HD and diuresis (pt still produces urine)  No further abx recommended  Symptomatic management  Rest per primary  Discussed with Dr. Cantu  ID will be available prn    Please reach ID with any questions or concerns  Dr. Liss Valentino  Available in Teams

## 2024-05-01 NOTE — DISCHARGE NOTE PROVIDER - CARE PROVIDERS DIRECT ADDRESSES
[email or web address]

## 2024-05-01 NOTE — DISCHARGE NOTE PROVIDER - PROVIDER TOKENS
PROVIDER:[TOKEN:[[id number]],FOLLOWUP:[1 week]] PROVIDER:[TOKEN:[[id number]],FOLLOWUP:[1 week]],PROVIDER:[TOKEN:[[id number]]]

## 2024-05-01 NOTE — PROGRESS NOTE ADULT - PROBLEM SELECTOR PLAN 1
Admitted for sepsis likely 2/2 to HD tunneled catheter infection vs. viral infection, and fluid overload in the setting of ESRD  bcx ngtd  c/w cefepime   Nephro Dr. Calle following     Vascular contacted about fistula before she leaves    [facility]  - Pt found to be hMPV positive, ordered droplet isolation Admitted for sepsis likely 2/2 to HD tunneled catheter infection vs. viral infection, and fluid overload in the setting of ESRD  bcx ngtd  dc cefepime   Nephro Dr. Calle following     [facility]  - Pt found to be hMPV positive, ordered droplet isolation Admitted for sepsis likely 2/2 to HD tunneled catheter infection vs. viral infection, and fluid overload in the setting of ESRD  bcx ngtd  dc cefepime   Dr. Lees consulted   Nephro Dr. Calle following     [facility]  - Pt found to be hMPV positive, ordered droplet isolation

## 2024-05-01 NOTE — CONSULT NOTE ADULT - SUBJECTIVE AND OBJECTIVE BOX
HPI:  Pt is a 54yo F with HTN, HLD, anemia, diastolic HF (last TTE 2023- Grade IV diastolic dysfunction (severe with fixed restrictive pattern), mild TR, mild TX, RV systolic pressure mod inc 46 mmHg, moderate pericardial effusion), ESRD on HD //Sat (diagnosed 2 years ago, last HD this AM), presents to the ED with chills/rigors, fevers around 102 F, productive cough, sore throat, and b/l flank pain since Thursday (3 days). Pt noted that she got her HD catheter exchanged on  (due to catheter malfunction), pt reported mild complication of bleeding during the procedure that was controlled. Pt has since received HD on tues//sat without issue/complication. Pt did not specifically notice fevers/rigors intra-HD sessions. Pt also reported that her daughter had a cold last week (was not diagnosed with the flu). Pt states that when she coughs she intermittently gets specks of blood in her sputum.     Of note, pt was admitted 2023 for acute diastolic CHF.   Also of note, pt was evaluated for fistula placement, however pt stated that it would not be covered by her insurance.  (2024 22:57)    Additional history obtained at the time of IDx consult from chart review - Patient also reports her daughter having flu-like symptoms last week. In the ED, vitals significant for Tmax of 101F, tachycardic and hypertensive. Permacath site clean, without surrounding cellulitis or discharge. On admission, Labs notable for no leukocytosis, thrombocytopenia of 62, and lactate of 0.4. CXR consistent with fluid overload, R >L. Patient admitted for sepsis 2/2 catheter infection vs viral infection but noted to have viral infection with HMPV. Course complicated by acute hypoxic respiratory failure 2/2 flash pulmonary edema in the AM on , requiring BiPAP. She has undergone HD  as well as receiving empiric doses of high dose IV Lasix with significant clinical improvement and only on 2 liter NC now saturating well.            PAST MEDICAL & SURGICAL HISTORY:  HTN (hypertension), benign      HLD (hyperlipidemia)      ESRD on dialysis      Anemia      Enlarged thyroid      H/O thrombocytopenia      H/O  section      H/O tubal ligation      S/P hemodialysis catheter insertion          MEDS:  albuterol    90 MICROgram(s) HFA Inhaler 2 Puff(s) Inhalation every 6 hours PRN  aspirin enteric coated 81 milliGRAM(s) Oral daily  calcium acetate 667 milliGRAM(s) Oral three times a day with meals  chlorhexidine 2% Cloths 1 Application(s) Topical daily  guaiFENesin Oral Liquid (Sugar-Free) 200 milliGRAM(s) Oral every 6 hours PRN  hydrALAZINE 100 milliGRAM(s) Oral every 8 hours  labetalol 300 milliGRAM(s) Oral every 8 hours  NIFEdipine XL 30 milliGRAM(s) Oral daily      ALLERGIES  No Known Allergies      SOCIAL HISTORY:    FAMILY HISTORY:  FH: HTN (hypertension) (Mother)        ROS:    General:	    Skin:  	  HEENT:    Respiratory and Thorax:  	  Cardiovascular:	    Abdomen:	    Genitourinary:	    Musculoskeletal:	    Neurological:	    Psychiatric:	    Hematology/Lymphatics:	    Endocrine:	    PHYSICAL EXAM:    Vital Signs Last 24 Hrs  T(C): 37.2 (01 May 2024 05:41), Max: 37.2 (01 May 2024 05:41)  T(F): 98.9 (01 May 2024 05:41), Max: 98.9 (01 May 2024 05:41)  HR: 70 (01 May 2024 05:41) (70 - 84)  BP: 128/79 (01 May 2024 05:41) (117/67 - 128/79)  BP(mean): --  RR: 18 (01 May 2024 05:41) (18 - 18)  SpO2: 94% (01 May 2024 05:41) (94% - 94%)    Parameters below as of 01 May 2024 05:41  Patient On (Oxygen Delivery Method): room air          Gen:    HEENT:    Neck:    Lymph Nodes:    Breasts:    Back:    Chest/Thorax:    Cardiovascular:    Gastrointestinal:    Genitourinary:    Rectal:    Extremities:    Vascular:    Neurological:    Skin:    Psychiatric:    LABS/DIAGNOSTIC TESTS:                          10.4   2.70  )-----------( 68       ( 01 May 2024 07:05 )             32.1     WBC Count: 2.70 K/uL ( @ 07:05)  WBC Count: 3.06 K/uL ( @ 06:44)  WBC Count: 5.18 K/uL ( @ 08:45)          132<L>  |  96  |  61<H>  ----------------------------<  86  5.0   |  25  |  9.54<H>    Ca    7.6<L>      01 May 2024 07:05  Phos  4.0       Mg     2.2             Urinalysis Basic - ( 01 May 2024 07:05 )    Color: x / Appearance: x / SG: x / pH: x  Gluc: 86 mg/dL / Ketone: x  / Bili: x / Urobili: x   Blood: x / Protein: x / Nitrite: x   Leuk Esterase: x / RBC: x / WBC x   Sq Epi: x / Non Sq Epi: x / Bacteria: x                LACTATE:    ABG -     CULTURES:     Culture - Blood (collected 24 @ 23:09)  Source: .Blood Blood-Peripheral  Preliminary Report (24 @ 03:01):    No growth at 72 Hours    Culture - Blood (collected 24 @ 23:09)  Source: .Blood Blood-Peripheral  Preliminary Report (24 @ 03:01):    No growth at 72 Hours    Urinalysis with Rflx Culture (collected 24 @ 22:40)    Culture - Blood (collected 24 @ 17:44)  Source: .Blood Blood-Peripheral  Preliminary Report (24 @ 21:01):    No growth at 72 Hours    Culture - Blood (collected 24 @ 17:40)  Source: .Blood Blood-Peripheral  Preliminary Report (24 @ 21:01):    No growth at 72 Hours        RADIOLOGY  CXR  2024          INTERPRETATION:  AP semierect chest on 2024 at 6:56 AM. Patient   is short of breath with fever.    Heart magnified by technique. Large right jugular line remains.    On  there was significant infiltration throughout most of the   right lung and slightly in the left perihilar area.    These infiltrates have markedly increased.    IMPRESSION: Increasing bilateral infiltrates right greater than left.    TTE- 24  CONCLUSIONS:      1. Left ventricular cavity is moderately dilated. Left ventricular wall thickness is normal. Left ventricular systolic function is normal with an ejection fraction of 54 % by Peralta's method of disks. There are no regional wall motion abnormalities seen.   2. There is mild (grade 1) left ventricular diastolic dysfunction.   3. Normal right ventricular cavity size, with normal wall thickness, and normal systolic function. Tricuspid annular plane systolic excursion (TAPSE) is 2.8 cm (normal >=1.7 cm).   4. Mild to moderate mitral regurgitation.   5. Normal left and right atrial size.   6. Mild tricuspid regurgitation.   7. Estimated pulmonary artery systolic pressure is 37 mmHg.   8. Left pleural effusion noted.   9. Mild aortic regurgitation.  10. Mild pulmonic regurgitation.  11. There is increased LV mass and eccentric hypertrophy.  12. Small pericardial effusion.  13. No prior echocardiogram is available for comparison.               HPI:  Pt is a 52yo F with HTN, HLD, anemia, diastolic HF (last TTE 2023- Grade IV diastolic dysfunction (severe with fixed restrictive pattern), mild TR, mild AL, RV systolic pressure mod inc 46 mmHg, moderate pericardial effusion), ESRD on HD //Sat (diagnosed 2 years ago, last HD this AM), presents to the ED with chills/rigors, fevers around 102 F, productive cough, sore throat, and b/l flank pain since Thursday (3 days). Pt noted that she got her HD catheter exchanged on  (due to catheter malfunction), pt reported mild complication of bleeding during the procedure that was controlled. Pt has since received HD on tues//sat without issue/complication. Pt did not specifically notice fevers/rigors intra-HD sessions. Pt also reported that her daughter had a cold last week (was not diagnosed with the flu). Pt states that when she coughs she intermittently gets specks of blood in her sputum.     Of note, pt was admitted 2023 for acute diastolic CHF.   Also of note, pt was evaluated for fistula placement, however pt stated that it would not be covered by her insurance.  (2024 22:57)    Additional history obtained at the time of IDx consult from chart review - Patient also reports her daughter having flu-like symptoms last week. In the ED, vitals significant for Tmax of 101F, tachycardic and hypertensive. Permacath site clean, without surrounding cellulitis or discharge. On admission, Labs notable for no leukocytosis, thrombocytopenia of 62, and lactate of 0.4. CXR consistent with fluid overload, R >L. Patient admitted for sepsis 2/2 catheter infection vs viral infection but noted to have viral infection with HMPV. Course complicated by acute hypoxic respiratory failure 2/2 flash pulmonary edema in the AM on , requiring BiPAP. She has undergone HD  as well as receiving empiric doses of high dose IV Lasix with significant clinical improvement saturating well on RA     Patient has no acute overnight events. Patient interviewed ans examined bedside with  service - ID no [id number]  Patient endorses she is feeling better since admission. SOB has resolved. Patient continues to complain of intermittent cough, productive of scant mucoid sputum, reports resolution of blood in sputum ( last episode on day of admission). Patient endorses lower back pain - 3/10 , relieved by tylenol.     Patient endorses she was nauseous earlier today , relieved by zofran, denies vomiting, diarrhea, constipation   Patient denies fever and chills, urinary symptoms, recent travel, recent hospitalization, recent antibiotic courses.   Patient endorses her daughter was sick with a cold about 1 week ago.             PAST MEDICAL & SURGICAL HISTORY:  HTN (hypertension), benign      HLD (hyperlipidemia)      ESRD on dialysis      Anemia      Enlarged thyroid      H/O thrombocytopenia      H/O  section      H/O tubal ligation      S/P hemodialysis catheter insertion          MEDS:  albuterol    90 MICROgram(s) HFA Inhaler 2 Puff(s) Inhalation every 6 hours PRN  aspirin enteric coated 81 milliGRAM(s) Oral daily  calcium acetate 667 milliGRAM(s) Oral three times a day with meals  chlorhexidine 2% Cloths 1 Application(s) Topical daily  guaiFENesin Oral Liquid (Sugar-Free) 200 milliGRAM(s) Oral every 6 hours PRN  hydrALAZINE 100 milliGRAM(s) Oral every 8 hours  labetalol 300 milliGRAM(s) Oral every 8 hours  NIFEdipine XL 30 milliGRAM(s) Oral daily      ALLERGIES  No Known Allergies      SOCIAL HISTORY:Denies smoking , drinks alcohol socially, denies IV drug use    FAMILY HISTORY:  FH: HTN (hypertension) (Mother)      ROS:    General:Denies fever and chills	    Skin: Denies rash and pruritus.  	  HEENT:Denies changes in vision and hearing.    Respiratory and Thorax:Denies SOB, + productive cough.    Cardiovascular:	Denies chest pain and palpitations    Abdomen:Denies abdominal pain, nausea, vomiting and diarrhea	    Genitourinary:	Denies dysuria and urinary frequency.    Musculoskeletal:	 + lower back pain     Neurological:	Denies headache and syncope.    Psychiatric:Denies recent changes in mood. Denies anxiety and depression.	    Hematology/Lymphatics:	Denies bleeding , bruising     Endocrine: Denies recent weight fluctuations     PHYSICAL EXAM:    Vital Signs Last 24 Hrs  T(C): 37.2 (01 May 2024 05:41), Max: 37.2 (01 May 2024 05:41)  T(F): 98.9 (01 May 2024 05:41), Max: 98.9 (01 May 2024 05:41)  HR: 70 (01 May 2024 05:41) (70 - 84)  BP: 128/79 (01 May 2024 05:41) (117/67 - 128/79)  BP(mean): --  RR: 18 (01 May 2024 05:41) (18 - 18)  SpO2: 94% (01 May 2024 05:41) (94% - 94%)    Parameters below as of 01 May 2024 05:41  Patient On (Oxygen Delivery Method): room air        HEENT:Atraumatic, Normocephalic, EOMI, PERRLA, conjunctiva and sclera clear    Neck:Supple, No JVD    Lymph Nodes: No palpable cervical LNs     Back: No palpable spinal tenderness, Paraspinal muscular tenderness to palpation of lower back     Chest/Thorax: R lower lobe crackles, clear with cough. No wheezes, rhonchi appreciated     Cardiovascular:Regular rate and rhythm; No murmurs;     Gastrointestinal:Soft, Nontender, Nondistended; Bowel sounds present; No guarding    Genitourinary: No suprapubic or CVA tenderness     Extremities:  2+ Peripheral Pulses, No cyanosis or edema    Vascular: BL DP palpable     Neurological: No focal sensory or motor deficits noted, A+O x 3    Skin: + R PermCath - NO erythema, swelling, tenderness to palpation, skin changes or localized warmth     Psychiatric: normal mood and affect     LABS/DIAGNOSTIC TESTS:                          10.4   2.70  )-----------( 68       ( 01 May 2024 07:05 )             32.1     WBC Count: 2.70 K/uL ( @ 07:05)  WBC Count: 3.06 K/uL ( @ 06:44)  WBC Count: 5.18 K/uL ( @ 08:45)          132<L>  |  96  |  61<H>  ----------------------------<  86  5.0   |  25  |  9.54<H>    Ca    7.6<L>      01 May 2024 07:05  Phos  4.0     05  Mg     2.2             Urinalysis Basic - ( 01 May 2024 07:05 )    Color: x / Appearance: x / SG: x / pH: x  Gluc: 86 mg/dL / Ketone: x  / Bili: x / Urobili: x   Blood: x / Protein: x / Nitrite: x   Leuk Esterase: x / RBC: x / WBC x   Sq Epi: x / Non Sq Epi: x / Bacteria: x                LACTATE:    ABG -     CULTURES:     Culture - Blood (collected 24 @ 23:09)  Source: .Blood Blood-Peripheral  Preliminary Report (24 @ 03:01):    No growth at 72 Hours    Culture - Blood (collected 24 @ 23:09)  Source: .Blood Blood-Peripheral  Preliminary Report (24 @ 03:01):    No growth at 72 Hours    Urinalysis with Rflx Culture (collected 24 @ 22:40)    Culture - Blood (collected 24 @ 17:44)  Source: .Blood Blood-Peripheral  Preliminary Report (24 @ 21:01):    No growth at 72 Hours    Culture - Blood (collected 24 @ 17:40)  Source: .Blood Blood-Peripheral  Preliminary Report (24 @ 21:01):    No growth at 72 Hours        RADIOLOGY  CXR  2024          INTERPRETATION:  AP semierect chest on 2024 at 6:56 AM. Patient   is short of breath with fever.    Heart magnified by technique. Large right jugular line remains.    On  there was significant infiltration throughout most of the   right lung and slightly in the left perihilar area.    These infiltrates have markedly increased.    IMPRESSION: Increasing bilateral infiltrates right greater than left.    TTE- 24  CONCLUSIONS:      1. Left ventricular cavity is moderately dilated. Left ventricular wall thickness is normal. Left ventricular systolic function is normal with an ejection fraction of 54 % by Peralta's method of disks. There are no regional wall motion abnormalities seen.   2. There is mild (grade 1) left ventricular diastolic dysfunction.   3. Normal right ventricular cavity size, with normal wall thickness, and normal systolic function. Tricuspid annular plane systolic excursion (TAPSE) is 2.8 cm (normal >=1.7 cm).   4. Mild to moderate mitral regurgitation.   5. Normal left and right atrial size.   6. Mild tricuspid regurgitation.   7. Estimated pulmonary artery systolic pressure is 37 mmHg.   8. Left pleural effusion noted.   9. Mild aortic regurgitation.  10. Mild pulmonic regurgitation.  11. There is increased LV mass and eccentric hypertrophy.  12. Small pericardial effusion.  13. No prior echocardiogram is available for comparison.               HPI:  Pt is a 52yo F with HTN, HLD, anemia, diastolic HF (last TTE 2023- Grade IV diastolic dysfunction (severe with fixed restrictive pattern), mild TR, mild ID, RV systolic pressure mod inc 46 mmHg, moderate pericardial effusion), ESRD on HD //Sat (diagnosed 2 years ago, last HD this AM), presents to the ED with chills/rigors, fevers around 102 F, productive cough, sore throat, and b/l flank pain since Thursday (3 days). Pt noted that she got her HD catheter exchanged on  (due to catheter malfunction), pt reported mild complication of bleeding during the procedure that was controlled. Pt has since received HD on tues//sat without issue/complication. Pt did not specifically notice fevers/rigors intra-HD sessions. Pt also reported that her daughter had a cold last week (was not diagnosed with the flu). Pt states that when she coughs she intermittently gets specks of blood in her sputum.     Of note, pt was admitted 2023 for acute diastolic CHF.   Also of note, pt was evaluated for fistula placement, however pt stated that it would not be covered by her insurance.  (2024 22:57)    Additional history obtained at the time of IDx consult from chart review - Patient also reports her daughter having flu-like symptoms last week. In the ED, vitals significant for Tmax of 101F, tachycardic and hypertensive. Permacath site clean, without surrounding cellulitis or discharge. On admission, Labs notable for no leukocytosis, thrombocytopenia of 62, and lactate of 0.4. CXR consistent with fluid overload, R >L. Patient admitted for sepsis 2/2 catheter infection vs viral infection but noted to have viral infection with HMPV. Course complicated by acute hypoxic respiratory failure 2/2 flash pulmonary edema in the AM on , requiring BiPAP. She has undergone HD  as well as receiving empiric doses of high dose IV Lasix with significant clinical improvement saturating well on RA     Patient has no acute overnight events. Patient interviewed ans examined bedside with  service - ID no [id number]  Patient endorses she is feeling better since admission. SOB has resolved. Patient continues to complain of intermittent cough, productive of scant mucoid sputum,  Patient endorses lower back pain - 3/10 , relieved by tylenol.     Patient endorses she was nauseous earlier today , relieved by zofran, denies vomiting, diarrhea, constipation   Patient denies fever and chills, urinary symptoms, recent travel, recent hospitalization, recent antibiotic courses.   Patient endorses her daughter was sick with a cold about 1 week ago.             PAST MEDICAL & SURGICAL HISTORY:  HTN (hypertension), benign      HLD (hyperlipidemia)      ESRD on dialysis      Anemia      Enlarged thyroid      H/O thrombocytopenia      H/O  section      H/O tubal ligation      S/P hemodialysis catheter insertion          MEDS:  albuterol    90 MICROgram(s) HFA Inhaler 2 Puff(s) Inhalation every 6 hours PRN  aspirin enteric coated 81 milliGRAM(s) Oral daily  calcium acetate 667 milliGRAM(s) Oral three times a day with meals  chlorhexidine 2% Cloths 1 Application(s) Topical daily  guaiFENesin Oral Liquid (Sugar-Free) 200 milliGRAM(s) Oral every 6 hours PRN  hydrALAZINE 100 milliGRAM(s) Oral every 8 hours  labetalol 300 milliGRAM(s) Oral every 8 hours  NIFEdipine XL 30 milliGRAM(s) Oral daily      ALLERGIES  No Known Allergies      SOCIAL HISTORY:Denies smoking , drinks alcohol socially, denies IV drug use    FAMILY HISTORY:  FH: HTN (hypertension) (Mother)      ROS:    General:Denies fever and chills	    Skin: Denies rash and pruritus.  	  HEENT:Denies changes in vision and hearing.    Respiratory and Thorax:Denies SOB, + productive cough.    Cardiovascular:	Denies chest pain and palpitations    Abdomen:Denies abdominal pain, nausea, vomiting and diarrhea	    Genitourinary:	Denies dysuria and urinary frequency.    Musculoskeletal:	 + lower back pain     Neurological:	Denies headache and syncope.    Psychiatric:Denies recent changes in mood. Denies anxiety and depression.	    Hematology/Lymphatics:	Denies bleeding , bruising     Endocrine: Denies recent weight fluctuations     PHYSICAL EXAM:    Vital Signs Last 24 Hrs  T(C): 37.2 (01 May 2024 05:41), Max: 37.2 (01 May 2024 05:41)  T(F): 98.9 (01 May 2024 05:41), Max: 98.9 (01 May 2024 05:41)  HR: 70 (01 May 2024 05:41) (70 - 84)  BP: 128/79 (01 May 2024 05:41) (117/67 - 128/79)  BP(mean): --  RR: 18 (01 May 2024 05:41) (18 - 18)  SpO2: 94% (01 May 2024 05:41) (94% - 94%)    Parameters below as of 01 May 2024 05:41  Patient On (Oxygen Delivery Method): room air        HEENT:Atraumatic, Normocephalic, EOMI, PERRLA, conjunctiva and sclera clear    Neck:Supple, No JVD    Lymph Nodes: No palpable cervical LNs     Back: No palpable spinal tenderness, Paraspinal muscular tenderness to palpation of lower back     Chest/Thorax: BL basilar crackles.  No wheezes, rhonchi appreciated     Cardiovascular:Regular rate and rhythm; No murmurs;     Gastrointestinal:Soft, Nontender, Nondistended; Bowel sounds present; No guarding    Genitourinary: No suprapubic or CVA tenderness     Extremities:  2+ Peripheral Pulses, No cyanosis or edema    Vascular: BL DP palpable     Neurological: No focal sensory or motor deficits noted, A+O x 3    Skin: + R PermCath - NO erythema, swelling, tenderness to palpation, skin changes or localized warmth     Psychiatric: normal mood and affect     LABS/DIAGNOSTIC TESTS:                          10.4   2.70  )-----------( 68       ( 01 May 2024 07:05 )             32.1     WBC Count: 2.70 K/uL ( @ 07:05)  WBC Count: 3.06 K/uL ( @ 06:44)  WBC Count: 5.18 K/uL ( @ 08:45)          132<L>  |  96  |  61<H>  ----------------------------<  86  5.0   |  25  |  9.54<H>    Ca    7.6<L>      01 May 2024 07:05  Phos  4.0     05  Mg     2.2             Urinalysis Basic - ( 01 May 2024 07:05 )    Color: x / Appearance: x / SG: x / pH: x  Gluc: 86 mg/dL / Ketone: x  / Bili: x / Urobili: x   Blood: x / Protein: x / Nitrite: x   Leuk Esterase: x / RBC: x / WBC x   Sq Epi: x / Non Sq Epi: x / Bacteria: x                LACTATE:    ABG -     CULTURES:     Culture - Blood (collected 24 @ 23:09)  Source: .Blood Blood-Peripheral  Preliminary Report (24 @ 03:01):    No growth at 72 Hours    Culture - Blood (collected 24 @ 23:09)  Source: .Blood Blood-Peripheral  Preliminary Report (24 @ 03:01):    No growth at 72 Hours    Urinalysis with Rflx Culture (collected 24 @ 22:40)    Culture - Blood (collected 24 @ 17:44)  Source: .Blood Blood-Peripheral  Preliminary Report (24 @ 21:01):    No growth at 72 Hours    Culture - Blood (collected 24 @ 17:40)  Source: .Blood Blood-Peripheral  Preliminary Report (24 @ 21:01):    No growth at 72 Hours        RADIOLOGY  CXR  2024          INTERPRETATION:  AP semierect chest on 2024 at 6:56 AM. Patient   is short of breath with fever.    Heart magnified by technique. Large right jugular line remains.    On  there was significant infiltration throughout most of the   right lung and slightly in the left perihilar area.    These infiltrates have markedly increased.    IMPRESSION: Increasing bilateral infiltrates right greater than left.    TTE- 24  CONCLUSIONS:      1. Left ventricular cavity is moderately dilated. Left ventricular wall thickness is normal. Left ventricular systolic function is normal with an ejection fraction of 54 % by Peralta's method of disks. There are no regional wall motion abnormalities seen.   2. There is mild (grade 1) left ventricular diastolic dysfunction.   3. Normal right ventricular cavity size, with normal wall thickness, and normal systolic function. Tricuspid annular plane systolic excursion (TAPSE) is 2.8 cm (normal >=1.7 cm).   4. Mild to moderate mitral regurgitation.   5. Normal left and right atrial size.   6. Mild tricuspid regurgitation.   7. Estimated pulmonary artery systolic pressure is 37 mmHg.   8. Left pleural effusion noted.   9. Mild aortic regurgitation.  10. Mild pulmonic regurgitation.  11. There is increased LV mass and eccentric hypertrophy.  12. Small pericardial effusion.  13. No prior echocardiogram is available for comparison.               HPI:  Pt is a 54yo F with HTN, HLD, anemia, diastolic HF (last TTE 2023- Grade IV diastolic dysfunction (severe with fixed restrictive pattern), mild TR, mild MI, RV systolic pressure mod inc 46 mmHg, moderate pericardial effusion), ESRD on HD //Sat (diagnosed 2 years ago, last HD this AM), presents to the ED with chills/rigors, fevers around 102 F, productive cough, sore throat and malaise 3 days PTA. Pt noted that she got her HD catheter exchanged on  (due to catheter malfunction), pt reported mild complication of bleeding during the procedure that was controlled. Pt has since received HD on tues//sat without issue/complication. Pt did not specifically notice fevers/chills intra-HD sessions. Pt also reported that her daughter had a cold last week (was not diagnosed with the flu).     Of note, pt was admitted 2023 for acute diastolic CHF.   Also of note, pt was evaluated for fistula placement, however pt stated that it would not be covered by her insurance.  (2024 22:57)    Hospital Course complicated by acute hypoxic respiratory failure 2/2 flash pulmonary edema in the AM on , requiring BiPAP. She has undergone HD  , received IV Lasix with significant clinical improvement saturating well on RA.  RVP + for hMPV  Started on cefepime empirically    Patient has no acute overnight events. Patient interviewed ans examined bedside with  service - ID no [id number]  Patient endorses she is feeling better since admission. SOB has resolved. Patient continues to complain of intermittent cough, productive of scant mucoid sputum,  Patient endorses lower back pain - 3/10 , relieved by tylenol.     Patient endorses she was nauseous earlier today , relieved by zofran, denies vomiting, diarrhea, constipation   Patient denies fever and chills, urinary symptoms, recent travel, recent hospitalization, recent antibiotic courses.   Patient endorses her daughter was sick with a cold about 1 week ago.     PAST MEDICAL & SURGICAL HISTORY:  HTN (hypertension), benign  HLD (hyperlipidemia)  ESRD on dialysis  Anemia  Enlarged thyroid  H/O thrombocytopenia  H/O  section  H/O tubal ligation  S/P hemodialysis catheter insertion    MEDS:  albuterol    90 MICROgram(s) HFA Inhaler 2 Puff(s) Inhalation every 6 hours PRN  aspirin enteric coated 81 milliGRAM(s) Oral daily  calcium acetate 667 milliGRAM(s) Oral three times a day with meals  chlorhexidine 2% Cloths 1 Application(s) Topical daily  guaiFENesin Oral Liquid (Sugar-Free) 200 milliGRAM(s) Oral every 6 hours PRN  hydrALAZINE 100 milliGRAM(s) Oral every 8 hours  labetalol 300 milliGRAM(s) Oral every 8 hours  NIFEdipine XL 30 milliGRAM(s) Oral daily    ALLERGIES  No Known Allergies    SOCIAL HISTORY:Denies smoking , drinks alcohol socially, denies IV drug use    FAMILY HISTORY:  FH: HTN (hypertension) (Mother)    ROS:    General:Denies fever and chills	    Skin: Denies rash and pruritus.  	  HEENT:Denies changes in vision and hearing.    Respiratory and Thorax:Denies SOB, + productive cough with white mucous phlegm     Cardiovascular:	Denies chest pain and palpitations    Abdomen:Denies abdominal pain, nausea, vomiting and diarrhea	    Genitourinary:	Denies dysuria and urinary frequency.    Musculoskeletal:	 + lower back pain     Neurological:	Denies headache and syncope.    Psychiatric:Denies recent changes in mood. Denies anxiety and depression.	    Hematology/Lymphatics:	Denies bleeding , bruising     Endocrine: Denies recent weight fluctuations     PHYSICAL EXAM:    Vital Signs Last 24 Hrs  T(C): 37.2 (01 May 2024 05:41), Max: 37.2 (01 May 2024 05:41)  T(F): 98.9 (01 May 2024 05:41), Max: 98.9 (01 May 2024 05:41)  HR: 70 (01 May 2024 05:41) (70 - 84)  BP: 128/79 (01 May 2024 05:41) (117/67 - 128/79)  BP(mean): --RR: 18 (01 May 2024 05:41) (18 - 18)  SpO2: 94% (01 May 2024 05:41) (94% - 94%)    Parameters below as of 01 May 2024 05:41  Patient On (Oxygen Delivery Method): room air    HEENT:Atraumatic, Normocephalic, EOMI, PERRLA, conjunctiva and sclera clear    Neck:Supple, No JVD    Lymph Nodes: No palpable cervical LNs     Back: No palpable spinal tenderness, Paraspinal muscular tenderness to palpation of lower back     Chest/Thorax: fine BL basilar crackles.  No wheezes, rhonchi appreciated     Cardiovascular:Regular rate and rhythm; No murmurs;     Gastrointestinal:Soft, Nontender, Nondistended; Bowel sounds present; No guarding    Genitourinary: No suprapubic or CVA tenderness     Extremities:  2+ Peripheral Pulses, No cyanosis or edema    Vascular: BL DP palpable     Neurological: No focal sensory or motor deficits noted, A+O x 3    Skin: + R PermCath - NO erythema, swelling, tenderness to palpation, skin changes or localized warmth     Psychiatric: normal mood and affect     LABS/DIAGNOSTIC TESTS:                          10.4   2.70  )-----------( 68       ( 01 May 2024 07:05 )             32.1     WBC Count: 2.70 K/uL ( @ 07:05)  WBC Count: 3.06 K/uL ( @ 06:44)  WBC Count: 5.18 K/uL ( @ 08:45)          132<L>  |  96  |  61<H>  ----------------------------<  86  5.0   |  25  |  9.54<H>    Ca    7.6<L>      01 May 2024 07:05  Phos  4.0       Mg     2.2         Urine Microscopic-Add On (NC) (24 @ 22:40)    Bacteria: Many /HPF   Comment - Urine: few amorphous precipitates present   Squamous Epithelial Cells: Present   Red Blood Cell - Urine: 1 /HPF   White Blood Cell - Urine: 4 /HPF    CULTURES:   Culture - Blood (collected 24 @ 23:09)  Source: .Blood Blood-Peripheral  Preliminary Report (24 @ 03:01):    No growth at 72 Hours    Culture - Blood (collected 24 @ 23:09)  Source: .Blood Blood-Peripheral  Preliminary Report (24 @ 03:01):    No growth at 72 Hours    Culture - Blood (collected 24 @ 17:44)  Source: .Blood Blood-Peripheral  Preliminary Report (24 @ 21:01):    No growth at 72 Hours    Culture - Blood (collected 24 @ 17:40)  Source: .Blood Blood-Peripheral  Preliminary Report (24 @ 21:01):    No growth at 72 Hours    RADIOLOGY  CXR  2024      INTERPRETATION:  AP semierect chest on 2024 at 6:56 AM. Patient   is short of breath with fever.    Heart magnified by technique. Large right jugular line remains.    On  there was significant infiltration throughout most of the   right lung and slightly in the left perihilar area.    These infiltrates have markedly increased.    IMPRESSION: Increasing bilateral infiltrates right greater than left.    < from: Xray Chest 1 View- PORTABLE-Urgent (24 @ 17:59) >    INTERPRETATION:  AP semierect chest on 2025 at 5:51 PM. Patient   has sepsis.    Heart likely enlarged. Large right jugular line again noted.    Present film shows mild to early moderate CHF significantly increased   from 2023.    IMPRESSION: Increasing CHF. Probable heart enlargement.    TTE- 24  CONCLUSIONS:      1. Left ventricular cavity is moderately dilated. Left ventricular wall thickness is normal. Left ventricular systolic function is normal with an ejection fraction of 54 % by Peralta's method of disks. There are no regional wall motion abnormalities seen.   2. There is mild (grade 1) left ventricular diastolic dysfunction.   3. Normal right ventricular cavity size, with normal wall thickness, and normal systolic function. Tricuspid annular plane systolic excursion (TAPSE) is 2.8 cm (normal >=1.7 cm).   4. Mild to moderate mitral regurgitation.   5. Normal left and right atrial size.   6. Mild tricuspid regurgitation.   7. Estimated pulmonary artery systolic pressure is 37 mmHg.   8. Left pleural effusion noted.   9. Mild aortic regurgitation.  10. Mild pulmonic regurgitation.  11. There is increased LV mass and eccentric hypertrophy.  12. Small pericardial effusion.  13. No prior echocardiogram is available for comparison

## 2024-05-01 NOTE — DISCHARGE NOTE PROVIDER - NSDCMRMEDTOKEN_GEN_ALL_CORE_FT
aspirin 81 mg oral delayed release tablet: 1 tab(s) orally once a day  calcium acetate 667 mg oral tablet: 1 tab(s) orally 3 times a day (before meals)  furosemide 80 mg oral tablet: 1 tab(s) orally once a day Take 80mg daily on days when you do not have dialysis  hydrALAZINE 100 mg oral tablet: 1 tab(s) orally 3 times a day  labetalol 300 mg oral tablet: 1 tab(s) orally 3 times a day  NIFEdipine (Eqv-Adalat CC) 30 mg oral tablet, extended release: 1 tab(s) orally once a day   aspirin 81 mg oral delayed release tablet: 1 tab(s) orally once a day  aspirin 81 mg oral delayed release tablet: 1 tab(s) orally once a day  calcium acetate 667 mg oral tablet: 1 tab(s) orally 3 times a day (before meals)  furosemide 80 mg oral tablet: 1 tab(s) orally once a day Take 80mg daily on days when you do not have dialysis  hydrALAZINE 100 mg oral tablet: 1 tab(s) orally every 8 hours  hydrALAZINE 100 mg oral tablet: 1 tab(s) orally 3 times a day  labetalol 300 mg oral tablet: 1 tab(s) orally 3 times a day  NIFEdipine (Eqv-Adalat CC) 30 mg oral tablet, extended release: 1 tab(s) orally once a day  NIFEdipine 30 mg oral tablet, extended release: 1 tab(s) orally once a day   aspirin 81 mg oral delayed release tablet: 1 tab(s) orally once a day  calcium acetate 667 mg oral tablet: 1 tab(s) orally 3 times a day (before meals)  hydrALAZINE 100 mg oral tablet: 1 tab(s) orally every 8 hours  labetalol 300 mg oral tablet: 1 tab(s) orally 3 times a day  NIFEdipine 30 mg oral tablet, extended release: 1 tab(s) orally once a day  polyethylene glycol 3350 oral powder for reconstitution: 17 gram(s) orally every other day  senna leaf extract oral tablet: 2 tab(s) orally once a day (at bedtime) as needed for Constipation

## 2024-05-02 ENCOUNTER — APPOINTMENT (OUTPATIENT)
Dept: VASCULAR SURGERY | Facility: HOSPITAL | Age: 54
End: 2024-05-02
Payer: MEDICAID

## 2024-05-02 LAB
ANION GAP SERPL CALC-SCNC: 6 MMOL/L — SIGNIFICANT CHANGE UP (ref 5–17)
APTT BLD: 31.1 SEC — SIGNIFICANT CHANGE UP (ref 24.5–35.6)
BLD GP AB SCN SERPL QL: SIGNIFICANT CHANGE UP
BUN SERPL-MCNC: 36 MG/DL — HIGH (ref 7–18)
CALCIUM SERPL-MCNC: 7.6 MG/DL — LOW (ref 8.4–10.5)
CHLORIDE SERPL-SCNC: 100 MMOL/L — SIGNIFICANT CHANGE UP (ref 96–108)
CO2 SERPL-SCNC: 30 MMOL/L — SIGNIFICANT CHANGE UP (ref 22–31)
CREAT SERPL-MCNC: 6.19 MG/DL — HIGH (ref 0.5–1.3)
CULTURE RESULTS: SIGNIFICANT CHANGE UP
CULTURE RESULTS: SIGNIFICANT CHANGE UP
EGFR: 8 ML/MIN/1.73M2 — LOW
GLUCOSE BLDC GLUCOMTR-MCNC: 146 MG/DL — HIGH (ref 70–99)
GLUCOSE BLDC GLUCOMTR-MCNC: 81 MG/DL — SIGNIFICANT CHANGE UP (ref 70–99)
GLUCOSE BLDC GLUCOMTR-MCNC: 91 MG/DL — SIGNIFICANT CHANGE UP (ref 70–99)
GLUCOSE SERPL-MCNC: 87 MG/DL — SIGNIFICANT CHANGE UP (ref 70–99)
HCT VFR BLD CALC: 34.2 % — LOW (ref 34.5–45)
HGB BLD-MCNC: 10.9 G/DL — LOW (ref 11.5–15.5)
INR BLD: 0.93 RATIO — SIGNIFICANT CHANGE UP (ref 0.85–1.18)
MAGNESIUM SERPL-MCNC: 2.1 MG/DL — SIGNIFICANT CHANGE UP (ref 1.6–2.6)
MCHC RBC-ENTMCNC: 29.5 PG — SIGNIFICANT CHANGE UP (ref 27–34)
MCHC RBC-ENTMCNC: 31.9 GM/DL — LOW (ref 32–36)
MCV RBC AUTO: 92.4 FL — SIGNIFICANT CHANGE UP (ref 80–100)
NRBC # BLD: 0 /100 WBCS — SIGNIFICANT CHANGE UP (ref 0–0)
PHOSPHATE SERPL-MCNC: 3.8 MG/DL — SIGNIFICANT CHANGE UP (ref 2.5–4.5)
PLATELET # BLD AUTO: 82 K/UL — LOW (ref 150–400)
POTASSIUM SERPL-MCNC: 4.6 MMOL/L — SIGNIFICANT CHANGE UP (ref 3.5–5.3)
POTASSIUM SERPL-SCNC: 4.6 MMOL/L — SIGNIFICANT CHANGE UP (ref 3.5–5.3)
PROTHROM AB SERPL-ACNC: 10.6 SEC — SIGNIFICANT CHANGE UP (ref 9.5–13)
RBC # BLD: 3.7 M/UL — LOW (ref 3.8–5.2)
RBC # FLD: 14.6 % — HIGH (ref 10.3–14.5)
SODIUM SERPL-SCNC: 136 MMOL/L — SIGNIFICANT CHANGE UP (ref 135–145)
SPECIMEN SOURCE: SIGNIFICANT CHANGE UP
SPECIMEN SOURCE: SIGNIFICANT CHANGE UP
WBC # BLD: 2.54 K/UL — LOW (ref 3.8–10.5)
WBC # FLD AUTO: 2.54 K/UL — LOW (ref 3.8–10.5)

## 2024-05-02 PROCEDURE — 36820 AV FUSION/FOREARM VEIN: CPT

## 2024-05-02 PROCEDURE — 99232 SBSQ HOSP IP/OBS MODERATE 35: CPT | Mod: GC

## 2024-05-02 DEVICE — CLIP APPLIER COVIDIEN SURGICLIP II 9.75" MEDIUM: Type: IMPLANTABLE DEVICE | Status: FUNCTIONAL

## 2024-05-02 DEVICE — SURGICEL FIBRILLAR 2 X 4": Type: IMPLANTABLE DEVICE | Status: FUNCTIONAL

## 2024-05-02 DEVICE — CLIP APPLIER COVIDIEN SURGICLIP III 9" SM: Type: IMPLANTABLE DEVICE | Status: FUNCTIONAL

## 2024-05-02 DEVICE — GELFOAM 12 X 7MM: Type: IMPLANTABLE DEVICE | Status: FUNCTIONAL

## 2024-05-02 RX ORDER — ASPIRIN/CALCIUM CARB/MAGNESIUM 324 MG
81 TABLET ORAL DAILY
Refills: 0 | Status: DISCONTINUED | OUTPATIENT
Start: 2024-05-02 | End: 2024-05-03

## 2024-05-02 RX ORDER — SODIUM CHLORIDE 9 MG/ML
1000 INJECTION, SOLUTION INTRAVENOUS
Refills: 0 | Status: DISCONTINUED | OUTPATIENT
Start: 2024-05-02 | End: 2024-05-03

## 2024-05-02 RX ORDER — CALCIUM ACETATE 667 MG
667 TABLET ORAL
Refills: 0 | Status: DISCONTINUED | OUTPATIENT
Start: 2024-05-02 | End: 2024-05-03

## 2024-05-02 RX ORDER — HYDRALAZINE HCL 50 MG
100 TABLET ORAL EVERY 8 HOURS
Refills: 0 | Status: DISCONTINUED | OUTPATIENT
Start: 2024-05-02 | End: 2024-05-03

## 2024-05-02 RX ORDER — INFLUENZA VIRUS VACCINE 15; 15; 15; 15 UG/.5ML; UG/.5ML; UG/.5ML; UG/.5ML
0.5 SUSPENSION INTRAMUSCULAR ONCE
Refills: 0 | Status: DISCONTINUED | OUTPATIENT
Start: 2024-05-02 | End: 2024-05-03

## 2024-05-02 RX ORDER — HEPARIN SODIUM 5000 [USP'U]/ML
5000 INJECTION INTRAVENOUS; SUBCUTANEOUS EVERY 12 HOURS
Refills: 0 | Status: DISCONTINUED | OUTPATIENT
Start: 2024-05-02 | End: 2024-05-03

## 2024-05-02 RX ORDER — NIFEDIPINE 30 MG
30 TABLET, EXTENDED RELEASE 24 HR ORAL DAILY
Refills: 0 | Status: DISCONTINUED | OUTPATIENT
Start: 2024-05-02 | End: 2024-05-03

## 2024-05-02 RX ORDER — ALBUTEROL 90 UG/1
2 AEROSOL, METERED ORAL EVERY 6 HOURS
Refills: 0 | Status: DISCONTINUED | OUTPATIENT
Start: 2024-05-02 | End: 2024-05-03

## 2024-05-02 RX ORDER — SODIUM CHLORIDE 9 MG/ML
1000 INJECTION, SOLUTION INTRAVENOUS
Refills: 0 | Status: DISCONTINUED | OUTPATIENT
Start: 2024-05-02 | End: 2024-05-02

## 2024-05-02 RX ORDER — TRAMADOL HYDROCHLORIDE 50 MG/1
50 TABLET ORAL EVERY 8 HOURS
Refills: 0 | Status: DISCONTINUED | OUTPATIENT
Start: 2024-05-02 | End: 2024-05-03

## 2024-05-02 RX ORDER — ACETAMINOPHEN 500 MG
1000 TABLET ORAL ONCE
Refills: 0 | Status: COMPLETED | OUTPATIENT
Start: 2024-05-02 | End: 2024-05-02

## 2024-05-02 RX ORDER — ONDANSETRON 8 MG/1
4 TABLET, FILM COATED ORAL
Refills: 0 | Status: DISCONTINUED | OUTPATIENT
Start: 2024-05-02 | End: 2024-05-03

## 2024-05-02 RX ORDER — ACETAMINOPHEN 500 MG
1000 TABLET ORAL EVERY 6 HOURS
Refills: 0 | Status: DISCONTINUED | OUTPATIENT
Start: 2024-05-02 | End: 2024-05-03

## 2024-05-02 RX ADMIN — CHLORHEXIDINE GLUCONATE 1 APPLICATION(S): 213 SOLUTION TOPICAL at 06:33

## 2024-05-02 RX ADMIN — SODIUM CHLORIDE 40 MILLILITER(S): 9 INJECTION, SOLUTION INTRAVENOUS at 16:00

## 2024-05-02 RX ADMIN — Medication 30 MILLIGRAM(S): at 17:34

## 2024-05-02 RX ADMIN — Medication 400 MILLIGRAM(S): at 21:36

## 2024-05-02 RX ADMIN — Medication 100 MILLIGRAM(S): at 21:37

## 2024-05-02 RX ADMIN — Medication 667 MILLIGRAM(S): at 17:35

## 2024-05-02 RX ADMIN — HEPARIN SODIUM 5000 UNIT(S): 5000 INJECTION INTRAVENOUS; SUBCUTANEOUS at 17:35

## 2024-05-02 NOTE — PROGRESS NOTE ADULT - PROBLEM SELECTOR PLAN 3
No TTE with grade 1 diastolic dysfunction   lasix 80 bid on non HD days     addendum: flash pulmonary edema in the ER, given lasix 120 and emergent dialysis on 4/28 and another session 4/29

## 2024-05-02 NOTE — PROGRESS NOTE ADULT - PROBLEM SELECTOR PLAN 4
- hx of ESRD on HD Tues/thurs/Sat     renal diet  phoslo  on dc will take lasix 80 qd on days when no HD    Dr Calle in nephro  AVF creation with Vascular 5/2  L arm precautions in preparation for Vascular procedure

## 2024-05-02 NOTE — PROGRESS NOTE ADULT - PROBLEM SELECTOR PLAN 2
likely in the setting of viral infection   monitor cbc, transfuse as indicated likely in the setting of viral infection   monitor cbc, transfuse as indicated  improving

## 2024-05-02 NOTE — PROGRESS NOTE ADULT - PROBLEM SELECTOR PLAN 5
Intensive Care Note                                              Name: Male-Elo Mancia MRN# [id number]   Parents: Elo Mancia  and Data Unavailable  Date/Time of Birth: 2023 11:25 PM  Date of Admission:   2022         History of Present Illness   , LBW, appropriate for gestational age, Gestational Age: 34w1d, 5 lb 0.4 oz (2280 g), male infant born by   at [facility].    The infant was admitted to the NICU for further evaluation, monitoring and treatment of prematurity, RDS, and possible sepsis     Patient Active Problem List   Diagnosis     Prematurity, birth weight 2,000-2,499 grams, with 34 completed weeks of gestation     Respiratory distress syndrome in      Respiratory failure of      Need for observation and evaluation of  for sepsis     Hyperbilirubinemia,      Immature thermoregulation     Slow feeding in           Interval History   Stable in RA.  Tolerating feeds.        Assessment & Plan   Overall Status:    12 day old,  , VLBW, appropriate for gestational age, now 35w6d PMA.     This patient is no longer critically ill with respiratory failure requiring CPAP, cardiac/respiratory monitoring, vital sign monitoring, temperature maintenance, enteral feeding adjustments, lab and/or oxygen monitoring and continuous assessment by the health care team under direct physician supervision.    Vascular Access:    None      FEN:  Vitals:    22 1730 22 1430 22 1930   Weight: 2.18 kg (4 lb 12.9 oz) 2.235 kg (4 lb 14.8 oz) 2.265 kg (4 lb 15.9 oz)     Appropriate I/Os. Took < 10% po.    - TF goal 160 ml/kg/day.   - Enteral nutrition of BM fortified to 24 kcal/oz using HMF  - Monitor fluid status,   - Working on PO feeds (breast and bottle). Not ready for Infant Driven Feeds yet.  - Consult lactation specialist and dietician.  - registered dietician to follow growth and nutrition     Resp:   Respiratory failure requiring  nasal CPAP +5 and 21% supplemental oxygen. Now weaned off CPAP . Clinical course is consistent with RDS.  Currently stable in RA without distress  - continue CR monitoring      CV:   Hemodynamically Stable. Good perfusion and BP.  Previously with Intermittnet murmur heard bu the medical team. No murmur on my exam today.  Possibly resolved small closing PDA  - Routine CR monitoring.   --CCHD screen - passed    ID:   Potential for sepsis in the setting of respiratory failure and PTL. IAP administered x 1dose  PTD. S/P 48hrs of  IV ampicillin and gentamicin.  BC remains negative.  Stopped antibiotics     > Candida diaper dermatitis and oral thrush noted   - continue antifungal treatment    - routine IP surveillance tests for MRSA and SARS-CoV-2   >  MRSA negative and SA positive. No treatment recommended in this clinical scenario.    Hematology:   Low risk for anemia of prematurity/phlebotomy.  - Monitor hemoglobin as indicated.  Lab Results   Component Value Date    WBC 2022    HGB 2022    HCT 2022     2022    ANEU 2022     Anticipate starting supplemental Fe at 2 weeks of age    Jaundice:   At risk for hyperbilirubinemia due to prematurity and sepsis.  Maternal blood type A+.  -Mild physiologic jaundice.    -Started phototherapy . Stopped .  Mild rebound off phototherapy- now resolving. Monitoring clinically now.   Bilirubin results:  Recent Labs   Lab 22  0246 22  0458   BILITOTAL 9.4 10.3       CNS:   Standard NICU monitoring and assessment.  -  weekly OFC measurements.      Toxicology:  Toxicology screening is not indicated       Sedation/Pain Management:   No concerns  - Non-pharmacologic comfort measures.Sweet-ease for painful procedures.    Ophthalmology:   Red reflex present bilaterally on exam  (had been deferred on admission exam)     Thermoregulation:  - Monitor temperature and provide thermal  support as indicated.    Psychosocial:  - Appreciate social work involvement.  - PMAD screening: Recognizing increased risk for  mood and anxiety disorders in NICU parents, plan for routine screening for parents at 1, 2, 4, and 6 months if infant remains hospitalized.     HCM and Discharge Planning:  Screening tests indicated  - [place] at 24 [place] normal  - CCHD screen - passed  - Hearing test at/after 35 weeks PMA passed  - Carseat trial (for infants less 37 weeks or less than 1500 grams)  - OT input.  - Continue standard NICU cares and family education plan.      Immunizations   -   Most Recent Immunizations   Administered Date(s) Administered     Hep B, Peds or Adolescent 2022         Medications   Current Facility-Administered Medications   Medication     Breast Milk label for barcode scanning 1 Bottle     miconazole (MICATIN) 2 % cream     nystatin (MYCOSTATIN) 915308 unit/mL suspension 100,000 Units     sucrose (SWEET-EASE) solution 0.2-2 mL          Physical Exam     GENERAL: NAD, male infant. Overall appearance c/w CGA.   RESPIRATORY: Chest CTA with equal breath sounds, no retractions.   CV: RRR, no murmur, strong/sym pulses in UE/LE, good perfusion.   ABDOMEN: soft, +BS, no HSM.   CNS: Tone appropriate for GA. AFOF. MAEE.   Rest of exam unchanged.       Communications   Parents:  Name Home Phone Work Phone Mobile Phone Relationship Lgl Grreji KESSLERELO [phone] Mother       Family lives in [place], [place]  Updated after rounds.    PCPs:  Infant PCP: Physician No Ref-Primary  [facility]  Maternal OB PCP: Dr Liat Calles  Information for the patient's mother:  Elo Kessler [[id number]]   No primary care provider on file.     Delivering Provider:  Dr Bee Smith  Admission note routed to all.    Health Care Team:  Patient discussed with the care team on rounds. A/P, imaging studies, laboratory data, medications and family situation  reviewed.  DUYEN CALDERON MD     - hx of HTN on hydral, labetalol, nifedipine   - home meds continued with parameters as pt is hypertensive to 180s in ED

## 2024-05-02 NOTE — PROGRESS NOTE ADULT - PROBLEM SELECTOR PLAN 1
Admitted for sepsis likely 2/2 to HD tunneled catheter infection vs. viral infection, and fluid overload in the setting of ESRD  bcx ngtd  dc cefepime   Dr. Lees consulted   Nephro Dr. Calle following     ADDEMDUM  - Pt found to be hMPV positive, ordered droplet isolation Admitted for sepsis likely 2/2 to HD tunneled catheter infection vs. viral infection, and fluid overload in the setting of ESRD  bcx ngtd  dc cefepime   Dr. Lees consulted   Nephro Dr. Calle following     ADDEMDUM  - Pt found to be hMPV positive, ordered droplet isolation    RESOLVED

## 2024-05-02 NOTE — PROGRESS NOTE ADULT - SUBJECTIVE AND OBJECTIVE BOX
PGY-1 Progress Note discussed with attending    PAGER #: [[phone]] TILL 5:00 PM  PLEASE CONTACT ON CALL TEAM:  - On Call Team (Please refer to Danie) FROM 5:00 PM - 8:30PM  - Nightfloat Team FROM 8:30 -7:30 AM    INTERVAL HPI/OVERNIGHT EVENTS:     MEDICATIONS  (STANDING):  aspirin enteric coated 81 milliGRAM(s) Oral daily  calcium acetate 667 milliGRAM(s) Oral three times a day with meals  chlorhexidine 2% Cloths 1 Application(s) Topical daily  hydrALAZINE 100 milliGRAM(s) Oral every 8 hours  labetalol 300 milliGRAM(s) Oral every 8 hours  NIFEdipine XL 30 milliGRAM(s) Oral daily    MEDICATIONS  (PRN):  albuterol    90 MICROgram(s) HFA Inhaler 2 Puff(s) Inhalation every 6 hours PRN Bronchospasm  guaiFENesin Oral Liquid (Sugar-Free) 200 milliGRAM(s) Oral every 6 hours PRN Cough  ondansetron Injectable 4 milliGRAM(s) IV Push two times a day PRN Nausea and/or Vomiting      REVIEW OF SYSTEMS:  CONSTITUTIONAL:  No fevers or chills, good appetite, good general state  EYES/ENT:  No visual changes;  No vertigo or throat pain   NECK:  No neck pain or stiffness  RESPIRATORY:  No cough, wheezing, hemoptysis; No shortness of breath  CARDIOVASCULAR:  No chest pain or palpitations  GASTROINTESTINAL:  No abdominal pain. No nausea, vomiting, or hematemesis; No diarrhea or constipation. No melena or hematochezia.  GENITOURINARY:  No dysuria, frequency or hematuria  NEUROLOGICAL:  No HA, no numbness or LE weakness  MSK: no back pain, no joint pain  SKIN:  No itching, no skin rash    Vital Signs Last 24 Hrs  T(C): 36.8 (02 May 2024 05:06), Max: 36.8 (02 May 2024 05:06)  T(F): 98.2 (02 May 2024 05:06), Max: 98.2 (02 May 2024 05:06)  HR: 73 (02 May 2024 05:06) (69 - 87)  BP: 150/88 (02 May 2024 05:06) (119/89 - 159/83)  BP(mean): --  RR: 18 (02 May 2024 05:06) (16 - 18)  SpO2: 96% (02 May 2024 05:06) (94% - 98%)    Parameters below as of 02 May 2024 05:06  Patient On (Oxygen Delivery Method): room air        PHYSICAL EXAM:  VITALS: Vital Signs Last 24 Hrs  T(C): 36.8 (02 May 2024 05:06), Max: 36.8 (02 May 2024 05:06)  T(F): 98.2 (02 May 2024 05:06), Max: 98.2 (02 May 2024 05:06)  HR: 73 (02 May 2024 05:06) (69 - 87)  BP: 150/88 (02 May 2024 05:06) (119/89 - 159/83)  BP(mean): --  RR: 18 (02 May 2024 05:06) (16 - 18)  SpO2: 96% (02 May 2024 05:06) (94% - 98%)    Parameters below as of 02 May 2024 05:06  Patient On (Oxygen Delivery Method): room air      Gen: AOx3, NAD, non-toxic, pleasant  HEAD:  Atraumatic, Normocephalic  EYES: PERRLA, conjunctiva and sclera clear  ENT: Moist mucous membranes  NECK: Supple, No JVD  CV: S1+S2 normal, no murmurs   Resp: Clear bilat, no resp distress, no crackles/wheezes  Abd: Soft, nontender, +BS  Ext: No LE edema, no cyanosis, LE pulses present  : Henry (+/-)  IV/Skin: No thrombophlebitis, no skin rash  Msk: No arthralgias, no joint swelling  Neuro: AAOx3. No sensory deficits, no motor deficits                          10.4   2.70  )-----------( 68       ( 01 May 2024 07:05 )             32.1     05-01    132<L>  |  96  |  61<H>  ----------------------------<  86  5.0   |  25  |  9.54<H>    Ca    7.6<L>      01 May 2024 07:05  Phos  4.0     05-01  Mg     2.2     05-01                CAPILLARY BLOOD GLUCOSE      RADIOLOGY & ADDITIONAL TESTS:                   PGY-1 Progress Note discussed with attending    PAGER #: [[phone]] TILL 5:00 PM  PLEASE CONTACT ON CALL TEAM:  - On Call Team (Please refer to Danie) FROM 5:00 PM - 8:30PM  - Nightfloat Team FROM 8:30 -7:30 AM    INTERVAL HPI/OVERNIGHT EVENTS: no events overnight. NPO for AVF placement today.     MEDICATIONS  (STANDING):  aspirin enteric coated 81 milliGRAM(s) Oral daily  calcium acetate 667 milliGRAM(s) Oral three times a day with meals  chlorhexidine 2% Cloths 1 Application(s) Topical daily  hydrALAZINE 100 milliGRAM(s) Oral every 8 hours  labetalol 300 milliGRAM(s) Oral every 8 hours  NIFEdipine XL 30 milliGRAM(s) Oral daily    MEDICATIONS  (PRN):  albuterol    90 MICROgram(s) HFA Inhaler 2 Puff(s) Inhalation every 6 hours PRN Bronchospasm  guaiFENesin Oral Liquid (Sugar-Free) 200 milliGRAM(s) Oral every 6 hours PRN Cough  ondansetron Injectable 4 milliGRAM(s) IV Push two times a day PRN Nausea and/or Vomiting      REVIEW OF SYSTEMS:  CONSTITUTIONAL:  No fevers or chills, good appetite, good general state  EYES/ENT:  No visual changes;  No vertigo or throat pain   NECK:  No neck pain or stiffness  RESPIRATORY:  No cough, wheezing, hemoptysis; No shortness of breath  CARDIOVASCULAR:  No chest pain or palpitations  GASTROINTESTINAL:  No abdominal pain. No nausea, vomiting, or hematemesis; No diarrhea or constipation. No melena or hematochezia.  GENITOURINARY:  No dysuria, frequency or hematuria  NEUROLOGICAL:  No HA, no numbness or LE weakness  MSK: no back pain, no joint pain  SKIN:  No itching, no skin rash    Vital Signs Last 24 Hrs  T(C): 36.8 (02 May 2024 05:06), Max: 36.8 (02 May 2024 05:06)  T(F): 98.2 (02 May 2024 05:06), Max: 98.2 (02 May 2024 05:06)  HR: 73 (02 May 2024 05:06) (69 - 87)  BP: 150/88 (02 May 2024 05:06) (119/89 - 159/83)  BP(mean): --  RR: 18 (02 May 2024 05:06) (16 - 18)  SpO2: 96% (02 May 2024 05:06) (94% - 98%)    Parameters below as of 02 May 2024 05:06  Patient On (Oxygen Delivery Method): room air        PHYSICAL EXAM:  VITALS: Vital Signs Last 24 Hrs  T(C): 36.8 (02 May 2024 05:06), Max: 36.8 (02 May 2024 05:06)  T(F): 98.2 (02 May 2024 05:06), Max: 98.2 (02 May 2024 05:06)  HR: 73 (02 May 2024 05:06) (69 - 87)  BP: 150/88 (02 May 2024 05:06) (119/89 - 159/83)  BP(mean): --  RR: 18 (02 May 2024 05:06) (16 - 18)  SpO2: 96% (02 May 2024 05:06) (94% - 98%)    Parameters below as of 02 May 2024 05:06  Patient On (Oxygen Delivery Method): room air      Gen: AOx3, NAD, non-toxic, pleasant  HEAD:  Atraumatic, Normocephalic  EYES: PERRLA, conjunctiva and sclera clear  ENT: Moist mucous membranes  NECK: Supple, No JVD  CV: S1+S2 normal, no murmurs   Resp: Clear bilat, no resp distress, no crackles/wheezes  Abd: Soft, nontender, +BS  Ext: No LE edema, no cyanosis, LE pulses present  : No ellis   IV/Skin: No thrombophlebitis, no skin rash  Msk: No arthralgias, no joint swelling  Neuro: AAOx3. No sensory deficits, no motor deficits                          10.4   2.70  )-----------( 68       ( 01 May 2024 07:05 )             32.1     05-01    132<L>  |  96  |  61<H>  ----------------------------<  86  5.0   |  25  |  9.54<H>    Ca    7.6<L>      01 May 2024 07:05  Phos  4.0     05-01  Mg     2.2     05-01                CAPILLARY BLOOD GLUCOSE      RADIOLOGY & ADDITIONAL TESTS:

## 2024-05-02 NOTE — PROGRESS NOTE ADULT - ASSESSMENT
Pt is a 52yo F with HTN, HLD, anemia, diastolic HF (last TTE 11/02/2023- Grade IV diastolic dysfunction (severe with fixed restrictive pattern), mild TR, mild NH, RV systolic pressure mod inc 46 mmHg, moderate pericardial effusion), ESRD on HD Tu/Th/Sat (diagnosed 2 years ago, last HD this AM), presents to the ED with chills/rigors, fevers around 102 F, productive cough, sore throat, and b/l flank pain since Thursday (3 days), with recent catheter exchange on Monday 4/22. Vitals sig for temp of 101.5 F, , sys  > 158. WC 5.74, cov/flu negative. CXR-  Increasing CHF. Probable heart enlargement. Admitted for sepsis likely 2/2 to HD tunneled catheter infection vs. viral infection, and fluid overload in the setting of ESRD.

## 2024-05-02 NOTE — PROGRESS NOTE ADULT - ASSESSMENT
1 ESRD on HD (T/TH/SAT): pt will be off schedule while on the hospital.   -s/p HD yesterday with UF 2.0kg. If clinically stable tomorrow then can be discharged and pt can go to outpatient HD on Sat.  -Hemodialysis Renal Diet and Fluid restriction to 1L/day  -Adjust meds to eGFR and avoid IV Gadolinium contrast,NSAIDs, and phosphate enema.  -Monitor Electrolytes daily.  2. HTN:   -bp is acceptable  -titrate bp meds to keep sbp >110 and < 130  3. Anemia of ESRD:  -start epogen if hb <10  -F/u CBC daily  -transfuse if HB < 7.0.  4. Mineral Bone Disease:  -continue phoslo tid  -monitor phos  5. Sob/fever due to fluid overload with hmp virus  -off iv abx since less likely permacath infection.   -ECHO noted.  -UF during HD.   6. AVF placement:  -Plan for AVF placement today.

## 2024-05-02 NOTE — CHART NOTE - NSCHARTNOTEFT_GEN_A_CORE
I have discussed case with Dr. Calle.   Patient has been weaned off BiPAP and is now on 4 L supplemental oxygen via nasal cannula, appropriate for floor at this time.   Has metapneumovirus; on antibiotics to cover for possible bacteremia as well as superimposed bacterial pneumonia on the right side of the chest.
MEDICINE NP    Notified by RN patient with respiratory distress. Seen and examined patient at bedside. Patient is alert, NAD. Denies HA, CP, cough, N/V, or abd pain. arrived in ed to fine patient using accessory muscle and diminish lung sound. cxr shows pulmonary congestion.    VITAL SIGNS:  T(C): 39.4 (04-28-24 @ 06:19), Max: 39.4 (04-27-24 @ 22:35)  HR: 106 (04-28-24 @ 06:19) (89 - 110)  BP: 177/111 (04-28-24 @ 06:19) (158/80 - 185/100)  RR: 24 (04-28-24 @ 06:19) (18 - 24)  SpO2: 94% (04-28-24 @ 06:19) (89% - 96%)  Wt(kg): --      LABORATORY:                          12.1   5.10  )-----------( 64       ( 28 Apr 2024 04:50 )             37.7       04-28    136  |  103  |  43<H>  ----------------------------<  101<H>  5.2   |  24  |  7.73<H>    Ca    7.8<L>      28 Apr 2024 04:50  Phos  4.4     04-28  Mg     2.0     04-28    TPro  6.1  /  Alb  3.1<L>  /  TBili  0.7  /  DBili  x   /  AST  19  /  ALT  16  /  AlkPhos  65  04-28          MICROBIOLOGY:           RADIOLOGY:          PHYSICAL EXAM:    Constitutional: AOx3. NAD.    Respiratory: clear lungs bilaterally. No wheezing, rhonchi, or crackles.    Cardiovascular: S1 S2. No murmurs.    Gastrointestinal: BS X4 active. soft. nontender.    Extremities/Vascular: +2 pulses bilaterally. No BLE edema.      ASSESSMENT/PLAN:   HPI:  Pt is a 54yo F with HTN, HLD, anemia, diastolic HF (last TTE 11/02/2023- Grade IV diastolic dysfunction (severe with fixed restrictive pattern), mild TR, mild [place], RV systolic pressure mod inc 46 mmHg, moderate pericardial effusion), ESRD on HD Tu/Th/Sat (diagnosed 2 years ago, last HD this AM), presents to the ED with chills/rigors, fevers around 102 F, productive cough, sore throat, and b/l flank pain since Thursday (3 days). Pt noted that she got her HD catheter exchanged on Monday 4/22 (due to catheter malfunction), pt reported mild complication of bleeding during the procedure that was controlled. Pt has since received HD on tues/thus/sat without issue/complication. Pt did not specifically notice fevers/rigors intra-HD sessions. Pt also reported that her daughter had a cold last week (was not diagnosed with the flu). Pt states that when she coughs she intermittently gets specks of blood in her sputum. now in respiratory distress          1) Respiratory distress/increase work of breathing  -Morphine  -Lasix  -Bipap  -Blood Gas  -CXR  -ICU consult  -F/U primary team in AM
Patient examined at bedside. Endorsed mild pain on LUE wound, denied any fevers, vomiting, paresthesias, motor deficits or other. Nursing personnel reported mild oozing through dressings.    Vital Signs Last 24 Hrs  T(C): 37 (02 May 2024 16:19), Max: 37 (02 May 2024 13:29)  T(F): 98.6 (02 May 2024 16:19), Max: 98.6 (02 May 2024 13:29)  HR: 76 (02 May 2024 16:19) (72 - 87)  BP: 161/87 (02 May 2024 16:19) (136/90 - 168/88)  BP(mean): 110 (02 May 2024 15:55) (110 - 112)  RR: 18 (02 May 2024 16:19) (13 - 20)  SpO2: 96% (02 May 2024 16:19) (95% - 98%)    Parameters below as of 02 May 2024 16:19  Patient On (Oxygen Delivery Method): room air    General: Lying in bed, comfortable, not in acute distress  Resp: adequate ventilation pattern, not acute distress  CV: sinus rhythm  LUE: incision covered with dressing with mild strikethrough, no active bleeding, mild swelling, adequate thrill and strong radial pulse  Neuro: GCS 15, AOX3     Assessment  53F with ESRD on her immediate post operative time for a LUE arteriovenous fistula creation. Stable, adequate post op course    Plan  - Continue care as per primary team  - Keep LUE arm restrictions  - To follow in Vascular clinic in 2 weeks after discharge for AVF evaluation  - Vascular to follow-up while inhouse, please page with any questions or concerns
Surgery Pre-op    Dx: ESRD  Procedure: left arm AVF creation    Vital Signs Last 24 Hrs  T(C): 36.2 (01 May 2024 17:29), Max: 37.2 (01 May 2024 05:41)  T(F): 97.1 (01 May 2024 17:29), Max: 98.9 (01 May 2024 05:41)  HR: 69 (01 May 2024 17:29) (69 - 73)  BP: 126/71 (01 May 2024 17:29) (117/67 - 128/79)  BP(mean): --  RR: 16 (01 May 2024 17:29) (16 - 18)  SpO2: 98% (01 May 2024 17:29) (94% - 98%)    Parameters below as of 01 May 2024 17:29  Patient On (Oxygen Delivery Method): room air        LABS:                      10.4   2.70  )-----------( 68       ( 01 May 2024 07:05 )             32.1              05-01    132<L>  |  96  |  61<H>  ----------------------------<  86  5.0   |  25  |  9.54<H>    Ca    7.6<L>      01 May 2024 07:05  Phos  4.0     05-01  Mg     2.2     05-01      Urinalysis Basic - ( 01 May 2024 07:05 )  Color: x / Appearance: x / SG: x / pH: x  Gluc: 86 mg/dL / Ketone: x  / Bili: x / Urobili: x   Blood: x / Protein: x / Nitrite: x   Leuk Esterase: x / RBC: x / WBC x   Sq Epi: x / Non Sq Epi: x / Bacteria: x      SARS-CoV-2: NotDetec (27 Apr 2024 17:49)  SARS-CoV-2: NotDetec (29 Feb 2024 03:56)      RADIOLOGY & ADDITIONAL STUDIES:  < from:  Duplex Hemodialysis Access (05.01.24 @ 12:50) >    FINDINGS /  IMPRESSION:    Measurements of the left basilic vein and left cephalic vein are as   follows.    LEFT Basilic Vein:  Proximal upper arm 0.59 x 0.67 cm; skin depth 0.77 cm  Mid upper arm 0.50 x 0.58 cm  Distal upper arm 0.55 x 0.56 cm  Antecubital fossa 0.45 x 0.53 cm  Proximal forearm 0.38 x 0.37 cm  Mid forearm 0.32 x 0.39 cm  Distal forearm 0.28 x 0.30 cm    LEFT Cephalic Vein:  Proximal upper arm 0.47 x 0.52 cm; skin depth 0.75 cm  Mid upper arm 0.47 x 0.42 cm  Distal upper arm 0.36 x 0.43 cm  Antecubital fossa 0.36 x 0.45 cm  Proximal forearm 0.33 x 0.37 cm  Mid forearm 0.32 x 0.30 cm  Distal forearm 0.27 x 0.33 cm  --- End of Report ---  < end of copied text >      53y.o. Female in need of HD access  -Case discussed with Dr. Cantu, pt cleared from medical and ID standpoint for AVF creation  -OR tomorrow with Dr. Shine 5/2/24  -NPO after midnight except PO meds  -IVF when NPO  -Chlorhexidine wipes  -DVT ppx  -Please ensure, if applicable, pregnancy test within 48 hours of upcoming procedure

## 2024-05-02 NOTE — PROGRESS NOTE ADULT - SUBJECTIVE AND OBJECTIVE BOX
Time of Visit:  Patient seen and examined.     MEDICATIONS  (STANDING):  aspirin enteric coated 81 milliGRAM(s) Oral daily  calcium acetate 667 milliGRAM(s) Oral three times a day with meals  chlorhexidine 2% Cloths 1 Application(s) Topical daily  hydrALAZINE 100 milliGRAM(s) Oral every 8 hours  influenza   Vaccine 0.5 milliLiter(s) IntraMuscular once  labetalol 300 milliGRAM(s) Oral every 8 hours  NIFEdipine XL 30 milliGRAM(s) Oral daily      MEDICATIONS  (PRN):  albuterol    90 MICROgram(s) HFA Inhaler 2 Puff(s) Inhalation every 6 hours PRN Bronchospasm  guaiFENesin Oral Liquid (Sugar-Free) 200 milliGRAM(s) Oral every 6 hours PRN Cough  ondansetron Injectable 4 milliGRAM(s) IV Push two times a day PRN Nausea and/or Vomiting       Medications up to date at time of exam.    ROS; No fever, chills, cough, congestion on exam.   PHYSICAL EXAMINATION:  Vital Signs Last 24 Hrs  T(C): 36.8 (02 May 2024 05:06), Max: 36.8 (02 May 2024 05:06)  T(F): 98.2 (02 May 2024 05:06), Max: 98.2 (02 May 2024 05:06)  HR: 73 (02 May 2024 05:06) (69 - 87)  BP: 150/88 (02 May 2024 05:06) (119/89 - 159/83)  BP(mean): --  RR: 18 (02 May 2024 05:06) (16 - 18)  SpO2: 96% (02 May 2024 05:06) (94% - 98%)    Parameters below as of 02 May 2024 05:06  Patient On (Oxygen Delivery Method): room air       (if applicable)    General: Alert and oriented. Able to answer question with no SOB. No acute distress .      HEENT: Head is normocephalic and atraumatic. No nasal tenderness. Extraocular muscles are intact. Mucous membranes are moist.     NECK: Supple, no palpable adenopathy.    LUNGS: Non labored. No wheezing. No use of accessory muscle.     HEART: S1 S2 Regular rate and no click / rub.     ABDOMEN: Soft, nontender, and nondistended. Active bowel sounds.     EXTREMITIES: Without any cyanosis, clubbing, rash, lesions or edema.    NEUROLOGIC: Awake, alert, oriented.     SKIN: Warm, dry, good turgor.      LABS:                        10.9   2.54  )-----------( 82       ( 02 May 2024 08:05 )             34.2     05-02    136  |  100  |  36<H>  ----------------------------<  87  4.6   |  30  |  6.19<H>    Ca    7.6<L>      02 May 2024 08:05  Phos  3.8     05-  Mg     2.1     05-      PT/INR - ( 02 May 2024 08:05 )   PT: 10.6 sec;   INR: 0.93 ratio         PTT - ( 02 May 2024 08:05 )  PTT:31.1 sec  Urinalysis Basic - ( 02 May 2024 08:05 )    Color: x / Appearance: x / SG: x / pH: x  Gluc: 87 mg/dL / Ketone: x  / Bili: x / Urobili: x   Blood: x / Protein: x / Nitrite: x   Leuk Esterase: x / RBC: x / WBC x   Sq Epi: x / Non Sq Epi: x / Bacteria: x                      MICROBIOLOGY: (if applicable)    RADIOLOGY & ADDITIONAL STUDIES:  EKG:   CXR: < from: Xray Chest 1 View- PORTABLE-Urgent (Xray Chest 1 View- PORTABLE-Urgent .) (24 @ 07:06) >    ACC: [id number] EXAM:  XR CHEST PORTABLE URGENT 1V   ORDERED BY: BECCA ENAMORADO     PROCEDURE DATE:  2024          INTERPRETATION:  AP semierect chest on 2024 at 6:56 AM. Patient   is short of breath with fever.    Heart magnified by technique. Large right jugular line remains.    On  there was significant infiltration throughout most of the   right lung and slightly in the left perihilar area.    These infiltrates have markedly increased.    IMPRESSION: Increasing bilateral infiltrates right greater than left.    --- End of Report ---            SOO POPE MD; Attending Radiologist  This document has been electronically signed. 2024  3:02PM    < end of copied text >    ECHO:    IMPRESSION: 53y Female PAST MEDICAL & SURGICAL HISTORY:  HTN (hypertension), benign      HLD (hyperlipidemia)      ESRD on dialysis      Anemia      Enlarged thyroid      H/O thrombocytopenia      H/O  section      H/O tubal ligation      S/P hemodialysis catheter insertion    Impression: This is a 54 Y/O Female presented to ED with productive cough  with intermittently gets specks of blood in her sputum but none today , fever with Temp 102, sore throat and B/L flank pain since Thursday . Has ESRD on HD 3x a week T-Th-sat  . For pulmonary folllow up due to Acute hypoxic respiratory failure due to Flash pulmonary Edema in ED , post lasix and emergent Dialysis on 24 . Which her O2 saturation is stable room air , hypoxia improved .  Positive swab for RVP/ HMPV . Negative for Influenza , Covid .     Suggestion:  O2 saturation 96% room air. So far saturating good room air.   PRN Albuterol 90 mcg 2 puffs Q 6 Hours .  PRN Guaifenesin Q 6 Hours .   DVT prophylactic. Off Heparin 5,000 Units SQ Q 12 Hours.    Per Vascular for Left arm AV Fistula creation. Minimal pulmonary risk for the propose Vascular procedure .

## 2024-05-02 NOTE — PROGRESS NOTE ADULT - NS ATTEND AMEND GEN_ALL_CORE FT
Impression: This is a 54 Y/O Female presented to ED with productive cough  with intermittently gets specks of blood in her sputum but none today , fever with Temp 102, sore throat and B/L flank pain since Thursday . Has ESRD on HD 3x a week T-Th-sat  . For pulmonary folllow up due to Acute hypoxic respiratory failure due to Flash pulmonary Edema in ED , post lasix and emergent Dialysis on 04-28-24 . Which her O2 saturation is stable room air , hypoxia improved .  Positive swab for RVP/ HMPV . Negative for Influenza , Covid .     Suggestion:  O2 saturation 96% room air. So far saturating good room air.   PRN Albuterol 90 mcg 2 puffs Q 6 Hours .  PRN Guaifenesin Q 6 Hours .   DVT prophylactic. Off Heparin 5,000 Units SQ Q 12 Hours.    Per Vascular for Left arm AV Fistula creation. Minimal pulmonary risk for the propose Vascular procedure .

## 2024-05-03 ENCOUNTER — TRANSCRIPTION ENCOUNTER (OUTPATIENT)
Age: 54
End: 2024-05-03

## 2024-05-03 VITALS
TEMPERATURE: 98 F | OXYGEN SATURATION: 96 % | HEART RATE: 75 BPM | SYSTOLIC BLOOD PRESSURE: 129 MMHG | RESPIRATION RATE: 18 BRPM | DIASTOLIC BLOOD PRESSURE: 75 MMHG

## 2024-05-03 LAB
ANION GAP SERPL CALC-SCNC: 7 MMOL/L — SIGNIFICANT CHANGE UP (ref 5–17)
BUN SERPL-MCNC: 50 MG/DL — HIGH (ref 7–18)
CALCIUM SERPL-MCNC: 7.8 MG/DL — LOW (ref 8.4–10.5)
CHLORIDE SERPL-SCNC: 100 MMOL/L — SIGNIFICANT CHANGE UP (ref 96–108)
CO2 SERPL-SCNC: 29 MMOL/L — SIGNIFICANT CHANGE UP (ref 22–31)
CREAT SERPL-MCNC: 8.52 MG/DL — HIGH (ref 0.5–1.3)
CULTURE RESULTS: SIGNIFICANT CHANGE UP
CULTURE RESULTS: SIGNIFICANT CHANGE UP
EGFR: 5 ML/MIN/1.73M2 — LOW
GLUCOSE SERPL-MCNC: 89 MG/DL — SIGNIFICANT CHANGE UP (ref 70–99)
HCT VFR BLD CALC: 35.4 % — SIGNIFICANT CHANGE UP (ref 34.5–45)
HGB BLD-MCNC: 11.2 G/DL — LOW (ref 11.5–15.5)
MAGNESIUM SERPL-MCNC: 2.3 MG/DL — SIGNIFICANT CHANGE UP (ref 1.6–2.6)
MCHC RBC-ENTMCNC: 29.3 PG — SIGNIFICANT CHANGE UP (ref 27–34)
MCHC RBC-ENTMCNC: 31.6 GM/DL — LOW (ref 32–36)
MCV RBC AUTO: 92.7 FL — SIGNIFICANT CHANGE UP (ref 80–100)
NRBC # BLD: 0 /100 WBCS — SIGNIFICANT CHANGE UP (ref 0–0)
PHOSPHATE SERPL-MCNC: 4.2 MG/DL — SIGNIFICANT CHANGE UP (ref 2.5–4.5)
PLATELET # BLD AUTO: 100 K/UL — LOW (ref 150–400)
POTASSIUM SERPL-MCNC: 5.1 MMOL/L — SIGNIFICANT CHANGE UP (ref 3.5–5.3)
POTASSIUM SERPL-SCNC: 5.1 MMOL/L — SIGNIFICANT CHANGE UP (ref 3.5–5.3)
RBC # BLD: 3.82 M/UL — SIGNIFICANT CHANGE UP (ref 3.8–5.2)
RBC # FLD: 14.7 % — HIGH (ref 10.3–14.5)
SODIUM SERPL-SCNC: 136 MMOL/L — SIGNIFICANT CHANGE UP (ref 135–145)
SPECIMEN SOURCE: SIGNIFICANT CHANGE UP
SPECIMEN SOURCE: SIGNIFICANT CHANGE UP
WBC # BLD: 3.15 K/UL — LOW (ref 3.8–10.5)
WBC # FLD AUTO: 3.15 K/UL — LOW (ref 3.8–10.5)

## 2024-05-03 PROCEDURE — 83735 ASSAY OF MAGNESIUM: CPT

## 2024-05-03 PROCEDURE — 85027 COMPLETE CBC AUTOMATED: CPT

## 2024-05-03 PROCEDURE — 84484 ASSAY OF TROPONIN QUANT: CPT

## 2024-05-03 PROCEDURE — 99285 EMERGENCY DEPT VISIT HI MDM: CPT

## 2024-05-03 PROCEDURE — 82803 BLOOD GASES ANY COMBINATION: CPT

## 2024-05-03 PROCEDURE — 82330 ASSAY OF CALCIUM: CPT

## 2024-05-03 PROCEDURE — 93990 DOPPLER FLOW TESTING: CPT

## 2024-05-03 PROCEDURE — 0241U: CPT

## 2024-05-03 PROCEDURE — 87637 SARSCOV2&INF A&B&RSV AMP PRB: CPT

## 2024-05-03 PROCEDURE — 84132 ASSAY OF SERUM POTASSIUM: CPT

## 2024-05-03 PROCEDURE — 86901 BLOOD TYPING SEROLOGIC RH(D): CPT

## 2024-05-03 PROCEDURE — 80048 BASIC METABOLIC PNL TOTAL CA: CPT

## 2024-05-03 PROCEDURE — 82962 GLUCOSE BLOOD TEST: CPT

## 2024-05-03 PROCEDURE — 87340 HEPATITIS B SURFACE AG IA: CPT

## 2024-05-03 PROCEDURE — 93005 ELECTROCARDIOGRAM TRACING: CPT

## 2024-05-03 PROCEDURE — 96365 THER/PROPH/DIAG IV INF INIT: CPT

## 2024-05-03 PROCEDURE — 86900 BLOOD TYPING SEROLOGIC ABO: CPT

## 2024-05-03 PROCEDURE — 99239 HOSP IP/OBS DSCHRG MGMT >30: CPT | Mod: GC

## 2024-05-03 PROCEDURE — 94640 AIRWAY INHALATION TREATMENT: CPT

## 2024-05-03 PROCEDURE — 0225U NFCT DS DNA&RNA 21 SARSCOV2: CPT

## 2024-05-03 PROCEDURE — 96368 THER/DIAG CONCURRENT INF: CPT

## 2024-05-03 PROCEDURE — 80053 COMPREHEN METABOLIC PANEL: CPT

## 2024-05-03 PROCEDURE — 36415 COLL VENOUS BLD VENIPUNCTURE: CPT

## 2024-05-03 PROCEDURE — 86850 RBC ANTIBODY SCREEN: CPT

## 2024-05-03 PROCEDURE — 85025 COMPLETE CBC W/AUTO DIFF WBC: CPT

## 2024-05-03 PROCEDURE — 85610 PROTHROMBIN TIME: CPT

## 2024-05-03 PROCEDURE — 87040 BLOOD CULTURE FOR BACTERIA: CPT

## 2024-05-03 PROCEDURE — 84295 ASSAY OF SERUM SODIUM: CPT

## 2024-05-03 PROCEDURE — 81001 URINALYSIS AUTO W/SCOPE: CPT

## 2024-05-03 PROCEDURE — 85730 THROMBOPLASTIN TIME PARTIAL: CPT

## 2024-05-03 PROCEDURE — 93306 TTE W/DOPPLER COMPLETE: CPT

## 2024-05-03 PROCEDURE — 71045 X-RAY EXAM CHEST 1 VIEW: CPT

## 2024-05-03 PROCEDURE — 96375 TX/PRO/DX INJ NEW DRUG ADDON: CPT

## 2024-05-03 PROCEDURE — 84100 ASSAY OF PHOSPHORUS: CPT

## 2024-05-03 PROCEDURE — 99261: CPT

## 2024-05-03 PROCEDURE — 83605 ASSAY OF LACTIC ACID: CPT

## 2024-05-03 PROCEDURE — 94660 CPAP INITIATION&MGMT: CPT

## 2024-05-03 RX ORDER — HYDRALAZINE HCL 50 MG
1 TABLET ORAL
Qty: 0 | Refills: 0 | DISCHARGE
Start: 2024-05-03

## 2024-05-03 RX ORDER — SENNA PLUS 8.6 MG/1
2 TABLET ORAL
Qty: 60 | Refills: 0
Start: 2024-05-03

## 2024-05-03 RX ORDER — ASPIRIN/CALCIUM CARB/MAGNESIUM 324 MG
1 TABLET ORAL
Qty: 0 | Refills: 0 | DISCHARGE
Start: 2024-05-03

## 2024-05-03 RX ORDER — POLYETHYLENE GLYCOL 3350 17 G/17G
17 POWDER, FOR SOLUTION ORAL DAILY
Refills: 0 | Status: DISCONTINUED | OUTPATIENT
Start: 2024-05-03 | End: 2024-05-03

## 2024-05-03 RX ORDER — POLYETHYLENE GLYCOL 3350 17 G/17G
17 POWDER, FOR SOLUTION ORAL
Qty: 1 | Refills: 0
Start: 2024-05-03 | End: 2024-05-16

## 2024-05-03 RX ORDER — SENNA PLUS 8.6 MG/1
2 TABLET ORAL AT BEDTIME
Refills: 0 | Status: DISCONTINUED | OUTPATIENT
Start: 2024-05-03 | End: 2024-05-03

## 2024-05-03 RX ORDER — NIFEDIPINE 30 MG
1 TABLET, EXTENDED RELEASE 24 HR ORAL
Refills: 0 | DISCHARGE

## 2024-05-03 RX ORDER — NIFEDIPINE 30 MG
1 TABLET, EXTENDED RELEASE 24 HR ORAL
Refills: 0 | DISCHARGE
Start: 2024-05-03

## 2024-05-03 RX ADMIN — Medication 81 MILLIGRAM(S): at 11:41

## 2024-05-03 RX ADMIN — Medication 30 MILLIGRAM(S): at 06:30

## 2024-05-03 RX ADMIN — Medication 667 MILLIGRAM(S): at 11:41

## 2024-05-03 RX ADMIN — TRAMADOL HYDROCHLORIDE 50 MILLIGRAM(S): 50 TABLET ORAL at 07:30

## 2024-05-03 RX ADMIN — Medication 667 MILLIGRAM(S): at 09:30

## 2024-05-03 RX ADMIN — HEPARIN SODIUM 5000 UNIT(S): 5000 INJECTION INTRAVENOUS; SUBCUTANEOUS at 06:31

## 2024-05-03 RX ADMIN — POLYETHYLENE GLYCOL 3350 17 GRAM(S): 17 POWDER, FOR SOLUTION ORAL at 11:40

## 2024-05-03 RX ADMIN — TRAMADOL HYDROCHLORIDE 50 MILLIGRAM(S): 50 TABLET ORAL at 06:30

## 2024-05-03 RX ADMIN — Medication 100 MILLIGRAM(S): at 13:56

## 2024-05-03 RX ADMIN — Medication 100 MILLIGRAM(S): at 06:30

## 2024-05-03 NOTE — PROGRESS NOTE ADULT - ASSESSMENT
1 ESRD on HD (T/TH/SAT): pt will be off schedule while on the hospital.   -Electrolytes and volume status is stable; Pt can be discharged and pt can go to outpatient HD on Sat.  -Hemodialysis Renal Diet and Fluid restriction to 1L/day  -Adjust meds to eGFR and avoid IV Gadolinium contrast,NSAIDs, and phosphate enema.  -Monitor Electrolytes daily.  2. HTN:   -bp is acceptable  -titrate bp meds to keep sbp >110 and < 130  3. Anemia of ESRD:  -start epogen if hb <10  -F/u CBC daily  -transfuse if HB < 7.0.  4. Mineral Bone Disease:  -continue phoslo tid  -monitor phos  5. Sob/fever due to fluid overload with hmp virus  -off iv abx since less likely permacath infection.   -ECHO noted.  -UF during HD.   6. AVF placement:  -Left wrist AVF faint bruit on 05/2/24 by Dr. Shine.    d/w patient.

## 2024-05-03 NOTE — PROGRESS NOTE ADULT - SUBJECTIVE AND OBJECTIVE BOX
PGY-1 Progress Note discussed with attending    PAGER #: [phone]] TILL 5:00 PM  PLEASE CONTACT ON CALL TEAM:  - On Call Team (Please refer to Danie) FROM 5:00 PM - 8:30PM  - Nightfloat Team FROM 8:30 -7:30 AM    INTERVAL HPI/OVERNIGHT EVENTS:     MEDICATIONS  (STANDING):  aspirin enteric coated 81 milliGRAM(s) Oral daily  calcium acetate 667 milliGRAM(s) Oral three times a day with meals  dextrose 5% + sodium chloride 0.45%. 1000 milliLiter(s) (40 mL/Hr) IV Continuous <Continuous>  heparin   Injectable 5000 Unit(s) SubCutaneous every 12 hours  hydrALAZINE 100 milliGRAM(s) Oral every 8 hours  influenza   Vaccine 0.5 milliLiter(s) IntraMuscular once  NIFEdipine XL 30 milliGRAM(s) Oral daily    MEDICATIONS  (PRN):  acetaminophen     Tablet .. 1000 milliGRAM(s) Oral every 6 hours PRN Temp greater or equal to 38.5C (101.3F), Moderate Pain (4 - 6)  albuterol    90 MICROgram(s) HFA Inhaler 2 Puff(s) Inhalation every 6 hours PRN Bronchospasm  ondansetron Injectable 4 milliGRAM(s) IV Push two times a day PRN Nausea and/or Vomiting  polyethylene glycol 3350 17 Gram(s) Oral daily PRN Constipation  senna 2 Tablet(s) Oral at bedtime PRN Constipation  traMADol 50 milliGRAM(s) Oral every 8 hours PRN Severe Pain (7 - 10)      REVIEW OF SYSTEMS:  CONSTITUTIONAL:  No fevers or chills, good appetite, good general state  EYES/ENT:  No visual changes;  No vertigo or throat pain   NECK:  No neck pain or stiffness  RESPIRATORY:  No cough, wheezing, hemoptysis; No shortness of breath  CARDIOVASCULAR:  No chest pain or palpitations  GASTROINTESTINAL:  No abdominal pain. No nausea, vomiting, or hematemesis; No diarrhea or constipation. No melena or hematochezia.  GENITOURINARY:  No dysuria, frequency or hematuria  NEUROLOGICAL:  No HA, no numbness or LE weakness  MSK: no back pain, no joint pain  SKIN:  No itching, no skin rash    Vital Signs Last 24 Hrs  T(C): 36.7 (03 May 2024 05:23), Max: 37 (02 May 2024 13:29)  T(F): 98.1 (03 May 2024 05:23), Max: 98.6 (02 May 2024 13:29)  HR: 71 (03 May 2024 05:23) (71 - 87)  BP: 143/84 (03 May 2024 05:23) (136/90 - 168/88)  BP(mean): 102 (03 May 2024 05:23) (102 - 118)  RR: 17 (03 May 2024 05:23) (13 - 20)  SpO2: 95% (03 May 2024 05:23) (94% - 98%)    Parameters below as of 03 May 2024 05:23  Patient On (Oxygen Delivery Method): room air        PHYSICAL EXAM:  VITALS: Vital Signs Last 24 Hrs  T(C): 36.7 (03 May 2024 05:23), Max: 37 (02 May 2024 13:29)  T(F): 98.1 (03 May 2024 05:23), Max: 98.6 (02 May 2024 13:29)  HR: 71 (03 May 2024 05:23) (71 - 87)  BP: 143/84 (03 May 2024 05:23) (136/90 - 168/88)  BP(mean): 102 (03 May 2024 05:23) (102 - 118)  RR: 17 (03 May 2024 05:23) (13 - 20)  SpO2: 95% (03 May 2024 05:23) (94% - 98%)    Parameters below as of 03 May 2024 05:23  Patient On (Oxygen Delivery Method): room air      Gen: AOx3, NAD, non-toxic, pleasant  HEAD:  Atraumatic, Normocephalic  EYES: PERRLA, conjunctiva and sclera clear  ENT: Moist mucous membranes  NECK: Supple, No JVD  CV: S1+S2 normal, no murmurs   Resp: Clear bilat, no resp distress, no crackles/wheezes  Abd: Soft, nontender, +BS  Ext: No LE edema, no cyanosis, LE pulses present  : Henry (+/-)  IV/Skin: No thrombophlebitis, no skin rash  Msk: No arthralgias, no joint swelling  Neuro: AAOx3. No sensory deficits, no motor deficits                          11.2   3.15  )-----------( 100      ( 03 May 2024 06:25 )             35.4     05-03    136  |  100  |  50<H>  ----------------------------<  89  5.1   |  29  |  8.52<H>    Ca    7.8<L>      03 May 2024 06:25  Phos  4.2     05-03  Mg     2.3     05-03            PT/INR - ( 02 May 2024 08:05 )   PT: 10.6 sec;   INR: 0.93 ratio         PTT - ( 02 May 2024 08:05 )  PTT:31.1 sec    CAPILLARY BLOOD GLUCOSE      RADIOLOGY & ADDITIONAL TESTS:

## 2024-05-03 NOTE — DISCHARGE NOTE NURSING/CASE MANAGEMENT/SOCIAL WORK - PATIENT PORTAL LINK FT
You can access the FollowMyHealth Patient Portal offered by [facility] by registering at the following website: [email or web address]. By joining [facility]’s FollowMyHealth portal, you will also be able to view your health information using other applications (apps) compatible with our system.

## 2024-05-03 NOTE — PROGRESS NOTE ADULT - SUBJECTIVE AND OBJECTIVE BOX
NEPHROLOGY MEDICAL CARE, [facility] - Dr. Marco Calle/ Dr. Nicholas Mckeon/ Dr. Ry Zuniga/ Dr. Roxi Reese    Date of Service: 05-03-24    Patient was seen and examined at bedside.    CC: patient has no sob and NAD    Vital Signs Last 24 Hrs  T(C): 36.7 (03 May 2024 13:08), Max: 37 (02 May 2024 13:29)  T(F): 98 (03 May 2024 13:08), Max: 98.6 (02 May 2024 13:29)  HR: 75 (03 May 2024 13:08) (71 - 87)  BP: 129/75 (03 May 2024 13:08) (129/75 - 168/88)  BP(mean): 102 (03 May 2024 05:23) (102 - 118)  RR: 18 (03 May 2024 13:08) (13 - 20)  SpO2: 96% (03 May 2024 13:08) (94% - 98%)    Parameters below as of 03 May 2024 13:08  Patient On (Oxygen Delivery Method): room air          PHYSICAL EXAM:  General: No acute respiratory distress.  Eyes: conjunctiva and sclera clear  ENMT: Atraumatic, Normocephalic, supple,   Respiratory: Right sided rhonchi >Lt; No rales, wheezing  Cardiovascular: S1S2+; no m/r/g  Gastrointestinal: Soft, Non-tender, Nondistended; Bowel sounds present,   Neuro:  Awake, Alert & Oriented X3  Ext:  No edema, No Cyanosis  Skin: No rashes  Dialysis Access::  Right Chest PERMACATH; Left wrist AVF faint bruit.       MEDICATIONS:  MEDICATIONS  (STANDING):  aspirin enteric coated 81 milliGRAM(s) Oral daily  calcium acetate 667 milliGRAM(s) Oral three times a day with meals  dextrose 5% + sodium chloride 0.45%. 1000 milliLiter(s) (40 mL/Hr) IV Continuous <Continuous>  heparin   Injectable 5000 Unit(s) SubCutaneous every 12 hours  hydrALAZINE 100 milliGRAM(s) Oral every 8 hours  influenza   Vaccine 0.5 milliLiter(s) IntraMuscular once  NIFEdipine XL 30 milliGRAM(s) Oral daily    MEDICATIONS  (PRN):  acetaminophen     Tablet .. 1000 milliGRAM(s) Oral every 6 hours PRN Temp greater or equal to 38.5C (101.3F), Moderate Pain (4 - 6)  albuterol    90 MICROgram(s) HFA Inhaler 2 Puff(s) Inhalation every 6 hours PRN Bronchospasm  ondansetron Injectable 4 milliGRAM(s) IV Push two times a day PRN Nausea and/or Vomiting  polyethylene glycol 3350 17 Gram(s) Oral daily PRN Constipation  senna 2 Tablet(s) Oral at bedtime PRN Constipation  traMADol 50 milliGRAM(s) Oral every 8 hours PRN Severe Pain (7 - 10)          LABS:                        11.2   3.15  )-----------( 100      ( 03 May 2024 06:25 )             35.4     05-03    136  |  100  |  50<H>  ----------------------------<  89  5.1   |  29  |  8.52<H>    Ca    7.8<L>      03 May 2024 06:25  Phos  4.2     05-03  Mg     2.3     05-03      PT/INR - ( 02 May 2024 08:05 )   PT: 10.6 sec;   INR: 0.93 ratio         PTT - ( 02 May 2024 08:05 )  PTT:31.1 sec  Urinalysis Basic - ( 03 May 2024 06:25 )    Color: x / Appearance: x / SG: x / pH: x  Gluc: 89 mg/dL / Ketone: x  / Bili: x / Urobili: x   Blood: x / Protein: x / Nitrite: x   Leuk Esterase: x / RBC: x / WBC x   Sq Epi: x / Non Sq Epi: x / Bacteria: x      Magnesium: 2.3 mg/dL (05-03 @ 06:25)  Phosphorus: 4.2 mg/dL (05-03 @ 06:25)    Urine studies    PTH and Vit D:

## 2024-05-03 NOTE — DISCHARGE NOTE NURSING/CASE MANAGEMENT/SOCIAL WORK - NSDCPEFALRISK_GEN_ALL_CORE
For information on Fall & Injury Prevention, visit: [email or web address] OR  [email or web address] OR  https://www.cdc.gov/steadi/patient.html

## 2024-05-03 NOTE — PROGRESS NOTE ADULT - PROVIDER SPECIALTY LIST ADULT
Nephrology
Nephrology
Pulmonology
Pulmonology
Internal Medicine
Internal Medicine
Nephrology
Surgery
Nephrology
Surgery
Vascular Surgery
Internal Medicine
Nephrology
Internal Medicine

## 2024-05-03 NOTE — PROGRESS NOTE ADULT - SUBJECTIVE AND OBJECTIVE BOX
Time of Visit:  Patient seen and examined.     MEDICATIONS  (STANDING):  aspirin enteric coated 81 milliGRAM(s) Oral daily  calcium acetate 667 milliGRAM(s) Oral three times a day with meals  dextrose 5% + sodium chloride 0.45%. 1000 milliLiter(s) (40 mL/Hr) IV Continuous <Continuous>  heparin   Injectable 5000 Unit(s) SubCutaneous every 12 hours  hydrALAZINE 100 milliGRAM(s) Oral every 8 hours  influenza   Vaccine 0.5 milliLiter(s) IntraMuscular once  NIFEdipine XL 30 milliGRAM(s) Oral daily      MEDICATIONS  (PRN):  acetaminophen     Tablet .. 1000 milliGRAM(s) Oral every 6 hours PRN Temp greater or equal to 38.5C (101.3F), Moderate Pain (4 - 6)  albuterol    90 MICROgram(s) HFA Inhaler 2 Puff(s) Inhalation every 6 hours PRN Bronchospasm  ondansetron Injectable 4 milliGRAM(s) IV Push two times a day PRN Nausea and/or Vomiting  polyethylene glycol 3350 17 Gram(s) Oral daily PRN Constipation  senna 2 Tablet(s) Oral at bedtime PRN Constipation  traMADol 50 milliGRAM(s) Oral every 8 hours PRN Severe Pain (7 - 10)       Medications up to date at time of exam.      PHYSICAL EXAMINATION:  Patient has no new complaints.  GENERAL: The patient  in no apparent distress.     Vital Signs Last 24 Hrs  T(C): 36.7 (03 May 2024 13:08), Max: 36.8 (02 May 2024 20:38)  T(F): 98 (03 May 2024 13:08), Max: 98.2 (02 May 2024 20:38)  HR: 75 (03 May 2024 13:08) (71 - 77)  BP: 129/75 (03 May 2024 13:08) (129/75 - 167/96)  BP(mean): 102 (03 May 2024 05:23) (102 - 118)  RR: 18 (03 May 2024 13:08) (17 - 18)  SpO2: 96% (03 May 2024 13:08) (94% - 96%)    Parameters below as of 03 May 2024 13:08  Patient On (Oxygen Delivery Method): room air       (if applicable)    Chest Tube (if applicable)    HEENT: Head is normocephalic and atraumatic. Extraocular muscles are intact. Mucous membranes are moist.     NECK: Supple, no palpable adenopathy.    LUNGS: Fair bilateral air entry   no wheezing, rales, or rhonchi.    HEART: Regular rate and rhythm without murmur.    ABDOMEN: Soft, nontender, and nondistended.  No hepatosplenomegaly is noted.    : No painful voiding, no pelvic pain    EXTREMITIES: Without any cyanosis, clubbing, rash, lesions or edema.    NEUROLOGIC: Awake, alert, oriented, grossly intact    SKIN: Warm, dry, good turgor.      LABS:                        11.2   3.15  )-----------( 100      ( 03 May 2024 06:25 )             35.4     05-03    136  |  100  |  50<H>  ----------------------------<  89  5.1   |  29  |  8.52<H>    Ca    7.8<L>      03 May 2024 06:25  Phos  4.2     05-03  Mg     2.3     05-03      PT/INR - ( 02 May 2024 08:05 )   PT: 10.6 sec;   INR: 0.93 ratio         PTT - ( 02 May 2024 08:05 )  PTT:31.1 sec  Urinalysis Basic - ( 03 May 2024 06:25 )    Color: x / Appearance: x / SG: x / pH: x  Gluc: 89 mg/dL / Ketone: x  / Bili: x / Urobili: x   Blood: x / Protein: x / Nitrite: x   Leuk Esterase: x / RBC: x / WBC x   Sq Epi: x / Non Sq Epi: x / Bacteria: x                      MICROBIOLOGY: (if applicable)    RADIOLOGY & ADDITIONAL STUDIES:  EKG:   CXR:  ECHO:    IMPRESSION: 53y Female PAST MEDICAL & SURGICAL HISTORY:  HTN (hypertension), benign      HLD (hyperlipidemia)      ESRD on dialysis      Anemia      Enlarged thyroid      H/O thrombocytopenia      H/O  section      H/O tubal ligation      S/P hemodialysis catheter insertion       p/w         Impression: This is a 52 Y/O Female presented to ED with productive cough  with intermittently gets specks of blood in her sputum but none today , fever with Temp 102, sore throat and B/L flank pain since Thursday . Has ESRD on HD 3x a week T--sat  . For pulmonary folllow up due to Acute hypoxic respiratory failure due to Flash pulmonary Edema in ED , post lasix and emergent Dialysis on 24 . Which her O2 saturation is stable room air , hypoxia improved .  Positive swab for RVP/ HMPV . Negative for Influenza , Covid .     Suggestion:  O2 saturation 96% room air. So far saturating good room air.   PRN Albuterol 90 mcg 2 puffs Q 6 Hours .  PRN Guaifenesin Q 6 Hours .   DVT prophylactic. Off Heparin 5,000 Units SQ Q 12 Hours.    Per Vascular for Left arm AV Fistula creation.

## 2024-05-10 NOTE — PROGRESS NOTE ADULT - ASSESSMENT
Problem: Adult Inpatient Plan of Care  Goal: Plan of Care Review  Outcome: Progressing  Flowsheets (Taken 5/10/2024 1555)  Plan of Care Reviewed With: patient  Goal: Patient-Specific Goal (Individualized)  Outcome: Progressing  Flowsheets (Taken 5/10/2024 1555)  Individualized Care Needs: conversation, pillow, recliner  Anxieties, Fears or Concerns: prolia due     Problem: Fatigue  Goal: Improved Activity Tolerance  Outcome: Progressing  Intervention: Promote Improved Energy  Flowsheets (Taken 5/10/2024 1555)  Fatigue Management: paced activity encouraged  Sleep/Rest Enhancement:   natural light exposure provided   relaxation techniques promoted   noise level reduced  Activity Management:   Ambulated -L4   Up in chair - L3     Pt tolerated venofer and prolia inj. NAD noted, VSS. Pt d/c home.    53 F POD1 from LUE AVF:   - Recommend elevation of the LUE to decrease swelling  - Ok for diet   - Patient should follow up with vascular surgery in one week   - Dressing in place can be removed tomorrow   - Rest of care as per primary team  53 F POD1 from LUE AVF:   - Recommend elevation of the LUE to decrease swelling  - Ok for diet   - Patient should follow up with vascular surgery in one week   - Dressing in place can be removed tomorrow   - Recommend bowel regimen given constipation  - Rest of care as per primary team

## 2024-05-27 ENCOUNTER — EMERGENCY (EMERGENCY)
Facility: HOSPITAL | Age: 54
LOS: 1 days | Discharge: HOME | End: 2024-05-27
Attending: STUDENT IN AN ORGANIZED HEALTH CARE EDUCATION/TRAINING PROGRAM
Payer: MEDICAID

## 2024-05-27 VITALS
TEMPERATURE: 99 F | RESPIRATION RATE: 18 BRPM | OXYGEN SATURATION: 99 % | DIASTOLIC BLOOD PRESSURE: 70 MMHG | HEART RATE: 70 BPM | SYSTOLIC BLOOD PRESSURE: 146 MMHG

## 2024-05-27 VITALS
WEIGHT: 149.69 LBS | HEART RATE: 72 BPM | SYSTOLIC BLOOD PRESSURE: 133 MMHG | HEIGHT: 62 IN | OXYGEN SATURATION: 99 % | RESPIRATION RATE: 20 BRPM | TEMPERATURE: 98 F | DIASTOLIC BLOOD PRESSURE: 68 MMHG

## 2024-05-27 DIAGNOSIS — Z99.2 DEPENDENCE ON RENAL DIALYSIS: Chronic | ICD-10-CM

## 2024-05-27 DIAGNOSIS — Z98.891 HISTORY OF UTERINE SCAR FROM PREVIOUS SURGERY: Chronic | ICD-10-CM

## 2024-05-27 DIAGNOSIS — Z98.51 TUBAL LIGATION STATUS: Chronic | ICD-10-CM

## 2024-05-27 LAB
ALBUMIN SERPL ELPH-MCNC: 3.3 G/DL — LOW (ref 3.5–5)
ALP SERPL-CCNC: 78 U/L — SIGNIFICANT CHANGE UP (ref 40–120)
ALT FLD-CCNC: 28 U/L DA — SIGNIFICANT CHANGE UP (ref 10–60)
ANION GAP SERPL CALC-SCNC: 12 MMOL/L — SIGNIFICANT CHANGE UP (ref 5–17)
AST SERPL-CCNC: 19 U/L — SIGNIFICANT CHANGE UP (ref 10–40)
BASOPHILS # BLD AUTO: 0.01 K/UL — SIGNIFICANT CHANGE UP (ref 0–0.2)
BASOPHILS NFR BLD AUTO: 0.2 % — SIGNIFICANT CHANGE UP (ref 0–2)
BILIRUB SERPL-MCNC: 0.5 MG/DL — SIGNIFICANT CHANGE UP (ref 0.2–1.2)
BUN SERPL-MCNC: 82 MG/DL — HIGH (ref 7–18)
CALCIUM SERPL-MCNC: 9 MG/DL — SIGNIFICANT CHANGE UP (ref 8.4–10.5)
CHLORIDE SERPL-SCNC: 101 MMOL/L — SIGNIFICANT CHANGE UP (ref 96–108)
CO2 SERPL-SCNC: 21 MMOL/L — LOW (ref 22–31)
CREAT SERPL-MCNC: 11.8 MG/DL — HIGH (ref 0.5–1.3)
EGFR: 3 ML/MIN/1.73M2 — LOW
EOSINOPHIL # BLD AUTO: 0.21 K/UL — SIGNIFICANT CHANGE UP (ref 0–0.5)
EOSINOPHIL NFR BLD AUTO: 4.5 % — SIGNIFICANT CHANGE UP (ref 0–6)
FLUAV AG NPH QL: SIGNIFICANT CHANGE UP
FLUBV AG NPH QL: SIGNIFICANT CHANGE UP
GLUCOSE SERPL-MCNC: 113 MG/DL — HIGH (ref 70–99)
HCT VFR BLD CALC: 24.6 % — LOW (ref 34.5–45)
HGB BLD-MCNC: 7.8 G/DL — LOW (ref 11.5–15.5)
IMM GRANULOCYTES NFR BLD AUTO: 0.4 % — SIGNIFICANT CHANGE UP (ref 0–0.9)
LYMPHOCYTES # BLD AUTO: 0.6 K/UL — LOW (ref 1–3.3)
LYMPHOCYTES # BLD AUTO: 12.8 % — LOW (ref 13–44)
MCHC RBC-ENTMCNC: 29.9 PG — SIGNIFICANT CHANGE UP (ref 27–34)
MCHC RBC-ENTMCNC: 31.7 GM/DL — LOW (ref 32–36)
MCV RBC AUTO: 94.3 FL — SIGNIFICANT CHANGE UP (ref 80–100)
MONOCYTES # BLD AUTO: 0.41 K/UL — SIGNIFICANT CHANGE UP (ref 0–0.9)
MONOCYTES NFR BLD AUTO: 8.7 % — SIGNIFICANT CHANGE UP (ref 2–14)
NEUTROPHILS # BLD AUTO: 3.44 K/UL — SIGNIFICANT CHANGE UP (ref 1.8–7.4)
NEUTROPHILS NFR BLD AUTO: 73.4 % — SIGNIFICANT CHANGE UP (ref 43–77)
NRBC # BLD: 0 /100 WBCS — SIGNIFICANT CHANGE UP (ref 0–0)
PLATELET # BLD AUTO: 96 K/UL — LOW (ref 150–400)
POTASSIUM SERPL-MCNC: 4.5 MMOL/L — SIGNIFICANT CHANGE UP (ref 3.5–5.3)
POTASSIUM SERPL-SCNC: 4.5 MMOL/L — SIGNIFICANT CHANGE UP (ref 3.5–5.3)
PROT SERPL-MCNC: 6.1 G/DL — SIGNIFICANT CHANGE UP (ref 6–8.3)
RBC # BLD: 2.61 M/UL — LOW (ref 3.8–5.2)
RBC # FLD: 14.1 % — SIGNIFICANT CHANGE UP (ref 10.3–14.5)
SARS-COV-2 RNA SPEC QL NAA+PROBE: SIGNIFICANT CHANGE UP
SODIUM SERPL-SCNC: 134 MMOL/L — LOW (ref 135–145)
WBC # BLD: 4.69 K/UL — SIGNIFICANT CHANGE UP (ref 3.8–10.5)
WBC # FLD AUTO: 4.69 K/UL — SIGNIFICANT CHANGE UP (ref 3.8–10.5)

## 2024-05-27 PROCEDURE — 80053 COMPREHEN METABOLIC PANEL: CPT

## 2024-05-27 PROCEDURE — 85025 COMPLETE CBC W/AUTO DIFF WBC: CPT

## 2024-05-27 PROCEDURE — 36415 COLL VENOUS BLD VENIPUNCTURE: CPT

## 2024-05-27 PROCEDURE — 71046 X-RAY EXAM CHEST 2 VIEWS: CPT

## 2024-05-27 PROCEDURE — 71046 X-RAY EXAM CHEST 2 VIEWS: CPT | Mod: 26

## 2024-05-27 PROCEDURE — 99283 EMERGENCY DEPT VISIT LOW MDM: CPT | Mod: 25

## 2024-05-27 PROCEDURE — 80074 ACUTE HEPATITIS PANEL: CPT

## 2024-05-27 PROCEDURE — 87637 SARSCOV2&INF A&B&RSV AMP PRB: CPT

## 2024-05-27 PROCEDURE — 99284 EMERGENCY DEPT VISIT MOD MDM: CPT

## 2024-05-27 NOTE — ED PROVIDER NOTE - PROGRESS NOTE DETAILS
MD Burks: Hgb 7.8; patient denies bleeding, melena, or symptoms of symptomatic anemia. SCr elevated from baseline, but no indication for emergent dialysis today. Pt was re-evaluated at bedside, VSS, feeling better overall. Results were discussed with patient. States will go to dialysis tomorrow. Discussed return precautions and follow up plan with PCP and/or nephrologist. Time was taken to answer any questions that the patient had before providing them with discharge paperwork.

## 2024-05-27 NOTE — ED PROVIDER NOTE - NSFOLLOWUPINSTRUCTIONS_ED_ALL_ED_FT
Please go for your routine dialysis appointment tomorrow.    Your Hgb was found to be lower than normal, you may require blood transfusion in your dialysis as well. Please discuss with your nephrologist and/or dialysis center.    Return to ED if develop weakness, chest pain, shortness of breath, fevers, or any other new/concerning symptoms.

## 2024-05-27 NOTE — ED PROVIDER NOTE - OBJECTIVE STATEMENT
53-year-old female, past medical history of hypertension, hyperlipidemia, anemia, diastolic heart failure, and ESRD on hemodialysis (M/W/F), presents to ED for medical clearance for dialysis center.  Patient just returned from a 2-week trip to [place] today.  Patient goes to [facility], and was requested to present to ED to receive blood work, hepatitis panel, and CXR to medically clear to return to dialysis. Patient's last dialysis was 05/24.  Patient currently denying any symptoms at this time. 53-year-old female, past medical history of hypertension, hyperlipidemia, anemia, diastolic heart failure, and ESRD on hemodialysis (Tu/Th/Sat), presents to ED for medical clearance for dialysis center.  Patient just returned from a 2-week trip to [place] today.  Patient goes to [facility], and was requested to present to ED to receive blood work, hepatitis panel, and CXR to medically clear to return to dialysis. Patient's last dialysis was 05/25.  Patient currently denying any symptoms at this time.

## 2024-05-27 NOTE — ED ADULT TRIAGE NOTE - CHIEF COMPLAINT QUOTE
PT REPORTS WAS IN [place] X 2 WEEKS AND RETURN TODAY. CAME FOR CLEARANCE TO RETURN TO DIALYSIS CENTER. LAST DIALYSIS WAS 5/24/24

## 2024-05-27 NOTE — ED PROVIDER NOTE - PATIENT PORTAL LINK FT
You can access the FollowMyHealth Patient Portal offered by [facility] by registering at the following website: [email or web address]. By joining [employer]’s FollowMyHealth portal, you will also be able to view your health information using other applications (apps) compatible with our system.

## 2024-05-27 NOTE — ED ADULT NURSE NOTE - OBJECTIVE STATEMENT
pt return from [place] requesting blood work for dialysis, last dialysis 5/24/24. denies pain or sob. Pt noted port to Rt upper chest, dialysis TU/TH/Sat.

## 2024-05-27 NOTE — ED ADULT NURSE REASSESSMENT NOTE - NSFALLUNIVINTERV_ED_ALL_ED
Bed/Stretcher in lowest position, wheels locked, appropriate side rails in place/Call bell, personal items and telephone in reach/Instruct patient to call for assistance before getting out of bed/chair/stretcher/Non-slip footwear applied when patient is off stretcher/[place] to call system/Physically safe environment - no spills, clutter or unnecessary equipment/Purposeful proactive rounding/Room/bathroom lighting operational, light cord in reach

## 2024-05-27 NOTE — ED PROVIDER NOTE - CLINICAL SUMMARY MEDICAL DECISION MAKING FREE TEXT BOX
53-year-old female, past medical history of hypertension, hyperlipidemia, anemia, diastolic heart failure, and ESRD on hemodialysis (M/W/F), presents to ED for medical clearance for dialysis center after returning from 2 week trip to [place]. Dialysis center requesting blood work, hepatitis panel, and CXR. Patient denies any symptoms at this time. Last dialysis was on 05/24. Plan to check basic labs, flu/covid swab, hepatitis panel, and CXR. Dispo pending results and reassessment. 53-year-old female, past medical history of hypertension, hyperlipidemia, anemia, diastolic heart failure, and ESRD on hemodialysis (Tu/Th/Sat), presents to ED for medical clearance for dialysis center after returning from 2 week trip to [place]. Dialysis center requesting blood work, hepatitis panel, and CXR. Patient denies any symptoms at this time. Last dialysis was on 05/25. Plan to check basic labs, flu/covid swab, hepatitis panel, and CXR. Dispo pending results and reassessment.

## 2024-05-27 NOTE — ED PROVIDER NOTE - NSICDXPASTSURGICALHX_GEN_ALL_CORE_FT
Unscheduled
PAST SURGICAL HISTORY:  H/O  section     H/O tubal ligation     S/P hemodialysis catheter insertion

## 2024-05-28 LAB
HAV IGM SER-ACNC: SIGNIFICANT CHANGE UP
HBV CORE IGM SER-ACNC: SIGNIFICANT CHANGE UP
HBV SURFACE AG SER-ACNC: SIGNIFICANT CHANGE UP
HCV AB S/CO SERPL IA: 0.09 S/CO — SIGNIFICANT CHANGE UP (ref 0–0.99)
HCV AB SERPL-IMP: SIGNIFICANT CHANGE UP

## 2024-05-31 ENCOUNTER — APPOINTMENT (OUTPATIENT)
Dept: VASCULAR SURGERY | Facility: CLINIC | Age: 54
End: 2024-05-31
Payer: MEDICAID

## 2024-05-31 VITALS
HEIGHT: 65.35 IN | WEIGHT: 146 LBS | SYSTOLIC BLOOD PRESSURE: 128 MMHG | BODY MASS INDEX: 24.03 KG/M2 | HEART RATE: 66 BPM | DIASTOLIC BLOOD PRESSURE: 72 MMHG

## 2024-05-31 DIAGNOSIS — Z99.2 DEPENDENCE ON RENAL DIALYSIS: ICD-10-CM

## 2024-05-31 DIAGNOSIS — T82.898A OTHER SPECIFIED COMPLICATION OF VASCULAR PROSTHETIC DEVICES, IMPLANTS AND GRAFTS, INITIAL ENCOUNTER: ICD-10-CM

## 2024-05-31 DIAGNOSIS — Z98.890 OTHER SPECIFIED POSTPROCEDURAL STATES: ICD-10-CM

## 2024-05-31 PROCEDURE — 99024 POSTOP FOLLOW-UP VISIT: CPT

## 2024-05-31 PROCEDURE — T1013: CPT

## 2024-05-31 NOTE — DISCUSSION/SUMMARY
[FreeTextEntry1] : Incision is clean dry and intact. Sutures were removed.    Good thrill in fistula.  Patient to be scheduled for maturation.

## 2024-05-31 NOTE — REASON FOR VISIT
[[employer] ] : provided by [employer]   [Time Spent: ____ minutes] : Total time spent using  services: [unfilled] minutes. The patient's primary language is not English thus required  services. [de-identified] : Creation of left radiocephalic AV fistula [de-identified] : Patient status post AV fistula creation.  Patient is currently on hemodialysis via tunneled catheter.  Denies fever or chills. [Interpreters_IDNumber] : [id number] [Interpreters_FullName] : Taylor [TWNoteComboBox1] : [place]

## 2024-06-11 ENCOUNTER — INPATIENT (INPATIENT)
Facility: HOSPITAL | Age: 54
LOS: 2 days | Discharge: HOME | DRG: 293 | End: 2024-06-14
Attending: STUDENT IN AN ORGANIZED HEALTH CARE EDUCATION/TRAINING PROGRAM | Admitting: STUDENT IN AN ORGANIZED HEALTH CARE EDUCATION/TRAINING PROGRAM
Payer: MEDICAID

## 2024-06-11 VITALS
WEIGHT: 149.91 LBS | HEIGHT: 62 IN | RESPIRATION RATE: 27 BRPM | HEART RATE: 79 BPM | TEMPERATURE: 98 F | OXYGEN SATURATION: 97 % | SYSTOLIC BLOOD PRESSURE: 151 MMHG | DIASTOLIC BLOOD PRESSURE: 88 MMHG

## 2024-06-11 DIAGNOSIS — Z98.51 TUBAL LIGATION STATUS: Chronic | ICD-10-CM

## 2024-06-11 DIAGNOSIS — I50.9 HEART FAILURE, UNSPECIFIED: ICD-10-CM

## 2024-06-11 DIAGNOSIS — Z99.2 DEPENDENCE ON RENAL DIALYSIS: Chronic | ICD-10-CM

## 2024-06-11 DIAGNOSIS — Z98.891 HISTORY OF UTERINE SCAR FROM PREVIOUS SURGERY: Chronic | ICD-10-CM

## 2024-06-11 DIAGNOSIS — U07.1 COVID-19: ICD-10-CM

## 2024-06-11 DIAGNOSIS — I10 ESSENTIAL (PRIMARY) HYPERTENSION: ICD-10-CM

## 2024-06-11 DIAGNOSIS — E78.5 HYPERLIPIDEMIA, UNSPECIFIED: ICD-10-CM

## 2024-06-11 DIAGNOSIS — Z29.9 ENCOUNTER FOR PROPHYLACTIC MEASURES, UNSPECIFIED: ICD-10-CM

## 2024-06-11 DIAGNOSIS — D64.9 ANEMIA, UNSPECIFIED: ICD-10-CM

## 2024-06-11 DIAGNOSIS — N18.6 END STAGE RENAL DISEASE: ICD-10-CM

## 2024-06-11 LAB
ALBUMIN SERPL ELPH-MCNC: 2.9 G/DL — LOW (ref 3.5–5)
ALP SERPL-CCNC: 86 U/L — SIGNIFICANT CHANGE UP (ref 40–120)
ALT FLD-CCNC: 32 U/L DA — SIGNIFICANT CHANGE UP (ref 10–60)
ANION GAP SERPL CALC-SCNC: 9 MMOL/L — SIGNIFICANT CHANGE UP (ref 5–17)
APTT BLD: 30 SEC — SIGNIFICANT CHANGE UP (ref 24.5–35.6)
AST SERPL-CCNC: 23 U/L — SIGNIFICANT CHANGE UP (ref 10–40)
BASOPHILS # BLD AUTO: 0.01 K/UL — SIGNIFICANT CHANGE UP (ref 0–0.2)
BASOPHILS NFR BLD AUTO: 0.3 % — SIGNIFICANT CHANGE UP (ref 0–2)
BILIRUB SERPL-MCNC: 0.7 MG/DL — SIGNIFICANT CHANGE UP (ref 0.2–1.2)
BUN SERPL-MCNC: 55 MG/DL — HIGH (ref 7–18)
CALCIUM SERPL-MCNC: 8 MG/DL — LOW (ref 8.4–10.5)
CHLORIDE SERPL-SCNC: 105 MMOL/L — SIGNIFICANT CHANGE UP (ref 96–108)
CO2 SERPL-SCNC: 24 MMOL/L — SIGNIFICANT CHANGE UP (ref 22–31)
CREAT SERPL-MCNC: 10.6 MG/DL — HIGH (ref 0.5–1.3)
EGFR: 4 ML/MIN/1.73M2 — LOW
EOSINOPHIL # BLD AUTO: 0.23 K/UL — SIGNIFICANT CHANGE UP (ref 0–0.5)
EOSINOPHIL NFR BLD AUTO: 6.8 % — HIGH (ref 0–6)
FLUAV AG NPH QL: SIGNIFICANT CHANGE UP
FLUBV AG NPH QL: SIGNIFICANT CHANGE UP
GLUCOSE SERPL-MCNC: 98 MG/DL — SIGNIFICANT CHANGE UP (ref 70–99)
HCT VFR BLD CALC: 22.1 % — LOW (ref 34.5–45)
HGB BLD-MCNC: 7.1 G/DL — LOW (ref 11.5–15.5)
IMM GRANULOCYTES NFR BLD AUTO: 0.6 % — SIGNIFICANT CHANGE UP (ref 0–0.9)
INR BLD: 1.03 RATIO — SIGNIFICANT CHANGE UP (ref 0.85–1.18)
LACTATE SERPL-SCNC: 0.4 MMOL/L — LOW (ref 0.7–2)
LYMPHOCYTES # BLD AUTO: 0.67 K/UL — LOW (ref 1–3.3)
LYMPHOCYTES # BLD AUTO: 19.8 % — SIGNIFICANT CHANGE UP (ref 13–44)
MAGNESIUM SERPL-MCNC: 2 MG/DL — SIGNIFICANT CHANGE UP (ref 1.6–2.6)
MCHC RBC-ENTMCNC: 30.5 PG — SIGNIFICANT CHANGE UP (ref 27–34)
MCHC RBC-ENTMCNC: 32.1 GM/DL — SIGNIFICANT CHANGE UP (ref 32–36)
MCV RBC AUTO: 94.8 FL — SIGNIFICANT CHANGE UP (ref 80–100)
MONOCYTES # BLD AUTO: 0.3 K/UL — SIGNIFICANT CHANGE UP (ref 0–0.9)
MONOCYTES NFR BLD AUTO: 8.8 % — SIGNIFICANT CHANGE UP (ref 2–14)
NEUTROPHILS # BLD AUTO: 2.16 K/UL — SIGNIFICANT CHANGE UP (ref 1.8–7.4)
NEUTROPHILS NFR BLD AUTO: 63.7 % — SIGNIFICANT CHANGE UP (ref 43–77)
NRBC # BLD: 0 /100 WBCS — SIGNIFICANT CHANGE UP (ref 0–0)
NT-PROBNP SERPL-SCNC: HIGH PG/ML (ref 0–125)
PHOSPHATE SERPL-MCNC: 3.6 MG/DL — SIGNIFICANT CHANGE UP (ref 2.5–4.5)
PLATELET # BLD AUTO: 70 K/UL — LOW (ref 150–400)
POTASSIUM SERPL-MCNC: 4.1 MMOL/L — SIGNIFICANT CHANGE UP (ref 3.5–5.3)
POTASSIUM SERPL-SCNC: 4.1 MMOL/L — SIGNIFICANT CHANGE UP (ref 3.5–5.3)
PROT SERPL-MCNC: 5.9 G/DL — LOW (ref 6–8.3)
PROTHROM AB SERPL-ACNC: 11.7 SEC — SIGNIFICANT CHANGE UP (ref 9.5–13)
RBC # BLD: 2.33 M/UL — LOW (ref 3.8–5.2)
RBC # FLD: 15.9 % — HIGH (ref 10.3–14.5)
SARS-COV-2 RNA SPEC QL NAA+PROBE: DETECTED
SODIUM SERPL-SCNC: 138 MMOL/L — SIGNIFICANT CHANGE UP (ref 135–145)
TROPONIN I, HIGH SENSITIVITY RESULT: 23.5 NG/L — SIGNIFICANT CHANGE UP
WBC # BLD: 3.39 K/UL — LOW (ref 3.8–10.5)
WBC # FLD AUTO: 3.39 K/UL — LOW (ref 3.8–10.5)

## 2024-06-11 PROCEDURE — 99285 EMERGENCY DEPT VISIT HI MDM: CPT

## 2024-06-11 PROCEDURE — 71045 X-RAY EXAM CHEST 1 VIEW: CPT | Mod: 26

## 2024-06-11 PROCEDURE — 99223 1ST HOSP IP/OBS HIGH 75: CPT | Mod: GC

## 2024-06-11 RX ORDER — CEFTRIAXONE 500 MG/1
1000 INJECTION, POWDER, FOR SOLUTION INTRAMUSCULAR; INTRAVENOUS ONCE
Refills: 0 | Status: COMPLETED | OUTPATIENT
Start: 2024-06-11 | End: 2024-06-11

## 2024-06-11 RX ORDER — CEFTRIAXONE 500 MG/1
1000 INJECTION, POWDER, FOR SOLUTION INTRAMUSCULAR; INTRAVENOUS EVERY 24 HOURS
Refills: 0 | Status: DISCONTINUED | OUTPATIENT
Start: 2024-06-11 | End: 2024-06-11

## 2024-06-11 RX ORDER — AZITHROMYCIN 500 MG/1
500 TABLET, FILM COATED ORAL EVERY 24 HOURS
Refills: 0 | Status: DISCONTINUED | OUTPATIENT
Start: 2024-06-11 | End: 2024-06-11

## 2024-06-11 RX ORDER — ONDANSETRON 8 MG/1
4 TABLET, FILM COATED ORAL EVERY 8 HOURS
Refills: 0 | Status: DISCONTINUED | OUTPATIENT
Start: 2024-06-11 | End: 2024-06-14

## 2024-06-11 RX ORDER — ATORVASTATIN CALCIUM 80 MG/1
40 TABLET, FILM COATED ORAL AT BEDTIME
Refills: 0 | Status: DISCONTINUED | OUTPATIENT
Start: 2024-06-11 | End: 2024-06-14

## 2024-06-11 RX ORDER — FUROSEMIDE 40 MG
60 TABLET ORAL ONCE
Refills: 0 | Status: COMPLETED | OUTPATIENT
Start: 2024-06-11 | End: 2024-06-11

## 2024-06-11 RX ORDER — ALBUTEROL 90 UG/1
2 AEROSOL, METERED ORAL EVERY 6 HOURS
Refills: 0 | Status: DISCONTINUED | OUTPATIENT
Start: 2024-06-11 | End: 2024-06-14

## 2024-06-11 RX ORDER — ASPIRIN/CALCIUM CARB/MAGNESIUM 324 MG
81 TABLET ORAL DAILY
Refills: 0 | Status: DISCONTINUED | OUTPATIENT
Start: 2024-06-11 | End: 2024-06-14

## 2024-06-11 RX ORDER — SODIUM CHLORIDE 9 MG/ML
100 INJECTION INTRAMUSCULAR; INTRAVENOUS; SUBCUTANEOUS
Refills: 0 | Status: DISCONTINUED | OUTPATIENT
Start: 2024-06-11 | End: 2024-06-12

## 2024-06-11 RX ORDER — ERYTHROPOIETIN 10000 [IU]/ML
10000 INJECTION, SOLUTION INTRAVENOUS; SUBCUTANEOUS
Refills: 0 | Status: DISCONTINUED | OUTPATIENT
Start: 2024-06-11 | End: 2024-06-11

## 2024-06-11 RX ORDER — HEPARIN SODIUM 5000 [USP'U]/ML
5000 INJECTION INTRAVENOUS; SUBCUTANEOUS EVERY 8 HOURS
Refills: 0 | Status: DISCONTINUED | OUTPATIENT
Start: 2024-06-11 | End: 2024-06-14

## 2024-06-11 RX ORDER — AZITHROMYCIN 500 MG/1
500 TABLET, FILM COATED ORAL ONCE
Refills: 0 | Status: COMPLETED | OUTPATIENT
Start: 2024-06-11 | End: 2024-06-11

## 2024-06-11 RX ORDER — ACETAMINOPHEN 500 MG
650 TABLET ORAL EVERY 6 HOURS
Refills: 0 | Status: DISCONTINUED | OUTPATIENT
Start: 2024-06-11 | End: 2024-06-14

## 2024-06-11 RX ORDER — ERYTHROPOIETIN 10000 [IU]/ML
10000 INJECTION, SOLUTION INTRAVENOUS; SUBCUTANEOUS
Refills: 0 | Status: DISCONTINUED | OUTPATIENT
Start: 2024-06-11 | End: 2024-06-14

## 2024-06-11 RX ORDER — LANOLIN ALCOHOL/MO/W.PET/CERES
3 CREAM (GRAM) TOPICAL AT BEDTIME
Refills: 0 | Status: DISCONTINUED | OUTPATIENT
Start: 2024-06-11 | End: 2024-06-14

## 2024-06-11 RX ORDER — FUROSEMIDE 40 MG
40 TABLET ORAL DAILY
Refills: 0 | Status: DISCONTINUED | OUTPATIENT
Start: 2024-06-11 | End: 2024-06-14

## 2024-06-11 RX ADMIN — Medication 650 MILLIGRAM(S): at 22:00

## 2024-06-11 RX ADMIN — CEFTRIAXONE 100 MILLIGRAM(S): 500 INJECTION, POWDER, FOR SOLUTION INTRAMUSCULAR; INTRAVENOUS at 07:43

## 2024-06-11 RX ADMIN — CEFTRIAXONE 1000 MILLIGRAM(S): 500 INJECTION, POWDER, FOR SOLUTION INTRAMUSCULAR; INTRAVENOUS at 08:43

## 2024-06-11 RX ADMIN — HEPARIN SODIUM 5000 UNIT(S): 5000 INJECTION INTRAVENOUS; SUBCUTANEOUS at 21:57

## 2024-06-11 RX ADMIN — Medication 60 MILLIGRAM(S): at 08:07

## 2024-06-11 RX ADMIN — ERYTHROPOIETIN 10000 UNIT(S): 10000 INJECTION, SOLUTION INTRAVENOUS; SUBCUTANEOUS at 18:54

## 2024-06-11 RX ADMIN — Medication 40 MILLIGRAM(S): at 21:57

## 2024-06-11 RX ADMIN — Medication 650 MILLIGRAM(S): at 18:54

## 2024-06-11 RX ADMIN — AZITHROMYCIN 255 MILLIGRAM(S): 500 TABLET, FILM COATED ORAL at 08:00

## 2024-06-11 RX ADMIN — ATORVASTATIN CALCIUM 40 MILLIGRAM(S): 80 TABLET, FILM COATED ORAL at 21:57

## 2024-06-11 NOTE — ED ADULT TRIAGE NOTE - CHIEF COMPLAINT QUOTE
she woke up at 4 am c/o chest pain and shortness of breath , took albuterol and BP medicine   hemodialysis  - TTS

## 2024-06-11 NOTE — ED PROVIDER NOTE - CLINICAL SUMMARY MEDICAL DECISION MAKING FREE TEXT BOX
Chest x-ray demonstrates pulmonary congestion, cardiomegaly, right lower lobe infiltrate.  Patient due for hemodialysis today.  BNP is elevated at 70,867.  discussed with hospitalist patient admitted for cardiopulmonary monitoring, inpatient hemodialysis.  Patient states she still makes urine will provide Lasix 60 mg IV push.  Right lower lobe infiltrate concerning for possible pneumonia, will start ceftriaxone and Zithromax.  Patient noted to have H&H today of 7.1/22.1, previous H&H on May 24, 2024 was 7.8/24.6.  Patient denies any melena or blood per rectum.  I had a detailed discussion with the patient and/or guardian regarding the historical points, exam findings, and any diagnostic results supporting the admit diagnosis.

## 2024-06-11 NOTE — H&P ADULT - PROBLEM SELECTOR PLAN 5
h/o anemia  likely 2/2 anemia of chronic disease  Hb 7.1, no active signs of bleeding  started on procrit by nephro  continue to monitor and trend Hb

## 2024-06-11 NOTE — ED PROVIDER NOTE - CARE PLAN
Principal Discharge DX:	Acute CHF  Secondary Diagnosis:	Pneumonia  Secondary Diagnosis:	ESRD on dialysis   1

## 2024-06-11 NOTE — ED ADULT NURSE NOTE - NSFALLUNIVINTERV_ED_ALL_ED
Bed/Stretcher in lowest position, wheels locked, appropriate side rails in place/Call bell, personal items and telephone in reach/Instruct patient to call for assistance before getting out of bed/chair/stretcher/Non-slip footwear applied when patient is off stretcher/[place] to call system/Physically safe environment - no spills, clutter or unnecessary equipment/Purposeful proactive rounding/Room/bathroom lighting operational, light cord in reach

## 2024-06-11 NOTE — CONSULT NOTE ADULT - SUBJECTIVE AND OBJECTIVE BOX
COLLIN WHITMAN  Patient is a 53y old  Female who presents with a chief complaint of   HPI:  ESRD on HD at [place], admitted for dyspnea and chest pains.  Noted to have CHF.    PAST MEDICAL & SURGICAL HISTORY:  HTN (hypertension), benign      HLD (hyperlipidemia)      ESRD on dialysis      Anemia      Enlarged thyroid      H/O thrombocytopenia      H/O  section      H/O tubal ligation      S/P hemodialysis catheter insertion        MEDICATIONS  (STANDING):  azithromycin  IVPB 500 milliGRAM(s) IV Intermittent every 24 hours  cefTRIAXone   IVPB 1000 milliGRAM(s) IV Intermittent every 24 hours  epoetin rashaun (PROCRIT) Injectable 83219 Unit(s) IV Push <User Schedule>    Allergies    No Known Allergies    Intolerances      FAMILY HISTORY:  FH: HTN (hypertension) (Mother)        REVIEW OF SYSTEMS    General:  No fever, chills or night sweats.    Ophthalmologic: No changes in vision.  	  ENMT: No difficulty swallowing. 	    Respiratory and Thorax: No cough but has dyspnea.  	  Cardiovascular: Pleuritic chest pains, no tiredness or palpitations.	    Gastrointestinal: No dyspepsia, constipation or diarrhea.	    Genitourinary:	 No dysuria, hematuria or frequency.    Musculoskeletal: No joint pains or swelling. No muscle pains.    Neurological:	No weakness or numbness. No seizures.    Hematology/Lymphatics: No heat or cold intolerance.    Endocrine: No polyuria or polydipsia.	      Vital Signs Last 24 Hrs  T(C): 36.7 (2024 07:23), Max: 36.7 (2024 05:23)  T(F): 98 (2024 07:23), Max: 98 (2024 05:23)  HR: 82 (2024 07:40) (74 - 82)  BP: 112/66 (2024 07:23) (112/66 - 151/88)  BP(mean): --  RR: 20 (2024 07:23) (20 - 27)  SpO2: 98% (2024 07:23) (97% - 98%)    Parameters below as of 2024 07:23  Patient On (Oxygen Delivery Method): room air        PHYSICAL EXAMINATION:  Constitutional:  She appears comfortable and not distressed. Not diaphoretic.    Neck:  The thyroid is normal. Trachea is midline.     Breasts: Normal examination.    Respiratory: The lungs are clear to auscultation. No dullness and expansion is normal.    Cardiovascular: S1 and S2 are normal. No mummurs, rubs or gallops are present.    Gastrointestinal: The abdomen is soft. No tenderness is present. No masses are present. Bowel sounds are normal.    Genitourinary: The bladder is not distended. No CVA tenderness is present.    Extremities: No edema is noted. No deformities are present.    Neurological: Cognition is normal. Tone, power and sensation are normal. Gait is steady.    Skin: No leasions are seen  or palpated.    Lymph Nodes: No lymphadenopathy is present.    Psychiatric: Mood is appropriate. No hallucinations or flight of ideas are noted.      Flu With COVID-19 By NOAH (24 @ 07:46)   SARS-CoV-2 Result: Detected: EUA/IVD Pro-Brain Natriuretic Peptide: [id number]: Interpretive guide for NT PRO-BNP                       7.1    3.39  )-----------( 70       ( 2024 06:23 )             22.1     06-11    138  |  105  |  55<H>  ----------------------------<  98  4.1   |  24  |  10.60<H>    Ca    8.0<L>      2024 06:23  Phos  3.6     06-11  Mg     2.0     -11    TPro  5.9<L>  /  Alb  2.9<L>  /  TBili  0.7  /  DBili  x   /  AST  23  /  ALT  32  /  AlkPhos  86  06-11    LIVER FUNCTIONS - ( 2024 06:23 )  Alb: 2.9 g/dL / Pro: 5.9 g/dL / ALK PHOS: 86 U/L / ALT: 32 U/L DA / AST: 23 U/L / GGT: x           < from: TTE W or WO Ultrasound Enhancing Agent (24 @ 12:16) >   1. Left ventricular cavity is moderately dilated. Left ventricular wall thickness is normal. Left ventricular systolic function is normalwith an ejection fraction of 54 % by Peralta's method of disks. There are no regional wall motion abnormalities seen.   2. There is mild (grade 1) left ventricular diastolic dysfunction.   3. Normal right ventricular cavity size, with normal wall thickness, and normal systolic function. Tricuspid annular plane systolic excursion (TAPSE) is 2.8 cm (normal >=1.7 cm).   4. Mild to moderate mitral regurgitation.   5. Normal left and right atrial size.   6. Mild tricuspid regurgitation.   7. Estimatedpulmonary artery systolic pressure is 37 mmHg.   8. Left pleural effusion noted.   9. Mild aortic regurgitation.  10. Mild pulmonic regurgitation.  11. There is increased LV mass and eccentric hypertrophy.  12. Small pericardial effusion.  13. No prior echocardiogram is available for comparison.      < end of copied text >

## 2024-06-11 NOTE — PATIENT PROFILE ADULT - FALL HARM RISK - HARM RISK INTERVENTIONS

## 2024-06-11 NOTE — H&P ADULT - NSHPPHYSICALEXAM_GEN_ALL_CORE
General - NAD, lying in bed, appears comfortable.   Eyes - PERRLA, EOM intact  ENT - Nonicteric sclerae, PERRLA, EOMI. Oropharynx clear. Moist mucous membranes. Conjunctivae appear well perfused.   Neck - No noticeable or palpable swelling, redness or rash around throat or on face  Lymph Nodes - No lymphadenopathy  Cardiovascular - RRR no m/r/g, no JVD, no carotid bruits  Lungs - (+) decreased breath sounds b/l bases.  no use of accessory muscles, no crackles or wheezes.  Skin - No rashes, skin warm and dry, no erythematous areas  Abdomen - Normal bowel sounds, abdomen soft and nontender  Rectal – Rectal exam not performed since no symptoms indicated blood loss.  Extremities - No edema, cyanosis or clubbing  Musculoskeletal - 5/5 strength, normal range of motion, no swollen or erythematous joints.  Neuro– Alert and oriented x 3, CN 2-12 grossly intact. General - NAD, lying in bed, appears comfortable.   Eyes - PERRLA, EOM intact  ENT - Nonicteric sclerae, PERRLA, EOMI. Oropharynx clear. Moist mucous membranes. Conjunctivae appear well perfused.   Neck - No noticeable or palpable swelling, redness or rash around throat or on face  Lymph Nodes - No lymphadenopathy  Cardiovascular - RRR no m/r/g, no JVD, no carotid bruits  Lungs - (+) decreased breath sounds b/l bases.  no use of accessory muscles, no crackles or wheezes.  Skin - (+) RCW permacath.  No rashes, skin warm and dry, no erythematous areas  Abdomen - Normal bowel sounds, abdomen soft and nontender  Rectal – Rectal exam not performed since no symptoms indicated blood loss.  Extremities - (+) LUE fistula thrill palpated, No edema, cyanosis or clubbing  Musculoskeletal - 5/5 strength, normal range of motion, no swollen or erythematous joints.  Neuro– Alert and oriented x 3, CN 2-12 grossly intact.

## 2024-06-11 NOTE — H&P ADULT - PROBLEM SELECTOR PLAN 2
h/o ESRD on HD (T, TH, S), last HD session 6/8  has RCW permacath  has LUE fistula created last month, not mature for HD  Dr. Calle consulted to resume HD  next HD today

## 2024-06-11 NOTE — H&P ADULT - PROBLEM SELECTOR PLAN 3
h/o HTN  /66 on admission  take labetalol and nifedipine 30 in am  and 60 in pm, hydralazine, and lasix  continue lasix 40 qd only with parameters  may resume other BP meds if elevated  monitor BP

## 2024-06-11 NOTE — H&P ADULT - HISTORY OF PRESENT ILLNESS
53F PMH HTN, ESRD (T, TH, S) HLD, anemia, CHF, presenting with SOB and cough. Pt states that she has been having flu like symptoms for the past 4 days with subjective fevers, body aches, and a non productive cough. She reports that 4am this morning, she was having trouble with breathing so she called her son to bring her to the ED. She was complaining of epigastric abdominal pain and nausea, with no episodes of vomiting, and pleuritic chest pain when she coughs.  She denies any diarrhea, constipation, dysuria, numbness/tingling/weakness in extremities. Her last HD session was on 6/8 and she completed her HD session

## 2024-06-11 NOTE — H&P ADULT - ATTENDING COMMENTS
54 yo [place]-speaking F with PMHx of HTN, ESRD on HD on Tuesday/Thursday/Saturday at [facility], presenting with body aches, fever, nausea and vomiting for 4 days. Denies sick contacts. Last HD was 6/8 as scheduled.     On exam, patient is AOx3, NAD, cardiopulmonary exams unremarkable, abdomen soft, NT/ND, extremities without edema.   Labs reviewed, CBC with hgb 7.1, BMP with high BUN and Cr on HD, LFT grossly normal, pro-BNP elevated to 70k. Trop negative. COVID-PCR positive.   EKG reviewed, normal sinus rhythm without acute ST-T changes.   CXR reviewed, bilateral opacities in lower lung fields, suspect fluid overload.     Assessment and plan:   54 yo [place]-speaking F with PMHx of HTN, ESRD on HD on Tuesday/Thursday/Saturday at [facility], presenting with body aches, fever, nausea and vomiting for 4 days. Found to have elevated pro-BNP, opacities on CXR, positive for COVID on PCR.     # COVID infection   # Pulmonary congestion   # ESRD on HD TuThSa   # Normocytic anemia   # HTN     - SpO2 normal on RA, steroids/remdesivir not indicated at this time   - monitor off abx given the signs of bacterial PNA   - Nephrology recs appreciated, to continue HD while inpatient   - to receive ELEN on HD for anemia, can send iron panel for possible concurrent SHARATH   - tylenol PRN for pain and Zofran PRN for nausea   - DVT ppx: heparin SQ

## 2024-06-11 NOTE — H&P ADULT - PROBLEM SELECTOR PLAN 1
presenting with SOB, nonproductive cough, body aches and subjective fevers  COVID (+)   afebrile, VSS on admission  98% on RA  will hold off on decadron or remdesivir  CXR showig b/l pulm congestion  supportive measures only

## 2024-06-11 NOTE — H&P ADULT - NSHPREVIEWOFSYSTEMS_GEN_ALL_CORE
CONSTITUTIONAL: (+) subjective fevers, body aches. No weight loss, or fatigue  RESPIRATORY: (+) nonproductive cough, SOB. No wheezing, chills or hemoptysis;  CARDIOVASCULAR: (+) pleuritic chest pain when she coughs. No chest pain, palpitations, dizziness, or leg swelling  GASTROINTESTINAL: (+) epigastric abdominal pain, nausea, no episode of vomiting.  No vomiting, or hematemesis; No diarrhea or constipation. No melena or hematochezia.  GENITOURINARY: No dysuria or hematuria, urinary frequency  NEUROLOGICAL: No headaches, memory loss, loss of strength, numbness, or tremors  ENDOCRINE: No polyuria, polydipsia, or heat/cold intolerance  MUSKULOSKELETAL: No muscle aches, joint pains  HEME: no easy bruisability, no tender or enlarged lymph nodes  SKIN: No itching, burning, rashes, or lesions .

## 2024-06-11 NOTE — CONSULT NOTE ADULT - ASSESSMENT
ESRD:  She is on HD at [place].  She has a permcath and is on TTS schedule.  - HD today.  - Renal diet.    Pneumonia:  - Increase UF at HD.  - Continue Antibiotics.    Anemia:  - ELEN at HD.    CKD-MBD:  - Resume PO4 binders.  - PO4 levels.

## 2024-06-11 NOTE — ED PROVIDER NOTE - OBJECTIVE STATEMENT
53-year-old female history of hypertension end-stage renal disease (hemodialysis Tuesday/Thursday/Saturday at [facility]).  Patient states last hemodialysis was 6/8/24,    Patient with coughing intermittent fever, chest tightness and shortness of breath for 4 days.

## 2024-06-12 LAB
ANION GAP SERPL CALC-SCNC: 8 MMOL/L — SIGNIFICANT CHANGE UP (ref 5–17)
BUN SERPL-MCNC: 28 MG/DL — HIGH (ref 7–18)
CALCIUM SERPL-MCNC: 8.6 MG/DL — SIGNIFICANT CHANGE UP (ref 8.4–10.5)
CHLORIDE SERPL-SCNC: 101 MMOL/L — SIGNIFICANT CHANGE UP (ref 96–108)
CO2 SERPL-SCNC: 27 MMOL/L — SIGNIFICANT CHANGE UP (ref 22–31)
CREAT SERPL-MCNC: 6.63 MG/DL — HIGH (ref 0.5–1.3)
EGFR: 7 ML/MIN/1.73M2 — LOW
GLUCOSE SERPL-MCNC: 134 MG/DL — HIGH (ref 70–99)
HBV SURFACE AB SER-ACNC: SIGNIFICANT CHANGE UP
HBV SURFACE AG SER-ACNC: SIGNIFICANT CHANGE UP
HCT VFR BLD CALC: 25.3 % — LOW (ref 34.5–45)
HGB BLD-MCNC: 8 G/DL — LOW (ref 11.5–15.5)
MAGNESIUM SERPL-MCNC: 2.1 MG/DL — SIGNIFICANT CHANGE UP (ref 1.6–2.6)
MCHC RBC-ENTMCNC: 30 PG — SIGNIFICANT CHANGE UP (ref 27–34)
MCHC RBC-ENTMCNC: 31.6 GM/DL — LOW (ref 32–36)
MCV RBC AUTO: 94.8 FL — SIGNIFICANT CHANGE UP (ref 80–100)
NRBC # BLD: 0 /100 WBCS — SIGNIFICANT CHANGE UP (ref 0–0)
PHOSPHATE SERPL-MCNC: 3.3 MG/DL — SIGNIFICANT CHANGE UP (ref 2.5–4.5)
PLATELET # BLD AUTO: 89 K/UL — LOW (ref 150–400)
POTASSIUM SERPL-MCNC: 3.8 MMOL/L — SIGNIFICANT CHANGE UP (ref 3.5–5.3)
POTASSIUM SERPL-SCNC: 3.8 MMOL/L — SIGNIFICANT CHANGE UP (ref 3.5–5.3)
PROCALCITONIN SERPL-MCNC: 0.5 NG/ML — HIGH (ref 0.02–0.1)
RBC # BLD: 2.67 M/UL — LOW (ref 3.8–5.2)
RBC # FLD: 15.9 % — HIGH (ref 10.3–14.5)
SODIUM SERPL-SCNC: 136 MMOL/L — SIGNIFICANT CHANGE UP (ref 135–145)
TROPONIN I, HIGH SENSITIVITY RESULT: 16.8 NG/L — SIGNIFICANT CHANGE UP
WBC # BLD: 3.24 K/UL — LOW (ref 3.8–10.5)
WBC # FLD AUTO: 3.24 K/UL — LOW (ref 3.8–10.5)

## 2024-06-12 PROCEDURE — 99232 SBSQ HOSP IP/OBS MODERATE 35: CPT | Mod: GC

## 2024-06-12 RX ORDER — CHLORHEXIDINE GLUCONATE 213 G/1000ML
1 SOLUTION TOPICAL DAILY
Refills: 0 | Status: DISCONTINUED | OUTPATIENT
Start: 2024-06-12 | End: 2024-06-14

## 2024-06-12 RX ORDER — CEFTRIAXONE 500 MG/1
1000 INJECTION, POWDER, FOR SOLUTION INTRAMUSCULAR; INTRAVENOUS EVERY 24 HOURS
Refills: 0 | Status: DISCONTINUED | OUTPATIENT
Start: 2024-06-12 | End: 2024-06-12

## 2024-06-12 RX ORDER — CYCLOBENZAPRINE HYDROCHLORIDE 10 MG/1
5 TABLET, FILM COATED ORAL THREE TIMES A DAY
Refills: 0 | Status: DISCONTINUED | OUTPATIENT
Start: 2024-06-12 | End: 2024-06-14

## 2024-06-12 RX ORDER — NIFEDIPINE 30 MG
90 TABLET, EXTENDED RELEASE 24 HR ORAL DAILY
Refills: 0 | Status: DISCONTINUED | OUTPATIENT
Start: 2024-06-12 | End: 2024-06-14

## 2024-06-12 RX ORDER — LIDOCAINE 4 G/100G
1 CREAM TOPICAL EVERY 24 HOURS
Refills: 0 | Status: DISCONTINUED | OUTPATIENT
Start: 2024-06-12 | End: 2024-06-14

## 2024-06-12 RX ORDER — AZITHROMYCIN 500 MG/1
500 TABLET, FILM COATED ORAL EVERY 24 HOURS
Refills: 0 | Status: DISCONTINUED | OUTPATIENT
Start: 2024-06-12 | End: 2024-06-12

## 2024-06-12 RX ADMIN — HEPARIN SODIUM 5000 UNIT(S): 5000 INJECTION INTRAVENOUS; SUBCUTANEOUS at 15:55

## 2024-06-12 RX ADMIN — HEPARIN SODIUM 5000 UNIT(S): 5000 INJECTION INTRAVENOUS; SUBCUTANEOUS at 05:33

## 2024-06-12 RX ADMIN — CYCLOBENZAPRINE HYDROCHLORIDE 5 MILLIGRAM(S): 10 TABLET, FILM COATED ORAL at 13:55

## 2024-06-12 RX ADMIN — LIDOCAINE 1 PATCH: 4 CREAM TOPICAL at 19:55

## 2024-06-12 RX ADMIN — Medication 81 MILLIGRAM(S): at 12:26

## 2024-06-12 RX ADMIN — LIDOCAINE 1 PATCH: 4 CREAM TOPICAL at 12:27

## 2024-06-12 RX ADMIN — Medication 650 MILLIGRAM(S): at 09:57

## 2024-06-12 RX ADMIN — Medication 650 MILLIGRAM(S): at 08:18

## 2024-06-12 RX ADMIN — ATORVASTATIN CALCIUM 40 MILLIGRAM(S): 80 TABLET, FILM COATED ORAL at 22:34

## 2024-06-12 RX ADMIN — Medication 40 MILLIGRAM(S): at 05:33

## 2024-06-12 RX ADMIN — CHLORHEXIDINE GLUCONATE 1 APPLICATION(S): 213 SOLUTION TOPICAL at 12:28

## 2024-06-12 RX ADMIN — HEPARIN SODIUM 5000 UNIT(S): 5000 INJECTION INTRAVENOUS; SUBCUTANEOUS at 22:34

## 2024-06-12 RX ADMIN — Medication 90 MILLIGRAM(S): at 16:13

## 2024-06-12 NOTE — PROGRESS NOTE ADULT - SUBJECTIVE AND OBJECTIVE BOX
PGY-1 Progress Note discussed with attending    PAGER #: [[phone]] TILL 5:00 PM  PLEASE CONTACT ON CALL TEAM:  - On Call Team (Please refer to Danie) FROM 5:00 PM - 8:30PM  - Nightfloat Team FROM 8:30 -7:30 AM    CC: Patient is a 53y old  Female who presents with a chief complaint of cough, fever, chest tightness (11 Jun 2024 13:26)      OVERNIGHT EVENTS:    SUBJECTIVE / INTERVAL HPI: Patient seen and examined at bedside.     ROS:  CONSTITUTIONAL: No weakness, fevers or chills  EYES/ENT: No visual changes;  No vertigo or throat pain   NECK: No pain or stiffness  RESPIRATORY: No cough, wheezing, hemoptysis; No shortness of breath  CARDIOVASCULAR: No chest pain or palpitations  GASTROINTESTINAL: No abdominal or epigastric pain. No nausea, vomiting, or hematemesis; No diarrhea or constipation. No melena or hematochezia.  GENITOURINARY: No dysuria, frequency or hematuria  NEUROLOGICAL: No numbness or weakness  SKIN: No itching, burning, rashes, or lesions     VITAL SIGNS:  Vital Signs Last 24 Hrs  T(C): 36.7 (12 Jun 2024 05:32), Max: 37.1 (11 Jun 2024 16:39)  T(F): 98.1 (12 Jun 2024 05:32), Max: 98.8 (11 Jun 2024 16:39)  HR: 84 (12 Jun 2024 05:32) (77 - 85)  BP: 157/94 (12 Jun 2024 05:32) (130/86 - 169/69)  BP(mean): --  RR: 18 (12 Jun 2024 05:32) (16 - 18)  SpO2: 94% (12 Jun 2024 05:32) (94% - 99%)    Parameters below as of 12 Jun 2024 05:32  Patient On (Oxygen Delivery Method): room air        PHYSICAL EXAM:    General: WDWN  HEENT: NC/AT; PERRL, anicteric sclera; MMM  Neck: supple  Cardiovascular: +S1/S2; RRR  Respiratory: CTA B/L; no W/R/R  Gastrointestinal: soft, NT/ND; +BSx4  Extremities: WWP; no edema, clubbing or cyanosis  Vascular: 2+ radial, DP/PT pulses B/L  Skin: Warm, dry, good turgor, no rashes, or ecchymoses  Neurological: AAOx3; no focal deficits    MEDICATIONS:  MEDICATIONS  (STANDING):  aspirin enteric coated 81 milliGRAM(s) Oral daily  atorvastatin 40 milliGRAM(s) Oral at bedtime  epoetin rashaun (PROCRIT) Injectable 86851 Unit(s) IV Push <User Schedule>  furosemide    Tablet 40 milliGRAM(s) Oral daily  heparin   Injectable 5000 Unit(s) SubCutaneous every 8 hours    MEDICATIONS  (PRN):  acetaminophen     Tablet .. 650 milliGRAM(s) Oral every 6 hours PRN Temp greater or equal to 38C (100.4F), Mild Pain (1 - 3)  albuterol    90 MICROgram(s) HFA Inhaler 2 Puff(s) Inhalation every 6 hours PRN Shortness of Breath and/or Wheezing  aluminum hydroxide/magnesium hydroxide/simethicone Suspension 30 milliLiter(s) Oral every 4 hours PRN Dyspepsia  melatonin 3 milliGRAM(s) Oral at bedtime PRN Insomnia  ondansetron Injectable 4 milliGRAM(s) IV Push every 8 hours PRN Nausea and/or Vomiting  sodium chloride 0.9% Bolus. 100 milliLiter(s) IV Bolus every 5 minutes PRN SBP LESS THAN or EQUAL to 90 mmHg      ALLERGIES:  Allergies    No Known Allergies    Intolerances        LABS:                        7.1    3.39  )-----------( 70       ( 11 Jun 2024 06:23 )             22.1     06-11    138  |  105  |  55<H>  ----------------------------<  98  4.1   |  24  |  10.60<H>    Ca    8.0<L>      11 Jun 2024 06:23  Phos  3.6     06-11  Mg     2.0     06-11    TPro  5.9<L>  /  Alb  2.9<L>  /  TBili  0.7  /  DBili  x   /  AST  23  /  ALT  32  /  AlkPhos  86  06-11    PT/INR - ( 11 Jun 2024 07:46 )   PT: 11.7 sec;   INR: 1.03 ratio         PTT - ( 11 Jun 2024 07:46 )  PTT:30.0 sec  Urinalysis Basic - ( 11 Jun 2024 06:23 )    Color: x / Appearance: x / SG: x / pH: x  Gluc: 98 mg/dL / Ketone: x  / Bili: x / Urobili: x   Blood: x / Protein: x / Nitrite: x   Leuk Esterase: x / RBC: x / WBC x   Sq Epi: x / Non Sq Epi: x / Bacteria: x      CAPILLARY BLOOD GLUCOSE          RADIOLOGY & ADDITIONAL TESTS: Reviewed. PGY-1 Progress Note discussed with attending    PAGER #: [[phone]] TILL 5:00 PM  PLEASE CONTACT ON CALL TEAM:  - On Call Team (Please refer to Danie) FROM 5:00 PM - 8:30PM  - Nightfloat Team FROM 8:30 -7:30 AM    CC: Patient is a 53y old  Female who presents with a chief complaint of cough, fever, chest tightness (11 Jun 2024 13:26)      OVERNIGHT EVENTS: admit to 5S    SUBJECTIVE / INTERVAL HPI: Patient seen and examined at bedside. Complaining of left shoulder and left rib pain. Says pain is exacerbated by deep breaths. Endorses feeling fatigued quickly when ambulating short distances. Denies issues with balance or near falls. Reports poor appetite. Denies fever, chills, headache, sore throat, cough, shortness of breath, palpitations, N/V/D.       ROS:  CONSTITUTIONAL: +weakness/fatigue, no fevers or chills  EYES/ENT: No visual changes;  No vertigo or throat pain   NECK: No pain or stiffness  RESPIRATORY: No cough, wheezing, hemoptysis; No shortness of breath  CARDIOVASCULAR: +pleuritic chest pain, no chest pressure, no palpitations  GASTROINTESTINAL: No abdominal or epigastric pain. No nausea, vomiting, or hematemesis; No diarrhea or constipation. No melena or hematochezia.  GENITOURINARY: No dysuria, frequency or hematuria  NEUROLOGICAL: No numbness or focal weakness, No disequilibrium  SKIN: No itching, burning, rashes, or lesions     VITAL SIGNS:  Vital Signs Last 24 Hrs  T(C): 36.7 (12 Jun 2024 05:32), Max: 37.1 (11 Jun 2024 16:39)  T(F): 98.1 (12 Jun 2024 05:32), Max: 98.8 (11 Jun 2024 16:39)  HR: 84 (12 Jun 2024 05:32) (77 - 85)  BP: 157/94 (12 Jun 2024 05:32) (130/86 - 169/69)  BP(mean): --  RR: 18 (12 Jun 2024 05:32) (16 - 18)  SpO2: 94% (12 Jun 2024 05:32) (94% - 99%)    Parameters below as of 12 Jun 2024 05:32  Patient On (Oxygen Delivery Method): room air        PHYSICAL EXAM:    General: WDWN, moderate distress due to pain  HEENT: NC/AT; PERRL, anicteric sclera; MMM  Neck: supple  Cardiovascular: +S1/S2; RRR, Left lateral chest painful to palpation at intercostal spaces  Respiratory: bibasilar rales; no wheezing or rhonchi  Gastrointestinal: soft, NT/ND; +BSx4  Extremities: WWP; no edema, clubbing or cyanosis  Vascular: 2+ radial, DP/PT pulses B/L  Skin: Warm, dry, good turgor, no rashes, or ecchymoses  Neurological: AAOx3; no focal deficits    MEDICATIONS:  MEDICATIONS  (STANDING):  aspirin enteric coated 81 milliGRAM(s) Oral daily  atorvastatin 40 milliGRAM(s) Oral at bedtime  epoetin rsahaun (PROCRIT) Injectable 31455 Unit(s) IV Push <User Schedule>  furosemide    Tablet 40 milliGRAM(s) Oral daily  heparin   Injectable 5000 Unit(s) SubCutaneous every 8 hours    MEDICATIONS  (PRN):  acetaminophen     Tablet .. 650 milliGRAM(s) Oral every 6 hours PRN Temp greater or equal to 38C (100.4F), Mild Pain (1 - 3)  albuterol    90 MICROgram(s) HFA Inhaler 2 Puff(s) Inhalation every 6 hours PRN Shortness of Breath and/or Wheezing  aluminum hydroxide/magnesium hydroxide/simethicone Suspension 30 milliLiter(s) Oral every 4 hours PRN Dyspepsia  melatonin 3 milliGRAM(s) Oral at bedtime PRN Insomnia  ondansetron Injectable 4 milliGRAM(s) IV Push every 8 hours PRN Nausea and/or Vomiting  sodium chloride 0.9% Bolus. 100 milliLiter(s) IV Bolus every 5 minutes PRN SBP LESS THAN or EQUAL to 90 mmHg      ALLERGIES:  Allergies    No Known Allergies    Intolerances        LABS:                        7.1    3.39  )-----------( 70       ( 11 Jun 2024 06:23 )             22.1     06-11    138  |  105  |  55<H>  ----------------------------<  98  4.1   |  24  |  10.60<H>    Ca    8.0<L>      11 Jun 2024 06:23  Phos  3.6     06-11  Mg     2.0     06-11    TPro  5.9<L>  /  Alb  2.9<L>  /  TBili  0.7  /  DBili  x   /  AST  23  /  ALT  32  /  AlkPhos  86  06-11    PT/INR - ( 11 Jun 2024 07:46 )   PT: 11.7 sec;   INR: 1.03 ratio         PTT - ( 11 Jun 2024 07:46 )  PTT:30.0 sec  Urinalysis Basic - ( 11 Jun 2024 06:23 )    Color: x / Appearance: x / SG: x / pH: x  Gluc: 98 mg/dL / Ketone: x  / Bili: x / Urobili: x   Blood: x / Protein: x / Nitrite: x   Leuk Esterase: x / RBC: x / WBC x   Sq Epi: x / Non Sq Epi: x / Bacteria: x      CAPILLARY BLOOD GLUCOSE          RADIOLOGY & ADDITIONAL TESTS:   < from: Xray Chest 1 View- PORTABLE-Urgent (06.11.24 @ 06:56) >  COMPARISON: 5/27/2024    Lung apices are partially omitted.    Right dialysis catheter tip overlies the distal right atrium.    AP view of the chest demonstrates interval development of moderate   pulmonary edema and trace bilateral pleural effusions. Questionable   superimposed right infrahilar pneumonia. There is no pneumothorax.    The heart is moderately enlarged. The mediastinum and antonio cannot be   assessed.    Mild thoracic degenerative changes are present.    IMPRESSION:    Interval development of moderate pulmonary edema and trace bilateral   pleural effusions. Possible superimposed right infrahilar infiltrate.   Cardiomegaly.    Dialysis catheter tip overlying the distal right atrium.    < end of copied text >

## 2024-06-12 NOTE — PROGRESS NOTE ADULT - PROBLEM SELECTOR PLAN 3
h/o HTN  /66 on admission  take labetalol and nifedipine 30 in am  and 60 in pm, hydralazine, and lasix  continue lasix 40 qd only with parameters  may resume other BP meds if elevated  monitor BP h/o HTN  -/66 on admission  -takes labetalol and nifedipine 30 in am  and 60 in pm, hydralazine, and lasix  -continue lasix 40 qd only with parameters  -resume other BP meds if elevated  -monitor BP

## 2024-06-12 NOTE — PROGRESS NOTE ADULT - PROBLEM SELECTOR PLAN 6
h/o CHF  TTE in April 2024: EF 54% with G1DD  continue with lasix 40 qd h/o CHF  -TTE in April 2024: EF 54% with G1DD  -continue with lasix 40 qd

## 2024-06-12 NOTE — PROGRESS NOTE ADULT - SUBJECTIVE AND OBJECTIVE BOX
COLLIN WHITMAN  53y  Patient is a 53y old  Female who presents with a chief complaint of cough, fever, chest tightness (2024 08:57)    HPI:  Seen and examined. ESRD on HD, admitted for pneumonia.  HD yesterday.    HEALTH ISSUES - PROBLEM Dx:  2019 novel coronavirus disease (COVID-19)    ESRD on dialysis    HTN (hypertension)    HLD (hyperlipidemia)    Anemia    Congestive heart failure (CHF)    Prophylactic measure          MEDICATIONS  (STANDING):  aspirin enteric coated 81 milliGRAM(s) Oral daily  atorvastatin 40 milliGRAM(s) Oral at bedtime  epoetin rashaun (PROCRIT) Injectable 89362 Unit(s) IV Push <User Schedule>  furosemide    Tablet 40 milliGRAM(s) Oral daily  heparin   Injectable 5000 Unit(s) SubCutaneous every 8 hours  lidocaine   4% Patch 1 Patch Transdermal every 24 hours    MEDICATIONS  (PRN):  acetaminophen     Tablet .. 650 milliGRAM(s) Oral every 6 hours PRN Temp greater or equal to 38C (100.4F), Mild Pain (1 - 3)  albuterol    90 MICROgram(s) HFA Inhaler 2 Puff(s) Inhalation every 6 hours PRN Shortness of Breath and/or Wheezing  aluminum hydroxide/magnesium hydroxide/simethicone Suspension 30 milliLiter(s) Oral every 4 hours PRN Dyspepsia  melatonin 3 milliGRAM(s) Oral at bedtime PRN Insomnia  ondansetron Injectable 4 milliGRAM(s) IV Push every 8 hours PRN Nausea and/or Vomiting    Vital Signs Last 24 Hrs  T(C): 36.7 (2024 05:32), Max: 37.1 (2024 16:39)  T(F): 98.1 (2024 05:32), Max: 98.8 (2024 16:39)  HR: 84 (2024 05:32) (77 - 85)  BP: 157/94 (2024 05:32) (130/86 - 169/69)  BP(mean): --  RR: 18 (2024 05:32) (16 - 18)  SpO2: 94% (2024 05:32) (94% - 99%)    Parameters below as of 2024 05:32  Patient On (Oxygen Delivery Method): room air      Daily     Daily Weight in k.8 (2024 20:42)    PHYSICAL EXAM:  Constitutional: She  appears comfortable and not distressed. Not diaphoretic.    Neck:  The thyroid is normal. Trachea is midline.     Breasts: Normal examination.    Respiratory: The lungs are clear to auscultation. No dullness and expansion is normal.    Cardiovascular: S1 and S2 are normal. No mummurs, rubs or gallops are present.    Gastrointestinal: The abdomen is soft. No tenderness is present. No masses are present. Bowel sounds are normal.    Genitourinary: The bladder is not distended. No CVA tenderness is present.    Extremities: No edema is noted. No deformities are present.    Neurological: Cognition is normal. Tone, power and sensation are normal. Gait is steady.    Skin: No leasions are seen  or palpated.    Lymph Nodes: No lymphadenopathy is present.    Psychiatric: Mood is appropriate. No hallucinations or flight of ideas are noted.                              8.0    3.24  )-----------( 89       ( 2024 08:40 )             25.3     06-12    136  |  101  |  28<H>  ----------------------------<  134<H>  3.8   |  27  |  6.63<H>    Ca    8.6      2024 08:40  Phos  3.3     06-12  Mg     2.1     06-12    TPro  5.9<L>  /  Alb  2.9<L>  /  TBili  0.7  /  DBili  x   /  AST  23  /  ALT  32  /  AlkPhos  86  06-11    < from: Xray Chest 1 View- PORTABLE-Urgent (24 @ 06:56) >  Interval development of moderate pulmonary edema and trace bilateral   pleural effusions. Possible superimposed right infrahilar infiltrate.   Cardiomegaly.    Dialysis catheter tip overlying the distal right atrium.      < end of copied text >

## 2024-06-12 NOTE — PROGRESS NOTE ADULT - PROBLEM SELECTOR PLAN 5
h/o anemia  likely 2/2 anemia of chronic disease  Hb 7.1, no active signs of bleeding  started on procrit by nephro  continue to monitor and trend Hb h/o anemia  -likely 2/2 anemia of chronic disease  -Hb 7.1, no active signs of bleeding  -started on procrit by nephro  -continue to monitor and trend Hb

## 2024-06-12 NOTE — PROGRESS NOTE ADULT - PROBLEM SELECTOR PLAN 2
h/o ESRD on HD (T, TH, S), last HD session 6/8  has RCW permacath  has LUE fistula created last month, not mature for HD  Dr. Calle consulted to resume HD  next HD today h/o ESRD on HD (T, Th, Sat), last HD session prior to admission on 6/8  -has RCW permacath and LUE fistula created last month, not mature for HD  -Dr. Elmo Cantu consulted to resume HD  -s/p HD on 6/11  -c/w HD T, Th, Sat

## 2024-06-12 NOTE — PROGRESS NOTE ADULT - PROBLEM SELECTOR PLAN 4
h/o HLD  takes atorvastatin at home  continue with home meds h/o HLD  -takes atorvastatin at home  -continue with home meds

## 2024-06-12 NOTE — PROGRESS NOTE ADULT - PROBLEM SELECTOR PLAN 1
presenting with SOB, nonproductive cough, body aches and subjective fevers  COVID (+)   afebrile, VSS on admission  98% on RA  will hold off on decadron or remdesivir  CXR showig b/l pulm congestion  supportive measures only presenting with SOB, nonproductive cough, body aches and subjective fevers  -COVID (+)   -afebrile, VSS, no elevation in WBC   -98% on RA  -will hold off on decadron or remdesivir  CXR shows moderate pulm edema and trace b/l pleural effusions. Possible superimposed right infrahilar infiltrate  -supportive measures only for now  -s/p ceftriaxone/azithromycin x1 in the ED  -f/u procalcitonin, urine strep and legionella, and mycoplasma IgM  -holding further abx for now, will consider restarting if procal+, WBC increases, or spikes fever  -start flexeril 5mg q8 PRN for likely costochondritis and msk chest/shoulder pain  -lidocaine patch for shoulder pain

## 2024-06-13 ENCOUNTER — TRANSCRIPTION ENCOUNTER (OUTPATIENT)
Age: 54
End: 2024-06-13

## 2024-06-13 LAB
ANION GAP SERPL CALC-SCNC: 9 MMOL/L — SIGNIFICANT CHANGE UP (ref 5–17)
BASOPHILS # BLD AUTO: 0.02 K/UL — SIGNIFICANT CHANGE UP (ref 0–0.2)
BASOPHILS NFR BLD AUTO: 0.6 % — SIGNIFICANT CHANGE UP (ref 0–2)
BUN SERPL-MCNC: 39 MG/DL — HIGH (ref 7–18)
CALCIUM SERPL-MCNC: 7.9 MG/DL — LOW (ref 8.4–10.5)
CHLORIDE SERPL-SCNC: 100 MMOL/L — SIGNIFICANT CHANGE UP (ref 96–108)
CO2 SERPL-SCNC: 27 MMOL/L — SIGNIFICANT CHANGE UP (ref 22–31)
CREAT SERPL-MCNC: 9.11 MG/DL — HIGH (ref 0.5–1.3)
EGFR: 5 ML/MIN/1.73M2 — LOW
EOSINOPHIL # BLD AUTO: 0.19 K/UL — SIGNIFICANT CHANGE UP (ref 0–0.5)
EOSINOPHIL NFR BLD AUTO: 5.6 % — SIGNIFICANT CHANGE UP (ref 0–6)
GLUCOSE SERPL-MCNC: 110 MG/DL — HIGH (ref 70–99)
HCT VFR BLD CALC: 24.8 % — LOW (ref 34.5–45)
HGB BLD-MCNC: 7.8 G/DL — LOW (ref 11.5–15.5)
IMM GRANULOCYTES NFR BLD AUTO: 0.3 % — SIGNIFICANT CHANGE UP (ref 0–0.9)
LEGIONELLA AG UR QL: NEGATIVE — SIGNIFICANT CHANGE UP
LYMPHOCYTES # BLD AUTO: 0.87 K/UL — LOW (ref 1–3.3)
LYMPHOCYTES # BLD AUTO: 25.5 % — SIGNIFICANT CHANGE UP (ref 13–44)
M PNEUMO IGM SER-ACNC: 0.19 INDEX — SIGNIFICANT CHANGE UP (ref 0–0.9)
MAGNESIUM SERPL-MCNC: 2.2 MG/DL — SIGNIFICANT CHANGE UP (ref 1.6–2.6)
MCHC RBC-ENTMCNC: 29.4 PG — SIGNIFICANT CHANGE UP (ref 27–34)
MCHC RBC-ENTMCNC: 31.5 GM/DL — LOW (ref 32–36)
MCV RBC AUTO: 93.6 FL — SIGNIFICANT CHANGE UP (ref 80–100)
MONOCYTES # BLD AUTO: 0.3 K/UL — SIGNIFICANT CHANGE UP (ref 0–0.9)
MONOCYTES NFR BLD AUTO: 8.8 % — SIGNIFICANT CHANGE UP (ref 2–14)
MYCOPLASMA AG SPEC QL: NEGATIVE — SIGNIFICANT CHANGE UP
NEUTROPHILS # BLD AUTO: 2.02 K/UL — SIGNIFICANT CHANGE UP (ref 1.8–7.4)
NEUTROPHILS NFR BLD AUTO: 59.2 % — SIGNIFICANT CHANGE UP (ref 43–77)
NRBC # BLD: 0 /100 WBCS — SIGNIFICANT CHANGE UP (ref 0–0)
PHOSPHATE SERPL-MCNC: 4.1 MG/DL — SIGNIFICANT CHANGE UP (ref 2.5–4.5)
PLATELET # BLD AUTO: 104 K/UL — LOW (ref 150–400)
POTASSIUM SERPL-MCNC: 4.1 MMOL/L — SIGNIFICANT CHANGE UP (ref 3.5–5.3)
POTASSIUM SERPL-SCNC: 4.1 MMOL/L — SIGNIFICANT CHANGE UP (ref 3.5–5.3)
RBC # BLD: 2.65 M/UL — LOW (ref 3.8–5.2)
RBC # FLD: 15.9 % — HIGH (ref 10.3–14.5)
S PNEUM AG UR QL: NEGATIVE — SIGNIFICANT CHANGE UP
SODIUM SERPL-SCNC: 136 MMOL/L — SIGNIFICANT CHANGE UP (ref 135–145)
WBC # BLD: 3.41 K/UL — LOW (ref 3.8–10.5)
WBC # FLD AUTO: 3.41 K/UL — LOW (ref 3.8–10.5)

## 2024-06-13 PROCEDURE — 99232 SBSQ HOSP IP/OBS MODERATE 35: CPT | Mod: GC

## 2024-06-13 RX ORDER — AZITHROMYCIN 500 MG/1
500 TABLET, FILM COATED ORAL EVERY 24 HOURS
Refills: 0 | Status: DISCONTINUED | OUTPATIENT
Start: 2024-06-13 | End: 2024-06-14

## 2024-06-13 RX ORDER — DEXAMETHASONE 0.5 MG/5ML
6 ELIXIR ORAL DAILY
Refills: 0 | Status: DISCONTINUED | OUTPATIENT
Start: 2024-06-13 | End: 2024-06-14

## 2024-06-13 RX ORDER — CEFTRIAXONE 500 MG/1
1000 INJECTION, POWDER, FOR SOLUTION INTRAMUSCULAR; INTRAVENOUS EVERY 24 HOURS
Refills: 0 | Status: DISCONTINUED | OUTPATIENT
Start: 2024-06-13 | End: 2024-06-14

## 2024-06-13 RX ADMIN — HEPARIN SODIUM 5000 UNIT(S): 5000 INJECTION INTRAVENOUS; SUBCUTANEOUS at 05:48

## 2024-06-13 RX ADMIN — HEPARIN SODIUM 5000 UNIT(S): 5000 INJECTION INTRAVENOUS; SUBCUTANEOUS at 22:58

## 2024-06-13 RX ADMIN — Medication 1200 MILLIGRAM(S): at 18:09

## 2024-06-13 RX ADMIN — Medication 90 MILLIGRAM(S): at 05:48

## 2024-06-13 RX ADMIN — CEFTRIAXONE 100 MILLIGRAM(S): 500 INJECTION, POWDER, FOR SOLUTION INTRAMUSCULAR; INTRAVENOUS at 18:09

## 2024-06-13 RX ADMIN — ATORVASTATIN CALCIUM 40 MILLIGRAM(S): 80 TABLET, FILM COATED ORAL at 22:58

## 2024-06-13 RX ADMIN — CHLORHEXIDINE GLUCONATE 1 APPLICATION(S): 213 SOLUTION TOPICAL at 12:09

## 2024-06-13 RX ADMIN — ERYTHROPOIETIN 10000 UNIT(S): 10000 INJECTION, SOLUTION INTRAVENOUS; SUBCUTANEOUS at 13:53

## 2024-06-13 RX ADMIN — AZITHROMYCIN 255 MILLIGRAM(S): 500 TABLET, FILM COATED ORAL at 15:26

## 2024-06-13 RX ADMIN — Medication 40 MILLIGRAM(S): at 05:55

## 2024-06-13 RX ADMIN — Medication 81 MILLIGRAM(S): at 12:13

## 2024-06-13 RX ADMIN — LIDOCAINE 1 PATCH: 4 CREAM TOPICAL at 00:25

## 2024-06-13 NOTE — PROGRESS NOTE ADULT - PROBLEM SELECTOR PLAN 6
h/o CHF  -TTE in April 2024: EF 54% with G1DD  -continue with lasix 40 qd  -c/w HD w/ ultra filtration T, Th, Sat

## 2024-06-13 NOTE — PROGRESS NOTE ADULT - SUBJECTIVE AND OBJECTIVE BOX
PGY-1 Progress Note discussed with attending    PAGER #: [[phone]] TILL 5:00 PM  PLEASE CONTACT ON CALL TEAM:  - On Call Team (Please refer to Danie) FROM 5:00 PM - 8:30PM  - Nightfloat Team FROM 8:30 -7:30 AM    CC: Patient is a 53y old  Female who presents with a chief complaint of cough, fever, chest tightness (12 Jun 2024 09:59)      OVERNIGHT EVENTS:    SUBJECTIVE / INTERVAL HPI: Patient seen and examined at bedside.     ROS:  CONSTITUTIONAL: +weakness/fatigue, no fevers or chills  EYES/ENT: No visual changes;  No vertigo or throat pain   NECK: No pain or stiffness  RESPIRATORY: No cough, wheezing, hemoptysis; No shortness of breath  CARDIOVASCULAR: +pleuritic chest pain, no chest pressure, no palpitations  GASTROINTESTINAL: No abdominal or epigastric pain. No nausea, vomiting, or hematemesis; No diarrhea or constipation. No melena or hematochezia.  GENITOURINARY: No dysuria, frequency or hematuria  NEUROLOGICAL: No numbness or focal weakness, No disequilibrium  SKIN: No itching, burning, rashes, or lesions       VITAL SIGNS:  Vital Signs Last 24 Hrs  T(C): 37.1 (13 Jun 2024 05:23), Max: 37.1 (13 Jun 2024 00:55)  T(F): 98.8 (13 Jun 2024 05:23), Max: 98.8 (13 Jun 2024 00:55)  HR: 89 (13 Jun 2024 05:23) (81 - 89)  BP: 146/80 (13 Jun 2024 05:23) (121/78 - 162/98)  BP(mean): 93 (12 Jun 2024 21:58) (93 - 93)  RR: 18 (13 Jun 2024 05:23) (16 - 20)  SpO2: 92% (13 Jun 2024 05:23) (92% - 98%)    Parameters below as of 13 Jun 2024 05:23  Patient On (Oxygen Delivery Method): nasal cannula  O2 Flow (L/min): 2      PHYSICAL EXAM:    General: WDWN, moderate distress due to pain  HEENT: NC/AT; PERRL, anicteric sclera; MMM  Neck: supple  Cardiovascular: +S1/S2; RRR, Left lateral chest painful to palpation at intercostal spaces  Respiratory: bibasilar rales; no wheezing or rhonchi  Gastrointestinal: soft, NT/ND; +BSx4  Extremities: WWP; no edema, clubbing or cyanosis  Vascular: 2+ radial, DP/PT pulses B/L  Skin: Warm, dry, good turgor, no rashes, or ecchymoses  Neurological: AAOx3; no focal deficits    MEDICATIONS:  MEDICATIONS  (STANDING):  aspirin enteric coated 81 milliGRAM(s) Oral daily  atorvastatin 40 milliGRAM(s) Oral at bedtime  azithromycin  IVPB 500 milliGRAM(s) IV Intermittent every 24 hours  cefTRIAXone   IVPB 1000 milliGRAM(s) IV Intermittent every 24 hours  chlorhexidine 2% Cloths 1 Application(s) Topical daily  dexAMETHasone  Injectable 6 milliGRAM(s) IV Push daily  epoetin rashaun (PROCRIT) Injectable 38105 Unit(s) IV Push <User Schedule>  furosemide    Tablet 40 milliGRAM(s) Oral daily  heparin   Injectable 5000 Unit(s) SubCutaneous every 8 hours  lidocaine   4% Patch 1 Patch Transdermal every 24 hours  NIFEdipine XL 90 milliGRAM(s) Oral daily    MEDICATIONS  (PRN):  acetaminophen     Tablet .. 650 milliGRAM(s) Oral every 6 hours PRN Temp greater or equal to 38C (100.4F), Mild Pain (1 - 3)  albuterol    90 MICROgram(s) HFA Inhaler 2 Puff(s) Inhalation every 6 hours PRN Shortness of Breath and/or Wheezing  aluminum hydroxide/magnesium hydroxide/simethicone Suspension 30 milliLiter(s) Oral every 4 hours PRN Dyspepsia  cyclobenzaprine 5 milliGRAM(s) Oral three times a day PRN Muscle Spasm  melatonin 3 milliGRAM(s) Oral at bedtime PRN Insomnia  ondansetron Injectable 4 milliGRAM(s) IV Push every 8 hours PRN Nausea and/or Vomiting      ALLERGIES:  Allergies    No Known Allergies    Intolerances        LABS:                        8.0    3.24  )-----------( 89       ( 12 Jun 2024 08:40 )             25.3     06-12    136  |  101  |  28<H>  ----------------------------<  134<H>  3.8   |  27  |  6.63<H>    Ca    8.6      12 Jun 2024 08:40  Phos  3.3     06-12  Mg     2.1     06-12        Urinalysis Basic - ( 12 Jun 2024 08:40 )    Color: x / Appearance: x / SG: x / pH: x  Gluc: 134 mg/dL / Ketone: x  / Bili: x / Urobili: x   Blood: x / Protein: x / Nitrite: x   Leuk Esterase: x / RBC: x / WBC x   Sq Epi: x / Non Sq Epi: x / Bacteria: x      CAPILLARY BLOOD GLUCOSE          RADIOLOGY & ADDITIONAL TESTS:   < from: Xray Chest 1 View- PORTABLE-Urgent (06.11.24 @ 06:56) >  COMPARISON: 5/27/2024    Lung apices are partially omitted.    Right dialysis catheter tip overlies the distal right atrium.    AP view of the chest demonstrates interval development of moderate   pulmonary edema and trace bilateral pleural effusions. Questionable   superimposed right infrahilar pneumonia. There is no pneumothorax.    The heart is moderately enlarged. The mediastinum and antonio cannot be   assessed.    Mild thoracic degenerative changes are present.    IMPRESSION:    Interval development of moderate pulmonary edema and trace bilateral   pleural effusions. Possible superimposed right infrahilar infiltrate.   Cardiomegaly.    Dialysis catheter tip overlying the distal right atrium.    < end of copied text >

## 2024-06-13 NOTE — DISCHARGE NOTE PROVIDER - NSDCHHENCOUNTER_GEN_ALL_CORE
13-Jun-2024 Complex Repair And Bilobe Flap Text: The defect edges were debeveled with a #15 scalpel blade.  The primary defect was closed partially with a complex linear closure.  Given the location of the remaining defect, shape of the defect and the proximity to free margins a bilobe flap was deemed most appropriate for complete closure of the defect.  Using a sterile surgical marker, an appropriate advancement flap was drawn incorporating the defect and placing the expected incisions within the relaxed skin tension lines where possible.    The area thus outlined was incised deep to adipose tissue with a #15 scalpel blade.  The skin margins were undermined to an appropriate distance in all directions utilizing iris scissors.

## 2024-06-13 NOTE — PROGRESS NOTE ADULT - PROBLEM SELECTOR PLAN 1
presenting with SOB, nonproductive cough, body aches and subjective fevers  -COVID (+)   -afebrile, VSS, no elevation in WBC   -98% on RA  -will hold off on decadron or remdesivir  CXR shows moderate pulm edema and trace b/l pleural effusions. Possible superimposed right infrahilar infiltrate  -supportive measures only for now  -s/p ceftriaxone/azithromycin x1 in the ED  - +procalcitonin 0.5, urine strep and legionella negative, f/u mycoplasma IgM  - restarted on ceftriaxone/azithromycin  -c/w flexeril 5mg q8 PRN for likely costochondritis and msk chest/shoulder pain  -lidocaine patch for shoulder pain

## 2024-06-13 NOTE — PROGRESS NOTE ADULT - PROBLEM SELECTOR PLAN 3
h/o HTN  -/66 on admission  -takes labetalol and nifedipine 30 in am  and 60 in pm, hydralazine, and lasix  -continue lasix 40 qd only with parameters  -resume other BP meds if elevated  -monitor BP  -restarted nifedipine 90mg qd for borderline HTN

## 2024-06-13 NOTE — DISCHARGE NOTE PROVIDER - NSDCCPCAREPLAN_GEN_ALL_CORE_FT
PRINCIPAL DISCHARGE DIAGNOSIS  Diagnosis: 2019 novel coronavirus disease (COVID-19)  Assessment and Plan of Treatment:       SECONDARY DISCHARGE DIAGNOSES  Diagnosis: Pneumonia  Assessment and Plan of Treatment:     Diagnosis: ESRD on dialysis  Assessment and Plan of Treatment:     Diagnosis: HTN (hypertension)  Assessment and Plan of Treatment:     Diagnosis: HLD (hyperlipidemia)  Assessment and Plan of Treatment:     Diagnosis: Anemia  Assessment and Plan of Treatment:      PRINCIPAL DISCHARGE DIAGNOSIS  Diagnosis: 2019 novel coronavirus disease (COVID-19)  Assessment and Plan of Treatment: When you presented to the hospital you tested positive for covid. You had no symptoms so you mostly had supportive treatment only. You briefly required supplemental oxygen and were started on IV steroid DECADRON however your oxygen saturation quickly returned to normal and this medication was discontinued after 2 doses.  Please follow latest CDC guidlines. If you test positive for COVID-19, stay home for at least 5 days and isolate from others in your home.  You are likely most infectious during these first 5 days.  Wear a high-quality mask if you must be around others at home and in public.  Do not go places where you are unable to wear a mask. For travel guidance, see [facility]’s Travel webpage.  Do not travel.  Stay home and separate from others as much as possible.  Use a separate bathroom, if possible.  Take steps to improve ventilation at home, if possible.  Don’t share personal household items, like cups, towels, and utensils.  Monitor your symptoms. If you have an emergency warning sign (like trouble breathing), seek emergency medical care immediately.  Learn more about what to do if you have COVID-19.        SECONDARY DISCHARGE DIAGNOSES  Diagnosis: Pneumonia  Assessment and Plan of Treatment: You were diagnosed with community aquired pneumonia superimposed on COVID pneumonia. You were started on IV antibiotics, CEFTRIAXONE and AZITHROMYCIN while in the hospital. Please continue to take CEFPODOXIME 200MG TWICE A DAY FOR FOR DAYS to complete your antibiotic treatment course after discharge. You also complained of severe rib and shoulder pain which was determined to be musculoskelatal in origin. This pain is likely due to chostochondritis which is an inflammation of the chest wall due to near by infection and muscle spasm from exessive coughing. You can continue to take over the counter Tylenol as needed for pain and the  the muscle relaxant  CYCLOBENZAPRINE 5MG AS NEEDED THREE TIMES A DAY for musculoskeletal chest pain. Please continue to take your medications as PRESCRIBED and follow up with your primary care doctor in a week from discharge to adjust medications as needed and discuss further management.      Diagnosis: ESRD on dialysis  Assessment and Plan of Treatment: You have history of ESRD on Hemodialysis Tue, Thu, Sat.   You were dialyzed during your hospital stay and your BMP was closely monitored. You were continued on your home medication LASIX 20MG ONCE A DAY. Please continue following your established dialysis schedule and follow up with your PCP and Nephrologist in a week from discharge.      Diagnosis: HTN (hypertension)  Assessment and Plan of Treatment: You have a history of Hypertension.   On this admission, your Blood Pressure was adequately controlled with LASIX 20MG ONCE A DAY, NIFEDIPINE ER 90MG ONCE A DAY, and LABETALOL 100MG THREE TIMES A DAY.   Your blood pressure target is 120-140/80-90. To care for your blood pressure at home maintain a healthy lifestyle, eat a low salt diet, avoid fatty food, try to lose weight, and exercise regularly or stay active as tolerated 30 mins X 3 times per week.  Notify your doctor if you have any of the following symptoms:   (Dizziness, Lightheadedness, Blurry vision, Headache, Chest pain, Shortness of breath.) Please continue to take your medications as PRESCRIBED and follow up with your primary care doctor in a week from discharge to adjust medications as needed and discuss further management.      Diagnosis: HLD (hyperlipidemia)  Assessment and Plan of Treatment: You have history of Hyperlipidemia. On this admission you were continued on your home statin medication.   Please take your medication as prescribed. Maintain healthy lifestyle, low fat diet, exercise regularly and check your lipid levels routinely.   Please follow up with your PCP in 1 week from discharge.      Diagnosis: Anemia  Assessment and Plan of Treatment: You have history of anemia. Your anemia is likely secondary to renal failure. Your hemoglobin was stable around 8 not requiring any blood transfusions during your admission. You were continued on EPOGEN infusionis during dialysis.  Please follow up with your nephrologist to ensure that you continue to receive this red blood cell boosting infusion with your dialysis sessions. Please  follow up with your primary care doctor and nephrologist in a week from discharge to adjust medications as needed and discuss further management.

## 2024-06-13 NOTE — PROGRESS NOTE ADULT - SUBJECTIVE AND OBJECTIVE BOX
NEPHROLOGY MEDICAL CARE, [facility] - Dr. Marco Calle/ Dr. Nicholas Mckeon/ Dr. Ry Zuniga/ Dr. Roxi Reese    Date of Service: 06-13-24    Patient was seen and examined at bedside.    CC: patient is okay and no sob  not on oxygen    Vital Signs Last 24 Hrs  T(C): 36.7 (13 Jun 2024 13:00), Max: 37.1 (13 Jun 2024 00:55)  T(F): 98.1 (13 Jun 2024 13:00), Max: 98.8 (13 Jun 2024 00:55)  HR: 81 (13 Jun 2024 13:00) (81 - 89)  BP: 132/75 (13 Jun 2024 13:00) (121/78 - 162/98)  BP(mean): 93 (12 Jun 2024 21:58) (93 - 93)  RR: 18 (13 Jun 2024 11:01) (16 - 20)  SpO2: 97% (13 Jun 2024 11:01) (92% - 98%)    Parameters below as of 13 Jun 2024 11:01  Patient On (Oxygen Delivery Method): room air          PHYSICAL EXAM:  General: No acute respiratory distress.  Eyes: conjunctiva and sclera clear  ENMT: Atraumatic, Normocephalic, supple,   Respiratory: b/l poor air entry; No rales, wheezing  Cardiovascular: S1S2+; no m/r/g  Gastrointestinal: Soft, Non-tender, Nondistended; Bowel sounds present,   Neuro:  Awake, Alert & Oriented X3  Ext:  No edema, No Cyanosis  Skin: No rashes  Dialysis Access::  Right Chest PERMACATH; Left wrist AVF bruit      MEDICATIONS:  MEDICATIONS  (STANDING):  aspirin enteric coated 81 milliGRAM(s) Oral daily  atorvastatin 40 milliGRAM(s) Oral at bedtime  azithromycin  IVPB 500 milliGRAM(s) IV Intermittent every 24 hours  cefTRIAXone   IVPB 1000 milliGRAM(s) IV Intermittent every 24 hours  chlorhexidine 2% Cloths 1 Application(s) Topical daily  dexAMETHasone  Injectable 6 milliGRAM(s) IV Push daily  epoetin rashaun (PROCRIT) Injectable 14134 Unit(s) IV Push <User Schedule>  furosemide    Tablet 40 milliGRAM(s) Oral daily  guaiFENesin ER 1200 milliGRAM(s) Oral every 12 hours  heparin   Injectable 5000 Unit(s) SubCutaneous every 8 hours  lidocaine   4% Patch 1 Patch Transdermal every 24 hours  NIFEdipine XL 90 milliGRAM(s) Oral daily    MEDICATIONS  (PRN):  acetaminophen     Tablet .. 650 milliGRAM(s) Oral every 6 hours PRN Temp greater or equal to 38C (100.4F), Mild Pain (1 - 3)  albuterol    90 MICROgram(s) HFA Inhaler 2 Puff(s) Inhalation every 6 hours PRN Shortness of Breath and/or Wheezing  aluminum hydroxide/magnesium hydroxide/simethicone Suspension 30 milliLiter(s) Oral every 4 hours PRN Dyspepsia  cyclobenzaprine 5 milliGRAM(s) Oral three times a day PRN Muscle Spasm  melatonin 3 milliGRAM(s) Oral at bedtime PRN Insomnia  ondansetron Injectable 4 milliGRAM(s) IV Push every 8 hours PRN Nausea and/or Vomiting          LABS:                        8.0    3.24  )-----------( 89       ( 12 Jun 2024 08:40 )             25.3     06-12    136  |  101  |  28<H>  ----------------------------<  134<H>  3.8   |  27  |  6.63<H>    Ca    8.6      12 Jun 2024 08:40  Phos  3.3     06-12  Mg     2.1     06-12        Urinalysis Basic - ( 12 Jun 2024 08:40 )    Color: x / Appearance: x / SG: x / pH: x  Gluc: 134 mg/dL / Ketone: x  / Bili: x / Urobili: x   Blood: x / Protein: x / Nitrite: x   Leuk Esterase: x / RBC: x / WBC x   Sq Epi: x / Non Sq Epi: x / Bacteria: x        Urine studies    PTH and Vit D:

## 2024-06-13 NOTE — PROGRESS NOTE ADULT - PROBLEM SELECTOR PLAN 5
h/o anemia  -likely 2/2 anemia of chronic disease  -Hb 7.1, no active signs of bleeding  -started on procrit by nephro  -continue to monitor and trend Hb

## 2024-06-13 NOTE — DISCHARGE NOTE PROVIDER - HOSPITAL COURSE
Patient is a 53F with PMH of HTN, ESRD (T, TH, S) HLD, anemia, CHF, presenting with SOB and cough. Pt stated that she had been having flu like symptoms for the past 4 days with subjective fevers, body aches, and a non productive cough. She reported that 4am on the morning of admission, she was having trouble with breathing so she called her son to bring her to the ED. She complained of epigastric abdominal pain and nausea, with no episodes of vomiting, and pleuritic chest pain when she coughs.  She denied any diarrhea, constipation, dysuria, numbness/tingling/weakness in extremities. Her last HD session was on 6/8 and she completed her HD session. In the ED: PCR positive COVID-19, afebrile with stable vital signs, normoxia on rooma air, and normal WBC count. s/p 1 dose ceftriaxone and azithromycin in the ED. Admitted to medicine for COVID pneumonia. Isolated per hospital protocol. CXR showed moderate pulm edema and trace b/l pleural effusions. Possible superimposed right infrahilar infiltrate. Underwent dialysis on 6/11. Patient remained normoxic on 6/12 only complaining of severe pleuritic chest pain and shoulder pain, likely chostochondritis/chest wall muscle spasm. EKG showed no ischemic changes and troponin was WNL x 2. Started on cyclobenzaprine 5mg q8 PRN. Patient remained afebrile with normal WBC count and anti-biotics were not restarted. Overnight into 6/13 patient required supplemental oxygen 2L nasal canula for hypoxia. Procalcitonin came back elevated at 0.5. Given possible R sided infiltrate on X ray, elevated procal, and mild hypoxia patient was restarted on ceftriaxone/azithromycin for likely superimposed bacterial community acquired pneumonia. Also started on decadron 6mg IV qd in s/o hypoxia possibly 2/2 worsening covid-19 infection. Able to be weaned off oxygen the morning of 6/13. Oxygen saturation on room air during ambulation was tested and showed ________. Of home anti-hypertensive medications only home lasix was continued on admission in s/o borderline blood pressures. Blood pressure increased to SBP in 150s on 6/12 and Nifedipine 90mg daily was restarted with adequate control. Continued on home atorvastatin on admission.        Patient is a 53F with PMH of HTN, ESRD (T, TH, S) HLD, anemia, CHF, presenting with SOB and cough. Pt stated that she had been having flu like symptoms for the past 4 days with subjective fevers, body aches, and a non productive cough. She reported that 4am on the morning of admission, she was having trouble with breathing so she called her son to bring her to the ED. She complained of epigastric abdominal pain and nausea, with no episodes of vomiting, and pleuritic chest pain when she coughs.  She denied any diarrhea, constipation, dysuria, numbness/tingling/weakness in extremities. Her last HD session was on 6/8 and she completed her HD session. In the ED: PCR positive COVID-19, afebrile with stable vital signs, normoxia on rooma air, and normal WBC count. s/p 1 dose ceftriaxone and azithromycin in the ED. Admitted to medicine for COVID pneumonia. Isolated per hospital protocol. CXR showed moderate pulm edema and trace b/l pleural effusions. Possible superimposed right infrahilar infiltrate. Underwent dialysis on 6/11. Patient remained normoxic on 6/12 only complaining of severe pleuritic chest pain and shoulder pain, likely chostochondritis/chest wall muscle spasm. EKG showed no ischemic changes and troponin was WNL x 2. Started on cyclobenzaprine 5mg q8 PRN. Patient remained afebrile with normal WBC count and anti-biotics were not restarted. Overnight into 6/13 patient required supplemental oxygen 2L nasal canula for hypoxia. Procalcitonin came back elevated at 0.5. Given possible R sided infiltrate on X ray, elevated procal, and mild hypoxia patient was restarted on ceftriaxone/azithromycin for likely superimposed bacterial community acquired pneumonia. Also started on decadron 6mg IV qd in s/o hypoxia possibly 2/2 worsening covid-19 infection. Able to be weaned off oxygen the morning of 6/13 and decadron was discontinued. Oxygen saturation on room air during ambulation was tested and showed no changes in spO2 compared to resting spO2. Of home anti-hypertensive medications only home lasix was continued on admission in s/o borderline blood pressures. Blood pressure increased to SBP in 150s on 6/12 and Nifedipine 90mg daily was restarted as well as labetalol 100mg TID with adequate control. Continued on home atorvastatin on admission. Patient is stable for discharge and is advised to follow up with PCP as outpatient.  Please refer to patient's complete medical chart with documents for a full hospital course, for this is only a brief summary.          53F with PMHx of HTN, ESRD (T, TH, S) HLD, anemia, CHF, presenting with SOB and cough. Pt stated that she had been having flu like symptoms for 4 days with subjective fevers, body aches, and a non productive cough. She also reported of epigastric abdominal pain and nausea, with no episodes of vomiting, and pleuritic chest pain when she coughs. In the ED, she was found to be positive for PCR positive COVID-19, however afebrile with stable vital signs, normoxia on room air, and normal WBC count. CXR with lung infiltrate and pleural effusion. Isolated per hospital protocol. Additional workup showed elevated procalcitonin, thus she was also started on IV abx for potential superimposed bacterial infection.  She was resumed on HD with T/Th/Sa schedule.  SpO2 remained normal on RA both at rest and on ambulation. Of home anti-hypertensive medications only home lasix was continued on admission in s/o borderline blood pressures. Blood pressure increased to SBP in 150s on 6/12 and Nifedipine 90mg daily was restarted as well as labetalol 100mg TID with adequate control. Continued on home atorvastatin on admission. Patient is stable for discharge and is advised to follow up with PCP as outpatient. To continue PO abx at home to complete 5-day course.

## 2024-06-13 NOTE — DISCHARGE NOTE PROVIDER - NSDCFUSCHEDAPPT_GEN_ALL_CORE_FT
Krish Shine  [facility] Physician [facility]  ENDOVASCULAR [address]   Scheduled Appointment: 06/26/2024

## 2024-06-13 NOTE — PROGRESS NOTE ADULT - ATTENDING COMMENTS
Family at bedside providing Bur[place]ian interpretation. patient states she has pain around shoulders and epigastric pain on deep inhalation, but denies fever, chills, SOB.    On exam, patient is AOx3, NAD, cardiopulmonary exams unremarkable, abdomen soft, NT/ND, extremities without edema. Has tenderness in the medial side of scapulas.    Labs reviewed, CBC with hgb 8.0, plt 89, BMP with high Cr on HD.    Assessment and plan:   52 yo [place]ian-speaking F with PMHx of HTN, ESRD on HD on Tuesday/Thursday/Saturday at [facility], presenting with body aches, fever, nausea and vomiting for 4 days. Found to have elevated pro-BNP, opacities on CXR, positive for COVID on PCR.     # COVID infection   # Pulmonary congestion   # ESRD on HD TuThSa   # Normocytic anemia   # HTN     - SpO2 normal on RA, steroids/remdesivir not indicated at this time   - monitor off abx given the signs of bacterial PNA, f/u procalcitonin  - Nephrology recs appreciated, to continue HD while inpatient   - continue ELEN on HD for anemia  - tylenol PRN for pain and Zofran PRN for nausea   - PT evaluation  - DVT ppx: heparin SQ .
 #[id number]    Patient seen at bedside, states her breathing is somewhat better, now able to walk to the bathroom. Has sputum coming up, asks for a medication.    SpO2 checked at bedside, SpO2 98% on 2L, tried off O2 and SpO2 stable with 96% on RA.  On exam, patient is AOx3, NAD, cardiopulmonary exams unremarkable, abdomen soft, NT/ND, extremities without edema.   No labs available for review today.     Assessment and plan:   54 yo Vietnamese-speaking F with PMHx of HTN, ESRD on HD on Tuesday/Thursday/Saturday at [facility], presenting with body aches, fever, nausea and vomiting for 4 days. Found to have elevated pro-BNP, opacities on CXR, positive for COVID on PCR.     # COVID infection   # Pulmonary congestion   # ESRD on HD TuThSa   # Normocytic anemia   # HTN     - weaned off O2 started overnight, SpO2 normal  - procal found to be elevated, started on abx for potential superimposed bacterial infection, anticipaitng 5-day course  - Nephrology recs appreciated, to continue HD while inpatient   - continue ELEN on HD for anemia  - tylenol PRN for pain and Zofran PRN for nausea   - Mucinex added for sputum production  - DVT ppx: heparin SQ .

## 2024-06-13 NOTE — PROGRESS NOTE ADULT - PROBLEM SELECTOR PLAN 2
h/o ESRD on HD (T, Th, Sat), last HD session prior to admission on 6/8  -has RCW permacath and LUE fistula created last month, not mature for HD  -Dr. Elmo Cantu consulted to resume HD  -s/p HD on 6/11  -c/w HD T, Th, Sat

## 2024-06-13 NOTE — DISCHARGE NOTE PROVIDER - NSDCMRMEDTOKEN_GEN_ALL_CORE_FT
Albuterol (Eqv-ProAir HFA) 90 mcg/inh inhalation aerosol: 2 puff(s) inhaled every 4 hours  aspirin 81 mg oral delayed release tablet: 1 tab(s) orally once a day  hydrALAZINE 100 mg oral tablet: 1 tab(s) orally every 8 hours  labetalol 300 mg oral tablet: 1 tab(s) orally 3 times a day  Lasix 40 mg oral tablet: 1 tab(s) orally once a day  Lipitor 40 mg oral tablet: 1 tab(s) orally once a day  NIFEdipine 30 mg oral tablet, extended release: 1 tab(s) orally once a day (in the morning)  NIFEdipine 90 mg oral tablet, extended release: 1 tab(s) orally once a day (at bedtime)  polyethylene glycol 3350 oral powder for reconstitution: 17 gram(s) orally every other day  senna leaf extract oral tablet: 2 tab(s) orally once a day (at bedtime) as needed for Constipation   Albuterol (Eqv-ProAir HFA) 90 mcg/inh inhalation aerosol: 2 puff(s) inhaled every 4 hours  aspirin 81 mg oral delayed release tablet: 1 tab(s) orally once a day  cefpodoxime 200 mg oral tablet: 1 tab(s) orally 2 times a day  cyclobenzaprine 5 mg oral tablet: 1 tab(s) orally 3 times a day as needed for Muscle Spasm  furosemide 40 mg oral tablet: 1 tab(s) orally once a day  labetalol 100 mg oral tablet: 1 tab(s) orally every 8 hours  Lipitor 40 mg oral tablet: 1 tab(s) orally once a day (at bedtime)  Procardia XL 90 mg oral tablet, extended release: 1 tab(s) orally once a day

## 2024-06-14 ENCOUNTER — TRANSCRIPTION ENCOUNTER (OUTPATIENT)
Age: 54
End: 2024-06-14

## 2024-06-14 VITALS
HEART RATE: 94 BPM | OXYGEN SATURATION: 96 % | SYSTOLIC BLOOD PRESSURE: 134 MMHG | DIASTOLIC BLOOD PRESSURE: 80 MMHG | RESPIRATION RATE: 18 BRPM | TEMPERATURE: 99 F

## 2024-06-14 LAB
ANION GAP SERPL CALC-SCNC: 8 MMOL/L — SIGNIFICANT CHANGE UP (ref 5–17)
BASOPHILS # BLD AUTO: 0.02 K/UL — SIGNIFICANT CHANGE UP (ref 0–0.2)
BASOPHILS NFR BLD AUTO: 0.5 % — SIGNIFICANT CHANGE UP (ref 0–2)
BUN SERPL-MCNC: 28 MG/DL — HIGH (ref 7–18)
CALCIUM SERPL-MCNC: 8.2 MG/DL — LOW (ref 8.4–10.5)
CHLORIDE SERPL-SCNC: 100 MMOL/L — SIGNIFICANT CHANGE UP (ref 96–108)
CO2 SERPL-SCNC: 29 MMOL/L — SIGNIFICANT CHANGE UP (ref 22–31)
CREAT SERPL-MCNC: 7.04 MG/DL — HIGH (ref 0.5–1.3)
EGFR: 6 ML/MIN/1.73M2 — LOW
EOSINOPHIL # BLD AUTO: 0.18 K/UL — SIGNIFICANT CHANGE UP (ref 0–0.5)
EOSINOPHIL NFR BLD AUTO: 4.4 % — SIGNIFICANT CHANGE UP (ref 0–6)
GLUCOSE SERPL-MCNC: 86 MG/DL — SIGNIFICANT CHANGE UP (ref 70–99)
HCT VFR BLD CALC: 24.8 % — LOW (ref 34.5–45)
HGB BLD-MCNC: 8 G/DL — LOW (ref 11.5–15.5)
IMM GRANULOCYTES NFR BLD AUTO: 1 % — HIGH (ref 0–0.9)
LYMPHOCYTES # BLD AUTO: 1.01 K/UL — SIGNIFICANT CHANGE UP (ref 1–3.3)
LYMPHOCYTES # BLD AUTO: 24.9 % — SIGNIFICANT CHANGE UP (ref 13–44)
MAGNESIUM SERPL-MCNC: 2.2 MG/DL — SIGNIFICANT CHANGE UP (ref 1.6–2.6)
MCHC RBC-ENTMCNC: 30.1 PG — SIGNIFICANT CHANGE UP (ref 27–34)
MCHC RBC-ENTMCNC: 32.3 GM/DL — SIGNIFICANT CHANGE UP (ref 32–36)
MCV RBC AUTO: 93.2 FL — SIGNIFICANT CHANGE UP (ref 80–100)
MONOCYTES # BLD AUTO: 0.37 K/UL — SIGNIFICANT CHANGE UP (ref 0–0.9)
MONOCYTES NFR BLD AUTO: 9.1 % — SIGNIFICANT CHANGE UP (ref 2–14)
NEUTROPHILS # BLD AUTO: 2.43 K/UL — SIGNIFICANT CHANGE UP (ref 1.8–7.4)
NEUTROPHILS NFR BLD AUTO: 60.1 % — SIGNIFICANT CHANGE UP (ref 43–77)
NRBC # BLD: 0 /100 WBCS — SIGNIFICANT CHANGE UP (ref 0–0)
PHOSPHATE SERPL-MCNC: 3.2 MG/DL — SIGNIFICANT CHANGE UP (ref 2.5–4.5)
PLATELET # BLD AUTO: 114 K/UL — LOW (ref 150–400)
POTASSIUM SERPL-MCNC: 3.7 MMOL/L — SIGNIFICANT CHANGE UP (ref 3.5–5.3)
POTASSIUM SERPL-SCNC: 3.7 MMOL/L — SIGNIFICANT CHANGE UP (ref 3.5–5.3)
RBC # BLD: 2.66 M/UL — LOW (ref 3.8–5.2)
RBC # FLD: 15.6 % — HIGH (ref 10.3–14.5)
SODIUM SERPL-SCNC: 137 MMOL/L — SIGNIFICANT CHANGE UP (ref 135–145)
WBC # BLD: 4.05 K/UL — SIGNIFICANT CHANGE UP (ref 3.8–10.5)
WBC # FLD AUTO: 4.05 K/UL — SIGNIFICANT CHANGE UP (ref 3.8–10.5)

## 2024-06-14 PROCEDURE — 80048 BASIC METABOLIC PNL TOTAL CA: CPT

## 2024-06-14 PROCEDURE — 87899 AGENT NOS ASSAY W/OPTIC: CPT

## 2024-06-14 PROCEDURE — 99239 HOSP IP/OBS DSCHRG MGMT >30: CPT | Mod: GC

## 2024-06-14 PROCEDURE — 83880 ASSAY OF NATRIURETIC PEPTIDE: CPT

## 2024-06-14 PROCEDURE — 84484 ASSAY OF TROPONIN QUANT: CPT

## 2024-06-14 PROCEDURE — 85027 COMPLETE CBC AUTOMATED: CPT

## 2024-06-14 PROCEDURE — 93005 ELECTROCARDIOGRAM TRACING: CPT

## 2024-06-14 PROCEDURE — 96374 THER/PROPH/DIAG INJ IV PUSH: CPT

## 2024-06-14 PROCEDURE — 84100 ASSAY OF PHOSPHORUS: CPT

## 2024-06-14 PROCEDURE — 85610 PROTHROMBIN TIME: CPT

## 2024-06-14 PROCEDURE — 99285 EMERGENCY DEPT VISIT HI MDM: CPT

## 2024-06-14 PROCEDURE — 36415 COLL VENOUS BLD VENIPUNCTURE: CPT

## 2024-06-14 PROCEDURE — 85730 THROMBOPLASTIN TIME PARTIAL: CPT

## 2024-06-14 PROCEDURE — 87340 HEPATITIS B SURFACE AG IA: CPT

## 2024-06-14 PROCEDURE — 86738 MYCOPLASMA ANTIBODY: CPT

## 2024-06-14 PROCEDURE — 86706 HEP B SURFACE ANTIBODY: CPT

## 2024-06-14 PROCEDURE — 86901 BLOOD TYPING SEROLOGIC RH(D): CPT

## 2024-06-14 PROCEDURE — 83605 ASSAY OF LACTIC ACID: CPT

## 2024-06-14 PROCEDURE — 80053 COMPREHEN METABOLIC PANEL: CPT

## 2024-06-14 PROCEDURE — 86850 RBC ANTIBODY SCREEN: CPT

## 2024-06-14 PROCEDURE — 84145 PROCALCITONIN (PCT): CPT

## 2024-06-14 PROCEDURE — 85025 COMPLETE CBC W/AUTO DIFF WBC: CPT

## 2024-06-14 PROCEDURE — 86900 BLOOD TYPING SEROLOGIC ABO: CPT

## 2024-06-14 PROCEDURE — 71045 X-RAY EXAM CHEST 1 VIEW: CPT

## 2024-06-14 PROCEDURE — 87637 SARSCOV2&INF A&B&RSV AMP PRB: CPT

## 2024-06-14 PROCEDURE — 83735 ASSAY OF MAGNESIUM: CPT

## 2024-06-14 PROCEDURE — 99261: CPT

## 2024-06-14 PROCEDURE — 87449 NOS EACH ORGANISM AG IA: CPT

## 2024-06-14 PROCEDURE — 87040 BLOOD CULTURE FOR BACTERIA: CPT

## 2024-06-14 RX ORDER — CYCLOBENZAPRINE HYDROCHLORIDE 10 MG/1
1 TABLET, FILM COATED ORAL
Qty: 15 | Refills: 0
Start: 2024-06-14 | End: 2024-06-18

## 2024-06-14 RX ORDER — FUROSEMIDE 40 MG
1 TABLET ORAL
Qty: 30 | Refills: 0
Start: 2024-06-14 | End: 2024-07-13

## 2024-06-14 RX ORDER — CEFPODOXIME PROXETIL 100 MG
1 TABLET ORAL
Qty: 8 | Refills: 0
Start: 2024-06-14 | End: 2024-06-17

## 2024-06-14 RX ORDER — LABETALOL HCL 100 MG
1 TABLET ORAL
Qty: 90 | Refills: 0
Start: 2024-06-14 | End: 2024-07-13

## 2024-06-14 RX ORDER — ATORVASTATIN CALCIUM 80 MG/1
1 TABLET, FILM COATED ORAL
Refills: 0 | DISCHARGE

## 2024-06-14 RX ORDER — ALBUTEROL 90 UG/1
2 AEROSOL, METERED ORAL
Refills: 0 | DISCHARGE

## 2024-06-14 RX ORDER — ALBUTEROL 90 UG/1
2 AEROSOL, METERED ORAL
Qty: 1 | Refills: 0
Start: 2024-06-14 | End: 2024-07-13

## 2024-06-14 RX ORDER — NIFEDIPINE 30 MG
1 TABLET, EXTENDED RELEASE 24 HR ORAL
Refills: 0 | DISCHARGE

## 2024-06-14 RX ORDER — LABETALOL HCL 100 MG
300 TABLET ORAL THREE TIMES A DAY
Refills: 0 | Status: DISCONTINUED | OUTPATIENT
Start: 2024-06-14 | End: 2024-06-14

## 2024-06-14 RX ORDER — LABETALOL HCL 100 MG
1 TABLET ORAL
Refills: 0 | DISCHARGE

## 2024-06-14 RX ORDER — LABETALOL HCL 100 MG
100 TABLET ORAL EVERY 8 HOURS
Refills: 0 | Status: DISCONTINUED | OUTPATIENT
Start: 2024-06-14 | End: 2024-06-14

## 2024-06-14 RX ORDER — NIFEDIPINE 30 MG
1 TABLET, EXTENDED RELEASE 24 HR ORAL
Qty: 30 | Refills: 0
Start: 2024-06-14 | End: 2024-07-13

## 2024-06-14 RX ORDER — FUROSEMIDE 40 MG
1 TABLET ORAL
Refills: 0 | DISCHARGE

## 2024-06-14 RX ORDER — ASPIRIN/CALCIUM CARB/MAGNESIUM 324 MG
1 TABLET ORAL
Qty: 30 | Refills: 0
Start: 2024-06-14 | End: 2024-07-13

## 2024-06-14 RX ORDER — ATORVASTATIN CALCIUM 80 MG/1
1 TABLET, FILM COATED ORAL
Qty: 30 | Refills: 0
Start: 2024-06-14 | End: 2024-07-13

## 2024-06-14 RX ADMIN — Medication 40 MILLIGRAM(S): at 07:02

## 2024-06-14 RX ADMIN — HEPARIN SODIUM 5000 UNIT(S): 5000 INJECTION INTRAVENOUS; SUBCUTANEOUS at 07:01

## 2024-06-14 RX ADMIN — Medication 100 MILLIGRAM(S): at 13:45

## 2024-06-14 RX ADMIN — LIDOCAINE 1 PATCH: 4 CREAM TOPICAL at 13:46

## 2024-06-14 RX ADMIN — Medication 1200 MILLIGRAM(S): at 07:02

## 2024-06-14 RX ADMIN — Medication 100 MILLIGRAM(S): at 10:17

## 2024-06-14 RX ADMIN — Medication 90 MILLIGRAM(S): at 07:02

## 2024-06-14 RX ADMIN — CHLORHEXIDINE GLUCONATE 1 APPLICATION(S): 213 SOLUTION TOPICAL at 12:13

## 2024-06-14 RX ADMIN — HEPARIN SODIUM 5000 UNIT(S): 5000 INJECTION INTRAVENOUS; SUBCUTANEOUS at 13:49

## 2024-06-14 RX ADMIN — Medication 6 MILLIGRAM(S): at 07:00

## 2024-06-14 RX ADMIN — Medication 81 MILLIGRAM(S): at 13:45

## 2024-06-14 NOTE — DISCHARGE NOTE NURSING/CASE MANAGEMENT/SOCIAL WORK - PATIENT PORTAL LINK FT
You can access the FollowMyHealth Patient Portal offered by [facility] by registering at the following website: [email or web address] By joining [facility]’s FollowMyHealth portal, you will also be able to view your health information using other applications (apps) compatible with our system.

## 2024-06-14 NOTE — DISCHARGE NOTE NURSING/CASE MANAGEMENT/SOCIAL WORK - NSDCPEFALRISK_GEN_ALL_CORE
For information on Fall & Injury Prevention, visit: [email or web address] OR  [email or web address] OR  https://www.cdc.gov/steadi/patient.html

## 2024-06-14 NOTE — PROGRESS NOTE ADULT - ASSESSMENT
53F PMH HTN, ESRD (T, TH, S) HLD, anemia, CHF, presenting with SOB and cough admitted for COVID PNA
1 ESRD on HD (T/TH/SAT):  -s/p HD yesterday with UF 2.0kg; plan for HD tomorrow if still here  -Hemodialysis Renal Diet and Fluid restriction to 1L/day  -Adjust meds to eGFR and avoid IV Gadolinium contrast,NSAIDs, and phosphate enema.  -Monitor Electrolytes daily.  2. HTN:   -bp is acceptable  -titrate bp meds to keep sbp >110 and < 130  3. Anemia of ESRD:  -epogen 10k tiw.  -F/u CBC daily  -transfuse if HB < 7.0.  4. Mineral Bone Disease:  -monitor phos  5. Sob/fever due to fluid overload with COVID  -on steroids.  -UF during HD.   
1 ESRD on HD (T/TH/SAT):  -plan for HD today.  -Hemodialysis Renal Diet and Fluid restriction to 1L/day  -Adjust meds to eGFR and avoid IV Gadolinium contrast,NSAIDs, and phosphate enema.  -Monitor Electrolytes daily.  2. HTN:   -bp is acceptable  -titrate bp meds to keep sbp >110 and < 130  3. Anemia of ESRD:  -epogen 10k tiw.  -F/u CBC daily  -transfuse if HB < 7.0.  4. Mineral Bone Disease:  -monitor phos  5. Sob/fever due to fluid overload with COVID  -started steroids.  -UF during HD.   
ESRD:  She is on HD at [place].  She has a permcath and is on TTS schedule.  - HD on TTS.  - Renal diet.    Pneumonia:  - Increase UF at HD.  - Continue Antibiotics.    Anemia:  - ELEN at HD.    CKD-MBD:  - Resume PO4 binders.  - PO4 levels.  
53F PMH HTN, ESRD (T, TH, S) HLD, anemia, CHF, presenting with SOB and cough admitted for COVID PNA

## 2024-06-14 NOTE — PROGRESS NOTE ADULT - SUBJECTIVE AND OBJECTIVE BOX
NEPHROLOGY MEDICAL CARE, [facility] - Dr. Marco Calle/ Dr. Nicholas Mckeon/ Dr. Ry Zuniga/ Dr. Roxi Reese    Date of Service: 06-14-24    Patient was seen and examined at bedside.    CC: pt is okay and nad    Vital Signs Last 24 Hrs  T(C): 36.8 (14 Jun 2024 04:43), Max: 36.8 (13 Jun 2024 20:10)  T(F): 98.2 (14 Jun 2024 04:43), Max: 98.2 (13 Jun 2024 20:10)  HR: 96 (14 Jun 2024 04:43) (81 - 96)  BP: 161/95 (14 Jun 2024 04:43) (132/75 - 161/95)  BP(mean): 115 (14 Jun 2024 04:43) (115 - 115)  RR: 17 (14 Jun 2024 04:43) (17 - 18)  SpO2: 95% (14 Jun 2024 04:43) (93% - 97%)    Parameters below as of 14 Jun 2024 04:43  Patient On (Oxygen Delivery Method): room air          PHYSICAL EXAM:  General: No acute respiratory distress.  Eyes: conjunctiva and sclera clear  ENMT: Atraumatic, Normocephalic, supple,   Respiratory: b/l poor air entry; No rales, wheezing  Cardiovascular: S1S2+; no m/r/g  Gastrointestinal: Soft, Non-tender, Nondistended; Bowel sounds present,   Neuro:  Awake, Alert & Oriented X3  Ext:  No edema, No Cyanosis  Skin: No rashes  Dialysis Access::  Right Chest PERMACATH; Left wrist AVF bruit    MEDICATIONS:  MEDICATIONS  (STANDING):  aspirin enteric coated 81 milliGRAM(s) Oral daily  atorvastatin 40 milliGRAM(s) Oral at bedtime  cefTRIAXone   IVPB 1000 milliGRAM(s) IV Intermittent every 24 hours  chlorhexidine 2% Cloths 1 Application(s) Topical daily  epoetin rashaun (PROCRIT) Injectable 69845 Unit(s) IV Push <User Schedule>  furosemide    Tablet 40 milliGRAM(s) Oral daily  guaiFENesin ER 1200 milliGRAM(s) Oral every 12 hours  heparin   Injectable 5000 Unit(s) SubCutaneous every 8 hours  labetalol 100 milliGRAM(s) Oral every 8 hours  lidocaine   4% Patch 1 Patch Transdermal every 24 hours  NIFEdipine XL 90 milliGRAM(s) Oral daily    MEDICATIONS  (PRN):  acetaminophen     Tablet .. 650 milliGRAM(s) Oral every 6 hours PRN Temp greater or equal to 38C (100.4F), Mild Pain (1 - 3)  albuterol    90 MICROgram(s) HFA Inhaler 2 Puff(s) Inhalation every 6 hours PRN Shortness of Breath and/or Wheezing  aluminum hydroxide/magnesium hydroxide/simethicone Suspension 30 milliLiter(s) Oral every 4 hours PRN Dyspepsia  cyclobenzaprine 5 milliGRAM(s) Oral three times a day PRN Muscle Spasm  melatonin 3 milliGRAM(s) Oral at bedtime PRN Insomnia  ondansetron Injectable 4 milliGRAM(s) IV Push every 8 hours PRN Nausea and/or Vomiting          LABS:                        8.0    4.05  )-----------( 114      ( 14 Jun 2024 06:36 )             24.8     06-14    137  |  100  |  28<H>  ----------------------------<  86  3.7   |  29  |  7.04<H>    Ca    8.2<L>      14 Jun 2024 06:36  Phos  3.2     06-14  Mg     2.2     06-14        Urinalysis Basic - ( 14 Jun 2024 06:36 )    Color: x / Appearance: x / SG: x / pH: x  Gluc: 86 mg/dL / Ketone: x  / Bili: x / Urobili: x   Blood: x / Protein: x / Nitrite: x   Leuk Esterase: x / RBC: x / WBC x   Sq Epi: x / Non Sq Epi: x / Bacteria: x      Magnesium: 2.2 mg/dL (06-14 @ 06:36)  Phosphorus: 3.2 mg/dL (06-14 @ 06:36)  Magnesium: 2.2 mg/dL (06-13 @ 13:00)  Phosphorus: 4.1 mg/dL (06-13 @ 13:00)    Urine studies    PTH and Vit D:

## 2024-06-14 NOTE — PROGRESS NOTE ADULT - REASON FOR ADMISSION
cough, fever, chest tightness

## 2024-06-16 LAB
CULTURE RESULTS: SIGNIFICANT CHANGE UP
CULTURE RESULTS: SIGNIFICANT CHANGE UP
SPECIMEN SOURCE: SIGNIFICANT CHANGE UP
SPECIMEN SOURCE: SIGNIFICANT CHANGE UP

## 2024-06-26 ENCOUNTER — APPOINTMENT (OUTPATIENT)
Dept: ENDOVASCULAR SURGERY | Facility: CLINIC | Age: 54
End: 2024-06-26

## 2024-06-29 ENCOUNTER — INPATIENT (INPATIENT)
Facility: HOSPITAL | Age: 54
LOS: 2 days | Discharge: HOME | DRG: 313 | End: 2024-07-02
Attending: STUDENT IN AN ORGANIZED HEALTH CARE EDUCATION/TRAINING PROGRAM | Admitting: STUDENT IN AN ORGANIZED HEALTH CARE EDUCATION/TRAINING PROGRAM
Payer: MEDICAID

## 2024-06-29 VITALS
TEMPERATURE: 99 F | HEART RATE: 91 BPM | RESPIRATION RATE: 14 BRPM | SYSTOLIC BLOOD PRESSURE: 165 MMHG | OXYGEN SATURATION: 100 % | HEIGHT: 62 IN | DIASTOLIC BLOOD PRESSURE: 81 MMHG | WEIGHT: 145.28 LBS

## 2024-06-29 DIAGNOSIS — Z98.891 HISTORY OF UTERINE SCAR FROM PREVIOUS SURGERY: Chronic | ICD-10-CM

## 2024-06-29 DIAGNOSIS — Z99.2 DEPENDENCE ON RENAL DIALYSIS: Chronic | ICD-10-CM

## 2024-06-29 DIAGNOSIS — Z98.51 TUBAL LIGATION STATUS: Chronic | ICD-10-CM

## 2024-06-29 PROCEDURE — 71045 X-RAY EXAM CHEST 1 VIEW: CPT | Mod: 26

## 2024-06-29 PROCEDURE — 99285 EMERGENCY DEPT VISIT HI MDM: CPT

## 2024-06-30 DIAGNOSIS — E78.5 HYPERLIPIDEMIA, UNSPECIFIED: ICD-10-CM

## 2024-06-30 DIAGNOSIS — R07.9 CHEST PAIN, UNSPECIFIED: ICD-10-CM

## 2024-06-30 DIAGNOSIS — Z29.9 ENCOUNTER FOR PROPHYLACTIC MEASURES, UNSPECIFIED: ICD-10-CM

## 2024-06-30 DIAGNOSIS — I16.0 HYPERTENSIVE URGENCY: ICD-10-CM

## 2024-06-30 DIAGNOSIS — J81.0 ACUTE PULMONARY EDEMA: ICD-10-CM

## 2024-06-30 DIAGNOSIS — I50.33 ACUTE ON CHRONIC DIASTOLIC (CONGESTIVE) HEART FAILURE: ICD-10-CM

## 2024-06-30 DIAGNOSIS — N18.6 END STAGE RENAL DISEASE: ICD-10-CM

## 2024-06-30 LAB
ALBUMIN SERPL ELPH-MCNC: 3.5 G/DL — SIGNIFICANT CHANGE UP (ref 3.5–5)
ALP SERPL-CCNC: 94 U/L — SIGNIFICANT CHANGE UP (ref 40–120)
ALT FLD-CCNC: 36 U/L DA — SIGNIFICANT CHANGE UP (ref 10–60)
ANION GAP SERPL CALC-SCNC: 7 MMOL/L — SIGNIFICANT CHANGE UP (ref 5–17)
APTT BLD: 30.3 SEC — SIGNIFICANT CHANGE UP (ref 24.5–35.6)
AST SERPL-CCNC: 36 U/L — SIGNIFICANT CHANGE UP (ref 10–40)
BASOPHILS # BLD AUTO: 0.02 K/UL — SIGNIFICANT CHANGE UP (ref 0–0.2)
BASOPHILS NFR BLD AUTO: 0.5 % — SIGNIFICANT CHANGE UP (ref 0–2)
BILIRUB SERPL-MCNC: 0.7 MG/DL — SIGNIFICANT CHANGE UP (ref 0.2–1.2)
BUN SERPL-MCNC: 32 MG/DL — HIGH (ref 7–18)
CALCIUM SERPL-MCNC: 9.4 MG/DL — SIGNIFICANT CHANGE UP (ref 8.4–10.5)
CHLORIDE SERPL-SCNC: 103 MMOL/L — SIGNIFICANT CHANGE UP (ref 96–108)
CO2 SERPL-SCNC: 28 MMOL/L — SIGNIFICANT CHANGE UP (ref 22–31)
CREAT SERPL-MCNC: 7.56 MG/DL — HIGH (ref 0.5–1.3)
EGFR: 6 ML/MIN/1.73M2 — LOW
EOSINOPHIL # BLD AUTO: 0.19 K/UL — SIGNIFICANT CHANGE UP (ref 0–0.5)
EOSINOPHIL NFR BLD AUTO: 4.6 % — SIGNIFICANT CHANGE UP (ref 0–6)
FLUAV AG NPH QL: SIGNIFICANT CHANGE UP
FLUBV AG NPH QL: SIGNIFICANT CHANGE UP
GAS PNL BLDA: SIGNIFICANT CHANGE UP
GLUCOSE SERPL-MCNC: 98 MG/DL — SIGNIFICANT CHANGE UP (ref 70–99)
HCT VFR BLD CALC: 28.9 % — LOW (ref 34.5–45)
HGB BLD-MCNC: 9.2 G/DL — LOW (ref 11.5–15.5)
IMM GRANULOCYTES NFR BLD AUTO: 0.5 % — SIGNIFICANT CHANGE UP (ref 0–0.9)
INR BLD: 1 RATIO — SIGNIFICANT CHANGE UP (ref 0.85–1.18)
LIDOCAIN IGE QN: 44 U/L — SIGNIFICANT CHANGE UP (ref 13–75)
LYMPHOCYTES # BLD AUTO: 0.92 K/UL — LOW (ref 1–3.3)
LYMPHOCYTES # BLD AUTO: 22.4 % — SIGNIFICANT CHANGE UP (ref 13–44)
MAGNESIUM SERPL-MCNC: 2.1 MG/DL — SIGNIFICANT CHANGE UP (ref 1.6–2.6)
MCHC RBC-ENTMCNC: 30.6 PG — SIGNIFICANT CHANGE UP (ref 27–34)
MCHC RBC-ENTMCNC: 31.8 GM/DL — LOW (ref 32–36)
MCV RBC AUTO: 96 FL — SIGNIFICANT CHANGE UP (ref 80–100)
MONOCYTES # BLD AUTO: 0.39 K/UL — SIGNIFICANT CHANGE UP (ref 0–0.9)
MONOCYTES NFR BLD AUTO: 9.5 % — SIGNIFICANT CHANGE UP (ref 2–14)
NEUTROPHILS # BLD AUTO: 2.57 K/UL — SIGNIFICANT CHANGE UP (ref 1.8–7.4)
NEUTROPHILS NFR BLD AUTO: 62.5 % — SIGNIFICANT CHANGE UP (ref 43–77)
NRBC # BLD: 0 /100 WBCS — SIGNIFICANT CHANGE UP (ref 0–0)
NT-PROBNP SERPL-SCNC: HIGH PG/ML (ref 0–125)
PLATELET # BLD AUTO: 120 K/UL — LOW (ref 150–400)
POTASSIUM SERPL-MCNC: 4.7 MMOL/L — SIGNIFICANT CHANGE UP (ref 3.5–5.3)
POTASSIUM SERPL-SCNC: 4.7 MMOL/L — SIGNIFICANT CHANGE UP (ref 3.5–5.3)
PROT SERPL-MCNC: 7 G/DL — SIGNIFICANT CHANGE UP (ref 6–8.3)
PROTHROM AB SERPL-ACNC: 11.4 SEC — SIGNIFICANT CHANGE UP (ref 9.5–13)
RAPID RVP RESULT: SIGNIFICANT CHANGE UP
RBC # BLD: 3.01 M/UL — LOW (ref 3.8–5.2)
RBC # FLD: 17.1 % — HIGH (ref 10.3–14.5)
SARS-COV-2 RNA SPEC QL NAA+PROBE: SIGNIFICANT CHANGE UP
SARS-COV-2 RNA SPEC QL NAA+PROBE: SIGNIFICANT CHANGE UP
SODIUM SERPL-SCNC: 138 MMOL/L — SIGNIFICANT CHANGE UP (ref 135–145)
TROPONIN I, HIGH SENSITIVITY RESULT: 21.2 NG/L — SIGNIFICANT CHANGE UP
TROPONIN I, HIGH SENSITIVITY RESULT: 27.5 NG/L — SIGNIFICANT CHANGE UP
WBC # BLD: 4.11 K/UL — SIGNIFICANT CHANGE UP (ref 3.8–10.5)
WBC # FLD AUTO: 4.11 K/UL — SIGNIFICANT CHANGE UP (ref 3.8–10.5)

## 2024-06-30 PROCEDURE — 71275 CT ANGIOGRAPHY CHEST: CPT | Mod: 26

## 2024-06-30 PROCEDURE — 99223 1ST HOSP IP/OBS HIGH 75: CPT | Mod: GC

## 2024-06-30 RX ORDER — ATORVASTATIN CALCIUM 20 MG/1
40 TABLET, FILM COATED ORAL AT BEDTIME
Refills: 0 | Status: DISCONTINUED | OUTPATIENT
Start: 2024-06-30 | End: 2024-07-02

## 2024-06-30 RX ORDER — ASPIRIN 325 MG/1
81 TABLET, FILM COATED ORAL DAILY
Refills: 0 | Status: DISCONTINUED | OUTPATIENT
Start: 2024-06-30 | End: 2024-07-02

## 2024-06-30 RX ORDER — FUROSEMIDE 10 MG/ML
40 INJECTION, SOLUTION INTRAMUSCULAR; INTRAVENOUS DAILY
Refills: 0 | Status: DISCONTINUED | OUTPATIENT
Start: 2024-06-30 | End: 2024-07-01

## 2024-06-30 RX ORDER — LABETALOL HYDROCHLORIDE 300 MG/1
100 TABLET ORAL EVERY 8 HOURS
Refills: 0 | Status: DISCONTINUED | OUTPATIENT
Start: 2024-06-30 | End: 2024-07-02

## 2024-06-30 RX ORDER — HEPARIN SODIUM 50 [USP'U]/ML
5000 INJECTION, SOLUTION INTRAVENOUS EVERY 12 HOURS
Refills: 0 | Status: DISCONTINUED | OUTPATIENT
Start: 2024-06-30 | End: 2024-07-02

## 2024-06-30 RX ORDER — CEFTRIAXONE SODIUM 500 MG
1000 VIAL (EA) INJECTION ONCE
Refills: 0 | Status: COMPLETED | OUTPATIENT
Start: 2024-06-30 | End: 2024-06-30

## 2024-06-30 RX ORDER — LABETALOL HYDROCHLORIDE 300 MG/1
100 TABLET ORAL ONCE
Refills: 0 | Status: COMPLETED | OUTPATIENT
Start: 2024-06-30 | End: 2024-06-30

## 2024-06-30 RX ORDER — AZITHROMYCIN 250 MG/1
500 TABLET, FILM COATED ORAL ONCE
Refills: 0 | Status: COMPLETED | OUTPATIENT
Start: 2024-06-30 | End: 2024-06-30

## 2024-06-30 RX ORDER — ACETAMINOPHEN 325 MG
650 TABLET ORAL EVERY 6 HOURS
Refills: 0 | Status: DISCONTINUED | OUTPATIENT
Start: 2024-06-30 | End: 2024-07-02

## 2024-06-30 RX ORDER — ACETAMINOPHEN 325 MG
1000 TABLET ORAL ONCE
Refills: 0 | Status: COMPLETED | OUTPATIENT
Start: 2024-06-30 | End: 2024-06-30

## 2024-06-30 RX ORDER — ONDANSETRON HYDROCHLORIDE 2 MG/ML
4 INJECTION INTRAMUSCULAR; INTRAVENOUS EVERY 8 HOURS
Refills: 0 | Status: DISCONTINUED | OUTPATIENT
Start: 2024-06-30 | End: 2024-07-02

## 2024-06-30 RX ORDER — FUROSEMIDE 10 MG/ML
40 INJECTION, SOLUTION INTRAMUSCULAR; INTRAVENOUS ONCE
Refills: 0 | Status: COMPLETED | OUTPATIENT
Start: 2024-06-30 | End: 2024-06-30

## 2024-06-30 RX ORDER — FUROSEMIDE 10 MG/ML
40 INJECTION, SOLUTION INTRAMUSCULAR; INTRAVENOUS EVERY 12 HOURS
Refills: 0 | Status: DISCONTINUED | OUTPATIENT
Start: 2024-06-30 | End: 2024-07-02

## 2024-06-30 RX ORDER — ASPIRIN 325 MG/1
324 TABLET, FILM COATED ORAL ONCE
Refills: 0 | Status: COMPLETED | OUTPATIENT
Start: 2024-06-30 | End: 2024-06-30

## 2024-06-30 RX ORDER — ALBUTEROL 90 MCG
2 AEROSOL REFILL (GRAM) INHALATION EVERY 6 HOURS
Refills: 0 | Status: DISCONTINUED | OUTPATIENT
Start: 2024-06-30 | End: 2024-07-02

## 2024-06-30 RX ADMIN — Medication 400 MILLIGRAM(S): at 01:18

## 2024-06-30 RX ADMIN — AZITHROMYCIN 255 MILLIGRAM(S): 250 TABLET, FILM COATED ORAL at 01:18

## 2024-06-30 RX ADMIN — FUROSEMIDE 40 MILLIGRAM(S): 10 INJECTION, SOLUTION INTRAMUSCULAR; INTRAVENOUS at 19:19

## 2024-06-30 RX ADMIN — Medication 30 MILLIGRAM(S): at 01:36

## 2024-06-30 RX ADMIN — Medication 1000 MILLIGRAM(S): at 03:20

## 2024-06-30 RX ADMIN — Medication 400 MILLIGRAM(S): at 01:17

## 2024-06-30 RX ADMIN — LABETALOL HYDROCHLORIDE 100 MILLIGRAM(S): 300 TABLET ORAL at 01:29

## 2024-06-30 RX ADMIN — HEPARIN SODIUM 5000 UNIT(S): 50 INJECTION, SOLUTION INTRAVENOUS at 19:18

## 2024-06-30 RX ADMIN — Medication 650 MILLIGRAM(S): at 22:52

## 2024-06-30 RX ADMIN — FUROSEMIDE 40 MILLIGRAM(S): 10 INJECTION, SOLUTION INTRAMUSCULAR; INTRAVENOUS at 13:36

## 2024-06-30 RX ADMIN — Medication 100 MILLIGRAM(S): at 01:18

## 2024-06-30 RX ADMIN — Medication 3 MILLIGRAM(S): at 22:52

## 2024-06-30 RX ADMIN — Medication 650 MILLIGRAM(S): at 23:04

## 2024-06-30 RX ADMIN — ASPIRIN 324 MILLIGRAM(S): 325 TABLET, FILM COATED ORAL at 08:55

## 2024-06-30 RX ADMIN — FUROSEMIDE 40 MILLIGRAM(S): 10 INJECTION, SOLUTION INTRAMUSCULAR; INTRAVENOUS at 01:17

## 2024-06-30 RX ADMIN — LABETALOL HYDROCHLORIDE 100 MILLIGRAM(S): 300 TABLET ORAL at 23:02

## 2024-06-30 RX ADMIN — ATORVASTATIN CALCIUM 40 MILLIGRAM(S): 20 TABLET, FILM COATED ORAL at 22:52

## 2024-06-30 RX ADMIN — Medication 90 MILLIGRAM(S): at 13:36

## 2024-06-30 RX ADMIN — Medication 400 MILLIGRAM(S): at 03:20

## 2024-07-01 DIAGNOSIS — J98.11 ATELECTASIS: ICD-10-CM

## 2024-07-01 DIAGNOSIS — I31.39 OTHER PERICARDIAL EFFUSION (NONINFLAMMATORY): ICD-10-CM

## 2024-07-01 DIAGNOSIS — J90 PLEURAL EFFUSION, NOT ELSEWHERE CLASSIFIED: ICD-10-CM

## 2024-07-01 DIAGNOSIS — Z75.8 OTHER PROBLEMS RELATED TO MEDICAL FACILITIES AND OTHER HEALTH CARE: ICD-10-CM

## 2024-07-01 LAB
ANION GAP SERPL CALC-SCNC: 13 MMOL/L — SIGNIFICANT CHANGE UP (ref 5–17)
BUN SERPL-MCNC: 51 MG/DL — HIGH (ref 7–18)
CALCIUM SERPL-MCNC: 9.2 MG/DL — SIGNIFICANT CHANGE UP (ref 8.4–10.5)
CHLORIDE SERPL-SCNC: 101 MMOL/L — SIGNIFICANT CHANGE UP (ref 96–108)
CO2 SERPL-SCNC: 24 MMOL/L — SIGNIFICANT CHANGE UP (ref 22–31)
CREAT SERPL-MCNC: 10.2 MG/DL — HIGH (ref 0.5–1.3)
EGFR: 4 ML/MIN/1.73M2 — LOW
GLUCOSE BLDC GLUCOMTR-MCNC: 150 MG/DL — HIGH (ref 70–99)
GLUCOSE SERPL-MCNC: 82 MG/DL — SIGNIFICANT CHANGE UP (ref 70–99)
HCT VFR BLD CALC: 27.5 % — LOW (ref 34.5–45)
HGB BLD-MCNC: 8.8 G/DL — LOW (ref 11.5–15.5)
MAGNESIUM SERPL-MCNC: 2.3 MG/DL — SIGNIFICANT CHANGE UP (ref 1.6–2.6)
MCHC RBC-ENTMCNC: 30.9 PG — SIGNIFICANT CHANGE UP (ref 27–34)
MCHC RBC-ENTMCNC: 32 GM/DL — SIGNIFICANT CHANGE UP (ref 32–36)
MCV RBC AUTO: 96.5 FL — SIGNIFICANT CHANGE UP (ref 80–100)
NRBC # BLD: 1 /100 WBCS — HIGH (ref 0–0)
PHOSPHATE SERPL-MCNC: 5.2 MG/DL — HIGH (ref 2.5–4.5)
PLATELET # BLD AUTO: 133 K/UL — LOW (ref 150–400)
POTASSIUM SERPL-MCNC: 4.6 MMOL/L — SIGNIFICANT CHANGE UP (ref 3.5–5.3)
POTASSIUM SERPL-SCNC: 4.6 MMOL/L — SIGNIFICANT CHANGE UP (ref 3.5–5.3)
RBC # BLD: 2.85 M/UL — LOW (ref 3.8–5.2)
RBC # FLD: 17.3 % — HIGH (ref 10.3–14.5)
SODIUM SERPL-SCNC: 138 MMOL/L — SIGNIFICANT CHANGE UP (ref 135–145)
WBC # BLD: 3.86 K/UL — SIGNIFICANT CHANGE UP (ref 3.8–10.5)
WBC # FLD AUTO: 3.86 K/UL — SIGNIFICANT CHANGE UP (ref 3.8–10.5)

## 2024-07-01 PROCEDURE — 99233 SBSQ HOSP IP/OBS HIGH 50: CPT

## 2024-07-01 RX ORDER — ERYTHROPOIETIN 4000 [IU]/ML
4000 INJECTION, SOLUTION INTRAVENOUS; SUBCUTANEOUS
Refills: 0 | Status: DISCONTINUED | OUTPATIENT
Start: 2024-07-01 | End: 2024-07-02

## 2024-07-01 RX ORDER — HYDRALAZINE HYDROCHLORIDE 50 MG/1
25 TABLET ORAL EVERY 8 HOURS
Refills: 0 | Status: DISCONTINUED | OUTPATIENT
Start: 2024-07-01 | End: 2024-07-02

## 2024-07-01 RX ORDER — ERYTHROPOIETIN 4000 [IU]/ML
4000 INJECTION, SOLUTION INTRAVENOUS; SUBCUTANEOUS
Refills: 0 | Status: DISCONTINUED | OUTPATIENT
Start: 2024-07-01 | End: 2024-07-01

## 2024-07-01 RX ADMIN — LABETALOL HYDROCHLORIDE 100 MILLIGRAM(S): 300 TABLET ORAL at 15:02

## 2024-07-01 RX ADMIN — HYDRALAZINE HYDROCHLORIDE 25 MILLIGRAM(S): 50 TABLET ORAL at 20:39

## 2024-07-01 RX ADMIN — Medication 90 MILLIGRAM(S): at 05:58

## 2024-07-01 RX ADMIN — HYDRALAZINE HYDROCHLORIDE 25 MILLIGRAM(S): 50 TABLET ORAL at 22:14

## 2024-07-01 RX ADMIN — LABETALOL HYDROCHLORIDE 100 MILLIGRAM(S): 300 TABLET ORAL at 22:14

## 2024-07-01 RX ADMIN — FUROSEMIDE 40 MILLIGRAM(S): 10 INJECTION, SOLUTION INTRAMUSCULAR; INTRAVENOUS at 18:44

## 2024-07-01 RX ADMIN — HEPARIN SODIUM 5000 UNIT(S): 50 INJECTION, SOLUTION INTRAVENOUS at 17:51

## 2024-07-01 RX ADMIN — Medication 1 APPLICATION(S): at 18:43

## 2024-07-01 RX ADMIN — ATORVASTATIN CALCIUM 40 MILLIGRAM(S): 20 TABLET, FILM COATED ORAL at 22:14

## 2024-07-01 RX ADMIN — FUROSEMIDE 40 MILLIGRAM(S): 10 INJECTION, SOLUTION INTRAMUSCULAR; INTRAVENOUS at 05:58

## 2024-07-01 RX ADMIN — LABETALOL HYDROCHLORIDE 100 MILLIGRAM(S): 300 TABLET ORAL at 05:58

## 2024-07-01 RX ADMIN — ASPIRIN 81 MILLIGRAM(S): 325 TABLET, FILM COATED ORAL at 12:20

## 2024-07-01 RX ADMIN — HEPARIN SODIUM 5000 UNIT(S): 50 INJECTION, SOLUTION INTRAVENOUS at 05:58

## 2024-07-01 RX ADMIN — HYDRALAZINE HYDROCHLORIDE 25 MILLIGRAM(S): 50 TABLET ORAL at 12:21

## 2024-07-02 ENCOUNTER — TRANSCRIPTION ENCOUNTER (OUTPATIENT)
Age: 54
End: 2024-07-02

## 2024-07-02 VITALS
SYSTOLIC BLOOD PRESSURE: 154 MMHG | HEART RATE: 82 BPM | OXYGEN SATURATION: 98 % | TEMPERATURE: 98 F | RESPIRATION RATE: 18 BRPM | DIASTOLIC BLOOD PRESSURE: 80 MMHG

## 2024-07-02 LAB
ALBUMIN SERPL ELPH-MCNC: 3.2 G/DL — LOW (ref 3.5–5)
ALP SERPL-CCNC: 81 U/L — SIGNIFICANT CHANGE UP (ref 40–120)
ALT FLD-CCNC: 26 U/L DA — SIGNIFICANT CHANGE UP (ref 10–60)
ANION GAP SERPL CALC-SCNC: 13 MMOL/L — SIGNIFICANT CHANGE UP (ref 5–17)
AST SERPL-CCNC: 24 U/L — SIGNIFICANT CHANGE UP (ref 10–40)
BILIRUB SERPL-MCNC: 0.6 MG/DL — SIGNIFICANT CHANGE UP (ref 0.2–1.2)
BUN SERPL-MCNC: 71 MG/DL — HIGH (ref 7–18)
CALCIUM SERPL-MCNC: 8.2 MG/DL — LOW (ref 8.4–10.5)
CHLORIDE SERPL-SCNC: 102 MMOL/L — SIGNIFICANT CHANGE UP (ref 96–108)
CO2 SERPL-SCNC: 23 MMOL/L — SIGNIFICANT CHANGE UP (ref 22–31)
CREAT SERPL-MCNC: 12.4 MG/DL — HIGH (ref 0.5–1.3)
EGFR: 3 ML/MIN/1.73M2 — LOW
GLUCOSE SERPL-MCNC: 81 MG/DL — SIGNIFICANT CHANGE UP (ref 70–99)
HCT VFR BLD CALC: 25.7 % — LOW (ref 34.5–45)
HGB BLD-MCNC: 8.1 G/DL — LOW (ref 11.5–15.5)
MAGNESIUM SERPL-MCNC: 2.2 MG/DL — SIGNIFICANT CHANGE UP (ref 1.6–2.6)
MCHC RBC-ENTMCNC: 30.7 PG — SIGNIFICANT CHANGE UP (ref 27–34)
MCHC RBC-ENTMCNC: 31.5 GM/DL — LOW (ref 32–36)
MCV RBC AUTO: 97.3 FL — SIGNIFICANT CHANGE UP (ref 80–100)
MRSA PCR RESULT.: SIGNIFICANT CHANGE UP
NRBC # BLD: 0 /100 WBCS — SIGNIFICANT CHANGE UP (ref 0–0)
PHOSPHATE SERPL-MCNC: 5 MG/DL — HIGH (ref 2.5–4.5)
PLATELET # BLD AUTO: 134 K/UL — LOW (ref 150–400)
POTASSIUM SERPL-MCNC: 4.5 MMOL/L — SIGNIFICANT CHANGE UP (ref 3.5–5.3)
POTASSIUM SERPL-SCNC: 4.5 MMOL/L — SIGNIFICANT CHANGE UP (ref 3.5–5.3)
PROT SERPL-MCNC: 6.3 G/DL — SIGNIFICANT CHANGE UP (ref 6–8.3)
RBC # BLD: 2.64 M/UL — LOW (ref 3.8–5.2)
RBC # FLD: 18.6 % — HIGH (ref 10.3–14.5)
S AUREUS DNA NOSE QL NAA+PROBE: SIGNIFICANT CHANGE UP
SODIUM SERPL-SCNC: 138 MMOL/L — SIGNIFICANT CHANGE UP (ref 135–145)
TSH SERPL-MCNC: 0.92 UU/ML — SIGNIFICANT CHANGE UP (ref 0.34–4.82)
WBC # BLD: 3.77 K/UL — LOW (ref 3.8–10.5)
WBC # FLD AUTO: 3.77 K/UL — LOW (ref 3.8–10.5)

## 2024-07-02 PROCEDURE — 36415 COLL VENOUS BLD VENIPUNCTURE: CPT

## 2024-07-02 PROCEDURE — 96374 THER/PROPH/DIAG INJ IV PUSH: CPT

## 2024-07-02 PROCEDURE — 87040 BLOOD CULTURE FOR BACTERIA: CPT

## 2024-07-02 PROCEDURE — 82803 BLOOD GASES ANY COMBINATION: CPT

## 2024-07-02 PROCEDURE — 0225U NFCT DS DNA&RNA 21 SARSCOV2: CPT

## 2024-07-02 PROCEDURE — 84100 ASSAY OF PHOSPHORUS: CPT

## 2024-07-02 PROCEDURE — 87640 STAPH A DNA AMP PROBE: CPT

## 2024-07-02 PROCEDURE — 84484 ASSAY OF TROPONIN QUANT: CPT

## 2024-07-02 PROCEDURE — 82330 ASSAY OF CALCIUM: CPT

## 2024-07-02 PROCEDURE — 85610 PROTHROMBIN TIME: CPT

## 2024-07-02 PROCEDURE — 84132 ASSAY OF SERUM POTASSIUM: CPT

## 2024-07-02 PROCEDURE — 83880 ASSAY OF NATRIURETIC PEPTIDE: CPT

## 2024-07-02 PROCEDURE — 83735 ASSAY OF MAGNESIUM: CPT

## 2024-07-02 PROCEDURE — 93005 ELECTROCARDIOGRAM TRACING: CPT

## 2024-07-02 PROCEDURE — 94660 CPAP INITIATION&MGMT: CPT

## 2024-07-02 PROCEDURE — 85379 FIBRIN DEGRADATION QUANT: CPT

## 2024-07-02 PROCEDURE — 71275 CT ANGIOGRAPHY CHEST: CPT | Mod: MC

## 2024-07-02 PROCEDURE — 82962 GLUCOSE BLOOD TEST: CPT

## 2024-07-02 PROCEDURE — 96375 TX/PRO/DX INJ NEW DRUG ADDON: CPT

## 2024-07-02 PROCEDURE — 87636 SARSCOV2 & INF A&B AMP PRB: CPT

## 2024-07-02 PROCEDURE — 84443 ASSAY THYROID STIM HORMONE: CPT

## 2024-07-02 PROCEDURE — 87641 MR-STAPH DNA AMP PROBE: CPT

## 2024-07-02 PROCEDURE — 83605 ASSAY OF LACTIC ACID: CPT

## 2024-07-02 PROCEDURE — 84295 ASSAY OF SERUM SODIUM: CPT

## 2024-07-02 PROCEDURE — 80053 COMPREHEN METABOLIC PANEL: CPT

## 2024-07-02 PROCEDURE — 99285 EMERGENCY DEPT VISIT HI MDM: CPT | Mod: 25

## 2024-07-02 PROCEDURE — 71045 X-RAY EXAM CHEST 1 VIEW: CPT

## 2024-07-02 PROCEDURE — 85025 COMPLETE CBC W/AUTO DIFF WBC: CPT

## 2024-07-02 PROCEDURE — T1013: CPT

## 2024-07-02 PROCEDURE — 99239 HOSP IP/OBS DSCHRG MGMT >30: CPT

## 2024-07-02 PROCEDURE — 80048 BASIC METABOLIC PNL TOTAL CA: CPT

## 2024-07-02 PROCEDURE — 85730 THROMBOPLASTIN TIME PARTIAL: CPT

## 2024-07-02 PROCEDURE — 99261: CPT

## 2024-07-02 PROCEDURE — 85027 COMPLETE CBC AUTOMATED: CPT

## 2024-07-02 PROCEDURE — 83690 ASSAY OF LIPASE: CPT

## 2024-07-02 RX ORDER — HYDRALAZINE HYDROCHLORIDE 50 MG/1
1 TABLET ORAL
Qty: 90 | Refills: 0
Start: 2024-07-02 | End: 2024-07-31

## 2024-07-02 RX ADMIN — Medication 1 APPLICATION(S): at 06:16

## 2024-07-02 RX ADMIN — ASPIRIN 81 MILLIGRAM(S): 325 TABLET, FILM COATED ORAL at 12:01

## 2024-07-02 RX ADMIN — HEPARIN SODIUM 5000 UNIT(S): 50 INJECTION, SOLUTION INTRAVENOUS at 06:14

## 2024-07-02 RX ADMIN — LABETALOL HYDROCHLORIDE 100 MILLIGRAM(S): 300 TABLET ORAL at 06:14

## 2024-07-02 RX ADMIN — FUROSEMIDE 40 MILLIGRAM(S): 10 INJECTION, SOLUTION INTRAMUSCULAR; INTRAVENOUS at 06:15

## 2024-07-02 RX ADMIN — ERYTHROPOIETIN 4000 UNIT(S): 4000 INJECTION, SOLUTION INTRAVENOUS; SUBCUTANEOUS at 10:08

## 2024-07-02 RX ADMIN — LABETALOL HYDROCHLORIDE 100 MILLIGRAM(S): 300 TABLET ORAL at 14:13

## 2024-07-02 RX ADMIN — HYDRALAZINE HYDROCHLORIDE 25 MILLIGRAM(S): 50 TABLET ORAL at 06:14

## 2024-07-02 RX ADMIN — HYDRALAZINE HYDROCHLORIDE 25 MILLIGRAM(S): 50 TABLET ORAL at 14:12

## 2024-07-16 ENCOUNTER — EMERGENCY (EMERGENCY)
Facility: HOSPITAL | Age: 54
LOS: 1 days | Discharge: HOME | End: 2024-07-16
Attending: EMERGENCY MEDICINE
Payer: MEDICAID

## 2024-07-16 VITALS
DIASTOLIC BLOOD PRESSURE: 97 MMHG | TEMPERATURE: 98 F | HEART RATE: 75 BPM | HEIGHT: 62 IN | SYSTOLIC BLOOD PRESSURE: 187 MMHG | RESPIRATION RATE: 18 BRPM | OXYGEN SATURATION: 100 % | WEIGHT: 138.45 LBS

## 2024-07-16 DIAGNOSIS — Z98.891 HISTORY OF UTERINE SCAR FROM PREVIOUS SURGERY: Chronic | ICD-10-CM

## 2024-07-16 DIAGNOSIS — Z99.2 DEPENDENCE ON RENAL DIALYSIS: Chronic | ICD-10-CM

## 2024-07-16 DIAGNOSIS — Z98.51 TUBAL LIGATION STATUS: Chronic | ICD-10-CM

## 2024-07-16 PROCEDURE — 99285 EMERGENCY DEPT VISIT HI MDM: CPT

## 2024-07-16 RX ORDER — HYDRALAZINE HYDROCHLORIDE 50 MG/1
10 TABLET ORAL ONCE
Refills: 0 | Status: COMPLETED | OUTPATIENT
Start: 2024-07-16 | End: 2024-07-16

## 2024-07-16 RX ORDER — ACETAMINOPHEN 325 MG
1000 TABLET ORAL ONCE
Refills: 0 | Status: COMPLETED | OUTPATIENT
Start: 2024-07-16 | End: 2024-07-16

## 2024-07-16 RX ORDER — LABETALOL HYDROCHLORIDE 300 MG/1
10 TABLET ORAL ONCE
Refills: 0 | Status: COMPLETED | OUTPATIENT
Start: 2024-07-16 | End: 2024-07-16

## 2024-07-17 VITALS
DIASTOLIC BLOOD PRESSURE: 88 MMHG | TEMPERATURE: 98 F | SYSTOLIC BLOOD PRESSURE: 150 MMHG | OXYGEN SATURATION: 98 % | HEART RATE: 75 BPM | RESPIRATION RATE: 16 BRPM

## 2024-07-17 LAB
ALBUMIN SERPL ELPH-MCNC: 3.7 G/DL — SIGNIFICANT CHANGE UP (ref 3.5–5)
ALP SERPL-CCNC: 72 U/L — SIGNIFICANT CHANGE UP (ref 40–120)
ALT FLD-CCNC: 28 U/L DA — SIGNIFICANT CHANGE UP (ref 10–60)
ANION GAP SERPL CALC-SCNC: 7 MMOL/L — SIGNIFICANT CHANGE UP (ref 5–17)
AST SERPL-CCNC: 25 U/L — SIGNIFICANT CHANGE UP (ref 10–40)
BASOPHILS # BLD AUTO: 0.02 K/UL — SIGNIFICANT CHANGE UP (ref 0–0.2)
BASOPHILS NFR BLD AUTO: 0.6 % — SIGNIFICANT CHANGE UP (ref 0–2)
BILIRUB SERPL-MCNC: 0.5 MG/DL — SIGNIFICANT CHANGE UP (ref 0.2–1.2)
BUN SERPL-MCNC: 38 MG/DL — HIGH (ref 7–18)
CALCIUM SERPL-MCNC: 8.5 MG/DL — SIGNIFICANT CHANGE UP (ref 8.4–10.5)
CHLORIDE SERPL-SCNC: 100 MMOL/L — SIGNIFICANT CHANGE UP (ref 96–108)
CO2 SERPL-SCNC: 27 MMOL/L — SIGNIFICANT CHANGE UP (ref 22–31)
CREAT SERPL-MCNC: 5.98 MG/DL — HIGH (ref 0.5–1.3)
EGFR: 8 ML/MIN/1.73M2 — LOW
EOSINOPHIL # BLD AUTO: 0.19 K/UL — SIGNIFICANT CHANGE UP (ref 0–0.5)
EOSINOPHIL NFR BLD AUTO: 5.5 % — SIGNIFICANT CHANGE UP (ref 0–6)
GLUCOSE SERPL-MCNC: 99 MG/DL — SIGNIFICANT CHANGE UP (ref 70–99)
HCT VFR BLD CALC: 29.6 % — LOW (ref 34.5–45)
HGB BLD-MCNC: 9.4 G/DL — LOW (ref 11.5–15.5)
IMM GRANULOCYTES NFR BLD AUTO: 0 % — SIGNIFICANT CHANGE UP (ref 0–0.9)
LYMPHOCYTES # BLD AUTO: 0.75 K/UL — LOW (ref 1–3.3)
LYMPHOCYTES # BLD AUTO: 21.7 % — SIGNIFICANT CHANGE UP (ref 13–44)
MAGNESIUM SERPL-MCNC: 2.1 MG/DL — SIGNIFICANT CHANGE UP (ref 1.6–2.6)
MCHC RBC-ENTMCNC: 30.5 PG — SIGNIFICANT CHANGE UP (ref 27–34)
MCHC RBC-ENTMCNC: 31.8 GM/DL — LOW (ref 32–36)
MCV RBC AUTO: 96.1 FL — SIGNIFICANT CHANGE UP (ref 80–100)
MONOCYTES # BLD AUTO: 0.38 K/UL — SIGNIFICANT CHANGE UP (ref 0–0.9)
MONOCYTES NFR BLD AUTO: 11 % — SIGNIFICANT CHANGE UP (ref 2–14)
NEUTROPHILS # BLD AUTO: 2.12 K/UL — SIGNIFICANT CHANGE UP (ref 1.8–7.4)
NEUTROPHILS NFR BLD AUTO: 61.2 % — SIGNIFICANT CHANGE UP (ref 43–77)
NRBC # BLD: 0 /100 WBCS — SIGNIFICANT CHANGE UP (ref 0–0)
PLATELET # BLD AUTO: 115 K/UL — LOW (ref 150–400)
POTASSIUM SERPL-MCNC: 4.4 MMOL/L — SIGNIFICANT CHANGE UP (ref 3.5–5.3)
POTASSIUM SERPL-SCNC: 4.4 MMOL/L — SIGNIFICANT CHANGE UP (ref 3.5–5.3)
PROT SERPL-MCNC: 7 G/DL — SIGNIFICANT CHANGE UP (ref 6–8.3)
RBC # BLD: 3.08 M/UL — LOW (ref 3.8–5.2)
RBC # FLD: 16 % — HIGH (ref 10.3–14.5)
SODIUM SERPL-SCNC: 134 MMOL/L — LOW (ref 135–145)
TROPONIN I, HIGH SENSITIVITY RESULT: 22.1 NG/L — SIGNIFICANT CHANGE UP
WBC # BLD: 3.46 K/UL — LOW (ref 3.8–10.5)
WBC # FLD AUTO: 3.46 K/UL — LOW (ref 3.8–10.5)

## 2024-07-17 PROCEDURE — 99284 EMERGENCY DEPT VISIT MOD MDM: CPT | Mod: 25

## 2024-07-17 PROCEDURE — 96365 THER/PROPH/DIAG IV INF INIT: CPT

## 2024-07-17 PROCEDURE — 84484 ASSAY OF TROPONIN QUANT: CPT

## 2024-07-17 PROCEDURE — 96375 TX/PRO/DX INJ NEW DRUG ADDON: CPT

## 2024-07-17 PROCEDURE — 36415 COLL VENOUS BLD VENIPUNCTURE: CPT

## 2024-07-17 PROCEDURE — 70450 CT HEAD/BRAIN W/O DYE: CPT | Mod: 26,MC

## 2024-07-17 PROCEDURE — 80053 COMPREHEN METABOLIC PANEL: CPT

## 2024-07-17 PROCEDURE — 85025 COMPLETE CBC W/AUTO DIFF WBC: CPT

## 2024-07-17 PROCEDURE — 70450 CT HEAD/BRAIN W/O DYE: CPT | Mod: MC

## 2024-07-17 PROCEDURE — 83735 ASSAY OF MAGNESIUM: CPT

## 2024-07-17 PROCEDURE — 96366 THER/PROPH/DIAG IV INF ADDON: CPT

## 2024-07-17 RX ADMIN — Medication 1000 MILLIGRAM(S): at 01:53

## 2024-07-17 RX ADMIN — HYDRALAZINE HYDROCHLORIDE 10 MILLIGRAM(S): 50 TABLET ORAL at 00:08

## 2024-07-17 RX ADMIN — LABETALOL HYDROCHLORIDE 10 MILLIGRAM(S): 300 TABLET ORAL at 00:08

## 2024-07-17 RX ADMIN — Medication 400 MILLIGRAM(S): at 00:09

## 2024-10-08 ENCOUNTER — EMERGENCY (EMERGENCY)
Facility: HOSPITAL | Age: 54
LOS: 1 days | Discharge: HOME | End: 2024-10-08
Attending: EMERGENCY MEDICINE
Payer: MEDICAID

## 2024-10-08 VITALS
DIASTOLIC BLOOD PRESSURE: 81 MMHG | RESPIRATION RATE: 18 BRPM | SYSTOLIC BLOOD PRESSURE: 160 MMHG | TEMPERATURE: 98 F | OXYGEN SATURATION: 95 % | HEART RATE: 72 BPM

## 2024-10-08 VITALS
WEIGHT: 150.13 LBS | RESPIRATION RATE: 18 BRPM | DIASTOLIC BLOOD PRESSURE: 98 MMHG | HEART RATE: 78 BPM | SYSTOLIC BLOOD PRESSURE: 188 MMHG | TEMPERATURE: 98 F | OXYGEN SATURATION: 94 %

## 2024-10-08 DIAGNOSIS — Z98.891 HISTORY OF UTERINE SCAR FROM PREVIOUS SURGERY: Chronic | ICD-10-CM

## 2024-10-08 DIAGNOSIS — Z98.51 TUBAL LIGATION STATUS: Chronic | ICD-10-CM

## 2024-10-08 DIAGNOSIS — Z99.2 DEPENDENCE ON RENAL DIALYSIS: Chronic | ICD-10-CM

## 2024-10-08 LAB
ALBUMIN SERPL ELPH-MCNC: 3.4 G/DL — LOW (ref 3.5–5)
ALP SERPL-CCNC: 82 U/L — SIGNIFICANT CHANGE UP (ref 40–120)
ALT FLD-CCNC: 55 U/L DA — SIGNIFICANT CHANGE UP (ref 10–60)
ANION GAP SERPL CALC-SCNC: 13 MMOL/L — SIGNIFICANT CHANGE UP (ref 5–17)
AST SERPL-CCNC: 43 U/L — HIGH (ref 10–40)
BASOPHILS # BLD AUTO: 0.02 K/UL — SIGNIFICANT CHANGE UP (ref 0–0.2)
BASOPHILS NFR BLD AUTO: 0.4 % — SIGNIFICANT CHANGE UP (ref 0–2)
BILIRUB SERPL-MCNC: 0.6 MG/DL — SIGNIFICANT CHANGE UP (ref 0.2–1.2)
BUN SERPL-MCNC: 98 MG/DL — HIGH (ref 7–18)
CALCIUM SERPL-MCNC: 7.9 MG/DL — LOW (ref 8.4–10.5)
CHLORIDE SERPL-SCNC: 102 MMOL/L — SIGNIFICANT CHANGE UP (ref 96–108)
CK SERPL-CCNC: 85 U/L — SIGNIFICANT CHANGE UP (ref 21–215)
CO2 SERPL-SCNC: 21 MMOL/L — LOW (ref 22–31)
CREAT SERPL-MCNC: 14.2 MG/DL — HIGH (ref 0.5–1.3)
EGFR: 3 ML/MIN/1.73M2 — LOW
EOSINOPHIL # BLD AUTO: 0.05 K/UL — SIGNIFICANT CHANGE UP (ref 0–0.5)
EOSINOPHIL NFR BLD AUTO: 1 % — SIGNIFICANT CHANGE UP (ref 0–6)
GLUCOSE SERPL-MCNC: 107 MG/DL — HIGH (ref 70–99)
HCT VFR BLD CALC: 43.1 % — SIGNIFICANT CHANGE UP (ref 34.5–45)
HGB BLD-MCNC: 13.9 G/DL — SIGNIFICANT CHANGE UP (ref 11.5–15.5)
IMM GRANULOCYTES NFR BLD AUTO: 0.2 % — SIGNIFICANT CHANGE UP (ref 0–0.9)
LIDOCAIN IGE QN: 41 U/L — SIGNIFICANT CHANGE UP (ref 13–75)
LYMPHOCYTES # BLD AUTO: 1.01 K/UL — SIGNIFICANT CHANGE UP (ref 1–3.3)
LYMPHOCYTES # BLD AUTO: 19.4 % — SIGNIFICANT CHANGE UP (ref 13–44)
MAGNESIUM SERPL-MCNC: 2.3 MG/DL — SIGNIFICANT CHANGE UP (ref 1.6–2.6)
MCHC RBC-ENTMCNC: 31.7 PG — SIGNIFICANT CHANGE UP (ref 27–34)
MCHC RBC-ENTMCNC: 32.3 GM/DL — SIGNIFICANT CHANGE UP (ref 32–36)
MCV RBC AUTO: 98.2 FL — SIGNIFICANT CHANGE UP (ref 80–100)
MONOCYTES # BLD AUTO: 0.44 K/UL — SIGNIFICANT CHANGE UP (ref 0–0.9)
MONOCYTES NFR BLD AUTO: 8.5 % — SIGNIFICANT CHANGE UP (ref 2–14)
NEUTROPHILS # BLD AUTO: 3.67 K/UL — SIGNIFICANT CHANGE UP (ref 1.8–7.4)
NEUTROPHILS NFR BLD AUTO: 70.5 % — SIGNIFICANT CHANGE UP (ref 43–77)
NRBC # BLD: 0 /100 WBCS — SIGNIFICANT CHANGE UP (ref 0–0)
PLATELET # BLD AUTO: 63 K/UL — LOW (ref 150–400)
POTASSIUM SERPL-MCNC: 5.6 MMOL/L — HIGH (ref 3.5–5.3)
POTASSIUM SERPL-SCNC: 5.6 MMOL/L — HIGH (ref 3.5–5.3)
PROT SERPL-MCNC: 6.4 G/DL — SIGNIFICANT CHANGE UP (ref 6–8.3)
RBC # BLD: 4.39 M/UL — SIGNIFICANT CHANGE UP (ref 3.8–5.2)
RBC # FLD: 15.9 % — HIGH (ref 10.3–14.5)
SODIUM SERPL-SCNC: 136 MMOL/L — SIGNIFICANT CHANGE UP (ref 135–145)
TROPONIN I, HIGH SENSITIVITY RESULT: 26.3 NG/L — SIGNIFICANT CHANGE UP
WBC # BLD: 5.2 K/UL — SIGNIFICANT CHANGE UP (ref 3.8–10.5)
WBC # FLD AUTO: 5.2 K/UL — SIGNIFICANT CHANGE UP (ref 3.8–10.5)

## 2024-10-08 PROCEDURE — 83735 ASSAY OF MAGNESIUM: CPT

## 2024-10-08 PROCEDURE — 85025 COMPLETE CBC W/AUTO DIFF WBC: CPT

## 2024-10-08 PROCEDURE — 84484 ASSAY OF TROPONIN QUANT: CPT

## 2024-10-08 PROCEDURE — 76705 ECHO EXAM OF ABDOMEN: CPT | Mod: 26

## 2024-10-08 PROCEDURE — 93005 ELECTROCARDIOGRAM TRACING: CPT

## 2024-10-08 PROCEDURE — 83690 ASSAY OF LIPASE: CPT

## 2024-10-08 PROCEDURE — T1013: CPT

## 2024-10-08 PROCEDURE — 96375 TX/PRO/DX INJ NEW DRUG ADDON: CPT

## 2024-10-08 PROCEDURE — 71045 X-RAY EXAM CHEST 1 VIEW: CPT

## 2024-10-08 PROCEDURE — 96374 THER/PROPH/DIAG INJ IV PUSH: CPT

## 2024-10-08 PROCEDURE — 99285 EMERGENCY DEPT VISIT HI MDM: CPT

## 2024-10-08 PROCEDURE — 76705 ECHO EXAM OF ABDOMEN: CPT

## 2024-10-08 PROCEDURE — 71045 X-RAY EXAM CHEST 1 VIEW: CPT | Mod: 26

## 2024-10-08 PROCEDURE — 80053 COMPREHEN METABOLIC PANEL: CPT

## 2024-10-08 PROCEDURE — 99285 EMERGENCY DEPT VISIT HI MDM: CPT | Mod: 25

## 2024-10-08 PROCEDURE — 36415 COLL VENOUS BLD VENIPUNCTURE: CPT

## 2024-10-08 PROCEDURE — 82550 ASSAY OF CK (CPK): CPT

## 2024-10-08 RX ORDER — ACETAMINOPHEN 325 MG
650 TABLET ORAL ONCE
Refills: 0 | Status: COMPLETED | OUTPATIENT
Start: 2024-10-08 | End: 2024-10-08

## 2024-10-08 RX ORDER — ASPIRIN 325 MG
162 TABLET ORAL ONCE
Refills: 0 | Status: DISCONTINUED | OUTPATIENT
Start: 2024-10-08 | End: 2024-10-08

## 2024-10-08 RX ORDER — ONDANSETRON HCL/PF 4 MG/2 ML
4 VIAL (ML) INJECTION ONCE
Refills: 0 | Status: COMPLETED | OUTPATIENT
Start: 2024-10-08 | End: 2024-10-08

## 2024-10-08 RX ORDER — METOCLOPRAMIDE HCL 5 MG
10 TABLET ORAL ONCE
Refills: 0 | Status: COMPLETED | OUTPATIENT
Start: 2024-10-08 | End: 2024-10-08

## 2024-10-08 RX ORDER — FAMOTIDINE 40 MG
20 TABLET ORAL ONCE
Refills: 0 | Status: COMPLETED | OUTPATIENT
Start: 2024-10-08 | End: 2024-10-08

## 2024-10-08 RX ORDER — LABETALOL HYDROCHLORIDE 200 MG/1
20 TABLET, FILM COATED ORAL ONCE
Refills: 0 | Status: COMPLETED | OUTPATIENT
Start: 2024-10-08 | End: 2024-10-08

## 2024-10-08 RX ORDER — ONDANSETRON HCL/PF 4 MG/2 ML
1 VIAL (ML) INJECTION
Qty: 15 | Refills: 0
Start: 2024-10-08 | End: 2024-10-12

## 2024-10-08 RX ORDER — SODIUM CHLORIDE 0.9 % (FLUSH) 0.9 %
500 SYRINGE (ML) INJECTION ONCE
Refills: 0 | Status: COMPLETED | OUTPATIENT
Start: 2024-10-08 | End: 2024-10-08

## 2024-10-08 RX ORDER — FAMOTIDINE 40 MG
1 TABLET ORAL
Qty: 28 | Refills: 0
Start: 2024-10-08 | End: 2024-10-21

## 2024-10-08 RX ADMIN — LABETALOL HYDROCHLORIDE 20 MILLIGRAM(S): 200 TABLET, FILM COATED ORAL at 17:44

## 2024-10-08 RX ADMIN — Medication 650 MILLIGRAM(S): at 19:05

## 2024-10-08 RX ADMIN — Medication 4 MILLIGRAM(S): at 15:13

## 2024-10-08 RX ADMIN — Medication 500 MILLILITER(S): at 15:15

## 2024-10-08 RX ADMIN — Medication 20 MILLIGRAM(S): at 15:13

## 2024-10-08 NOTE — CONSULT NOTE ADULT - ASSESSMENT
1 ESRD on HD (T/TH/SAT)  -Plan for HD today with UF 2.0kg  -Hemodialysis Renal Diet and Fluid restriction to 1L/day  -Adjust meds to eGFR and avoid IV Gadolinium contrast,NSAIDs, and phosphate enema.  -Monitor Electrolytes daily.  2. HTN:   -bp is elevated. her bp regimen: coreg 12.5mg bid; hydralazine 100mg tid; lasxi 40mg daily; imdur 60mg daily; clonidine 0.1mg as needed.  -titrate bp meds to keep sbp >110 and < 130  3. Anemia of ESRD:  -start epogen if hb <10  -F/u CBC daily  -transfuse if HB < 7.0.  4. Mineral Bone Disease:  -continue phoslo tid  -monitor phos       1 ESRD on HD (T/TH/SAT)  -ER planning for discharge today. Patient is okay and no sob and plan for HD tomorrow at outpatient dialysis since electrolytes are stable and no symptoms of volume overload.   -Hemodialysis Renal Diet and Fluid restriction to 1L/day  -Adjust meds to eGFR and avoid IV Gadolinium contrast,NSAIDs, and phosphate enema.  -Monitor Electrolytes daily.  2. HTN:   -bp is improving. her bp regimen: coreg 12.5mg bid; hydralazine 100mg tid; Lasix 40mg daily; imdur 60mg daily; clonidine 0.1mg as needed.  -titrate bp meds to keep sbp >110 and < 130  3. Anemia of ESRD:  -start epogen if hb <10  -F/u CBC daily  -transfuse if HB < 7.0.  4. Mineral Bone Disease:  -continue phoslo tid  -monitor phos  5. Abd pain due to possible gastroenteritis   -abd sono shows gallstones w/o choleycystitis.  -normal LFTs  6. Hyperkalemia:  -no EKG changes  -give lokelma 10gm  -maintain low K diet.  -monitor K.

## 2024-10-08 NOTE — ED ADULT NURSE NOTE - CAS DISCH TRANSFER METHOD
PAST MEDICAL HISTORY:  Gastrointestinal bleeding     HLD (hyperlipidemia)     HTN (hypertension)     Hypothyroidism     T2DM (type 2 diabetes mellitus)     
Private car

## 2024-10-08 NOTE — ED PROVIDER NOTE - CHPI ED SYMPTOMS NEG
Pt seen last on dec 2023, I will call today to make him his fasting f/u ov.  
RELAY    I tried to contact pt, no answer and his vm box was full so I could not leave a vm.  
no dysuria/no hematuria

## 2024-10-08 NOTE — ED PROVIDER NOTE - MUSCULOSKELETAL, MLM
Spine appears normal, range of motion is not limited, no muscle or joint tenderness, no CVAT, Lt forearm-AVG with thrills

## 2024-10-08 NOTE — ED PROVIDER NOTE - CLINICAL SUMMARY MEDICAL DECISION MAKING FREE TEXT BOX
54-year-old female with end-stage renal disease on dialysis complaining of abdominal pain, nausea vomiting diarrhea.  Patient mostly with gastroenteritis.  However exam showed patient with right upper quadrant and epigastric pain, will get sono to rule out biliary colic and reassess

## 2024-10-08 NOTE — ED PROVIDER NOTE - NSFOLLOWUPINSTRUCTIONS_ED_ALL_ED_FT
Gastroenteritis viral en los adultos  Viral Gastroenteritis, Adult  Body outline showing the digestive tract, including the stomach, small intestine, and large intestine.  La gastroenteritis viral también se conoce kim gripe estomacal. Esta afección podría afectar el estómago, el intestino posada y el intestino grueso. Puede causar diarrea líquida, fiebre y vómitos repentinos. Esta afección es causada por muchos virus diferentes. Estos virus pueden transmitirse de tonya persona a otra con mucha facilidad (son contagiosos).    La diarrea y los vómitos pueden hacerlo sentir débil y causar deshidratación. Es posible que no pueda retener los líquidos. La deshidratación puede causarle cansancio, sed, sequedad en la boca y disminución en la frecuencia con la que orina. Es importante restituir los líquidos que pierde por causa de la diarrea y los vómitos.    ¿Cuáles son las causas?  La gastroenteritis es causada por muchos virus, entre los que se incluyen el rotavirus y el norovirus. El norovirus es la causa más frecuente en los adultos. Puede enfermarse después de estar expuesto a los virus de otras personas. También puede enfermarse de las siguientes maneras:  A través de la ingesta de alimentos o agua contaminados, o por tocar superficies contaminadas con alguno de estos virus.  Al compartir utensilios u otros artículos personales con tonya persona infectada.  ¿Qué incrementa el riesgo?  Es más probable que tenga esta afección si:  Tiene debilitado el sistema de defensa del organismo (sistema inmunitario).  Vive con stan o más niños menores de 2 años.  Vive en un hogar de ancianos.  Viaja en un crucero.  ¿Cuáles son los signos o síntomas?  Los síntomas de esta afección suelen aparecer entre 1 y 3 días después de la exposición al virus. Pueden durar algunos días o incluso tonya semana. Los síntomas frecuentes son diarrea líquida y vómitos. Otros síntomas pueden incluir los siguientes:  Fiebre.  Dolor de deana.  Fatiga.  Dolor en el abdomen.  Escalofríos.  Debilidad.  Náuseas.  Funmi musculares.  Pérdida del apetito.  ¿Cómo se diagnostica?  Esta afección se diagnostica mediante tonya revisión de los antecedentes médicos y un examen físico. También podrían hacerle un análisis de materia fecal para detectar virus u otras infecciones.    ¿Cómo se trata?  Por lo general, esta afección desaparece por sí landry. El tratamiento se centra en prevenir la deshidratación y reponer los líquidos perdidos (rehidratación). El tratamiento de esta afección puede incluir:  Tonya solución de rehidratación oral (SRO) para reemplazar sales y minerales (electrolitos) importantes en el cuerpo. Tómela si se lo indicó el médico. Esta es tonya bebida que se vende en farmacias y tiendas minoristas.  Medicamentos para aliviar los síntomas.  Suplementos probióticos para disminuir los síntomas de diarrea.  Administración de líquidos por vía intravenosa si la deshidratación es grave.  Los adultos mayores y las personas que tienen otras enfermedades o un sistema inmunitario débil están en mayor riesgo de deshidratación.    Siga estas indicaciones en bardales casa:  Comida y bebida    A comparison of three sample cups showing dark yellow, yellow, and pale yellow urine.  [place] tonya SRO kim se lo haya indicado el médico.  En la medida en que pueda, judy líquidos transparentes en pequeñas cantidades. Los líquidos transparentes son, por ejemplo:  [place].  Trocitos de hielo.  Jugo de frutas diluido.  Bebidas deportivas de bajas calorías.  Judy suficiente líquido kim para mantener la orina de color amarillo pálido.  Coma pequeñas cantidades de alimentos saludables cada 3 a 4 horas según bardales tolerancia. Estos pueden incluir cereales integrales, frutas, verduras, tyree magras y yogur.  Evite consumir líquidos que contengan mucha azúcar o cafeína, kim bebidas energéticas, bebidas deportivas y refrescos.  Evite los alimentos condimentados o con alto contenido de grasa.  Evite izaiah alcohol.  Indicaciones generales    Washing hands with soap and water.  Lávese las zuhair con frecuencia, especialmente después de tener diarrea o vómitos. Use desinfectante para zuhair si no dispone de agua y jabón.  Asegúrese de que todas las personas que viven en bardales casa se laven mone las zuhair y con frecuencia.  Use los medicamentos de venta jessie y los recetados solamente kim se lo haya indicado el médico.  Descanse en bardales casa mientras se recupera.  Controle bardales afección para detectar cualquier cambio.  [place] un baño caliente para ayudar a disminuir el ardor o el dolor causados por los episodios frecuentes de diarrea.  Concurra a todas las visitas de seguimiento. [place] es importante.  Comuníquese con un médico si:  No retiene los líquidos.  Tiene síntomas que empeoran.  Tiene nuevos síntomas.  Se siente mareado o siente que va a desvanecerse.  Presenta calambres musculares.  Solicite ayuda de inmediato si:  Siente dolor en el pecho.  Tiene problemas para respirar o respira muy rápidamente.  Tiene latidos cardíacos acelerados.  Se siente muy débil o se desmaya.  Siente dolor de deana intenso, rigidez en el nino, o ambas cosas.  Tiene tonya erupción cutánea.  Tiene dolor intenso, cólicos o distensión en el abdomen.  Tiene la piel fría y húmeda.  Se siente confundido.  Tiene dolor al orinar.  Tiene signos de deshidratación, kim los siguientes:  Orina de color oscuro, muy escasa o falta de orina.  Labios agrietados.  Sequedad de boca.  Ojos hundidos.  Somnolencia.  Debilidad.  Presenta signos de sangrado, kim los siguientes:  Observa cale en el vómito.  Tiene vómitos que se asemejan al poso del café.  Hace heces sanguinolentas, negras o con aspecto alquitranado.  Estos síntomas pueden indicar tonya emergencia. Solicite ayuda de inmediato. Llame al 911.  No espere a val si los síntomas desaparecen.  No conduzca por jim propios medios hasta el hospital.  Resumen  La gastroenteritis viral también se conoce kim gripe estomacal. Puede causar diarrea líquida, fiebre y vómitos repentinos.  Esta afección se puede transmitir de tonya persona a otra con mucha facilidad (es contagiosa).  Judy tonya solución de rehidratación oral (SRO) si se lo indicó el médico. Esta es tonya bebida que se vende en farmacias y tiendas minoristas.  Lávese las zuhair con frecuencia, especialmente después de tener diarrea o vómitos. Use desinfectante para zuhair si no dispone de agua y jabón.  Esta información no tiene kim fin reemplazar el consejo del médico. Asegúrese de hacerle al médico cualquier pregunta que tenga.        Opciones de alimentos para ayudar a aliviar la diarrea en los adultos  Food Choices to Help Relieve Diarrhea, Adult  La diarrea puede hacerlo sentir débil y deshidratarlo. La deshidratación es tonya afección que se caracteriza por tonya cantidad insuficiente de agua u otros líquidos en el organismo. Es importante elegir los alimentos y las bebidas que adry adecuados para lo siguiente:  Aliviar la diarrea.  Remplazar los líquidos y nutrientes perdidos.  Evitar la deshidratación.  Consejos para seguir feli plan  Alivio de la diarrea    Evite los alimentos que empeoren la diarrea. Pueden incluir:  Alimentos y bebidas endulzados con jarabe de maíz de alto contenido de fructosa, miel o endulzantes, kim xilitol, sorbitol y manitol. Consulte las etiquetas de los alimentos para val si tienen estos ingredientes.  Comidas fritas, grasosas o condimentadas.  Frutas y verduras crudas.  Consuma alimentos con alto contenido de probióticos. Entre estos alimentos, se incluyen el yogur y los productos fermentados de la leche. Los probióticos pueden contribuir a aumentar la cantidad de bacterias saludables del estómago y de los intestinos (tubo digestivo o tracto gastrointestinal [GI]). [place] puede favorecer la digestión y detener la diarrea.  Si tiene intolerancia a la lactosa, evite los productos lácteos. Estos podrían empeorar la diarrea.  [place] los medicamentos para detener la diarrea solo kim se lo haya indicado el médico.  Reemplazo de nutrientes    Bananas next to a bowl of applesauce.  Consuma alimentos blandos y fáciles de digerir en pequeñas cantidades, en la medida en que pueda, hasta que la diarrea comience a mejorar. Estos alimentos incluyen bananas, compota de manzana, arroz, tostadas y galletas saladas.  Con el tiempo, agregue alimentos ricos en nutrientes a medida que el cuerpo los tolere o kim se lo indique el médico. [place] incluye lo siguiente:  Alimentos proteicos mone cocidos, kim huevos, tyree magras, kim pescado o rere sin piel, y tofu.  Frutas y verduras peladas, sin semillas y apenas cocidas.  Productos lácteos con bajo contenido de grasa.  Cereales integrales.  [place] suplementos de vitaminas y minerales kim se lo haya indicado el médico.  Evitar la deshidratación    A bottle of clear fruit juice and glass of water.  Comience bebiendo pequeños sorbos de agua o de tonya solución para evitar la deshidratación (solución de rehidratación oral [SRO]). Esta es tonya bebida que ayuda a reponer los líquidos y los minerales que el cuerpo perdió. Puede comprar tonya SRO en farmacias y tiendas minoristas.  Intente beber, al menos, entre 8 y 10 tazas (2,000-2,500 ml) de líquidos todos los días para ayudar a reponer los líquidos perdidos. Si bardales orina es de color amarillo pálido, está recibiendo la cantidad suficiente de líquidos.  Puede beber otros líquidos además de agua, kim jugo de frutas con agregado de agua (jugo de fruta diluido) o bebidas deportivas de bajas calorías, según lo tolere o según las indicaciones del médico.  Evite consumir bebidas con cafeína, kim café, té o gaseosas.  Evite izaiah alcohol.  Esta información no tiene kim fin reemplazar el consejo del médico. Asegúrese de hacerle al médico cualquier pregunta que tenga.      Drink plenty of fluids   take probiotic as directed   frequent handwashing   avoid greasy, dairy, fried food for 24 to 48 hours   go for your dialysis tomorrow

## 2024-10-08 NOTE — ED ADULT NURSE NOTE - OBJECTIVE STATEMENT
Pt presents to the ED c/o epigastric pain, fever and  N/V x 1 a.m last night. Pt endorses exposure to bad food x 1 day ago. Pt denies any chills, chest pain, SOB, no bloody stools or urinary discomfort as per Pt. No fever upon ED arrival noted.

## 2024-10-08 NOTE — ED PROVIDER NOTE - PATIENT PORTAL LINK FT
You can access the FollowMyHealth Patient Portal offered by [facility] by registering at the following website: [email or web address]. By joining [facility]’s FollowMyHealth portal, you will also be able to view your health information using other applications (apps) compatible with our system.

## 2024-10-08 NOTE — ED PROVIDER NOTE - OBJECTIVE STATEMENT
54-year-old post menopause female with history of ESRD on HD, last HD Saturday, HTN, HLD, enlarged thyroid, thrombocytopenia.  Patient complaining of generalized abdominal pain early morning, associated with vomited x 5, watery stool x 5, no BRBPR, subjective fever, decreased appetite.  Patient urinates very little 1-2 times per day.  She describes as burning sensation

## 2024-10-08 NOTE — CONSULT NOTE ADULT - SUBJECTIVE AND OBJECTIVE BOX
NEPHROLOGY MEDICAL CARE, [facility] - Dr. Marco Calle/ Dr. Nicholas Mckeon/ Dr. Ry Zuniga/ Dr. Roxi Reese    Date of Service: 10-08-24    Patient was seen and examined at bedside.    Consultation requested by:  Doctor    Reason for Consult: End Stage Renal Disease management        HPI:    54y F with HTN, HLD, anemia, diastolic HF (last TTE 2023- Grade IV diastolic dysfunction (severe with fixed restrictive pattern), mild TR, mild MS, RV systolic pressure mod inc 46 mmHg, moderate pericardial effusion), ESRD on HD (T//Sat at [facility], Nephro: Dr. Calle) presents to the ED with sob and nausea/vomiting. Pt completed her dialysis treatment on Sat. Patient has h/o uncontrolled HTN with multiple drugs that were adjusted and Patient also is unable to tolerate more than 2L per HD sessions. Patient doesn't make much urine.     PMH:   No pertinent past medical history    HTN (hypertension), benign    HLD (hyperlipidemia)    ESRD on dialysis    Anemia    Enlarged thyroid    H/O thrombocytopenia        PSH:     H/O  section    H/O tubal ligation    S/P hemodialysis catheter insertion        FAMILY HISTORY:  FH: HTN (hypertension) (Mother)        Social History:  non-smoker/ non-alcoholic     Home Meds:  Home Medications:      Allergies:  Allergies    No Known Allergies    Intolerances        REVIEW OF SYSTEMS:  CONSTITUTIONAL: No fever; No weight loss; No fatigue  EYES: No eye pain; No visual disturbances; No discharge  ENMT:  No difficulty hearing; No tinnitus;  No vertigo; No sinus; No throat pain  NECK: No pain; No stiffness  BREASTS: No pain; No masses; No nipple discharge  RESPIRATORY: No cough; No wheezing; No chills; No hemoptysis;  shortness of breath  CARDIOVASCULAR: No chest pain; No palpitations; No dizziness; No leg swelling  GASTROINTESTINAL: No abdominal pain; No epigastric pain;  nausea; vomiting; No hematemesis; No diarrhea; No constipation. No melena   GENITOURINARY: No dysuria No frequency; No hematuria; No incontinence  NEUROLOGICAL: No headaches; No memory loss; No loss of strength; No numbness; No tremors  SKIN: No itching; No burning; No rashes  ENDOCRINE: No heat or cold intolerance; No hair loss  MUSCULOSKELETAL: No joint pain or swelling; No muscle, back, or extremity pain  PSYCHIATRIC: No depression; No anxiety; No mood swings; No difficulty sleeping  HEME/LYMPH: No easy bruising; No bleeding gums  ALLERY AND IMMUNOLOGIC: No hives or eczema    Vital Signs Last 24 Hrs  T(C): 36.4 (08 Oct 2024 12:38), Max: 36.4 (08 Oct 2024 12:38)  T(F): 97.6 (08 Oct 2024 12:38), Max: 97.6 (08 Oct 2024 12:38)  HR: 78 (08 Oct 2024 12:38) (78 - 78)  BP: 188/98 (08 Oct 2024 12:38) (188/98 - 188/98)  BP(mean): --  RR: 18 (08 Oct 2024 12:38) (18 - 18)  SpO2: 94% (08 Oct 2024 12:38) (94% - 94%)    Parameters below as of 08 Oct 2024 12:38  Patient On (Oxygen Delivery Method): room air          PHYSICAL EXAM:  General: No acute respiratory distress.  Eyes: conjunctiva and sclera clear  ENMT: Atraumatic, Normocephalic, supple, No JVD present. Moist mucous membranes  Respiratory:   Bilaterally clear lungs; No rales, rhonchi, wheezing  Cardiovascular: S1S2+; no m/r/g  Gastrointestinal: Soft, Non-tender, Nondistended; Bowel sounds present, no hepatosplenomegaly.   Neuro:  Awake, Alert & Oriented X3, No focal deficits present.   Ext:  2+ Peripheral Pulses and No edema, No Cyanosis  Skin: No rashes  Back: No CVA tenderness.  Dialysis Access:  Left wrist AVF thrill/bruit+ and Right chest PERMACATH    LABS:                Urine studies      Medications:         NEPHROLOGY MEDICAL CARE, [facility] - Dr. Marco Calle/ Dr. Nicholas Mckeon/ Dr. Ry Zuniga/ Dr. Roxi Reese    Date of Service: 10-08-24    Patient was seen and examined at bedside.    Consultation requested by:  ER    Reason for Consult: End Stage Renal Disease management        HPI:    54y F with HTN, HLD, anemia, diastolic HF (last TTE 2023- Grade IV diastolic dysfunction (severe with fixed restrictive pattern), mild TR, mild SC, RV systolic pressure mod inc 46 mmHg, moderate pericardial effusion), ESRD on HD (T//Sat at [facility], Nephro: Dr. Calle) presents to the ED with nausea/vomiting and diarrhea. Pt completed her dialysis treatment on Sat. Patient has h/o uncontrolled HTN with multiple drugs that were adjusted and Patient also is unable to tolerate more than 2L per HD sessions. Patient doesn't make much urine. In the Er, K was 5.6meq/L w/o EKG changes and given lokelma 10gm once. Patient has no sob present.     PMH:   No pertinent past medical history    HTN (hypertension), benign    HLD (hyperlipidemia)    ESRD on dialysis    Anemia    Enlarged thyroid    H/O thrombocytopenia        PSH:     H/O  section    H/O tubal ligation    S/P hemodialysis catheter insertion        FAMILY HISTORY:  FH: HTN (hypertension) (Mother)        Social History:  non-smoker/ non-alcoholic     Home Meds:  Home Medications:      Allergies:  Allergies    No Known Allergies    Intolerances        REVIEW OF SYSTEMS:  CONSTITUTIONAL: No fever; No weight loss; No fatigue  EYES: No eye pain; No visual disturbances; No discharge  ENMT:  No difficulty hearing; No tinnitus;  No vertigo; No sinus; No throat pain  NECK: No pain; No stiffness  BREASTS: No pain; No masses; No nipple discharge  RESPIRATORY: No cough; No wheezing; No chills; No hemoptysis; no shortness of breath  CARDIOVASCULAR: No chest pain; No palpitations; No dizziness; No leg swelling  GASTROINTESTINAL: No abdominal pain; No epigastric pain;  nausea; vomiting; No hematemesis; diarrhea; No constipation. No melena   GENITOURINARY: No dysuria No frequency; No hematuria; No incontinence  NEUROLOGICAL: No headaches; No memory loss; No loss of strength; No numbness; No tremors  SKIN: No itching; No burning; No rashes  ENDOCRINE: No heat or cold intolerance; No hair loss  MUSCULOSKELETAL: No joint pain or swelling; No muscle, back, or extremity pain  PSYCHIATRIC: No depression; No anxiety; No mood swings; No difficulty sleeping  HEME/LYMPH: No easy bruising; No bleeding gums  ALLERY AND IMMUNOLOGIC: No hives or eczema    Vital Signs Last 24 Hrs  T(C): 36.4 (08 Oct 2024 12:38), Max: 36.4 (08 Oct 2024 12:38)  T(F): 97.6 (08 Oct 2024 12:38), Max: 97.6 (08 Oct 2024 12:38)  HR: 78 (08 Oct 2024 12:38) (78 - 78)  BP: 188/98 (08 Oct 2024 12:38) (188/98 - 188/98)  BP(mean): --  RR: 18 (08 Oct 2024 12:38) (18 - 18)  SpO2: 94% (08 Oct 2024 12:38) (94% - 94%)    Parameters below as of 08 Oct 2024 12:38  Patient On (Oxygen Delivery Method): room air          PHYSICAL EXAM:  General: No acute respiratory distress.  Eyes: conjunctiva and sclera clear  ENMT: Atraumatic, Normocephalic, supple, No JVD present. Moist mucous membranes  Respiratory:   Bilaterally poor air entry; No rales, rhonchi, wheezing  Cardiovascular: S1S2+; no m/r/g  Gastrointestinal: Soft, RUQ tenderness, Nondistended; Bowel sounds present, no hepatosplenomegaly.   Neuro:  Awake, Alert & Oriented X3, No focal deficits present.   Ext:  2+ Peripheral Pulses and No edema, No Cyanosis  Skin: No rashes  Dialysis Access:  Left wrist AVF thrill/bruit+ and Right chest PERMACATH    LABS:                Urine studies      Medications:

## 2024-11-26 ENCOUNTER — EMERGENCY (EMERGENCY)
Facility: HOSPITAL | Age: 54
LOS: 1 days | Discharge: HOME | End: 2024-11-26
Attending: EMERGENCY MEDICINE
Payer: MEDICARE

## 2024-11-26 VITALS
HEART RATE: 72 BPM | TEMPERATURE: 99 F | SYSTOLIC BLOOD PRESSURE: 189 MMHG | WEIGHT: 145.51 LBS | OXYGEN SATURATION: 100 % | DIASTOLIC BLOOD PRESSURE: 96 MMHG | RESPIRATION RATE: 18 BRPM

## 2024-11-26 DIAGNOSIS — Z99.2 DEPENDENCE ON RENAL DIALYSIS: Chronic | ICD-10-CM

## 2024-11-26 DIAGNOSIS — Z98.51 TUBAL LIGATION STATUS: Chronic | ICD-10-CM

## 2024-11-26 DIAGNOSIS — Z98.891 HISTORY OF UTERINE SCAR FROM PREVIOUS SURGERY: Chronic | ICD-10-CM

## 2024-11-26 LAB
ALBUMIN SERPL ELPH-MCNC: 3.3 G/DL — LOW (ref 3.5–5)
ALP SERPL-CCNC: 55 U/L — SIGNIFICANT CHANGE UP (ref 40–120)
ALT FLD-CCNC: 16 U/L DA — SIGNIFICANT CHANGE UP (ref 10–60)
ANION GAP SERPL CALC-SCNC: 9 MMOL/L — SIGNIFICANT CHANGE UP (ref 5–17)
AST SERPL-CCNC: 13 U/L — SIGNIFICANT CHANGE UP (ref 10–40)
BASOPHILS # BLD AUTO: 0.02 K/UL — SIGNIFICANT CHANGE UP (ref 0–0.2)
BASOPHILS NFR BLD AUTO: 0.6 % — SIGNIFICANT CHANGE UP (ref 0–2)
BILIRUB SERPL-MCNC: 0.4 MG/DL — SIGNIFICANT CHANGE UP (ref 0.2–1.2)
BUN SERPL-MCNC: 63 MG/DL — HIGH (ref 7–18)
CALCIUM SERPL-MCNC: 8.3 MG/DL — LOW (ref 8.4–10.5)
CHLORIDE SERPL-SCNC: 102 MMOL/L — SIGNIFICANT CHANGE UP (ref 96–108)
CO2 SERPL-SCNC: 26 MMOL/L — SIGNIFICANT CHANGE UP (ref 22–31)
CREAT SERPL-MCNC: 9.89 MG/DL — HIGH (ref 0.5–1.3)
EGFR: 4 ML/MIN/1.73M2 — LOW
EOSINOPHIL # BLD AUTO: 0.1 K/UL — SIGNIFICANT CHANGE UP (ref 0–0.5)
EOSINOPHIL NFR BLD AUTO: 3 % — SIGNIFICANT CHANGE UP (ref 0–6)
GLUCOSE SERPL-MCNC: 149 MG/DL — HIGH (ref 70–99)
HCT VFR BLD CALC: 36.7 % — SIGNIFICANT CHANGE UP (ref 34.5–45)
HGB BLD-MCNC: 11.9 G/DL — SIGNIFICANT CHANGE UP (ref 11.5–15.5)
IMM GRANULOCYTES NFR BLD AUTO: 0.3 % — SIGNIFICANT CHANGE UP (ref 0–0.9)
LYMPHOCYTES # BLD AUTO: 0.72 K/UL — LOW (ref 1–3.3)
LYMPHOCYTES # BLD AUTO: 21.9 % — SIGNIFICANT CHANGE UP (ref 13–44)
MAGNESIUM SERPL-MCNC: 2.3 MG/DL — SIGNIFICANT CHANGE UP (ref 1.6–2.6)
MCHC RBC-ENTMCNC: 31.6 PG — SIGNIFICANT CHANGE UP (ref 27–34)
MCHC RBC-ENTMCNC: 32.4 G/DL — SIGNIFICANT CHANGE UP (ref 32–36)
MCV RBC AUTO: 97.6 FL — SIGNIFICANT CHANGE UP (ref 80–100)
MONOCYTES # BLD AUTO: 0.33 K/UL — SIGNIFICANT CHANGE UP (ref 0–0.9)
MONOCYTES NFR BLD AUTO: 10 % — SIGNIFICANT CHANGE UP (ref 2–14)
NEUTROPHILS # BLD AUTO: 2.11 K/UL — SIGNIFICANT CHANGE UP (ref 1.8–7.4)
NEUTROPHILS NFR BLD AUTO: 64.2 % — SIGNIFICANT CHANGE UP (ref 43–77)
NRBC # BLD: 0 /100 WBCS — SIGNIFICANT CHANGE UP (ref 0–0)
NT-PROBNP SERPL-SCNC: HIGH PG/ML (ref 0–125)
PLATELET # BLD AUTO: 73 K/UL — LOW (ref 150–400)
POTASSIUM SERPL-MCNC: 4.3 MMOL/L — SIGNIFICANT CHANGE UP (ref 3.5–5.3)
POTASSIUM SERPL-SCNC: 4.3 MMOL/L — SIGNIFICANT CHANGE UP (ref 3.5–5.3)
PROT SERPL-MCNC: 6.2 G/DL — SIGNIFICANT CHANGE UP (ref 6–8.3)
RBC # BLD: 3.76 M/UL — LOW (ref 3.8–5.2)
RBC # FLD: 15.9 % — HIGH (ref 10.3–14.5)
SODIUM SERPL-SCNC: 137 MMOL/L — SIGNIFICANT CHANGE UP (ref 135–145)
WBC # BLD: 3.29 K/UL — LOW (ref 3.8–10.5)
WBC # FLD AUTO: 3.29 K/UL — LOW (ref 3.8–10.5)

## 2024-11-26 PROCEDURE — 72100 X-RAY EXAM L-S SPINE 2/3 VWS: CPT

## 2024-11-26 PROCEDURE — 99285 EMERGENCY DEPT VISIT HI MDM: CPT

## 2024-11-26 PROCEDURE — 96374 THER/PROPH/DIAG INJ IV PUSH: CPT

## 2024-11-26 PROCEDURE — 83880 ASSAY OF NATRIURETIC PEPTIDE: CPT

## 2024-11-26 PROCEDURE — 71046 X-RAY EXAM CHEST 2 VIEWS: CPT

## 2024-11-26 PROCEDURE — 83735 ASSAY OF MAGNESIUM: CPT

## 2024-11-26 PROCEDURE — 85025 COMPLETE CBC W/AUTO DIFF WBC: CPT

## 2024-11-26 PROCEDURE — 96375 TX/PRO/DX INJ NEW DRUG ADDON: CPT

## 2024-11-26 PROCEDURE — 80053 COMPREHEN METABOLIC PANEL: CPT

## 2024-11-26 PROCEDURE — 99284 EMERGENCY DEPT VISIT MOD MDM: CPT | Mod: 25

## 2024-11-26 PROCEDURE — 36415 COLL VENOUS BLD VENIPUNCTURE: CPT

## 2024-11-26 PROCEDURE — 71046 X-RAY EXAM CHEST 2 VIEWS: CPT | Mod: 26

## 2024-11-26 PROCEDURE — 72100 X-RAY EXAM L-S SPINE 2/3 VWS: CPT | Mod: 26

## 2024-11-26 RX ORDER — KETOROLAC TROMETHAMINE 30 MG/ML
15 INJECTION INTRAMUSCULAR; INTRAVENOUS ONCE
Refills: 0 | Status: DISCONTINUED | OUTPATIENT
Start: 2024-11-26 | End: 2024-11-26

## 2024-11-26 RX ORDER — LIDOCAINE HYDROCHLORIDE 40 MG/ML
1 SOLUTION TOPICAL ONCE
Refills: 0 | Status: COMPLETED | OUTPATIENT
Start: 2024-11-26 | End: 2024-11-26

## 2024-11-26 RX ORDER — MORPHINE SULFATE 30 MG/1
4 TABLET, EXTENDED RELEASE ORAL ONCE
Refills: 0 | Status: DISCONTINUED | OUTPATIENT
Start: 2024-11-26 | End: 2024-11-26

## 2024-11-26 RX ADMIN — LIDOCAINE HYDROCHLORIDE 1 PATCH: 40 SOLUTION TOPICAL at 19:25

## 2024-11-26 RX ADMIN — KETOROLAC TROMETHAMINE 15 MILLIGRAM(S): 30 INJECTION INTRAMUSCULAR; INTRAVENOUS at 21:38

## 2024-11-26 RX ADMIN — MORPHINE SULFATE 4 MILLIGRAM(S): 30 TABLET, EXTENDED RELEASE ORAL at 19:24
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
## 2024-11-26 NOTE — ED PROVIDER NOTE - PATIENT PORTAL LINK FT
You can access the FollowMyHealth Patient Portal offered by [facility] by registering at the following website: [email or web address]. By joining [employer]’s FollowMyHealth portal, you will also be able to view your health information using other applications (apps) compatible with our system.

## 2024-11-26 NOTE — ED ADULT NURSE NOTE - CHIEF COMPLAINT QUOTE
No
BIBA for back pain w nausea during dialysis, 1 hr tx completed  Left AV graft  Dialysis T-thur-sat

## 2024-11-26 NOTE — ED PROVIDER NOTE - NSFOLLOWUPINSTRUCTIONS_ED_ALL_ED_FT
Acute Low Back Pain    WHAT YOU NEED TO KNOW:    What is acute low back pain? Acute low back pain is sudden discomfort that lasts up to 6 weeks and makes activity difficult.    What causes or increases my risk for acute low back pain? Conditions that affect the spine, joints, or muscles can cause back pain. These may include arthritis, spinal stenosis (narrowing of the spinal column), muscle tension, or breakdown of the spinal discs. The following increase your risk for back pain:    Repeated bending, lifting, or twisting, or lifting heavy items    Injury from a fall or accident    Lack of regular physical activity    Obesity or pregnancy    Smoking    Aging    Driving, sitting, or standing for long periods    Bad posture while sitting or standing  How is the cause of acute low back pain diagnosed? Your healthcare provider will ask about your medical history and examine you. He or she may ask when you last had low back pain and how it started. Show him or her where you feel the pain and what makes it better or worse. Tell your provider about the type of pain you have, how bad it is, and how long it lasts. Tell him or her if your pain worsens at night or when you lie on your back.  Pain Scale     How is acute low back pain treated? The goal of treatment is to relieve your pain and help you be able to do your regular activities. Most people with acute low back pain get better within 4 to 6 weeks. You may need any of the following:    NSAIDs, such as ibuprofen, help decrease swelling, pain, and fever. This medicine is available with or without a doctor's order. NSAIDs can cause stomach bleeding or kidney problems in certain people. If you take blood thinner medicine, always ask your healthcare provider if NSAIDs are safe for you. Always read the medicine label and follow directions.    Acetaminophen decreases pain and fever. It is available without a doctor's order. Ask how much to take and how often to take it. Follow directions. Read the labels of all other medicines you are using to see if they also contain acetaminophen, or ask your doctor or pharmacist. Acetaminophen can cause liver damage if not taken correctly.    Muscle relaxers decrease pain by relaxing the muscles in your lower spine.    Prescription pain medicine may be given. Ask your healthcare provider how to take this medicine safely. Some prescription pain medicines contain acetaminophen. Do not take other medicines that contain acetaminophen without talking to your healthcare provider. Too much acetaminophen may cause liver damage. Prescription pain medicine may cause constipation. Ask your healthcare provider how to prevent or treat constipation.  What can I do to prevent low back pain?    Use proper body mechanics.  Bend at the hips and knees when you  objects. Do not bend from the waist. Use your leg muscles as you lift the load. Do not use your back. Keep the object close to your chest as you lift it. Try not to twist or lift anything above your waist.  How to Lift Items Safely      Change your position often when you stand for long periods of time. Rest one foot on a small box or footrest, and then switch to the other foot often.    Try not to sit for long periods of time. When you do, sit in a straight-backed chair with your feet flat on the floor. Never reach, pull, or push while you are sitting.    Do exercises that strengthen your back muscles. Warm up before you exercise. Ask your healthcare provider for the best exercises for you.  Warm up and Cool Down       Maintain a healthy weight. Ask your healthcare provider what a healthy weight is for you. Ask him or her to help you create a weight loss plan if you are overweight.  How can I take care of myself if I have acute low back pain?    Stay active as much as you can without causing more pain. Bed rest could make your back pain worse. Start with some light exercises, such as walking. Avoid heavy lifting until your pain is gone. Ask for more information about the activities or exercises that are right for you.   FAMILY WALKING FOR EXERCISE      Apply heat on your back for 20 to 30 minutes every 2 hours for as many days as directed. Heat helps decrease pain and muscle spasms. Alternate heat and ice.    Apply ice on your back for 15 to 20 minutes every hour or as directed. Use an ice pack, or put crushed ice in a plastic bag. Cover it with a towel before you apply it to your skin. Ice helps prevent tissue damage and decreases swelling and pain.  When should I seek immediate care?    You have severe pain.    You have sudden stiffness and heaviness down both legs.    You have numbness or weakness in one leg, or pain in both legs.    You have numbness in your genital area or across your lower back.    You cannot control your urine or bowel movements.  When should I call my doctor?    You have a fever.    You have pain at night or when you rest.    Your pain does not get better with treatment.    You have pain that worsens when you cough or sneeze.    You suddenly feel something pop or snap in your back.    You have questions or concerns about your condition or care.  CARE AGREEMENT:    You have the right to help plan your care. Learn about your health condition and how it may be treated. Discuss treatment options with your healthcare providers to decide what care you want to receive. You always have the right to refuse treatment.    © [employer] 1973, 2024    	  back to top

## 2024-11-26 NOTE — ED PROVIDER NOTE - OBJECTIVE STATEMENT
54-year-old female Telugu-speaking presents to ED with back pain after only completing 1 hour of dialysis.   services offered patient preferred to use son at bedside.  Patient has dialysis Tuesday Thursdays and Saturdays.  Patient last had full dialysis on Saturday.  As per son last week the patient bent over to pick something up and hurt her back.  Since then she has been complaining of low back pain.  Patient went to PMD for same was given medications and topical patches however has felt no relief.  Today patient was in dialysis and an hour into dialysis patient could not continue because of the back pain.  No chest pain no shortness of breath.  Dialysis was stopped and patient was brought to the ED for further evaluation.  In ED patient planing of nausea no vomiting no chest pain no shortness of breath no fever no urinary fecal incontinence no saddle anesthesia.

## 2024-11-26 NOTE — ED ADULT NURSE NOTE - OBJECTIVE STATEMENT
Pt c/o lower back pain x 1 week. Unable to complete dialysis today due to pain. On Dialysis Tues-Thurs-Sat, AV fistula to left arm.

## 2024-11-26 NOTE — ED PROVIDER NOTE - PROGRESS NOTE DETAILS
Case discussed with patient's nephrologist Dr. Alva Pang  Patient received 2 hours of dialysis prior to stopping.  Potassium within normal limits patient not fluid overloaded.  Patient does not need emergent dialysis this evening.  She could follow-up with her scheduled dialysis on Thursday.  As per Dr. [place].  the dialysis center will be open on Thursday though it is Thanksgiving.  Patient reassessed and still having severe back pain and does not feel like she could walk.  Will give additional analgesia and reassess Patient reassessed and now feeling better. patient ambulatory.  Patient's son here to accompany her home

## 2024-11-26 NOTE — ED PROVIDER NOTE - CLINICAL SUMMARY MEDICAL DECISION MAKING FREE TEXT BOX
54-year-old female presents ED with back pain after only completing 1 hour of dialysis.  No chest pain, no shortness of breath.  As per patient back pain occurred when she bent over to pick something last week.  No focal deficits on exam.  Will get x-rays, analgesia, labs, reassess.  Patient not fluid overloaded.  Upon lab results were discussed with nephrology plan for dialysis

## 2024-11-26 NOTE — ED ADULT TRIAGE NOTE - BP NONINVASIVE SYSTOLIC (MM HG)
189 Pt will perform bed mobility skills independently with proper hand/feet placement, proper sequencing and proper body mechanics within 2 weeks

## 2025-03-31 NOTE — H&P ADULT - PROBLEM SELECTOR PLAN 3
Lab orders placed for upcoming annual visit.   Encounter closed.    on CT scan   f/u resolution post HD   cardiology consult on CT scan   f/u resolution post HD   cardiology consult Dr. Salguero

## 2025-06-18 NOTE — ED ADULT NURSE NOTE - NS ED NURSE PATIENT LEFT UNIT TIME
You can access the FollowMyHealth Patient Portal offered by [facility] by registering at the following website: [email or web address]. By joining [employer]’s FollowMyHealth portal, you will also be able to view your health information using other applications (apps) compatible with our system.
05:28

## 2025-07-22 NOTE — ED PROVIDER NOTE - NS ED MD DISPO DIVISION
Caller returning call to Clinical Team- Connected to RN- Johnny- Connect call to Triage queue- Route message to provider's clinical support pool .     Reason call connected to clinical:  order        [facility]

## (undated) DEVICE — SUT SILK 2-0 12-18"

## (undated) DEVICE — DRSG CURITY GAUZE SPONGE 4 X 4" 12-PLY

## (undated) DEVICE — SOL IRR POUR NS 0.9% 1500ML

## (undated) DEVICE — SUT SILK 3-0 18" TIES

## (undated) DEVICE — ELCTR GROUNDING PAD ADULT COVIDIEN

## (undated) DEVICE — VENODYNE/SCD SLEEVE CALF MEDIUM

## (undated) DEVICE — SUT PROLENE 7-0 24" BV-1

## (undated) DEVICE — SUT SILK 4-0 30" TIES

## (undated) DEVICE — LAP PAD W RING 18 X 18"

## (undated) DEVICE — FOR-ESU VALLEYLAB T7E14982DX: Type: DURABLE MEDICAL EQUIPMENT

## (undated) DEVICE — SUT VICRYL PLUS 2-0 27" SH

## (undated) DEVICE — PACK AV FISTULA

## (undated) DEVICE — DRSG KLING 6"

## (undated) DEVICE — SUT PROLENE 6-0 18" BV-1

## (undated) DEVICE — SUT NYLON 3-0 18" PS-2

## (undated) DEVICE — SYR LUER LOK 5CC

## (undated) DEVICE — DRSG TEGADERM 4X4.75"